# Patient Record
Sex: MALE | Race: WHITE | NOT HISPANIC OR LATINO | Employment: OTHER | URBAN - METROPOLITAN AREA
[De-identification: names, ages, dates, MRNs, and addresses within clinical notes are randomized per-mention and may not be internally consistent; named-entity substitution may affect disease eponyms.]

---

## 2017-08-10 ENCOUNTER — TRANSFERRED RECORDS (OUTPATIENT)
Dept: HEALTH INFORMATION MANAGEMENT | Facility: CLINIC | Age: 62
End: 2017-08-10

## 2018-01-13 ENCOUNTER — APPOINTMENT (OUTPATIENT)
Dept: GENERAL RADIOLOGY | Facility: CLINIC | Age: 63
End: 2018-01-13
Attending: EMERGENCY MEDICINE
Payer: COMMERCIAL

## 2018-01-13 ENCOUNTER — HOSPITAL ENCOUNTER (EMERGENCY)
Facility: CLINIC | Age: 63
Discharge: HOME | End: 2018-01-13
Attending: EMERGENCY MEDICINE | Admitting: EMERGENCY MEDICINE
Payer: COMMERCIAL

## 2018-01-13 VITALS
DIASTOLIC BLOOD PRESSURE: 60 MMHG | SYSTOLIC BLOOD PRESSURE: 102 MMHG | RESPIRATION RATE: 20 BRPM | TEMPERATURE: 99.8 F | HEIGHT: 68 IN | OXYGEN SATURATION: 99 % | HEART RATE: 81 BPM

## 2018-01-13 DIAGNOSIS — J98.8 RESPIRATORY INFECTION: ICD-10-CM

## 2018-01-13 LAB
FLUAV+FLUBV AG SPEC QL: NEGATIVE
FLUAV+FLUBV AG SPEC QL: NEGATIVE
SPECIMEN SOURCE: NORMAL

## 2018-01-13 PROCEDURE — 25000132 ZZH RX MED GY IP 250 OP 250 PS 637: Performed by: EMERGENCY MEDICINE

## 2018-01-13 PROCEDURE — 99284 EMERGENCY DEPT VISIT MOD MDM: CPT | Mod: 25 | Performed by: EMERGENCY MEDICINE

## 2018-01-13 PROCEDURE — 87804 INFLUENZA ASSAY W/OPTIC: CPT | Performed by: EMERGENCY MEDICINE

## 2018-01-13 PROCEDURE — 99284 EMERGENCY DEPT VISIT MOD MDM: CPT | Mod: Z6 | Performed by: EMERGENCY MEDICINE

## 2018-01-13 PROCEDURE — 71046 X-RAY EXAM CHEST 2 VIEWS: CPT

## 2018-01-13 RX ORDER — IPRATROPIUM BROMIDE AND ALBUTEROL SULFATE 2.5; .5 MG/3ML; MG/3ML
3 SOLUTION RESPIRATORY (INHALATION) ONCE
Status: DISCONTINUED | OUTPATIENT
Start: 2018-01-13 | End: 2018-01-13

## 2018-01-13 RX ORDER — OSELTAMIVIR PHOSPHATE 75 MG/1
75 CAPSULE ORAL 2 TIMES DAILY
Qty: 9 CAPSULE | Refills: 0 | Status: SHIPPED | OUTPATIENT
Start: 2018-01-14 | End: 2018-01-19

## 2018-01-13 RX ORDER — BENZONATATE 100 MG/1
200 CAPSULE ORAL 2 TIMES DAILY PRN
Qty: 30 CAPSULE | Refills: 0 | Status: SHIPPED | OUTPATIENT
Start: 2018-01-13 | End: 2018-08-22

## 2018-01-13 RX ORDER — DOXYCYCLINE 100 MG/1
100 CAPSULE ORAL 2 TIMES DAILY
Qty: 14 CAPSULE | Refills: 0 | Status: SHIPPED | OUTPATIENT
Start: 2018-01-13 | End: 2018-01-20

## 2018-01-13 RX ORDER — SODIUM CHLORIDE, SODIUM LACTATE, POTASSIUM CHLORIDE, CALCIUM CHLORIDE 600; 310; 30; 20 MG/100ML; MG/100ML; MG/100ML; MG/100ML
INJECTION, SOLUTION INTRAVENOUS CONTINUOUS
Status: DISCONTINUED | OUTPATIENT
Start: 2018-01-13 | End: 2018-01-13

## 2018-01-13 RX ORDER — ALBUTEROL SULFATE 90 UG/1
2 AEROSOL, METERED RESPIRATORY (INHALATION) EVERY 4 HOURS PRN
Qty: 1 INHALER | Refills: 0 | Status: SHIPPED | OUTPATIENT
Start: 2018-01-13

## 2018-01-13 RX ORDER — OSELTAMIVIR PHOSPHATE 75 MG/1
75 CAPSULE ORAL ONCE
Status: COMPLETED | OUTPATIENT
Start: 2018-01-13 | End: 2018-01-13

## 2018-01-13 RX ORDER — CETIRIZINE HYDROCHLORIDE 10 MG/1
10 TABLET ORAL DAILY
Qty: 5 TABLET | Refills: 0 | Status: SHIPPED | OUTPATIENT
Start: 2018-01-13 | End: 2018-01-18

## 2018-01-13 RX ORDER — ACETAMINOPHEN 500 MG
1000 TABLET ORAL ONCE
Status: COMPLETED | OUTPATIENT
Start: 2018-01-13 | End: 2018-01-13

## 2018-01-13 RX ADMIN — OSELTAMIVIR PHOSPHATE 75 MG: 75 CAPSULE ORAL at 23:28

## 2018-01-13 RX ADMIN — ACETAMINOPHEN 1000 MG: 500 TABLET, FILM COATED ORAL at 22:15

## 2018-01-13 ASSESSMENT — ENCOUNTER SYMPTOMS
POLYDIPSIA: 0
ADENOPATHY: 0
ABDOMINAL PAIN: 0
FLANK PAIN: 0
FEVER: 1
BRUISES/BLEEDS EASILY: 0
NAUSEA: 0
DYSURIA: 0
RHINORRHEA: 1
AGITATION: 0
FATIGUE: 1
VOMITING: 0
HEADACHES: 0
CHILLS: 1
BACK PAIN: 0
LIGHT-HEADEDNESS: 0
NECK PAIN: 0
COUGH: 1
DIFFICULTY URINATING: 0
SHORTNESS OF BREATH: 0
NECK STIFFNESS: 1
COLOR CHANGE: 0

## 2018-01-13 NOTE — ED AVS SNAPSHOT
[facility], Emergency Department    [address] [address]    Phone:  [phone]                                       Jose Carlos Bolden   MRN: [id number]    Department:  [facility], Emergency Department   Date of Visit:  1/13/2018           Patient Information     Date Of Birth          1955        Your diagnoses for this visit were:     Respiratory infection        You were seen by Osmel Ingram MD.        Discharge Instructions       Please take the anti-influenza medicine prescribed to today.  If your symptoms do not improve and you are feeling worse please start taking the antibiotic (doxycycline) that was prescribed to you today.  Your symptoms are likely caused by a virus, possibly influenza, but there is a small chance that you might have bacterial pneumonia.  Therefore, if your symptoms do not improve in 2 days on Tamiflu please start taking the antibiotic.  If you are feeling worse you should seek medical evaluation and delay your trip to [place].    Discharge Instructions  Influenza    You were diagnosed today with influenza or influenza like illness.  Influenza is a respiratory illness caused by influenza A or B viruses.  Influenza causes fever, headache, muscle aches, and fatigue.  These symptoms start one to four days after you have been around a person with this illness.  People with influenza commonly have a dry cough, sore throat, and a runny nose.   Influenza is spread through sneezing and coughing and can live on surfaces for several days.  It is usually contagious for 5 days but in some cases up to 10 days and often affects several family members. If you have a family member who is less than 2 years old, older than 65 years old, pregnant or has a serious medical condition, they should be seen right away by a physician to decide if they should take preventative medications.      Return to the Emergency Department if:    You have trouble breathing.    You develop pain in  your chest.    You have signs of being dehydrated, such as dizziness or unable to urinate at least three times daily.    You feel confused.    You cannot stop vomiting or you cannot drink enough fluids.    In children, you should seek help if the child has any of the above or if child:    Has blue or purplish skin color.    Is so irritable that he or she does not want to be held.    Does not have tears when crying (in infants) or does not urinate at least three times daily.    Does not wake up easily.    Follow-up with your doctor if you are not improving after 5 days.    What can I do to help myself?    Rest.    Fluids -- Drink hydrating solutions such as Gatorade  or Pedialyte  as often as you can. If you are drinking enough, you should pass urine at least every eight hours.    Tylenol  (acetaminophen) and Advil  (ibuprofen) can relieve fever, headache, and muscle aches. Do not give aspirin to children under 18 years old.     Antiviral treatment -- Antiviral medicines do not make the flu symptoms go away immediately.  They have only been shown to make the symptoms go away 12 to 24 hours sooner than they would without treatment.       Antibiotics -- Antibiotics are NOT useful for treating viral illnesses such as influenza. Antibiotics should only be used if there is a bacterial complication of the flu such as bacterial pneumonia, ear infection, or sinusitis.    Because you were diagnosed with a flu like illness you are very contagious.  This means you cannot work, attend school or  for at least 24 hours or until you no longer have a fever.  If you were given a prescription for medicine here today, be sure to read all of the information (including the package insert) that comes with your prescription.  This will include important information about the medicine, its side effects, and any warnings that you need to know about.  The pharmacist who fills the prescription can provide more information and answer  questions you may have about the medicine.  If you have questions or concerns that the pharmacist cannot address, please call or return to the Emergency Department.         Discharge References/Attachments     ADULT, PNEUMONIA (ENGLISH)      24 Hour Appointment Hotline       To make an appointment at any [facility], call [phone] ([phone]). If you don't have a family doctor or clinic, we will help you find one. [place] clinics are conveniently located to serve the needs of you and your family.             Review of your medicines      START taking        Dose / Directions Last dose taken    albuterol 108 (90 BASE) MCG/ACT Inhaler   Commonly known as:  PROAIR HFA   Dose:  2 puff   Quantity:  1 Inhaler        Inhale 2 puffs into the lungs every 4 hours as needed for shortness of breath / dyspnea   Refills:  0        benzonatate 100 MG capsule   Commonly known as:  TESSALON   Dose:  200 mg   Quantity:  30 capsule        Take 2 capsules (200 mg) by mouth 2 times daily as needed   Refills:  0        cetirizine 10 MG tablet   Commonly known as:  zyrTEC   Dose:  10 mg   Quantity:  5 tablet        Take 1 tablet (10 mg) by mouth daily for 5 days   Refills:  0        doxycycline 100 MG capsule   Commonly known as:  VIBRAMYCIN   Dose:  100 mg   Quantity:  14 capsule        Take 1 capsule (100 mg) by mouth 2 times daily for 7 days   Refills:  0        oseltamivir 75 MG capsule   Commonly known as:  TAMIFLU   Dose:  75 mg   Quantity:  9 capsule   Start taking on:  1/14/2018        Take 1 capsule (75 mg) by mouth 2 times daily for 5 days   Refills:  0          Our records show that you are taking the medicines listed below. If these are incorrect, please call your family doctor or clinic.        Dose / Directions Last dose taken    FISH OIL        Refills:  0        simvastatin 20 MG tablet   Commonly known as:  ZOCOR   Dose:  20 mg        Take 20 mg by mouth At Bedtime.   Refills:  0               "  Prescriptions were sent or printed at these locations (5 Prescriptions)                   Other Prescriptions                Printed at Department/Unit printer (5 of 5)         oseltamivir (TAMIFLU) 75 MG capsule               doxycycline (VIBRAMYCIN) 100 MG capsule               albuterol (PROAIR HFA) 108 (90 BASE) MCG/ACT Inhaler               cetirizine (ZYRTEC) 10 MG tablet               benzonatate (TESSALON) 100 MG capsule                Procedures and tests performed during your visit     Influenza A/B antigen    XR Chest 2 Views      Orders Needing Specimen Collection     None      Pending Results     Date and Time Order Name Status Description    1/13/2018 2138 XR Chest 2 Views Preliminary             Pending Culture Results     No orders found from 1/11/2018 to 1/14/2018.            Pending Results Instructions     If you had any lab results that were not finalized at the time of your Discharge, you can call the ED Lab Result RN at [phone]. You will be contacted by this team for any positive Lab results or changes in treatment. The nurses are available 7 days a week from 10A to 6:30P.  You can leave a message 24 hours per day and they will return your call.        Thank you for choosing [facility]       Thank you for choosing [facility] for your care. Our goal is always to provide you with excellent care. Hearing back from our patients is one way we can continue to improve our services. Please take a few minutes to complete the written survey that you may receive in the mail after you visit with us. Thank you!        [employer] Information     [employer] lets you send messages to your doctor, view your test results, renew your prescriptions, schedule appointments and more. To sign up, go to [email or web address] . Click on \"Log in\" on the left side of the screen, which will take you to the Welcome page. Then click on \"Sign up Now\" on the right side of the page.     You will be asked to enter the access " code listed below, as well as some personal information. Please follow the directions to create your username and password.     Your access code is: [email or web address]  Expires: 2018 11:19 PM     Your access code will  in 90 days. If you need help or a new code, please call your [facility] clinic or [phone].        Care EveryWhere ID     This is your Care EveryWhere ID. This could be used by other organizations to access your [facility] medical records  [id number]        Equal Access to Services     [facility] : Kristen Olivarez, mary delgado, sarah arriagaalbarbi boston, matilde michelle . So [facility] [phone].    ATENCIÓN: Si habla español, tiene a alvarenga disposición servicios gratuitos de asistencia lingüística. Llame al [phone].    We comply with applicable federal civil rights laws and [place] laws. We do not discriminate on the basis of race, color, national origin, age, disability, sex, sexual orientation, or gender identity.            After Visit Summary       This is your record. Keep this with you and show to your community pharmacist(s) and doctor(s) at your next visit.

## 2018-01-13 NOTE — ED AVS SNAPSHOT
[facility], [place], Emergency Department    [address] [place] [address]    Phone:  [phone]                                       Jose Carlos Bolden   MRN: [id number]    Department:  [facility], Emergency Department   Date of Visit:  1/13/2018           After Visit Summary Signature Page     I have received my discharge instructions, and my questions have been answered. I have discussed any challenges I see with this plan with the nurse or doctor.    ..........................................................................................................................................  Patient/Patient Representative Signature      ..........................................................................................................................................  Patient Representative Print Name and Relationship to Patient    ..................................................               ................................................  Date                                            Time    ..........................................................................................................................................  Reviewed by Signature/Title    ...................................................              ..............................................  Date                                                            Time

## 2018-01-14 NOTE — DISCHARGE INSTRUCTIONS
Please take the anti-influenza medicine prescribed to today.  If your symptoms do not improve and you are feeling worse please start taking the antibiotic (doxycycline) that was prescribed to you today.  Your symptoms are likely caused by a virus, possibly influenza, but there is a small chance that you might have bacterial pneumonia.  Therefore, if your symptoms do not improve in 2 days on Tamiflu please start taking the antibiotic.  If you are feeling worse you should seek medical evaluation and delay your trip to [place].    Discharge Instructions  Influenza    You were diagnosed today with influenza or influenza like illness.  Influenza is a respiratory illness caused by influenza A or B viruses.  Influenza causes fever, headache, muscle aches, and fatigue.  These symptoms start one to four days after you have been around a person with this illness.  People with influenza commonly have a dry cough, sore throat, and a runny nose.   Influenza is spread through sneezing and coughing and can live on surfaces for several days.  It is usually contagious for 5 days but in some cases up to 10 days and often affects several family members. If you have a family member who is less than 2 years old, older than 65 years old, pregnant or has a serious medical condition, they should be seen right away by a physician to decide if they should take preventative medications.      Return to the Emergency Department if:    You have trouble breathing.    You develop pain in your chest.    You have signs of being dehydrated, such as dizziness or unable to urinate at least three times daily.    You feel confused.    You cannot stop vomiting or you cannot drink enough fluids.    In children, you should seek help if the child has any of the above or if child:    Has blue or purplish skin color.    Is so irritable that he or she does not want to be held.    Does not have tears when crying (in infants) or does not urinate at least three  times daily.    Does not wake up easily.    Follow-up with your doctor if you are not improving after 5 days.    What can I do to help myself?    Rest.    Fluids -- Drink hydrating solutions such as Gatorade  or Pedialyte  as often as you can. If you are drinking enough, you should pass urine at least every eight hours.    Tylenol  (acetaminophen) and Advil  (ibuprofen) can relieve fever, headache, and muscle aches. Do not give aspirin to children under 18 years old.     Antiviral treatment -- Antiviral medicines do not make the flu symptoms go away immediately.  They have only been shown to make the symptoms go away 12 to 24 hours sooner than they would without treatment.       Antibiotics -- Antibiotics are NOT useful for treating viral illnesses such as influenza. Antibiotics should only be used if there is a bacterial complication of the flu such as bacterial pneumonia, ear infection, or sinusitis.    Because you were diagnosed with a flu like illness you are very contagious.  This means you cannot work, attend school or  for at least 24 hours or until you no longer have a fever.  If you were given a prescription for medicine here today, be sure to read all of the information (including the package insert) that comes with your prescription.  This will include important information about the medicine, its side effects, and any warnings that you need to know about.  The pharmacist who fills the prescription can provide more information and answer questions you may have about the medicine.  If you have questions or concerns that the pharmacist cannot address, please call or return to the Emergency Department.

## 2018-01-14 NOTE — ED PROVIDER NOTES
[place] EMERGENCY DEPARTMENT ([facility])  1/13/18 ED 15    History     Chief Complaint   Patient presents with     Flu Symptoms     The history is provided by the patient and medical records.     Jose Carlos Bolden is a 62 year old male with no past medical history who presents with nonproductive cough, pleuritic chest pain, sore throat, and body aches that started abruptly 5 days ago. He has a fever of 101.1  F. He has some neck stiffness that is intermittent. He did have some wheezing before coming here and used an old inhaler. He has some fatigue and sore throat as well as diarrhea. No rashes. No headache. No vomiting.  No shortness of breath or chest pain.  He has decreased appetite but can tolerate food and fluids OK. He tried ibuprofen at 3pm. He has not had any tylenol. No other sick contacts. He has been concerned for aspiration as he does wake up choking in the middle of the night sometimes and sometimes has trouble swallowing his food. He denies any recent significant episodes of possible aspiration, but he is still worried this could be contributing to his symptoms.     I have reviewed the Medications, Allergies, Past Medical and Surgical History, and Social History in the Epic system.    Review of Systems   Constitutional: Positive for chills, fatigue and fever.   HENT: Positive for congestion and rhinorrhea.    Eyes: Negative for visual disturbance.   Respiratory: Positive for cough. Negative for shortness of breath.    Cardiovascular: Negative for chest pain.   Gastrointestinal: Negative for abdominal pain, nausea and vomiting.   Endocrine: Negative for polydipsia and polyuria.   Genitourinary: Negative for difficulty urinating, dysuria and flank pain.   Musculoskeletal: Positive for neck stiffness. Negative for back pain and neck pain.   Skin: Negative for color change.   Allergic/Immunologic: Negative for immunocompromised state.   Neurological: Negative for light-headedness and headaches.  "  Hematological: Negative for adenopathy. Does not bruise/bleed easily.   Psychiatric/Behavioral: Negative for agitation and behavioral problems.       Physical Exam   BP: 132/74  Pulse: 86  Temp: 101.1  F (38.4  C)  Resp: 20  Height: 172.7 cm (5' 8\")  SpO2: 99 %      Physical Exam   Constitutional: He is oriented to person, place, and time. He appears well-developed and well-nourished. No distress.   HENT:   Head: Normocephalic and atraumatic.   Right Ear: External ear normal.   Left Ear: External ear normal.   Nose: Nose normal.   Mouth/Throat: Oropharynx is clear and moist. No oropharyngeal exudate.   No tonsillar enlargement or exudates.  No elevation of the floor of the mouth.  Airway is patent.   Eyes: Conjunctivae and EOM are normal. Pupils are equal, round, and reactive to light. No scleral icterus.   Neck: Normal range of motion, full passive range of motion without pain and phonation normal. Neck supple. No spinous process tenderness and no muscular tenderness present. No rigidity. No edema, no erythema and normal range of motion present.   There is no meningismus.   Cardiovascular: Normal rate, normal heart sounds and intact distal pulses.    Pulmonary/Chest: Effort normal and breath sounds normal. No respiratory distress. He has no wheezes. He has no rales.   Abdominal: Soft. Bowel sounds are normal. There is no tenderness.   Musculoskeletal: Normal range of motion. He exhibits no edema or tenderness.   Lymphadenopathy:     He has no cervical adenopathy.   Neurological: He is alert and oriented to person, place, and time. He has normal strength. No cranial nerve deficit or sensory deficit. He exhibits normal muscle tone. Coordination and gait normal. GCS eye subscore is 4. GCS verbal subscore is 5. GCS motor subscore is 6.   Skin: Skin is warm. No rash noted. He is not diaphoretic.   Psychiatric: He has a normal mood and affect. His behavior is normal. Judgment and thought content normal.   Nursing note " and vitals reviewed.      ED Course     ED Course     Procedures             Critical Care time:  none             Labs Ordered and Resulted from Time of ED Arrival Up to the Time of Departure from the ED   PULSE OXIMETRY NURSING   CARDIAC CONTINUOUS MONITORING   PATIENT CARE ORDER   INFLUENZA A/B ANTIGEN            Assessments & Plan (with Medical Decision Making)   Jose Carlos Bolden is a 62-year-old man presents with a cough, fevers, and body aches for 2 days.  Differential diagnosis:  influenza, pneumonia, viral illness.    After thorough history and physical exam the patient appears to be no acute distress.   He is febrile in the emergency department and I will treat him with oral Tylenol.  I will obtain laboratory studies and hydrate him with bolus of lactated Ringer's, obtain chest x-ray and influenza testing.  He and his family member agree with the plan.      Patient's influenza testing returned negative.  I reviewed his chest x-ray and I read the radiology report; there are hazy perihilar and bibasilar opacities.  Patient's symptoms are mainly consistent with a viral illness and I have a high suspicion that he still might have influenza since the sensitivity of the rapid influenza testing is rather poor.  I did discuss these findings and results with him and his wife and we made a plan for him to start taking Tamiflu.  I will give him his first dose tonight and then give him a prescription for the rest of the course.  I believe that benefit of taking the medicine outweighs the risks.  Patient and wife did inform me that they are leaving for [place] in 2 days.  Therefore, to err on the side of caution I did give him prescription for doxycycline in case this is a bacterial pneumonia.  If he is not feeling better in 48 hours on Tamiflu he will start taking doxycycline.  I also strongly advised him to come back to the Emergency Department for reevaluation and potentially delay his trip if possible.  He is unsure if he  will delay his trip at this time.  He will assess his condition in the next 36 hours and make that decision at that time.  At this point he is stable for discharge.  His fever has resolved in the Emergency Department.    This part of the document was transcribed by Darlene Pham, Medical Scribe.      I have reviewed the nursing notes.    I have reviewed the findings, diagnosis, plan and need for follow up with the patient.    Discharge Medication List as of 1/13/2018 11:19 PM      START taking these medications    Details   oseltamivir (TAMIFLU) 75 MG capsule Take 1 capsule (75 mg) by mouth 2 times daily for 5 days, Disp-9 capsule, R-0, Local PrintFirst dose given in the emergency department at 11:30 PM on January 13, 2018.      doxycycline (VIBRAMYCIN) 100 MG capsule Take 1 capsule (100 mg) by mouth 2 times daily for 7 days, Disp-14 capsule, R-0, Local Print      albuterol (PROAIR HFA) 108 (90 BASE) MCG/ACT Inhaler Inhale 2 puffs into the lungs every 4 hours as needed for shortness of breath / dyspnea, Disp-1 Inhaler, R-0, Local Print      cetirizine (ZYRTEC) 10 MG tablet Take 1 tablet (10 mg) by mouth daily for 5 days, Disp-5 tablet, R-0, Local Print      benzonatate (TESSALON) 100 MG capsule Take 2 capsules (200 mg) by mouth 2 times daily as needed, Disp-30 capsule, R-0, Local Print             Final diagnoses:   Respiratory infection     I was physically present and have reviewed and verified the accuracy of this note documented by my scribe.    Osmel Ingram MD      1/13/2018   [facility], [place], EMERGENCY DEPARTMENT     Osmel Ingram MD  01/13/18 2151

## 2018-01-14 NOTE — ED NOTES
Pt presents to ED with c/o fever, cough, chest wall pain, and neck stiffness, fatigue, and sore throat. Symptoms started Tuesday night. Pt temp on arrival 101.1. Pt has been taking ibuprofen with some relief, tried inhaler without improvement. No significant medical history.

## 2018-07-12 ENCOUNTER — TRANSFERRED RECORDS (OUTPATIENT)
Dept: HEALTH INFORMATION MANAGEMENT | Facility: CLINIC | Age: 63
End: 2018-07-12

## 2018-08-10 ENCOUNTER — TELEPHONE (OUTPATIENT)
Dept: OPHTHALMOLOGY | Facility: CLINIC | Age: 63
End: 2018-08-10

## 2018-08-10 NOTE — TELEPHONE ENCOUNTER
[facility] Call Center    Phone Message    May a detailed message be left on voicemail: yes    Reason for Call: Other: Per pt, requesting visual field test. Has appt coming up this Monday (8/13/2018). Please follow up     Action Taken: Message routed to:  [facility] ([facility] EYE

## 2018-08-13 ENCOUNTER — OFFICE VISIT (OUTPATIENT)
Dept: OPHTHALMOLOGY | Facility: CLINIC | Age: 63
End: 2018-08-13
Attending: OPHTHALMOLOGY
Payer: COMMERCIAL

## 2018-08-13 DIAGNOSIS — H40.003 GLAUCOMA SUSPECT, BOTH EYES: Primary | ICD-10-CM

## 2018-08-13 DIAGNOSIS — H04.123 DRY EYES, BILATERAL: ICD-10-CM

## 2018-08-13 DIAGNOSIS — H25.13 NUCLEAR SCLEROTIC CATARACT OF BOTH EYES: ICD-10-CM

## 2018-08-13 DIAGNOSIS — H52.203 HYPEROPIC ASTIGMATISM OF BOTH EYES: ICD-10-CM

## 2018-08-13 DIAGNOSIS — H52.4 PRESBYOPIA: ICD-10-CM

## 2018-08-13 DIAGNOSIS — H10.13 ALLERGIC CONJUNCTIVITIS OF BOTH EYES: ICD-10-CM

## 2018-08-13 PROCEDURE — 92015 DETERMINE REFRACTIVE STATE: CPT | Mod: ZF

## 2018-08-13 PROCEDURE — G0463 HOSPITAL OUTPT CLINIC VISIT: HCPCS | Mod: ZF

## 2018-08-13 PROCEDURE — 92083 EXTENDED VISUAL FIELD XM: CPT | Mod: ZF | Performed by: OPHTHALMOLOGY

## 2018-08-13 RX ORDER — ATORVASTATIN CALCIUM 20 MG/1
TABLET, FILM COATED ORAL
Refills: 0 | COMMUNITY
Start: 2018-05-09 | End: 2018-09-05

## 2018-08-13 RX ORDER — AMOXICILLIN 500 MG/1
2000 TABLET, FILM COATED ORAL
COMMUNITY
Start: 2014-05-06 | End: 2018-09-05

## 2018-08-13 RX ORDER — GABAPENTIN 600 MG/1
TABLET ORAL
Refills: 3 | COMMUNITY
Start: 2018-07-16 | End: 2018-08-22

## 2018-08-13 ASSESSMENT — VISUAL ACUITY
METHOD: SNELLEN - LINEAR
CORRECTION_TYPE: GLASSES
OD_CC+: -3
OD_CC: 20/25
OS_CC: 20/20

## 2018-08-13 ASSESSMENT — EXTERNAL EXAM - RIGHT EYE: OD_EXAM: NORMAL

## 2018-08-13 ASSESSMENT — REFRACTION_WEARINGRX
OD_CYLINDER: +1.00
OS_SPHERE: +0.50
OS_ADD: +2.50
SPECS_TYPE: PAL
OD_AXIS: 147
OS_AXIS: 057
OD_ADD: +2.50
OD_SPHERE: +0.50
OS_CYLINDER: +0.75

## 2018-08-13 ASSESSMENT — REFRACTION_MANIFEST
OS_CYLINDER: SPHERE
OD_ADD: +2.50
OS_ADD: +2.50
OD_CYLINDER: +0.75
OD_SPHERE: +0.25
OS_SPHERE: +0.50
OD_AXIS: 174

## 2018-08-13 ASSESSMENT — CONF VISUAL FIELD
OD_NORMAL: 1
METHOD: COUNTING FINGERS
OS_NORMAL: 1

## 2018-08-13 ASSESSMENT — TONOMETRY
OD_IOP_MMHG: 21
IOP_METHOD: ICARE
IOP_METHOD: APPLANATION
OD_IOP_MMHG: 20
OS_IOP_MMHG: 18
OS_IOP_MMHG: 20

## 2018-08-13 ASSESSMENT — SLIT LAMP EXAM - LIDS
COMMENTS: MILD MGD
COMMENTS: MILD MGD

## 2018-08-13 ASSESSMENT — EXTERNAL EXAM - LEFT EYE: OS_EXAM: NORMAL

## 2018-08-13 ASSESSMENT — CUP TO DISC RATIO
OD_RATIO: 0.65
OS_RATIO: 0.55

## 2018-08-13 NOTE — MR AVS SNAPSHOT
After Visit Summary   8/13/2018    Jose Carlos Bolden    MRN: [id number]           Patient Information     Date Of Birth          1955        Visit Information        Provider Department      8/13/2018 8:00 AM Brenda [facility]as MD [facility]        Today's Diagnoses     Glaucoma suspect, both eyes    -  1    Allergic conjunctivitis of both eyes        Dry eyes, bilateral        Nuclear sclerotic cataract of both eyes        Hyperopic astigmatism of both eyes        Presbyopia           Follow-ups after your visit        Follow-up notes from your care team     Return in about 1 year (around 8/13/2019) for applanation IOP, dilation, nerve OCT OU.      Your next 10 appointments already scheduled     Aug 22, 2018 10:10 AM CDT   (Arrive by 9:55 AM)   New Patient Visit with Alyse Young MD   [facility] ([facility])    [address] [place] [id number] [phone]            Sep 13, 2018  1:00 PM CDT   (Arrive by 12:45 PM)   Hearing Aid Evaluation with Piyush Bob   [facility] ([facility])    [address] [place] [id number] [phone]           Please see your medical professional for ear cleaning prior to this appointment if you believe wax buildup may be an issue. All patients are required to have a physician's order stating the medical reason for the hearing test. Your doctor can send an electronic order, use their own form or we have provided a form (called Physician's Order for Audiology Services). It states that there is a medical reason for your exam. Without an order you may need to be rescheduled until the order can be obtained.              Who to contact     Please call your clinic at [phone] to:    Ask questions about your health    Make or cancel appointments    Discuss your medicines    Learn about your test results    Speak to your doctor             Additional Information About Your Visit        Digiscend Information     Digiscend is an electronic gateway that provides easy, online access to your medical records. With Digiscend, you can request a clinic appointment, read your test results, renew a prescription or communicate with your care team.     To sign up for Digiscend visit the website at [email or web address]   You will be asked to enter the access code listed below, as well as some personal information. Please follow the directions to create your username and password.     Your access code is: [email or web address]  Expires: 2018  6:30 AM     Your access code will  in 90 days. If you need help or a new code, please contact your [facility] or call [phone] for assistance.        Care EveryWhere ID     This is your Care EveryWhere ID. This could be used by other organizations to access your [place] medical records  [id number]         Blood Pressure from Last 3 Encounters:   18 102/60   12/15/11 134/88    Weight from Last 3 Encounters:   12/15/11 59.9 kg (132 lb)              We Performed the Following     OVF 24-2 Dynamic OU        Primary Care Provider Office Phone # Fax #    Petre Enmanuel Huerta [phone]       [place] [id number] [facility] [address]        Equal Access to Services     GRIFFIN SCOTT : Hadii aad ku hadasho Soomaali, waaxda luqadaha, qaybta kaalmada adeegyada, matilde bland. So [facility] [phone].    ATENCIÓN: Si habla español, tiene a alvarenga disposición servicios gratuitos de asistencia lingüística. Llame al [phone].    We comply with applicable federal civil rights laws and [place] laws. We do not discriminate on the basis of race, color, national origin, age, disability, sex, sexual orientation, or gender identity.            Thank you!     Thank you for choosing EYE CLINIC  for your care. Our goal is always to provide you  with excellent care. Hearing back from our patients is one way we can continue to improve our services. Please take a few minutes to complete the written survey that you may receive in the mail after your visit with us. Thank you!             Your Updated Medication List - Protect others around you: Learn how to safely use, store and throw away your medicines at www.disposemymeds.org.          This list is accurate as of 8/13/18 10:39 AM.  Always use your most recent med list.                   Brand Name Dispense Instructions for use Diagnosis    albuterol 108 (90 Base) MCG/ACT inhaler    PROAIR HFA    1 Inhaler    Inhale 2 puffs into the lungs every 4 hours as needed for shortness of breath / dyspnea        amoxicillin 500 MG tablet    AMOXIL     Take 2,000 mg by mouth        atorvastatin 20 MG tablet    LIPITOR          benzonatate 100 MG capsule    TESSALON    30 capsule    Take 2 capsules (200 mg) by mouth 2 times daily as needed        FISH OIL           gabapentin 600 MG tablet    NEURONTIN          simvastatin 20 MG tablet    ZOCOR     Take 20 mg by mouth At Bedtime.

## 2018-08-13 NOTE — NURSING NOTE
Chief Complaints and History of Present Illnesses   Patient presents with     Annual Eye Exam     HPI    Affected eye(s):  Both   Symptoms:     Blurred vision (Comment: in AM)   Floaters      Frequency:  Constant       Do you have eye pain now?:  Yes   Location:  OU   Pain Level:  Mild Pain (2)   Pain Frequency:  Constant      Comments:  Blurred vision on waking, maybe due to allergies in last couple weeks. New floaters for 6 weeks, worse in left. H/o eye injury in left eye- hit with a ball in the past, and also twig hit eye last year. Pt states he has ongoing eye pain, both eyes for years.    Pt is glaucoma suspect and requests VF testing    Sintia Cherry COT 8:19 AM August 13, 2018

## 2018-08-13 NOTE — PROGRESS NOTES
FELICE Bolden is a 63 year old male here for yearly eye exam. He has noted some intermittent blurring of both eyes in the morning associated with his seasonal allergies. He is not using any drops for this. He denies pain, redness, discharge. No flashes/floaters.    POH: Glaucoma suspect followed by Dr. Lopez  FH: 2 of 8 siblings with glaucoma    Assessment & Plan      (H40.003) Glaucoma suspect, both eyes  (primary encounter diagnosis)  Comment: Based on increased cup to disc ratio with mild cup to disc asymmetry and + FH    FH: + 2 of 8 siblings  Pachymetry: 569/580  Gonioscopy:  Tmax: 22 OU  Today's IOP: 21/20  Target IOP:  Current medications: None  Meds to avoid: None  Visual field: OVF 24-2 (8/13/18)  right eye: Non-specific generalized depression, no glaucomatous defects on pattern  left eye: Non-specific generalized depression, no glaucomatous defects on pattern  - stable compared to last HVF described by Dr. Lopez  Nerve OCT:    Plan: Had been followed yearly by Dr. Lopez, alternating OVF and nerve OCT. Will request prior visual fields and OCTs. Will continue pattern of alternating testing.     RTC 1 year with applanation, dilation, nerve OCT each eye.    (H10.13) Allergic conjunctivitis of both eyes  (H04.123) Dry eyes, bilateral  Comment: Worse in allergy season  Plan: Try Zaditor each eye BID and AT 2-3x daily OU    (H25.13) Nuclear sclerotic cataract of both eyes  Comment: Not visually significant  Plan: Observe    (H52.203) Hyperopic astigmatism of both eyes  (H52.4) Presbyopia  Comment: Minimal change in refraction  Plan: Given updated glasses Rx     -----------------------------------------------------------------------------------    Patient disposition:   Return in about 1 year (around 8/13/2019) for applanation IOP, dilation, nerve OCT OU. or sooner as needed.    Teaching statement:  Complete documentation of historical and exam elements from today's encounter can be found in the full encounter  summary report (not reduplicated in this progress note). I personally obtained the chief complaint(s) and history of present illness.  I confirmed and edited as necessary the review of systems, past medical/surgical history, family history, social history, and examination findings as documented by others; and I examined the patient myself. I personally reviewed the relevant tests, images, and reports as documented above.     I formulated and edited as necessary the assessment and plan and discussed the findings and management plan with the patient and family.    Brenda Arias MD  Comprehensive Ophthalmology & Ocular Pathology  Department of Ophthalmology and Visual Neurosciences  [email or web address]  Pager [phone]

## 2018-08-21 ENCOUNTER — PATIENT OUTREACH (OUTPATIENT)
Dept: CARE COORDINATION | Facility: CLINIC | Age: 63
End: 2018-08-21

## 2018-08-22 ENCOUNTER — OFFICE VISIT (OUTPATIENT)
Dept: INTERNAL MEDICINE | Facility: CLINIC | Age: 63
End: 2018-08-22
Payer: COMMERCIAL

## 2018-08-22 VITALS
BODY MASS INDEX: 20.17 KG/M2 | HEART RATE: 56 BPM | DIASTOLIC BLOOD PRESSURE: 69 MMHG | WEIGHT: 133.1 LBS | SYSTOLIC BLOOD PRESSURE: 133 MMHG | HEIGHT: 68 IN

## 2018-08-22 DIAGNOSIS — R39.15 URGENCY OF URINATION: ICD-10-CM

## 2018-08-22 DIAGNOSIS — Z76.89 ENCOUNTER TO ESTABLISH CARE WITH NEW DOCTOR: Primary | ICD-10-CM

## 2018-08-22 DIAGNOSIS — R30.0 DYSURIA: ICD-10-CM

## 2018-08-22 DIAGNOSIS — E78.5 HYPERLIPIDEMIA LDL GOAL <130: ICD-10-CM

## 2018-08-22 DIAGNOSIS — G25.81 RESTLESS LEGS SYNDROME: ICD-10-CM

## 2018-08-22 LAB — PSA SERPL-MCNC: 1.27 UG/L (ref 0–4)

## 2018-08-22 RX ORDER — GABAPENTIN 600 MG/1
600 TABLET ORAL 3 TIMES DAILY
Qty: 270 TABLET | Refills: 3 | Status: SHIPPED | OUTPATIENT
Start: 2018-08-22 | End: 2019-07-31

## 2018-08-22 ASSESSMENT — ENCOUNTER SYMPTOMS
SINUS CONGESTION: 1
HOARSE VOICE: 0
JOINT SWELLING: 0
MUSCLE WEAKNESS: 0
EYE REDNESS: 0
SORE THROAT: 0
MUSCLE CRAMPS: 1
MYALGIAS: 1
BACK PAIN: 1
NECK MASS: 0
SINUS PAIN: 1
NAIL CHANGES: 0
EYE IRRITATION: 1
STIFFNESS: 0
SKIN CHANGES: 1
POOR WOUND HEALING: 0
EYE WATERING: 1
TASTE DISTURBANCE: 0
DOUBLE VISION: 0
TROUBLE SWALLOWING: 0
EYE PAIN: 0
ARTHRALGIAS: 1
SMELL DISTURBANCE: 0
NECK PAIN: 0

## 2018-08-22 ASSESSMENT — ACTIVITIES OF DAILY LIVING (ADL)
IN_THE_PAST_7_DAYS,_DID_YOU_NEED_HELP_FROM_OTHERS_TO_PERFORM_EVERYDAY_ACTIVITIES_SUCH_AS_EATING,_GETTING_DRESSED,_GROOMING,_BATHING,_WALKING,_OR_USING_THE_TOILET: N
IN_THE_PAST_7_DAYS,_DID_YOU_NEED_HELP_FROM_OTHERS_TO_TAKE_CARE_OF_THINGS_SUCH_AS_LAUNDRY_AND_HOUSEKEEPING,_BANKING,_SHOPPING,_USING_THE_TELEPHONE,_FOOD_PREPARATION,_TRANSPORTATION,_OR_TAKING_YOUR_OWN_MEDICATIONS?: N

## 2018-08-22 ASSESSMENT — PAIN SCALES - GENERAL: PAINLEVEL: MILD PAIN (2)

## 2018-08-22 NOTE — PATIENT INSTRUCTIONS
[facility] Medication Refill Request Information:  * Please contact your pharmacy regarding ANY request for medication refills.  ** [facility] Prescription Fax = [phone]  * Please allow 3 business days for routine medication refills.  * Please allow 5 business days for controlled substance medication refills.     [facility] Test Result notification information:  *You will be notified with in 7-10 days of your appointment day regarding the results of your test.  If you are on MyChart you will be notified as soon as the provider has reviewed the results and signed off on them.    [facility]: [phone]

## 2018-08-22 NOTE — NURSING NOTE
Chief Complaint   Patient presents with     Physical     pt here for physical       Fernanda Ley CMA at 10:18 AM on 8/22/2018.

## 2018-08-22 NOTE — MR AVS SNAPSHOT
After Visit Summary   8/22/2018    Jose Carlos Bolden    MRN: [id number]           Patient Information     Date Of Birth          1955        Visit Information        Provider Department      8/22/2018 10:10 AM Alyse Young MD [facility]        Today's Diagnoses     Encounter to establish care with new doctor    -  1    Dysuria        Restless legs syndrome        Hyperlipidemia LDL goal <130        Urgency of urination           Care Instructions    Primary [facility] Center Medication Refill Request Information:  * Please contact your pharmacy regarding ANY request for medication refills.  ** [facility] Prescription Fax = [phone]  * Please allow 3 business days for routine medication refills.  * Please allow 5 business days for controlled substance medication refills.     [facility] Center Test Result notification information:  *You will be notified with in 7-10 days of your appointment day regarding the results of your test.  If you are on MyChart you will be notified as soon as the provider has reviewed the results and signed off on them.    [facility]: [phone]             Follow-ups after your visit        Follow-up notes from your care team     Return in about 2 weeks (around 9/5/2018) for Physical Exam (Medicare vs. routine).      Your next 10 appointments already scheduled     Sep 05, 2018  7:30 AM CDT   (Arrive by 7:15 AM)   PHYSICAL with Alyse Young MD   [facility] ([facility])    [address] [place] [id number] [phone]            Sep 13, 2018  1:00 PM CDT   (Arrive by 12:45 PM)   Hearing Aid Evaluation with Piyush Bob   [facility] ([facility])    [address] [place] [address] [id number] [phone]           Please see your medical professional for ear cleaning prior to this appointment if you believe wax buildup may  "be an issue. All patients are required to have a physician's order stating the medical reason for the hearing test. Your doctor can send an electronic order, use their own form or we have provided a form (called Physician's Order for Audiology Services). It states that there is a medical reason for your exam. Without an order you may need to be rescheduled until the order can be obtained.              Who to contact     Please call your clinic at [phone] to:    Ask questions about your health    Make or cancel appointments    Discuss your medicines    Learn about your test results    Speak to your doctor            Additional Information About Your Visit        Greenopedia[employer]Ringadoc Information     Sprout is an electronic gateway that provides easy, online access to your medical records. With Sprout, you can request a clinic appointment, read your test results, renew a prescription or communicate with your care team.     To sign up for Sprout visit the website at [email or web address]   You will be asked to enter the access code listed below, as well as some personal information. Please follow the directions to create your username and password.     Your access code is: [email or web address]  Expires: 2018  6:30 AM     Your access code will  in 90 days. If you need help or a new code, please contact your [facility] or call [phone] for assistance.        Care EveryWhere ID     This is your Care EveryWhere ID. This could be used by other organizations to access your [place] medical records  [id number]        Your Vitals Were     Pulse Height BMI (Body Mass Index)             56 1.715 m (5' 7.52\") 20.53 kg/m2          Blood Pressure from Last 3 Encounters:   18 133/69   18 102/60   12/15/11 134/88    Weight from Last 3 Encounters:   18 60.4 kg (133 lb 1.6 oz)   12/15/11 59.9 kg (132 lb)                 Today's Medication Changes          These changes are " accurate as of 8/22/18 11:59 PM.  If you have any questions, ask your nurse or doctor.               These medicines have changed or have updated prescriptions.        Dose/Directions    gabapentin 600 MG tablet   Commonly known as:  NEURONTIN   This may have changed:    - how much to take  - how to take this  - when to take this  - additional instructions   Used for:  Restless legs syndrome   Changed by:  Alyse Young MD        Dose:  600 mg   Take 1 tablet (600 mg) by mouth 3 times daily As directed   Quantity:  270 tablet   Refills:  3         Stop taking these medicines if you haven't already. Please contact your care team if you have questions.     benzonatate 100 MG capsule   Commonly known as:  TESSALON   Stopped by:  Alyse Young MD           FISH OIL   Stopped by:  Alyse Young MD           simvastatin 20 MG tablet   Commonly known as:  ZOCOR   Stopped by:  Alyse Young MD                Where to get your medicines      These medications were sent to [address] [address]     Phone:  [phone]     gabapentin 600 MG tablet                Primary Care Provider Office Phone # Fax #    Peter Enmanuel Huerta [phone]       [facility] [address] [facility] [address]        Equal Access to Services     GRIFFIN SCOTT AH: Kristen ludwigo Solew, waaxda lutinyadaha, qaybta kaalmada adeegyada, matilde bland. So [facility] [phone].    ATENCIÓN: Si habla español, tiene a alvarenga disposición servicios gratuitos de asistencia lingüística. Llame al [phone].    We comply with applicable federal civil rights laws and [place] laws. We do not discriminate on the basis of race, color, national origin, age, disability, sex, sexual orientation, or gender identity.            Thank you!     Thank you for choosing [facility]  for your care. Our goal  is always to provide you with excellent care. Hearing back from our patients is one way we can continue to improve our services. Please take a few minutes to complete the written survey that you may receive in the mail after your visit with us. Thank you!             Your Updated Medication List - Protect others around you: Learn how to safely use, store and throw away your medicines at www.disposemymeds.org.          This list is accurate as of 8/22/18 11:59 PM.  Always use your most recent med list.                   Brand Name Dispense Instructions for use Diagnosis    albuterol 108 (90 Base) MCG/ACT inhaler    PROAIR HFA    1 Inhaler    Inhale 2 puffs into the lungs every 4 hours as needed for shortness of breath / dyspnea        amoxicillin 500 MG tablet    AMOXIL     Take 2,000 mg by mouth        atorvastatin 20 MG tablet    LIPITOR          gabapentin 600 MG tablet    NEURONTIN    270 tablet    Take 1 tablet (600 mg) by mouth 3 times daily As directed    Restless legs syndrome

## 2018-08-22 NOTE — PROGRESS NOTES
[facility]  Primary Care Center   Alyse Young MD  08/22/2018      Chief Complaint:   Establishing care    History of Present Illness:   Jose Carlos Bolden is a 63 year old male with a history of high cholesterol, restless leg syndrome, and osteoarthritis who presents for evaluation of establishing care. He previously visited [facility] for health care.   I reviewed his complete medical history in detail and entered it into the EHR    Hospital visit: In January he visited the emergency department for the flu that developed into pneumonia or another respiratory illness. He was given antibiotics at the hospital for treatment.     Prostate problem: He states that he his urination is a weak stream, some dribbling, and urgency. He had seen someone 20 years ago for this, but did not find anything. He has not had a PSA test done or seen a urologist done. Further, he states that his brother had Prostate cancer at age 60 years old.     Reactive Airways: He has had intermittent asthmatic symptoms throughout his life, beginning as a teenager. They were triggered more when he was younger, and competing in track. However, now it only comes occasionally if he has a bad seasonal allergies    Blood pressure: He does not typically check his blood pressure, but states he is able to do this at a pharmacy if needed.     Skin: He states that he has a spot on his back that is somewhat bothersome. He would like this looked at or a referral to dermatology.     Other concerns discussed:  - He is being monitored for potential Glaucoma, and recently had an eye exam.   - He takes 600 mg of gabapentin 3x daily for his restless leg syndrome. He is prescribed this by a neurologist.        Review of Systems:   Pertinent items are noted in HPI and below, remainder of complete ROS is negative.    Answers for HPI/ROS submitted by the patient on 8/22/2018   General Symptoms: No  Skin Symptoms: Yes  HENT Symptoms: Yes  EYE SYMPTOMS: Yes  HEART  SYMPTOMS: No  LUNG SYMPTOMS: No  INTESTINAL SYMPTOMS: No  URINARY SYMPTOMS: No  REPRODUCTIVE SYMPTOMS: No  SKELETAL SYMPTOMS: Yes  BLOOD SYMPTOMS: No  NERVOUS SYSTEM SYMPTOMS: No  MENTAL HEALTH SYMPTOMS: No  Changes in hair: No  Changes in moles/birth marks: Yes  Itching: Yes  Rashes: No  Changes in nails: No  Acne: No  Change in facial hair: No  Warts: No  Non-healing sores: No  Scarring: No  Flaking of skin: No  Color changes of hands/feet in cold : No  Sun sensitivity: No  Skin thickening: No  Ear pain: Yes  Ear discharge: No  Hearing loss: Yes  Tinnitus: Yes  Nosebleeds: No  Congestion: Yes  Sinus pain: Yes  Trouble swallowing: No   Voice hoarseness: No  Mouth sores: No  Sore throat: No  Tooth pain: No  Gum tenderness: No  Bleeding gums: No  Change in taste: No  Change in sense of smell: No  Dry mouth: No  Hearing aid used: Yes  Neck lump: No  Eye pain: No  Vision loss: No  Dry eyes: Yes  Watery eyes: Yes  Eye bulging: No  Double vision: No  Flashing of lights: Yes  Spots: No  Floaters: Yes  Redness: No  Crossed eyes: No  Tunnel Vision: No  Yellowing of eyes: No  Eye irritation: Yes  Back pain: Yes  Muscle aches: Yes  Neck pain: No  Swollen joints: No  Joint pain: Yes  Bone pain: No  Muscle cramps: Yes  Muscle weakness: No  Joint stiffness: No  Bone fracture: No  PHQ-2 Score: 1    Active Medications:      albuterol (PROAIR HFA) 108 (90 BASE) MCG/ACT Inhaler, Inhale 2 puffs into the lungs every 4 hours as needed for shortness of breath / dyspnea, Disp: 1 Inhaler, Rfl: 0     amoxicillin (AMOXIL) 500 MG tablet, Take 2,000 mg by mouth, Disp: , Rfl:      atorvastatin (LIPITOR) 20 MG tablet, , Disp: , Rfl: 0     gabapentin (NEURONTIN) 600 MG tablet, , Disp: , Rfl: 3     simvastatin (ZOCOR) 20 MG tablet, Take 20 mg by mouth At Bedtime., Disp: , Rfl:       Allergies:   Penicillins and Sulfa drugs      Past Medical History:  High cholesterol  Hip pain, left  Back pain, intermitten  Osteoarthritis  Restless leg  "syndrome  Uses hearing aids in both ears  Reactive airways     Past Surgical History:  Total hip arthroplasty  Vasectomy - not successful    Family History:   Glaucoma - brother (Servando), sister (Linda), father  Prostate cancer - brother (Francisco)  High cholesterol - all four brother and all four sisters, father  Heart issues - brother (Francisco)  Pancreatic cancer ( from it) - sister (Linda)  Heart bypass - sister (Lina)  Depression - sister (Annalisa)  Multiple chemical sensitivity - sister (Annalisa)  Breast cancer - sister (Luz)  Osteoporosis - sister (Luz)  Scoliosis - sister (Luz)  Allergies - mother, maternal grandfather  Glioblastoma - mother  Post Partum depression - mother  Heart attack - father (at age 36), maternal grandmother ( in 80s), paternal grandfather ( age 56)  Heart bypass surgery - father  COPD - father      Social History:   This patient presented alone.  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  Normal moisture, good skin turgor. Seborrheic keratosis and moles scattered across back, all are benign and non suspicious.   Psych:  Broad range affect.  Not psychomotor slowed.  No signs of anxiety or agitation.     Assessment and Plan:    Establish care    Dysuria  Urgency of urination  He has struggled with urinary symptoms including a weak stream, dribbling, and urgency for 20 years now. Given that his brother was diagnosed with prostate cancer I ordered a PSA test for further evaluation.   - PSA tumor marker    Restless legs syndrome  He is currently taking 600 mg three times daily. He was previously " prescribed this by a neurologist.   - gabapentin (NEURONTIN) 600 MG tablet  Dispense: 270 tablet; Refill: 3    Hyperlipidemia LDL goal <130    Seborrheic Keratosis  Scattered across back, will clip bothersome lesion today in clinic.     He will check with his insurance to see if this visit his next visit will be a Welcome to Medicare visit or routine physical.         Follow-up: Return in about 2 weeks (around 9/5/2018) for Physical Exam (Medicare vs. routine).       Total time spent 30 minutes.  More than 50% of the time spent with Mr. Bolden on counseling / coordinating his care        Scribe Disclosure:   I, Coni Levine, am serving as a scribe to document services personally performed by Alyse Young MD at this visit, based upon the provider's statements to me. All documentation has been reviewed by the aforementioned provider prior to being entered into the official medical record.     Portions of this medical record were completed by a scribe. UPON MY REVIEW AND AUTHENTICATION BY ELECTRONIC SIGNATURE, this confirms (a) I performed the applicable clinical services, and (b) the record is accurate.     Alyse Young M.D.  Internal Medicine  Primary Care Center   pager [phone]

## 2018-09-05 ENCOUNTER — OFFICE VISIT (OUTPATIENT)
Dept: INTERNAL MEDICINE | Facility: CLINIC | Age: 63
End: 2018-09-05
Payer: COMMERCIAL

## 2018-09-05 VITALS
DIASTOLIC BLOOD PRESSURE: 62 MMHG | HEART RATE: 78 BPM | SYSTOLIC BLOOD PRESSURE: 105 MMHG | BODY MASS INDEX: 20.97 KG/M2 | WEIGHT: 136 LBS

## 2018-09-05 DIAGNOSIS — L71.9 ROSACEA: ICD-10-CM

## 2018-09-05 DIAGNOSIS — E61.1 IRON DEFICIENCY: ICD-10-CM

## 2018-09-05 DIAGNOSIS — Z96.60 HISTORY OF JOINT REPLACEMENT, UNSPECIFIED JOINT: ICD-10-CM

## 2018-09-05 DIAGNOSIS — E53.8 VITAMIN B12 DEFICIENCY: ICD-10-CM

## 2018-09-05 DIAGNOSIS — E78.5 HYPERLIPIDEMIA LDL GOAL <130: ICD-10-CM

## 2018-09-05 DIAGNOSIS — Z23 NEED FOR VACCINATION: ICD-10-CM

## 2018-09-05 DIAGNOSIS — M19.90 OSTEOARTHRITIS, UNSPECIFIED OSTEOARTHRITIS TYPE, UNSPECIFIED SITE: ICD-10-CM

## 2018-09-05 DIAGNOSIS — L23.7 CONTACT DERMATITIS DUE TO POISON IVY: ICD-10-CM

## 2018-09-05 DIAGNOSIS — E53.8 VITAMIN B12 DEFICIENCY (NON ANEMIC): ICD-10-CM

## 2018-09-05 DIAGNOSIS — Z00.00 ROUTINE GENERAL MEDICAL EXAMINATION AT A HEALTH CARE FACILITY: Primary | ICD-10-CM

## 2018-09-05 DIAGNOSIS — Z83.2 FAMILY HISTORY OF ANEMIA: ICD-10-CM

## 2018-09-05 RX ORDER — AMOXICILLIN 500 MG/1
2000 TABLET, FILM COATED ORAL ONCE
Qty: 4 TABLET | Refills: 3 | COMMUNITY
Start: 2018-09-05 | End: 2020-11-09

## 2018-09-05 RX ORDER — TRIAMCINOLONE ACETONIDE 1 MG/G
CREAM TOPICAL 2 TIMES DAILY
COMMUNITY
End: 2018-09-05

## 2018-09-05 RX ORDER — ATORVASTATIN CALCIUM 20 MG/1
20 TABLET, FILM COATED ORAL DAILY
Qty: 90 TABLET | Refills: 3 | COMMUNITY
Start: 2018-09-05 | End: 2018-09-10

## 2018-09-05 RX ORDER — FERROUS GLUCONATE 324(38)MG
324 TABLET ORAL
Qty: 90 TABLET | Refills: 3 | COMMUNITY
Start: 2018-09-05 | End: 2024-04-23

## 2018-09-05 RX ORDER — TRIAMCINOLONE ACETONIDE 1 MG/G
CREAM TOPICAL 2 TIMES DAILY
Qty: 30 G | Refills: 0 | COMMUNITY
Start: 2018-09-05 | End: 2024-04-23

## 2018-09-05 ASSESSMENT — ENCOUNTER SYMPTOMS
POOR WOUND HEALING: 0
SKIN CHANGES: 0
NAIL CHANGES: 0

## 2018-09-05 ASSESSMENT — PAIN SCALES - GENERAL: PAINLEVEL: MILD PAIN (3)

## 2018-09-05 NOTE — NURSING NOTE
Chief Complaint   Patient presents with     Physical     patient states he is here for a physical     SARIKA OROZCO at 7:36 AM on 9/5/2018.

## 2018-09-05 NOTE — MR AVS SNAPSHOT
After Visit Summary   9/5/2018    Jose Carlos Bolden    MRN: [id number]           Patient Information     Date Of Birth          1955        Visit Information        Provider Department      9/5/2018 7:30 AM Alyse Young MD [facility]        Today's Diagnoses     Routine general medical examination at a health care facility    -  1    Kyphosis, unspecified kyphosis type, unspecified spinal region        Scoliosis, unspecified scoliosis type, unspecified spinal region        Contact dermatitis due to poison ivy        History of joint replacement, unspecified joint        Rosacea        Hyperlipidemia LDL goal <130        Need for vaccination        Family history of anemia        Iron deficiency        Vitamin B12 deficiency (non anemic)          Care Instructions    [facility] Center Medication Refill Request Information:  * Please contact your pharmacy regarding ANY request for medication refills.  ** [facility] Prescription Fax = [phone]  * Please allow 3 business days for routine medication refills.  * Please allow 5 business days for controlled substance medication refills.     Primary Care Center Test Result notification information:  *You will be notified with in 7-10 days of your appointment day regarding the results of your test.  If you are on MyChart you will be notified as soon as the provider has reviewed the results and signed off on them.    Primary Care Center: [phone]     The Primary Care Clinic now has the Shingrix vaccine available.     However, not all insurance carriers cover the entire cost of the Shingrix vaccine if the vaccine is administered at a primary clinic.     Prior to receiving the vaccine, we recommend that you call your insurance carrier and ask them the following questions:     * Does my insurance cover the Shingrix vaccine and administration of the vaccine?   * What is my co-pay or deductible for the vaccine?   * Is there a cost difference  if I receive the vaccine at my doctors office or at a pharmacy?     Unfortunately, the clinic cannot determine your insurance benefits.  Please call your insurance provider prior to scheduling an appointment to receive the Shingrix vaccine.    If you decide to receive your vaccine at the Primary Care Clinic please call [phone] and request a nurse only appointment for the Shingrix vaccine.         Here is some advice regarding bone health:  1.  Make sure you are getting enough calcium and vitamin D.    Currently, total diet plus supplement recommended intake for calcium 1200 mg for ages 51+.  Recommended Vitamin D intake is 600 international units (IU) daily for ages 18-70 .At this time, the total diet plus supplement recommended intake for you is [id number] mg daily Vitamin D  600 IU daily.    2.    Do not take more than the recommended amount of Vitamin D without consulting your provider. Vitamin D toxicity can cause non-specific symptoms such as anorexia, weight loss, polyuria, and heart arrhythmias. More seriously, it can also raise blood levels of calcium which leads to vascular and tissue calcification, with subsequent damage to the heart, blood vessels, and kidneys.  3.  Do not take more than the recommended amount of Calcium without consulting your provider.  Excessively high levels of calcium in the blood known as hypercalcemia can cause renal insufficiency, vascular and soft tissue calcification, hypercalciuria (high levels of calcium in the urine) and kidney stones.  4.  Calcium Carbonate should be taken with food.  Calcium Citrate can be taken with or without food.  5.  Physical activity is important. Consider activities such as walking, water workouts or   walter chi . Include resistance type exercises.  6.  In order for your skin to manufacture enough Vitamin D, you need some exposure to the sun.  Recommendation is approximately 5-30 minutes of sun exposure between 10 AM and 3 PM at least twice a  week to the face, arms, legs, or back without sunscreen.  Do not use tanning beds.  7.  Decrease your risk of falls.  Wear sensible shoes. Review your medications with your provider. See additional recommendations below.   8.  See tables below regarding the content of calcium and Vitamin D in various foods.      Reduce your risk of falls:  Remove home hazards  Take a look around your home. Your living room, kitchen, bedroom, bathroom, hallways and stairways may be filled with hazards. To make your home safer:   Remove boxes, newspapers, electrical cords and phone cords from walkways.   Move coffee tables, magazine racks and plant stands from high-traffic areas.   Secure loose rugs with double-faced tape, tacks or a slip-resistant backing -- or remove loose rugs from your home.   Repair loose, wooden floorboards and carpeting right away.   Store clothing, dishes, food and other necessities within easy reach.   Immediately clean spilled liquids, grease or food.   Use nonskid floor wax.   Use nonslip mats in your bathtub or shower.   Light up your living space  Keep your home brightly lit to avoid tripping on objects that are hard to see. Also:   Place night lights in your bedroom, bathroom and hallways.   Place a lamp within reach of your bed for middle-of-the-night needs.   Make clear paths to light switches that aren't near room entrances. Consider trading traditional switches for glow-in-the-dark or illuminated switches.   Turn on the lights before going up or down stairs.   Store flashlights in easy-to-find places in case of power outages.   Use assistive devices  Your doctor might recommend using a cane or walker to keep you steady. Other assistive devices can help, too. For example:   Hand rails for both sides of stairways   Nonslip treads for bare-wood steps   A raised toilet seat or one with armrests   Grab bars for the shower or tub   A sturdy plastic seat for the shower or tub -- plus a hand-held shower  "nozzle for bathing while sitting down   Source: http://www.[facility]/health/fall-prevention/      Calcium Content of Foods:     Food Milligrams (mg)  per serving     Yogurt, plain, low fat, 8 ounces 415    Orange juice, calcium-fortified, 6 ounces 375    Yogurt, fruit, low fat, 8 ounces 338-384    Mozzarella, part skim, 1.5 ounces 333    Sardines, canned in oil, with bones, 3 ounces 325    Cheddar cheese, 1.5 ounces 307    Milk, nonfat, 8 ounces 299    Milk, reduced-fat (2% milk fat), 8 ounces 293    Milk, buttermilk, 8 ounces 282-350    Milk, whole (3.25% milk fat), 8 ounces 276    Tofu, firm, made with calcium sulfate,   cup 253    Fife, pink, canned, solids with bone, 3 ounces 181    Cottage cheese, 1% milk fat, 1 cup 138    Tofu, soft, made with calcium sulfate,   cup 138    Instant breakfast drink, various flavors and brands, powder prepared with water, 8 ounces 105-250    Frozen yogurt, vanilla, soft serve,   cup  103    Ready-to-eat cereal, calcium-fortified, 1 cup  100-1,000    Turnip greens, fresh, boiled,   cup  99    Kale, fresh, cooked, 1 cup  94    Kale, raw, chopped, 1 cup  90    Ice cream, vanilla,   cup  84    Soy beverage, calcium-fortified, 8 ounces       Chinese cabbage, AdCrimson watson, raw, shredded, 1 cup  74    Bread, white, 1 slice  73    Pudding, chocolate, ready to eat, refrigerated, 4 ounces  55    Tortilla, corn, ready-to-bake/keller, one 6\" diameter 46    Tortilla, flour, ready-to-bake/keller, one 6\" diameter  32    Sour cream, reduced fat, cultured, 2 tablespoons  31    Bread, whole-wheat, 1 slice  30    Broccoli, raw,   cup  21    Cheese, cream, regular, 1 tablespoon  14    Source:  http://ods.od.nih.gov/factsheets    Selected Food Sources of Vitamin D [ Food IUs per serving]:    Cod liver oil, 1 tablespoon 1,360  Swordfish, cooked, 3 ounces 566  Fife (sockeye), cooked, 3 ounces 447  Tuna fish, canned in water, drained, 3 ounces 154   Orange juice fortified with vitamin D, 1 cup " (check product labels, as amount of added vitamin D varies) 137   Milk, nonfat, reduced fat, and whole, vitamin D-fortified, 1 cup 115-124   Yogurt, fortified with 20% of the DV for vitamin D, 6 ounces (more heavily fortified yogurts provide more of the DV) 80  Margarine, fortified, 1 tablespoon 60   Sardines, canned in oil, drained, 2 sardines 46  Liver, beef, cooked, 3 ounces 42  Egg, 1 large (vitamin D is found in yolk) 41  Ready-to-eat cereal, fortified with 10% of the DV for vitamin D, 0.75-1 cup (more heavily fortified cereals might provide more of the DV) 40   Cheese, Swiss, 1 ounce.  Source:  Http://ods.od.nih.gov/factsheets                Follow-ups after your visit        Future tests that were ordered for you today     Open Future Orders        Priority Expected Expires Ordered    Lipid Profile FASTING Routine 9/5/2018 9/12/2018 9/5/2018    Basic metabolic panel Routine 9/5/2018 9/12/2018 9/5/2018    CBC with platelets Routine 9/5/2018 9/12/2018 9/5/2018    Vitamin B12 Routine 9/5/2018 9/12/2018 9/5/2018    HC ZOSTER VACCINE RECOMBINANT ADJUVANTED IM NJX Routine  9/12/2018 9/5/2018    Hepatitis B surface antigen Routine  9/12/2018 9/5/2018    Hepatitis B Surface Antibody Routine 9/5/2018 9/12/2018 9/5/2018            Who to contact     Please call your clinic at [phone] to:    Ask questions about your health    Make or cancel appointments    Discuss your medicines    Learn about your test results    Speak to your doctor            Additional Information About Your Visit        [employer] Information     [employer] gives you secure access to your electronic health record. If you see a primary care provider, you can also send messages to your care team and make appointments. If you have questions, please call your primary care clinic.  If you do not have a primary care provider, please call [phone] and they will assist you.      [employer] is an electronic gateway that provides easy, online access to your  medical records. With incrediblue, you can request a clinic appointment, read your test results, renew a prescription or communicate with your care team.     To access your existing account, please contact your [facility] or call [phone] for assistance.        Care EveryWhere ID     This is your Care EveryWhere ID. This could be used by other organizations to access your [facility] medical records  [id number]        Your Vitals Were     Pulse BMI (Body Mass Index)                78 20.97 kg/m2           Blood Pressure from Last 3 Encounters:   09/05/18 105/62   08/22/18 133/69   01/13/18 102/60    Weight from Last 3 Encounters:   09/05/18 61.7 kg (136 lb)   08/22/18 60.4 kg (133 lb 1.6 oz)   12/15/11 59.9 kg (132 lb)              We Performed the Following     TD PRESERVATIVE FREE, AGE >=7 YR          Today's Medication Changes          These changes are accurate as of 9/5/18  8:38 AM.  If you have any questions, ask your nurse or doctor.               These medicines have changed or have updated prescriptions.        Dose/Directions    amoxicillin 500 MG tablet   Commonly known as:  AMOXIL   This may have changed:  when to take this   Used for:  History of joint replacement, unspecified joint   Changed by:  Alyse Young MD        Dose:  2000 mg   Take 4 tablets (2,000 mg) by mouth once   Quantity:  4 tablet   Refills:  3       atorvastatin 20 MG tablet   Commonly known as:  LIPITOR   This may have changed:    - how much to take  - how to take this  - when to take this   Used for:  Hyperlipidemia LDL goal <130   Changed by:  Alyse Young MD        Dose:  20 mg   Take 1 tablet (20 mg) by mouth daily   Quantity:  90 tablet   Refills:  3                Primary Care Provider Office Phone # Fax #    Peter Huerta [phone]       [address] [address]        Equal Access to Services     GRIFFIN SCOTT AH: Kristen kelley  calvin Olivarez, wawandada lutinyadaha, qaybta kadot boston, matilde erlinin hayaaelizabeth randbenjamin francesjohanny laJean-Claudemireya baldo. So [facility] [phone].    ATENCIÓN: Si christla annika, tiene a alvarenga disposición servicios gratuitos de asistencia lingüística. Keagan al [phone].    We comply with applicable federal civil rights laws and [place] laws. We do not discriminate on the basis of race, color, national origin, age, disability, sex, sexual orientation, or gender identity.            Thank you!     Thank you for choosing [facility]  for your care. Our goal is always to provide you with excellent care. Hearing back from our patients is one way we can continue to improve our services. Please take a few minutes to complete the written survey that you may receive in the mail after your visit with us. Thank you!             Your Updated Medication List - Protect others around you: Learn how to safely use, store and throw away your medicines at www.disposemymeds.org.          This list is accurate as of 9/5/18  8:38 AM.  Always use your most recent med list.                   Brand Name Dispense Instructions for use Diagnosis    albuterol 108 (90 Base) MCG/ACT inhaler    PROAIR HFA    1 Inhaler    Inhale 2 puffs into the lungs every 4 hours as needed for shortness of breath / dyspnea        amoxicillin 500 MG tablet    AMOXIL    4 tablet    Take 4 tablets (2,000 mg) by mouth once    History of joint replacement, unspecified joint       atorvastatin 20 MG tablet    LIPITOR    90 tablet    Take 1 tablet (20 mg) by mouth daily    Hyperlipidemia LDL goal <130       ferrous gluconate 324 (38 Fe) MG tablet    FERGON    90 tablet    Take 1 tablet (324 mg) by mouth daily (with breakfast)    Iron deficiency       gabapentin 600 MG tablet    NEURONTIN    270 tablet    Take 1 tablet (600 mg) by mouth 3 times daily As directed    Restless legs syndrome       metroNIDAZOLE 1 % cream    NORITATE    60 g    Apply topically daily    Rosacea        triamcinolone 0.1 % cream    KENALOG    30 g    Apply topically 2 times daily    Contact dermatitis due to poison ivy       Vitamin B12 1000 MCG Tbcr     90 tablet    Take 1 tablet by mouth daily    Vitamin B12 deficiency (non anemic)

## 2018-09-05 NOTE — PROGRESS NOTES
[facility]  Primary Care Center   Alyse Young MD  09/05/2018      Chief Complaint:   Physical       History of Present Illness:   Jose Carlos Bolden is a 63 year old male with a history of restless leg syndrome, high cholesterol, and osteoporosis who presents alone for a routine physical examination.    Healthcare/Maintenance: His past medical history was reviewed. He is due for immunizations including a routine type tetanus shot, influenza (in October), and Shingrix. He was advised to check with his insurance prior to recieving Shingrix. He is otherwise up to date on routine health. He had a colonoscopy completed in 2010 with unremarkable results. He will be due for a repeat in 10 years. He has never smoked cigarettes and has had lung X-Rays in the past with unremarkable results.     Medications: He is not using Albuterol on a regular basis. He is taking Amoxicillin for dental prophylaxis (joint). To manage his hyperlipidemia, he is taking 20 mg Atorvastatin daily. He was previously taking Simvastatin but stopped taking it as he thought it could have been the source of his leg pain. He notes that his iron levels have been low in the past for unknown reasons. He has also had low levels of Vitamin B12 in the past. He is subsequently taking one iron 324 mg supplement once a day and 1000 micrograms of Vitamin B12 daily. He is not currently taking any supplements for calcium or vitamin D. He notes that his diet consists of green leafy vegetables. He has not received any injections in the past.    Poison Ivy: He was exposed to poison ivy one week ago from Saturday when he was taking pictures at the [facility] near the [facility]. He notes that it has been appearing in new places since his exposure. He notes places on his arms and his thighs that currently have rashes. 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    Other concerns discussed:  1. PSA results, 1.27 - unremarkable  2. Laboratory orders to  be completed when fasting (12 hrs) check kidney function and glucose  3. Has regular eye doctor - no referral needed     Review of Systems:   Pertinent items are noted in HPI, remainder of complete ROS is negative.    Answers for HPI/ROS submitted by the patient on 9/5/2018   General Symptoms: No  Skin Symptoms: Yes  HENT Symptoms: No  EYE SYMPTOMS: No  HEART SYMPTOMS: No  LUNG SYMPTOMS: No  INTESTINAL SYMPTOMS: No  URINARY SYMPTOMS: No  REPRODUCTIVE SYMPTOMS: No  SKELETAL SYMPTOMS: No  BLOOD SYMPTOMS: No  NERVOUS SYSTEM SYMPTOMS: No  MENTAL HEALTH SYMPTOMS: No  Changes in hair: No  Changes in moles/birth marks: No  Itching: Yes  Rashes: Yes  Changes in nails: No  Acne: No  Change in facial hair: No  Warts: Yes  Non-healing sores: No  Scarring: No  Flaking of skin: No  Color changes of hands/feet in cold : No  Sun sensitivity: No  Skin thickening: No    Active Medications:      albuterol (PROAIR HFA) 108 (90 BASE) MCG/ACT Inhaler, Inhale 2 puffs into the lungs every 4 hours as needed for shortness of breath / dyspnea, Disp: 1 Inhaler, Rfl: 0     amoxicillin (AMOXIL) 500 MG tablet, Take 2,000 mg by mouth, Disp: , Rfl:      atorvastatin (LIPITOR) 20 MG tablet, , Disp: , Rfl: 0     gabapentin (NEURONTIN) 600 MG tablet, Take 1 tablet (600 mg) by mouth 3 times daily As directed, Disp: 270 tablet, Rfl: 3      Allergies:   Penicillins  Sulfa drugs   Food - tomatillos, Ground Cherries, potato chips   Environmental allergies      Past Medical History:  Back pain, intermittent   Glaucoma suspect  Hearing loss  High cholesterol  Osteoarthritis  Reactive airway disease  Restless leg syndrome  Gastroesophageal reflux disease   Scoliosis  Kyphosis   Environmental allergies  Iron deficient      Past Surgical History:  Total hip arthroplasty, left  Vasectomy - not successful    Family History:   Glaucoma - brother (Servando), sister (Linda), father  Hyperlipidemia - all four brothers and all four sisters, father  Pancreatic cancer -  sister (Linda)  Brain cancer- mother  Glioblastoma - mother  Post partum depression - mother  Heart disease - maternal grandmother, sister, paternal grandfather  Depression  - sister (Annalisa)  Multiple chemical sensitivity - sister (Annalisa)  Breast cancer - sister (Luz)  Scoliosis - sister (Luz)  Bell's palsy - father, brother (Stevie Singh), sister (Annalisa)  Osteoporosis - mother, all 4 sisters, brother (Francisco)  Anemia - mother, sister (Linda, Annalisa, Lina), brother (Francisco)  Chronic obstructive pulmonary disease - father  Heart attack - father, maternal grandmother, paternal grandfather  Raynaud's disease - sister (Luz), brother (Francisco)  Heart bypass - sister (Lina), father  Vaginal cancer - paternal grandmother  Allergies - mother, sister (Linda, Luz)  BPH - brother (Francisco Al)     Social History:   The patient is , a nonsmoker, and does not consume alcohol. He is a retired  and has 2 adopted children.       Physical Exam:   /62  Pulse 78  Wt 61.7 kg (136 lb)  BMI 20.97 kg/m2   Physical Examination:  General:  Pleasant, alert, no acute distress  Eyes:  Pupils 2-3 mm, sclera white, EOM's full.    Ears:  TM's normal, EAC's patent, clear of cerumen  Nose:  Nasal passages clear, turbinates not swollen, no polyps visualized.  Throat/Mouth:  No pharyngeal erythema or exudate, oral mucosa and tongue moist, no suspicious oral lesions  Neck:  Full AROM, supple, thyroid smooth, symmetric, not enlarge, no nodules  Lungs:  Clear to auscultation throughout, no wheezes, rhonchi or rales.  C/V:  Regular rate and rhythm, no murmurs, rubs or gallops.  No JVD, no carotid bruits.  No peripheral edema.    Abdomen:  Not distended.  Bowel sounds active.  No tenderness, no hepatosplenomegaly or masses.  No CVA tenderness or masses.  Lymph:  No cervical, axillary or inguinal lymph nodes.  Neuro:   Face symmetric. No tremor.  Gait steady.   M/S:  No joint deformities noted.  No joint swelling.  Skin:  No  suspicious lesions.  No rashes or jaundice.  Normal moisture, good skin turgor. Seborrheic keratoses on back. Red, round patch 5.5 cm x 4.5 cm with lighter center and a little bit of scale on left lower leg. Red, round patch 1.5 cm x 1.0 cm on right lower leg posteriorly, 2 mm raised papules near. Red, small scattered papules, most about 2 mm on right inner thigh and similar but more distally on inner left thigh.  Psych:  Broad range affect.  Not psychomotor slowed.  No signs of anxiety or agitation.        Assessment and Plan:  Jose Carlos was seen today for physical.    Diagnoses and all orders for this visit:    Routine general medical examination at a health care facility  Reviewed past medical history and medications during his visit today. He is up to date on colonoscopy (last one completed in 2010)  He is not currently taking any calcium or vitamin D supplements. Informed him of the recommended daily intake of each (1200 mg Ca and 600 mg Vitamin D) and was given papers outlining where each could be obtained from diet.     Contact dermatitis due to poison ivy  He was exposed to poison ivy a little over a week ago while near the [facility]. Upon examination, was advised to continue to treat affected areas as poison ivy. Advised icing and cooling. Also informed to pat the areas vs itching. Follow up in clinic if symptoms do not improve or worsen.     History of joint replacement, unspecified joint  He is currently taking Amoxicillin for dental prophylaxis. Made note of this in his chart in case of future need to refill medication in the future.     Hyperlipidemia LDL goal <130  He is managing his hyperlipidemia with Atorvastatin. Will recheck lipids. Also placed orders for routine blood work due to family medical history. Will return to clinic when fasting to complete labs. Pending results will follow up as needed.    -     Lipid Profile FASTING; Future  -     Basic metabolic panel; Future    Need for  vaccination   He is due for a routine tetanus shot today which was administered during his visit. He was informed about the Shingrix and adivsed to check with insurance before receiving. Informed importance of Hepatitis B vaccination if traveling and placed order to be completed as needed in the future.   -     TD PRESERVATIVE FREE, AGE >=7 YR  -     HC ZOSTER VACCINE RECOMBINANT ADJUVANTED IM NJX; Future  -     Hepatitis B surface antigen; Future  -     Hepatitis B Surface Antibody; Future    Family history of anemia  He has a family history of anemia. WIll place orders to screen for this and pending results will follow up as needed.   -     CBC with platelets; Future    Iron deficiency, Vitamin B12 deficiency (non anemic)  Has history of low iron levels and is currently taking a 324 mg supplement. Also has been deficient of Vitamin B12 in the past and is taking a 1000 microgram supplement. Will recheck levels for both and follow up as needed.  -     CBC with platelets; Future  -     Vitamin B12; Future    Rosacea    Kyphosis, Scoliosis    Follow-up: Patient should follow up in clinic in one year or on a PRN basis.           Scribe Disclosure:   I, Kristie Carmona, am serving as a scribe to document services personally performed by Alyse Young MD at this visit, based upon the provider's statements to me. All documentation has been reviewed by the aforementioned provider prior to being entered into the official medical record.     Portions of this medical record were completed by a scribe. UPON MY REVIEW AND AUTHENTICATION BY ELECTRONIC SIGNATURE, this confirms (a) I performed the applicable clinical services, and (b) the record is accurate.       Alyse Young M.D.  Internal Medicine  Primary Care Center   pager [phone]

## 2018-09-05 NOTE — PATIENT INSTRUCTIONS
[facility] Medication Refill Request Information:  * Please contact your pharmacy regarding ANY request for medication refills.  ** [facility] Prescription Fax = [phone]  * Please allow 3 business days for routine medication refills.  * Please allow 5 business days for controlled substance medication refills.     [facility] Test Result notification information:  *You will be notified with in 7-10 days of your appointment day regarding the results of your test.  If you are on MyChart you will be notified as soon as the provider has reviewed the results and signed off on them.    [facility]: [phone]     The [facility] now has the Shingrix vaccine available.     However, not all insurance carriers cover the entire cost of the Shingrix vaccine if the vaccine is administered at a primary clinic.     Prior to receiving the vaccine, we recommend that you call your insurance carrier and ask them the following questions:     * Does my insurance cover the Shingrix vaccine and administration of the vaccine?   * What is my co-pay or deductible for the vaccine?   * Is there a cost difference if I receive the vaccine at my doctors office or at a pharmacy?     Unfortunately, the clinic cannot determine your insurance benefits.  Please call your insurance provider prior to scheduling an appointment to receive the Shingrix vaccine.    If you decide to receive your vaccine at the [facility] please call [phone] and request a nurse only appointment for the Shingrix vaccine.         Here is some advice regarding bone health:  1.  Make sure you are getting enough calcium and vitamin D.    Currently, total diet plus supplement recommended intake for calcium 1200 mg for ages 51+.  Recommended Vitamin D intake is 600 international units (IU) daily for ages 18-70 .At this time, the total diet plus supplement recommended intake for you is [id number] mg daily Vitamin D  600 IU daily.    2.     Do not take more than the recommended amount of Vitamin D without consulting your provider. Vitamin D toxicity can cause non-specific symptoms such as anorexia, weight loss, polyuria, and heart arrhythmias. More seriously, it can also raise blood levels of calcium which leads to vascular and tissue calcification, with subsequent damage to the heart, blood vessels, and kidneys.  3.  Do not take more than the recommended amount of Calcium without consulting your provider.  Excessively high levels of calcium in the blood known as hypercalcemia can cause renal insufficiency, vascular and soft tissue calcification, hypercalciuria (high levels of calcium in the urine) and kidney stones.  4.  Calcium Carbonate should be taken with food.  Calcium Citrate can be taken with or without food.  5.  Physical activity is important. Consider activities such as walking, water workouts or   walter chi . Include resistance type exercises.  6.  In order for your skin to manufacture enough Vitamin D, you need some exposure to the sun.  Recommendation is approximately 5-30 minutes of sun exposure between 10 AM and 3 PM at least twice a week to the face, arms, legs, or back without sunscreen.  Do not use tanning beds.  7.  Decrease your risk of falls.  Wear sensible shoes. Review your medications with your provider. See additional recommendations below.   8.  See tables below regarding the content of calcium and Vitamin D in various foods.      Reduce your risk of falls:  Remove home hazards  Take a look around your home. Your living room, kitchen, bedroom, bathroom, hallways and stairways may be filled with hazards. To make your home safer:   Remove boxes, newspapers, electrical cords and phone cords from walkways.   Move coffee tables, magazine racks and plant stands from high-traffic areas.   Secure loose rugs with double-faced tape, tacks or a slip-resistant backing -- or remove loose rugs from your home.   Repair loose, wooden  floorboards and carpeting right away.   Store clothing, dishes, food and other necessities within easy reach.   Immediately clean spilled liquids, grease or food.   Use nonskid floor wax.   Use nonslip mats in your bathtub or shower.   Light up your living space  Keep your home brightly lit to avoid tripping on objects that are hard to see. Also:   Place night lights in your bedroom, bathroom and hallways.   Place a lamp within reach of your bed for middle-of-the-night needs.   Make clear paths to light switches that aren't near room entrances. Consider trading traditional switches for glow-in-the-dark or illuminated switches.   Turn on the lights before going up or down stairs.   Store flashlights in easy-to-find places in case of power outages.   Use assistive devices  Your doctor might recommend using a cane or walker to keep you steady. Other assistive devices can help, too. For example:   Hand rails for both sides of stairways   Nonslip treads for bare-wood steps   A raised toilet seat or one with armrests   Grab bars for the shower or tub   A sturdy plastic seat for the shower or tub -- plus a hand-held shower nozzle for bathing while sitting down   Source: http://www.[employer]/health/fall-prevention/      Calcium Content of Foods:     Food Milligrams (mg)  per serving     Yogurt, plain, low fat, 8 ounces 415    Orange juice, calcium-fortified, 6 ounces 375    Yogurt, fruit, low fat, 8 ounces 338-384    Mozzarella, part skim, 1.5 ounces 333    Sardines, canned in oil, with bones, 3 ounces 325    Cheddar cheese, 1.5 ounces 307    Milk, nonfat, 8 ounces 299    Milk, reduced-fat (2% milk fat), 8 ounces 293    Milk, buttermilk, 8 ounces 282-350    Milk, whole (3.25% milk fat), 8 ounces 276    Tofu, firm, made with calcium sulfate,   cup 253    [place], pink, canned, solids with bone, 3 ounces 181    Cottage cheese, 1% milk fat, 1 cup 138    Tofu, soft, made with calcium sulfate,   cup 138    Instant breakfast  "drink, various flavors and brands, powder prepared with water, 8 ounces 105-250    Frozen yogurt, vanilla, soft serve,   cup  103    Ready-to-eat cereal, calcium-fortified, 1 cup  100-1,000    Turnip greens, fresh, boiled,   cup  99    Kale, fresh, cooked, 1 cup  94    Kale, raw, chopped, 1 cup  90    Ice cream, vanilla,   cup  84    Soy beverage, calcium-fortified, 8 ounces       Chinese cabbage, bok watson, raw, shredded, 1 cup  74    Bread, white, 1 slice  73    Pudding, chocolate, ready to eat, refrigerated, 4 ounces  55    Tortilla, corn, ready-to-bake/keller, one 6\" diameter 46    Tortilla, flour, ready-to-bake/keller, one 6\" diameter  32    Sour cream, reduced fat, cultured, 2 tablespoons  31    Bread, whole-wheat, 1 slice  30    Broccoli, raw,   cup  21    Cheese, cream, regular, 1 tablespoon  14    Source:  http://ods.od.nih.gov/factsheets    Selected Food Sources of Vitamin D [ Food IUs per serving]:    Cod liver oil, 1 tablespoon 1,360  Swordfish, cooked, 3 ounces 566  [place] (sockeye), cooked, 3 ounces 447  Tuna fish, canned in water, drained, 3 ounces 154   Orange juice fortified with vitamin D, 1 cup (check product labels, as amount of added vitamin D varies) 137   Milk, nonfat, reduced fat, and whole, vitamin D-fortified, 1 cup 115-124   Yogurt, fortified with 20% of the DV for vitamin D, 6 ounces (more heavily fortified yogurts provide more of the DV) 80  Margarine, fortified, 1 tablespoon 60   Sardines, canned in oil, drained, 2 sardines 46  Liver, beef, cooked, 3 ounces 42  Egg, 1 large (vitamin D is found in yolk) 41  Ready-to-eat cereal, fortified with 10% of the DV for vitamin D, 0.75-1 cup (more heavily fortified cereals might provide more of the DV) 40   Cheese, Swiss, 1 ounce.  Source:  Http://ods.od.nih.gov/factsheets        "

## 2018-09-10 ENCOUNTER — MYC REFILL (OUTPATIENT)
Dept: INTERNAL MEDICINE | Facility: CLINIC | Age: 63
End: 2018-09-10

## 2018-09-10 DIAGNOSIS — Z83.2 FAMILY HISTORY OF ANEMIA: ICD-10-CM

## 2018-09-10 DIAGNOSIS — Z23 NEED FOR VACCINATION: ICD-10-CM

## 2018-09-10 DIAGNOSIS — E78.5 HYPERLIPIDEMIA LDL GOAL <130: ICD-10-CM

## 2018-09-10 DIAGNOSIS — E61.1 IRON DEFICIENCY: ICD-10-CM

## 2018-09-10 LAB
ANION GAP SERPL CALCULATED.3IONS-SCNC: 4 MMOL/L (ref 3–14)
BUN SERPL-MCNC: 14 MG/DL (ref 7–30)
CALCIUM SERPL-MCNC: 9.1 MG/DL (ref 8.5–10.1)
CHLORIDE SERPL-SCNC: 103 MMOL/L (ref 94–109)
CHOLEST SERPL-MCNC: 124 MG/DL
CO2 SERPL-SCNC: 33 MMOL/L (ref 20–32)
CREAT SERPL-MCNC: 1.02 MG/DL (ref 0.66–1.25)
ERYTHROCYTE [DISTWIDTH] IN BLOOD BY AUTOMATED COUNT: 13.3 % (ref 10–15)
GFR SERPL CREATININE-BSD FRML MDRD: 73 ML/MIN/1.7M2
GLUCOSE SERPL-MCNC: 91 MG/DL (ref 70–99)
HBV SURFACE AB SERPL IA-ACNC: 7.48 M[IU]/ML
HBV SURFACE AG SERPL QL IA: NONREACTIVE
HCT VFR BLD AUTO: 44.1 % (ref 40–53)
HDLC SERPL-MCNC: 33 MG/DL
HGB BLD-MCNC: 14.3 G/DL (ref 13.3–17.7)
LDLC SERPL CALC-MCNC: 74 MG/DL
MCH RBC QN AUTO: 32.4 PG (ref 26.5–33)
MCHC RBC AUTO-ENTMCNC: 32.4 G/DL (ref 31.5–36.5)
MCV RBC AUTO: 100 FL (ref 78–100)
NONHDLC SERPL-MCNC: 91 MG/DL
PLATELET # BLD AUTO: 181 10E9/L (ref 150–450)
POTASSIUM SERPL-SCNC: 3.8 MMOL/L (ref 3.4–5.3)
RBC # BLD AUTO: 4.42 10E12/L (ref 4.4–5.9)
SODIUM SERPL-SCNC: 140 MMOL/L (ref 133–144)
TRIGL SERPL-MCNC: 88 MG/DL
VIT B12 SERPL-MCNC: 1232 PG/ML (ref 193–986)
WBC # BLD AUTO: 5.4 10E9/L (ref 4–11)

## 2018-09-10 RX ORDER — ATORVASTATIN CALCIUM 20 MG/1
20 TABLET, FILM COATED ORAL DAILY
Qty: 90 TABLET | Refills: 3 | Status: SHIPPED | OUTPATIENT
Start: 2018-09-10 | End: 2019-12-02

## 2018-09-10 NOTE — TELEPHONE ENCOUNTER
Message from [employer]ends[employer]:  Original authorizing provider: Alyse Young MD    Jose aCrlos Bolden would like a refill of the following medications:  atorvastatin (LIPITOR) 20 MG tablet [Alyse Young MD]    Preferred pharmacy: [address]    Comment:  Hi, I didn't notice that I was out of refills for atorvastatin. I will be out of that by W. 9/12. I understand that it is one that is not urgent to take everyday, though. I spoke with the nurse for my former doctor Deanna at [facility] and they recommend getting Rxs at  now. -- I did the fasting lab work this morning. Thank you    RX sent to pt pharmacy.  Simi Lacey RN 2:01 PM on 9/10/2018.

## 2019-07-31 ENCOUNTER — ANCILLARY PROCEDURE (OUTPATIENT)
Dept: GENERAL RADIOLOGY | Facility: CLINIC | Age: 64
End: 2019-07-31
Attending: INTERNAL MEDICINE
Payer: COMMERCIAL

## 2019-07-31 ENCOUNTER — OFFICE VISIT (OUTPATIENT)
Dept: INTERNAL MEDICINE | Facility: CLINIC | Age: 64
End: 2019-07-31
Payer: COMMERCIAL

## 2019-07-31 VITALS
DIASTOLIC BLOOD PRESSURE: 64 MMHG | BODY MASS INDEX: 20.67 KG/M2 | OXYGEN SATURATION: 98 % | WEIGHT: 134 LBS | SYSTOLIC BLOOD PRESSURE: 110 MMHG | HEART RATE: 62 BPM

## 2019-07-31 DIAGNOSIS — M25.552 HIP PAIN, BILATERAL: Primary | ICD-10-CM

## 2019-07-31 DIAGNOSIS — M25.552 HIP PAIN, BILATERAL: ICD-10-CM

## 2019-07-31 DIAGNOSIS — R20.0 NUMBNESS AND TINGLING OF BOTH FEET: ICD-10-CM

## 2019-07-31 DIAGNOSIS — M25.551 HIP PAIN, BILATERAL: Primary | ICD-10-CM

## 2019-07-31 DIAGNOSIS — Z23 NEED FOR VACCINATION: ICD-10-CM

## 2019-07-31 DIAGNOSIS — M25.551 HIP PAIN, BILATERAL: ICD-10-CM

## 2019-07-31 DIAGNOSIS — R73.9 HYPERGLYCEMIA: ICD-10-CM

## 2019-07-31 DIAGNOSIS — G25.81 RESTLESS LEGS SYNDROME: ICD-10-CM

## 2019-07-31 DIAGNOSIS — E61.1 IRON DEFICIENCY: ICD-10-CM

## 2019-07-31 DIAGNOSIS — R20.2 NUMBNESS AND TINGLING OF BOTH FEET: ICD-10-CM

## 2019-07-31 LAB
FERRITIN SERPL-MCNC: 139 NG/ML (ref 26–388)
HBA1C MFR BLD: 4.7 % (ref 0–5.6)
TSH SERPL DL<=0.005 MIU/L-ACNC: 1.91 MU/L (ref 0.4–4)
VIT B12 SERPL-MCNC: 695 PG/ML (ref 193–986)

## 2019-07-31 RX ORDER — ROPINIROLE 0.25 MG/1
0.25 TABLET, FILM COATED ORAL 3 TIMES DAILY
Qty: 90 TABLET | Refills: 1 | Status: SHIPPED | OUTPATIENT
Start: 2019-07-31 | End: 2019-09-26

## 2019-07-31 ASSESSMENT — PAIN SCALES - GENERAL: PAINLEVEL: MILD PAIN (3)

## 2019-07-31 NOTE — PATIENT INSTRUCTIONS
[facility] Medication Refill Request Information:  * Please contact your pharmacy regarding ANY request for medication refills.  ** [facility] Prescription Fax = [phone]  * Please allow 3 business days for routine medication refills.  * Please allow 5 business days for controlled substance medication refills.     [facility] Test Result notification information:  *You will be notified with in 7-10 days of your appointment day regarding the results of your test.  If you are on MyChart you will be notified as soon as the provider has reviewed the results and signed off on them.    [facility]: [phone]

## 2019-07-31 NOTE — NURSING NOTE
Patient received Prevnar 13 vaccine.  See immunization list for administration details.  Patient tolerated injection well.     Mai Carrizales at 9:00 AM on 7/31/2019.

## 2019-07-31 NOTE — NURSING NOTE
Chief Complaint   Patient presents with     Referral     Pt would like a ortho referral.       Mai Dennis LPN at 7:44 AM on 7/31/2019.

## 2019-07-31 NOTE — PROGRESS NOTES
Subjective  HPI  64 year old man with history of restless leg syndrome, high cholesterol, osteoarthritis, sciatica, osteoporosis, and rosacia who presents for neurology and orthopedics referral request, BP concerns.    He was last seen 9/2018, established care at that time and discussed difficulties with sciatica and RLS.    Since his last appointment, he has new left leg pain from achilles tendon radiating up calf. Episodes occur weekly and last 4-5 days, with up to 7/10 pain at its worst. He sometimes takes ibuprofen for it with modest improvement of the pain. He states that it is similar to his sciatica but lower down his leg. He experiences similar symptoms in his right leg less frequently and with less severity. It interferes with his sleep sometimes. Notably he has fallen 3 times in the last year which he attributes to being unable to pick his foot up fully at times.    He also reports increase in frequency of symptoms of his RLS. He currently takes gabapentin for this but feels it is less effective lately.    He is also concerned about some high BP readings that he takes at the gym with systolic readings in the 140s. He does not rest before taking his reading, rather he goes directly from strenuous activity.    Medications:  Takes 325mg iron and 1000mg B12 for history of B12 deficiency. Has albuterol inhaler but does not use often. Takes 20mg atorvastatin daily for hyperlipidemia. Uses amoxicillin for dental prophylaxis. Takes gabapentin 600mg TID for restless leg syndrome.    PM  Past Medical History:   Diagnosis Date     Allergy     sulfa, penicillium, food (tomatillos, ground cherries, some potato chips)     Back pain      Glaucoma suspect      Hearing loss      Hip pain, left      Hyperlipidemia LDL goal <130      Iron deficiency      Kyphosis      Osteoarthritis      Reactive airway disease      Restless leg syndrome      Scoliosis      Vitamin B12 deficiency      Past Surgical History:   Procedure  Laterality Date     COLONOSCOPY      Findings: normal     JOINT REPLACEMENT Left     hip, CATALINA     VASECTOMY       Social History  Retired  and psych counselor,  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 Exercises daily, mostly biking and hiking. Denies current alcohol use. Never smoker.    Objective  /64   Pulse 62   Wt 60.8 kg (134 lb)   SpO2 98%   BMI 20.67 kg/m    Physical Examination:  General:  Conversant, generally healthy appearing, no acute distress  Head: atraumatic  Ears:  Hearing aids in place  Neck:  Trachea midline, Full AROM, supple, thyroid smooth, symmetric, not enlarged, no nodules, no neck lymphadenopathy  Lungs:  Clear to auscultation throughout, no wheezes, rhonchi or rales. Normal respiratory effort and no intercostal retractions.  C/V:  Regular rate and rhythm, no murmurs, rubs or gallops.  No JVD, normal carotid upstroke and amplitude, no carotid bruits.  Neuro: Alert and oriented, face symmetric. No tremor.  Gait steady. Normal monofilament foot exam, normal vibratory sensation in lower extremities, normal cold sensation in lower extremities, normal sharp/dull in lower extremities. Strength 5/5 in hip flexion and extension, knee flexion and extension, foot eversion, dorsiflexion and plantar flexion, and great toe flexion and extension.   M/S:   Reduced external rotation of right hip, ROM otherwise normal bilaterally. Mild pain in buttock on left straight leg raise, right normal. No point tenderness over sciatic notch  Skin:   Normal temperature., turgor and texture. No rashes, suspicious lesions,  jaundice or ulcers    Extremities:  No peripheral edema, no digital cyanosis  Psych:  Alert and oriented to person, place and time. Appropriate affect.  Not psychomotor slowed.  No signs of anxiety or agitation.    Labs   Results for CREA, ALEJANDRA CREA S (MRN [id number]) as of 7/31/2019 09:03   Ref. Range 9/10/2018 08:07   Vitamin B12 Latest Ref Range: 193 - 986 pg/mL 1,232 (H)      Assessment/Plan  Jose Carlos Bolden is a 64 year old man with a history of sciatica, restless leg syndrome, and prior L hip arthroplasty who presents with left leg pain and sensory concerns, neurology and orthopedic referral requests, and BP concerns.    #1. Left leg pain, other suspected neuropathy - Episodic left leg pain present for last several months, starting at ankle and radiating up to knee, associated with perceived sensory changes, though entirely normal exam. Unclear etiology as the pain is not exacerbated by sciatic nerve maneuvers and he has no point tenderness over sciatic notch. Pain could be progression of sciatica, though less likely with negative exam. Other potential etiologies include musculoskeletal in nature related to frequent exercise vs possible manifestation of his existing RLS vs other suspected neuropathy. Patient is otherwise able to exercises and go about his normal ADL's without difficulty. Will hold off on imaging at this time until further workup is complete.  - Ferritin  - B12 level  - TSH, reflex T4  - Hemoglobin A1c    #2. Restless leg syndrome - Chronic for the patient, but worse over the year. He does not think the gabapentin is working as well as it was before.  - Discontinue gabapentin, start ropinirole 0.25mg TID    #3. BP concerns - Patient concerned regarding BP readings in the 140s taken while he is at the gym. He reports strenuous activity before readings. /64 in clinic. Instructed on resting 5-10 minutes before reading BP.    #2. Health maintenance  - Prevnar vaccine    Total time spent 45 minutes.  More than 50% of the time spent with Mr. Bolden on counseling / coordinating his care        Ulises Alvarenga MS  07/31/19    Teaching Physician  Disclosure:    I was present with the medical student who participated in the service and in the documentation of this note.  I have verified the history and personally performed the physical exam and medical decision making, and  have verified the content of the note, which accurately reflects my assessment of the patient and the plan of care    Alyse Young M.D.  Internal Medicine   pager [phone]

## 2019-08-07 ENCOUNTER — MYC MEDICAL ADVICE (OUTPATIENT)
Dept: INTERNAL MEDICINE | Facility: CLINIC | Age: 64
End: 2019-08-07

## 2019-08-12 ENCOUNTER — MYC MEDICAL ADVICE (OUTPATIENT)
Dept: INTERNAL MEDICINE | Facility: CLINIC | Age: 64
End: 2019-08-12

## 2019-08-12 ENCOUNTER — OFFICE VISIT (OUTPATIENT)
Dept: OPHTHALMOLOGY | Facility: CLINIC | Age: 64
End: 2019-08-12
Attending: OPHTHALMOLOGY
Payer: COMMERCIAL

## 2019-08-12 DIAGNOSIS — H52.4 PRESBYOPIA: ICD-10-CM

## 2019-08-12 DIAGNOSIS — H25.13 NUCLEAR SCLEROTIC CATARACT OF BOTH EYES: ICD-10-CM

## 2019-08-12 DIAGNOSIS — H04.123 DRY EYES, BILATERAL: ICD-10-CM

## 2019-08-12 DIAGNOSIS — H10.13 ALLERGIC CONJUNCTIVITIS OF BOTH EYES: Primary | ICD-10-CM

## 2019-08-12 DIAGNOSIS — H40.003 GLAUCOMA SUSPECT, BOTH EYES: Primary | ICD-10-CM

## 2019-08-12 DIAGNOSIS — H40.003 GLAUCOMA SUSPECT, BOTH EYES: ICD-10-CM

## 2019-08-12 DIAGNOSIS — H52.203 HYPEROPIC ASTIGMATISM OF BOTH EYES: ICD-10-CM

## 2019-08-12 PROCEDURE — 92133 CPTRZD OPH DX IMG PST SGM ON: CPT | Mod: ZF | Performed by: OPHTHALMOLOGY

## 2019-08-12 PROCEDURE — G0463 HOSPITAL OUTPT CLINIC VISIT: HCPCS | Mod: ZF

## 2019-08-12 ASSESSMENT — VISUAL ACUITY
OD_CC: 20/20
METHOD: SNELLEN - LINEAR
OS_CC: 20/20
CORRECTION_TYPE: GLASSES
OD_CC+: -1

## 2019-08-12 ASSESSMENT — TONOMETRY
IOP_METHOD: APPLANATION
OD_IOP_MMHG: 18
OS_IOP_MMHG: 18

## 2019-08-12 ASSESSMENT — SLIT LAMP EXAM - LIDS
COMMENTS: MILD MGD
COMMENTS: MILD MGD

## 2019-08-12 ASSESSMENT — CONF VISUAL FIELD
OD_NORMAL: 1
OS_NORMAL: 1
METHOD: COUNTING FINGERS

## 2019-08-12 ASSESSMENT — REFRACTION_WEARINGRX
OS_CYLINDER: +0.75
OD_ADD: +2.50
OD_CYLINDER: +1.00
SPECS_TYPE: PAL
OS_ADD: +2.50
OS_SPHERE: +0.50
OD_AXIS: 147
OS_AXIS: 057
OD_SPHERE: +0.50

## 2019-08-12 ASSESSMENT — EXTERNAL EXAM - LEFT EYE: OS_EXAM: NORMAL

## 2019-08-12 ASSESSMENT — CUP TO DISC RATIO
OD_RATIO: 0.65
OS_RATIO: 0.55

## 2019-08-12 ASSESSMENT — EXTERNAL EXAM - RIGHT EYE: OD_EXAM: NORMAL

## 2019-08-12 NOTE — PROGRESS NOTES
FELICE Bolden is a 64 year old male here for yearly eye exam. He feels his vision is good and stable in both eyes with current glasses. He has intermittent eye itching which is relieved with Refresh tears. He denies pain, redness, discharge. No flashes/floaters.    POH: Glaucoma suspect   FH: 2 of 8 siblings with glaucoma    Assessment & Plan      (H40.003) Glaucoma suspect, both eyes  (primary encounter diagnosis)  Comment: Based on increased cup to disc ratio with mild cup to disc asymmetry and + FH    FH: + 2 of 8 siblings  Pachymetry: 569/580  Gonioscopy:  Tmax: 22 OU  Today's IOP: 18/18  Target IOP:  Current medications: None  Meds to avoid: None  Visual field: OVF 24-2 (8/13/18)  right eye: Non-specific generalized depression, no glaucomatous defects on pattern  left eye: Non-specific generalized depression, no glaucomatous defects on pattern  - stable compared to last HVF described by Dr. Lopez  Nerve OCT: SpectralBO.LT optic nerve OCT (8/12/19)  OD: Avg RNFL thickness 94, all green   OS: Avg RNFL thickness 97, all green    Normal IOPs today with stable ONH appearance and reassuring RNFL on nerve OCT both eyes.     Plan: Had been followed yearly by Dr. Lopez, alternating OVF and nerve OCT. Will continue pattern of alternating testing.     (H10.13) Allergic conjunctivitis of both eyes  (H04.123) Dry eyes, bilateral  Comment: Worse in allergy season  Plan: Continue with Refresh, can try Zaditor if Refresh stops helping    (H25.13) Nuclear sclerotic cataract of both eyes  Comment: Not visually significant  Plan: Observe    (H52.203) Hyperopic astigmatism of both eyes  (H52.4) Presbyopia  Comment: Good vision with current glasses  Plan: Observe     -----------------------------------------------------------------------------------    Patient disposition:   Return in about 1 year (around 8/12/2020) for applanation IOP, OVF 24-2, dilation. or sooner as needed.    Teaching statement:  Complete documentation of  historical and exam elements from today's encounter can be found in the full encounter summary report (not reduplicated in this progress note). I personally obtained the chief complaint(s) and history of present illness.  I confirmed and edited as necessary the review of systems, past medical/surgical history, family history, social history, and examination findings as documented by others; and I examined the patient myself. I personally reviewed the relevant tests, images, and reports as documented above.     I formulated and edited as necessary the assessment and plan and discussed the findings and management plan with the patient and family.    Brenda Arias MD  Comprehensive Ophthalmology & Ocular Pathology  Department of Ophthalmology and Visual Neurosciences  [email or web address]  Pager [phone]

## 2019-08-12 NOTE — NURSING NOTE
Chief Complaints and History of Present Illnesses   Patient presents with     Glaucoma Follow-Up     1 year follow-up Glaucoma suspect, both eyes     Chief Complaint(s) and History of Present Illness(es)     Glaucoma Follow-Up     Comments: 1 year follow-up Glaucoma suspect, both eyes              Comments     Pt denies any significant vision changes in either eye since last visit.  Hx of floaters- unchanged.  Denies any flashes, pain, pressure, irritation, discharge, and tearing.  Currently using AT's PRN BE.    Julianna Godwin OT 2:57 PM August 12, 2019

## 2019-08-14 NOTE — TELEPHONE ENCOUNTER
Re when to take Ropinerole you Rxed. Your response was that you suggest scheduling appt. I called to schedule; next available time 8/24, a ways out, so I didn't schedule. I already have a followup appt w you 10/7. I just want to know if I should take at 4, 7 & 9 PM as I had been with gabapentin, or take all 3 ropinerole tabs at bedtime? I will try the latter. Thanks

## 2019-08-14 NOTE — TELEPHONE ENCOUNTER
Pt called and informed that he is to space the Requip to 3x a day.  Clinic number given for additional questions or concerns.  Simi Lacey RN 8:37 AM on 8/14/2019.

## 2019-08-26 DIAGNOSIS — G25.81 RESTLESS LEGS SYNDROME: ICD-10-CM

## 2019-08-29 RX ORDER — ROPINIROLE 0.25 MG/1
0.25 TABLET, FILM COATED ORAL 3 TIMES DAILY
Qty: 90 TABLET | Refills: 1 | OUTPATIENT
Start: 2019-08-29

## 2019-08-29 NOTE — TELEPHONE ENCOUNTER
rOPINIRole (REQUIP) 0.25 MG tablet      Last Written Prescription Date:  7/31/19  Last Fill Quantity: 90,   # refills: 1  Last Office Visit : 7/31/19  Future Office visit:  10/7/19    Routing refill request to provider for review/approval because:  Spoke to pharmacistMavis - disregard refill on file.

## 2019-09-26 DIAGNOSIS — G25.81 RESTLESS LEGS SYNDROME: ICD-10-CM

## 2019-09-27 RX ORDER — ROPINIROLE 0.25 MG/1
0.25 TABLET, FILM COATED ORAL 3 TIMES DAILY
Qty: 90 TABLET | Refills: 0 | Status: SHIPPED | OUTPATIENT
Start: 2019-09-27 | End: 2019-10-07

## 2019-09-27 NOTE — TELEPHONE ENCOUNTER
ROPINIROLE HCL 0.25 MG TABLET    Last Written Prescription Date:  7/31/2019  Last Fill Quantity: 90,   # refills: 1  Last Office Visit : 7/31/2019  Future Office visit:  10/7/2019      Refilled for 90 days per protocol.      Routing refill request to provider for review because:   Labs due CBC,ALT, Cr.    Ashley Salazar RN  Central Triage Red Flags/Med Refills

## 2019-09-29 ENCOUNTER — HEALTH MAINTENANCE LETTER (OUTPATIENT)
Age: 64
End: 2019-09-29

## 2019-10-07 ENCOUNTER — OFFICE VISIT (OUTPATIENT)
Dept: INTERNAL MEDICINE | Facility: CLINIC | Age: 64
End: 2019-10-07
Payer: COMMERCIAL

## 2019-10-07 VITALS
HEART RATE: 70 BPM | BODY MASS INDEX: 20.51 KG/M2 | DIASTOLIC BLOOD PRESSURE: 72 MMHG | OXYGEN SATURATION: 97 % | SYSTOLIC BLOOD PRESSURE: 121 MMHG | WEIGHT: 133 LBS

## 2019-10-07 DIAGNOSIS — Z23 NEED FOR VACCINATION: ICD-10-CM

## 2019-10-07 DIAGNOSIS — M16.11 PRIMARY OSTEOARTHRITIS OF RIGHT HIP: Primary | ICD-10-CM

## 2019-10-07 DIAGNOSIS — G25.81 RESTLESS LEGS SYNDROME: ICD-10-CM

## 2019-10-07 RX ORDER — GABAPENTIN 600 MG/1
600 TABLET ORAL 3 TIMES DAILY
Qty: 270 TABLET | Refills: 3 | Status: SHIPPED | OUTPATIENT
Start: 2019-10-07 | End: 2020-11-02

## 2019-10-07 ASSESSMENT — PAIN SCALES - GENERAL: PAINLEVEL: NO PAIN (0)

## 2019-10-07 NOTE — PROGRESS NOTES
[facility]  Primary Care Center   Brie Mendez MD  10/07/2019      Chief Complaint:   Recheck Medication     History of Present Illness:   Jose Carlos Bolden is a 64 year old male with a history of restless leg syndrome, hyperlipidemia,  who presents for medication recheck and restless leg neuropathy follow up.     Restless leg syndrome: He started Requip 0.25 mg in September and has not found it as effective for the treatment of his restless leg symptoms as Gabapentin 600 mg. He would like to switch back to Gabapentin. He experienced side effects with Requip, and denies experiencing side effects of drowsiness or reduced mental clarity with Gabapentin. He had ferritin, B12, A1C, and thyroid labs checked, which were all unremarkable. He is currently taking iron and B12 supplements.     Hyperlipidemia: He is taking Lipitor 20 mg and is experiencing no side effects. His most recent lipid profile in 2018 showed an overall cholesterol of 124 and an LDL of 74.     Right hip pain: He has previously had a left total hip arthroplasty and is currently experiencing right hip pain. He wants a referral to orthopedics to determine whether any intervention is needed.     Neuropathy/Balance: He is having some trouble with his balance, which he believes is due to slight neuropathy in his feet and inquires about a neurology referral.  His balance has been progressively worsening over the past 20 years. He sees a  at the  and works on balance exercises with them.  He experiences foot numbness, especially on the left. He reports that he has narrow ear canals, which may also be contributing to balance issues.     Other concerns discussed:  1. Would like a flu shot today. Up to date on shingles and pneumonia vaccines, gets second dose of pneumonia vaccine July 2020.      Review of Systems:   Pertinent items are noted in HPI or as in patient entered ROS below, remainder of complete ROS is negative.     Active  Medications:     Current Outpatient Medications:      albuterol (PROAIR HFA) 108 (90 BASE) MCG/ACT Inhaler, Inhale 2 puffs into the lungs every 4 hours as needed for shortness of breath / dyspnea, Disp: 1 Inhaler, Rfl: 0     amoxicillin (AMOXIL) 500 MG tablet, Take 4 tablets (2,000 mg) by mouth once, Disp: 4 tablet, Rfl: 3     atorvastatin (LIPITOR) 20 MG tablet, Take 1 tablet (20 mg) by mouth daily, Disp: 90 tablet, Rfl: 3     Cyanocobalamin (VITAMIN B12) 1000 MCG TBCR, Take 1 tablet by mouth daily, Disp: 90 tablet, Rfl: 3     ferrous gluconate (FERGON) 324 (38 Fe) MG tablet, Take 1 tablet (324 mg) by mouth daily (with breakfast), Disp: 90 tablet, Rfl: 3     gabapentin (NEURONTIN) 600 MG tablet, Take 1 tablet (600 mg) by mouth 3 times daily As directed, Disp: 270 tablet, Rfl: 3     metroNIDAZOLE (NORITATE) 1 % cream, Apply topically daily, Disp: 60 g, Rfl: 0     triamcinolone (KENALOG) 0.1 % cream, Apply topically 2 times daily, Disp: 30 g, Rfl: 0      Allergies:   Food; Seasonal allergies; Sulfa drugs; and Penicillium notatum allergy skin test      Past Medical History:  Allergy  Back pain  Glaucoma suspect  Hearing loss  Hip pain, left  Hyperlipidemia  Iron deficiency  Kyphosis  Osteoarthritis  Reactive airway disease  Restless leg syndrome  Scoliosis  Vitamin B12 deficiency     Past Surgical History:  Colonoscopy  Total hip arthroplasty, left  Vasectomy     Family History:   Glaucoma: brother, father, sister  Hyperlipidemia: brother, sister, father   Benign prostatic hyperplasia: brother   Pancreatic cancer: sister   Osteoporosis: sister, mother, brother   Anemia: sister, mother, brother   Brain cancer: mother  Depression: mother, sister  Heart disease: father (first myocardial infarction at 36), maternal grandmother, sister, paternal grandfather ( of myocardial infarction at 56)  Emphysema: father  Marcellus palsy: father, brother   Coronary artery disease: paternal grandmother   Uterine cancer: paternal  grandmother   Raynaud's disease: brother, sister  Penicillin allergy: mother   Breast cancer: sister    Social History:   Smoking status: never smoker  Alcohol use: 7 drinks/week    Physical Exam:   /72 (BP Location: Right arm, Patient Position: Sitting, Cuff Size: Adult Regular)   Pulse 70   Wt 60.3 kg (133 lb)   SpO2 97%   BMI 20.51 kg/m     Constitutional: Alert. In no distress.  Head: Normocephalic. No masses, lesions, tenderness or abnormalities.  Musculoskeletal: Extremities normal. No gross deformities noted. Normal muscle tone.  Neurologic: Gait normal. Sensation grossly WNL. Negative Romberg test, finger-nose test normal.  Psychiatric: Mentation appears normal. Normal affect.     Assessment and Plan:  Primary osteoarthritis of right hip  He has previously had a left total hip arthroplasty and is currently experiencing right hip pain. Will refer to orthopedics to determine if intervention is necessary.  - ORTHOPEDICS ADULT REFERRAL    Restless legs syndrome  He is currently taking Requip 0.25 mg and does not find it as tolerable or effective as Gabapentin 600 mg, which he has previously taken. He denies experiencing drowsiness or reduced mental clarity with Gabapentin in the past. Discussed recent unremarkable labs for B12, ferritin, diabetes, and thyroid, indicating that restless legs are not secondary to any of these. Will restart on Gabapentin 600 mg three times daily.   - gabapentin (NEURONTIN) 600 MG tablet  Dispense: 270 tablet; Refill: 3    Need for vaccination  - FLU VACCINE HIGH DOSE PRESERVATIVE FREE, AGE =>65 YR    Balance issues  He has been experiencing slight balance issues due to foot neuropathy, progressively worsening over the past 20 years. Discussed that Gabapentin could improve neuropathy symptoms and possibly improve balance as a result. Will revisit as necessary and if symptoms worsen further, consider neurology referral.     Discussed lipid profile and strategies to increase  HDL, including diet and increased aerobic exercise.      Follow-up: No follow-ups on file.      Scribe Disclosure:  I, Sherron Dye, am serving as a scribe to document services personally performed by Brie Mendez MD at this visit, based upon the provider's statements to me. All documentation has been reviewed by the aforementioned provider prior to being entered into the official medical record.    Scribe Preparation Attestation:  ISherron, a scribe, prepared the chart for today's encounter.      Portions of this medical record were completed by a scribe. UPON MY REVIEW AND AUTHENTICATION BY ELECTRONIC SIGNATURE, this confirms (a) I performed the applicable clinical services, and (b) the record is accurate.     -- Alysha Mendez MD

## 2019-10-07 NOTE — PATIENT INSTRUCTIONS
[facility] Medication Refill Request Information:  * Please contact your pharmacy regarding ANY request for medication refills.  ** [facility] Prescription Fax = [phone]  * Please allow 3 business days for routine medication refills.  * Please allow 5 business days for controlled substance medication refills.     [facility] Test Result notification information:  *You will be notified with in 7-10 days of your appointment day regarding the results of your test.  If you are on MyChart you will be notified as soon as the provider has reviewed the results and signed off on them.    [facility]: [phone]

## 2019-10-07 NOTE — NURSING NOTE
Chief Complaint   Patient presents with     Recheck Medication     pt here for med check       Parish Arambula CMA, EMT at 3:24 PM on 10/7/2019.

## 2019-10-11 NOTE — TELEPHONE ENCOUNTER
RECORDS RECEIVED FROM: Primary osteoarthritis of right hip. Records/imaging with [facility]. No prior surgery. Referred by Alysha Christine MD   DATE RECEIVED: Oct 17, 2019    NOTES STATUS DETAILS   OFFICE NOTE from referring provider Internal 10/7/19 Dr. Andrea Baird   OFFICE NOTE from other specialist N/A    DISCHARGE SUMMARY from hospital N/A    DISCHARGE REPORT from the ER N/A    OPERATIVE REPORT N/A    MEDICATION LIST Internal    IMPLANT RECORD/STICKER N/A    LABS     CBC/DIFF N/A    CULTURES N/A    INJECTIONS DONE IN RADIOLOGY N/A    MRI N/A    CT SCAN N/A    XRAYS (IMAGES & REPORTS) Internal 2019 internal    TUMOR     PATHOLOGY  Slides & report N/A      Note: Parchment records are for the opposite laterality hip

## 2019-10-14 ASSESSMENT — ENCOUNTER SYMPTOMS
NECK PAIN: 0
ALTERED TEMPERATURE REGULATION: 0
POLYPHAGIA: 0
EYE WATERING: 0
EYE REDNESS: 0
BACK PAIN: 1
FEVER: 0
EYE PAIN: 0
HALLUCINATIONS: 0
DOUBLE VISION: 0
EYE IRRITATION: 1
WEIGHT GAIN: 0
STIFFNESS: 1
INCREASED ENERGY: 0
ARTHRALGIAS: 1
DECREASED APPETITE: 0
MUSCLE CRAMPS: 0
MYALGIAS: 0
WEIGHT LOSS: 0
JOINT SWELLING: 0
POLYDIPSIA: 0
NIGHT SWEATS: 0
FATIGUE: 0
CHILLS: 0
MUSCLE WEAKNESS: 0

## 2019-10-17 ENCOUNTER — OFFICE VISIT (OUTPATIENT)
Dept: ORTHOPEDICS | Facility: CLINIC | Age: 64
End: 2019-10-17
Attending: INTERNAL MEDICINE
Payer: COMMERCIAL

## 2019-10-17 ENCOUNTER — ANCILLARY PROCEDURE (OUTPATIENT)
Dept: GENERAL RADIOLOGY | Facility: CLINIC | Age: 64
End: 2019-10-17
Attending: NURSE PRACTITIONER
Payer: COMMERCIAL

## 2019-10-17 ENCOUNTER — PRE VISIT (OUTPATIENT)
Dept: ORTHOPEDICS | Facility: CLINIC | Age: 64
End: 2019-10-17

## 2019-10-17 VITALS — BODY MASS INDEX: 20.37 KG/M2 | WEIGHT: 134.4 LBS | HEIGHT: 68 IN

## 2019-10-17 DIAGNOSIS — M25.551 PAIN OF RIGHT HIP JOINT: ICD-10-CM

## 2019-10-17 DIAGNOSIS — M25.551 PAIN OF RIGHT HIP JOINT: Primary | ICD-10-CM

## 2019-10-17 DIAGNOSIS — M16.11 PRIMARY OSTEOARTHRITIS OF RIGHT HIP: Primary | ICD-10-CM

## 2019-10-17 ASSESSMENT — MIFFLIN-ST. JEOR: SCORE: 1364.13

## 2019-10-17 NOTE — LETTER
"10/17/2019       RE: Jose Carlos Bolden  [address] [address]     Dear Colleague,    Thank you for referring your patient, Jose Carlos Bolden, to the [facility] at [facility]. Please see a copy of my visit note below.    CC: \"right hip pain\"    HPI:  Jose Carlos is seen as a new patient for right hip pain. He states his hip has \"been sore for many years but has worsened over \"the last few months\". He denies specific injuries or falls but has \"increased his walking\". Has occasional activity related night pain. Takes occasional ibuprophen which \"completely eliminates his hip achiness\". Rates his pain \"4 or 5\". Localized to the groin and at times \"radiates to the outside\". Has no pain while laying on that side at night. Denies locking or catching or other mechanical symptoms. He also uses stretching and strengthening exercises that \"really help\". He had a left CATALINA done in 2014 by Dr. Mcmillan that \"has functioned really well\". He has done his antibiotics for his dental procedures.    PMH: Reviewed from patient chart today 10/17/2019    ROS: Reviewed from patient tablet today 10/17/2019 with all systems negative except for those listed below    PE:  Pleasant and cooperative male alert and oriented x3. He is 5'8\" and weighs 134 pounds. His BMI is 20. He arises from chair unguarded. Has a negative Trendelenburg and no limp on gait. He has full motion in passive internal and external rotation that is slightly painful at the end range of internal rotation but is fairly symmetrical to contralateral side. No pain with hip flexion with symmetrical motion. Nontender to palpate the greater trochanter to both hips. + CMS with full sensation in all dermatomes to lower extremity. Skin intact without rashes, bruises, or lesions. Negative straight leg raise noted. Able to forward flex and hyperextend with side to side bending without pain.    Full length xray and previous hip " images were reviewed today that demonstrate minimal leg length difference with neutral alignment to bilateral lower extremity. Left total hip is in ideal position and anatomic. There is moderate amount of joint space narrowing to the right hip wth an osteophyte and mild subchondral edema noted.     Dx:  1. Moderate right hip osteoarthritis    Plan:  1. The natural progression and plan of care for managing the symptoms of hip arthritis were discussed including conservative options of cane, injections, nsaids, and therapy. He agrees, that his pain is not yet bad enough to consider hip replacement. He will consider an injection in the future. He was given our hip surgeons names for future reference.    Total time spent was 30 minutes with greater than 50% spent in face to face consultation and collaboration of care.    Again, thank you for allowing me to participate in the care of your patient.      Sincerely,    BAM Nettles CNP

## 2019-10-17 NOTE — PROGRESS NOTES
"CC: \"right hip pain\"    HPI:  Jose Carlos is seen as a new patient for right hip pain. He states his hip has \"been sore for many years but has worsened over \"the last few months\". He denies specific injuries or falls but has \"increased his walking\". Has occasional activity related night pain. Takes occasional ibuprophen which \"completely eliminates his hip achiness\". Rates his pain \"4 or 5\". Localized to the groin and at times \"radiates to the outside\". Has no pain while laying on that side at night. Denies locking or catching or other mechanical symptoms. He also uses stretching and strengthening exercises that \"really help\". He had a left CATALINA done in 2014 by Dr. Mcmillan that \"has functioned really well\". He has done his antibiotics for his dental procedures.    PMH: Reviewed from patient chart today 10/17/2019    ROS: Reviewed from patient tablet today 10/17/2019 with all systems negative except for those listed below    PE:  Pleasant and cooperative male alert and oriented x3. He is 5'8\" and weighs 134 pounds. His BMI is 20. He arises from chair unguarded. Has a negative Trendelenburg and no limp on gait. He has full motion in passive internal and external rotation that is slightly painful at the end range of internal rotation but is fairly symmetrical to contralateral side. No pain with hip flexion with symmetrical motion. Nontender to palpate the greater trochanter to both hips. + CMS with full sensation in all dermatomes to lower extremity. Skin intact without rashes, bruises, or lesions. Negative straight leg raise noted. Able to forward flex and hyperextend with side to side bending without pain.    Full length xray and previous hip images were reviewed today that demonstrate minimal leg length difference with neutral alignment to bilateral lower extremity. Left total hip is in ideal position and anatomic. There is moderate amount of joint space narrowing to the right hip wth an osteophyte and mild subchondral edema " noted.     Dx:  1. Moderate right hip osteoarthritis    Plan:  1. The natural progression and plan of care for managing the symptoms of hip arthritis were discussed including conservative options of cane, injections, nsaids, and therapy. He agrees, that his pain is not yet bad enough to consider hip replacement. He will consider an injection in the future. He was given our hip surgeons names for future reference.    Total time spent was 30 minutes with greater than 50% spent in face to face consultation and collaboration of care.      Answers for HPI/ROS submitted by the patient on 10/14/2019   General Symptoms: Yes  Skin Symptoms: No  HENT Symptoms: No  EYE SYMPTOMS: Yes  HEART SYMPTOMS: No  LUNG SYMPTOMS: No  INTESTINAL SYMPTOMS: No  URINARY SYMPTOMS: No  REPRODUCTIVE SYMPTOMS: No  SKELETAL SYMPTOMS: Yes  BLOOD SYMPTOMS: No  NERVOUS SYSTEM SYMPTOMS: No  MENTAL HEALTH SYMPTOMS: No  Fever: No  Loss of appetite: No  Weight loss: No  Weight gain: No  Fatigue: No  Night sweats: No  Chills: No  Increased stress: No  Excessive hunger: No  Excessive thirst: No  Feeling hot or cold when others believe the temperature is normal: No  Loss of height: No  Post-operative complications: No  Surgical site pain: No  Hallucinations: No  Change in or Loss of Energy: No  Hyperactivity: No  Confusion: No  Eye pain: No  Vision loss: No  Dry eyes: Yes  Watery eyes: No  Eye bulging: No  Double vision: No  Flashing of lights: No  Spots: No  Floaters: Yes  Redness: No  Crossed eyes: No  Tunnel Vision: No  Yellowing of eyes: No  Eye irritation: Yes  Back pain: Yes  Muscle aches: No  Neck pain: No  Swollen joints: No  Joint pain: Yes  Bone pain: No  Muscle cramps: No  Muscle weakness: No  Joint stiffness: Yes  Bone fracture: No

## 2019-10-17 NOTE — NURSING NOTE
"Reason For Visit:   Chief Complaint   Patient presents with     Consult     right hip pain       Ht 1.727 m (5' 8\")   Wt 61 kg (134 lb 6.4 oz)   BMI 20.44 kg/m      Pain Assessment  Patient Currently in Pain: Yes  0-10 Pain Scale: 4  Primary Pain Location: Hip(right)  Pain Descriptors: (grinding)  Alleviating Factors: Exercise, Other (comment), NSAIDS  Aggravating Factors: Walking, Sitting    Kenny Kenya, ATC    "

## 2019-10-24 DIAGNOSIS — G25.81 RESTLESS LEGS SYNDROME: ICD-10-CM

## 2019-10-24 RX ORDER — ROPINIROLE 0.25 MG/1
0.25 TABLET, FILM COATED ORAL 3 TIMES DAILY
Qty: 90 TABLET | Refills: 0 | OUTPATIENT
Start: 2019-10-24

## 2019-10-28 ENCOUNTER — TELEPHONE (OUTPATIENT)
Dept: INTERNAL MEDICINE | Facility: CLINIC | Age: 64
End: 2019-10-28

## 2019-10-28 ENCOUNTER — TELEPHONE (OUTPATIENT)
Dept: ORTHOPEDICS | Facility: CLINIC | Age: 64
End: 2019-10-28

## 2019-10-28 DIAGNOSIS — Z96.649 HISTORY OF TOTAL HIP REPLACEMENT, UNSPECIFIED LATERALITY: Primary | ICD-10-CM

## 2019-10-28 RX ORDER — CLINDAMYCIN HCL 300 MG
CAPSULE ORAL
Qty: 6 CAPSULE | Refills: 0 | Status: SHIPPED | OUTPATIENT
Start: 2019-10-28 | End: 2021-12-09

## 2019-10-28 NOTE — TELEPHONE ENCOUNTER
Called Jose Carlos back and let him know that Dr. Whitaker like all his total joint patients to take antibiotics prior to dental work. Prescription sent to [facility].    Sherron Lua, ATC

## 2019-10-28 NOTE — TELEPHONE ENCOUNTER
[facility] Call Center    Phone Message    May a detailed message be left on voicemail: yes    Reason for Call: Medication Question or concern regarding medication   Prescription Clarification  Name of Medication: antibiotics  Prescribing Provider: MAURILIO Burton   Pharmacy: ?   What on the order needs clarification? Does the pt need antibiotics prior to dental procedures, he has an appt today at 12:40 pm. Please call to discuss. Thanks.          Action Taken: Message routed to:  [facility]

## 2019-10-28 NOTE — TELEPHONE ENCOUNTER
-spoke to the patient over the phone.  -informed him that yes, Dr. Whitaker would highly recommend he take the antibiotics prior to dental procedures for the rest of his life.  -better to be safe than sorry, just really wanting to prevent from any infections.  -patient voiced an understanding and will proceed as directed.  -also encouraged the patient to call back with additional questions.     Nya Lim RN  10/28/2019 10:37 AM       [facility] Call Center    Phone Message    May a detailed message be left on voicemail: yes    Reason for Call: Medication Question or concern regarding medication   Prescription Clarification  Name of Medication: amoxicillin (AMOXIL) 500 MG tablet     Prescribing Provider: Alyse Young MD   Pharmacy: [facility]/PHARMACY #[id number] [address]   What on the order needs clarification?   The patient wanted to know if he'll have to take this medication for the rest of his life? He spoke to the Dental clinic, Then at some point in that encounter he was lead to believe he would have to take this for the rest of his life. Then a read a news article that left him with conflicting information please call patient to address concerns thank you.        Action Taken: Message routed to:  [facility]

## 2019-11-16 ENCOUNTER — MYC MEDICAL ADVICE (OUTPATIENT)
Dept: INTERNAL MEDICINE | Facility: CLINIC | Age: 64
End: 2019-11-16

## 2019-11-18 NOTE — TELEPHONE ENCOUNTER
Updated immunization record per [employer] message. Sulema Acosta Paramedic on 11/18/2019 at 11:28 AM

## 2019-12-02 DIAGNOSIS — E78.2 MIXED HYPERLIPIDEMIA: Primary | ICD-10-CM

## 2020-02-24 ENCOUNTER — TELEPHONE (OUTPATIENT)
Dept: ORTHOPEDICS | Facility: CLINIC | Age: 65
End: 2020-02-24

## 2020-02-24 DIAGNOSIS — M16.11 PRIMARY OSTEOARTHRITIS OF RIGHT HIP: Primary | ICD-10-CM

## 2020-02-24 NOTE — TELEPHONE ENCOUNTER
I called Jose Carlos this afternoon to ask about his appointmetn on Thursday 2/28/20. Patient would just like to have a Right hip injectio. Because he is just requesting an injection and Deneen has already approved getting an injection that order will be placed and imaging will call to help Jose Carlos get scheduled. He asked me to cancel his appointment on Thursday and he will proceed with the injection into his right hip.    Sherron Lua, ATC

## 2020-02-27 DIAGNOSIS — E78.5 HYPERLIPIDEMIA LDL GOAL <130: ICD-10-CM

## 2020-02-28 ENCOUNTER — ANCILLARY PROCEDURE (OUTPATIENT)
Dept: GENERAL RADIOLOGY | Facility: CLINIC | Age: 65
End: 2020-02-28
Attending: NURSE PRACTITIONER
Payer: COMMERCIAL

## 2020-02-28 DIAGNOSIS — M16.11 PRIMARY OSTEOARTHRITIS OF RIGHT HIP: ICD-10-CM

## 2020-02-28 RX ORDER — LIDOCAINE HYDROCHLORIDE 20 MG/ML
JELLY TOPICAL ONCE
Status: DISCONTINUED | OUTPATIENT
Start: 2020-02-28 | End: 2020-02-28 | Stop reason: CLARIF

## 2020-02-28 RX ORDER — IOPAMIDOL 408 MG/ML
20 INJECTION, SOLUTION INTRATHECAL ONCE
Status: COMPLETED | OUTPATIENT
Start: 2020-02-28 | End: 2020-02-28

## 2020-02-28 RX ORDER — LIDOCAINE HYDROCHLORIDE 10 MG/ML
30 INJECTION, SOLUTION EPIDURAL; INFILTRATION; INTRACAUDAL; PERINEURAL ONCE
Status: COMPLETED | OUTPATIENT
Start: 2020-02-28 | End: 2020-02-28

## 2020-02-28 RX ORDER — TRIAMCINOLONE ACETONIDE 40 MG/ML
40 INJECTION, SUSPENSION INTRA-ARTICULAR; INTRAMUSCULAR ONCE
Status: COMPLETED | OUTPATIENT
Start: 2020-02-28 | End: 2020-02-28

## 2020-02-28 RX ORDER — ATORVASTATIN CALCIUM 20 MG/1
20 TABLET, FILM COATED ORAL DAILY
Qty: 90 TABLET | Refills: 0 | Status: SHIPPED | OUTPATIENT
Start: 2020-02-28 | End: 2020-05-27

## 2020-02-28 RX ORDER — BUPIVACAINE HYDROCHLORIDE 2.5 MG/ML
10 INJECTION, SOLUTION EPIDURAL; INFILTRATION; INTRACAUDAL ONCE
Status: COMPLETED | OUTPATIENT
Start: 2020-02-28 | End: 2020-02-28

## 2020-02-28 RX ADMIN — IOPAMIDOL 2 ML: 408 INJECTION, SOLUTION INTRATHECAL at 13:04

## 2020-02-28 RX ADMIN — LIDOCAINE HYDROCHLORIDE 5 ML: 10 INJECTION, SOLUTION EPIDURAL; INFILTRATION; INTRACAUDAL; PERINEURAL at 13:04

## 2020-02-28 RX ADMIN — BUPIVACAINE HYDROCHLORIDE 10 MG: 2.5 INJECTION, SOLUTION EPIDURAL; INFILTRATION; INTRACAUDAL at 13:04

## 2020-02-28 RX ADMIN — TRIAMCINOLONE ACETONIDE 40 MG: 40 INJECTION, SUSPENSION INTRA-ARTICULAR; INTRAMUSCULAR at 13:04

## 2020-02-28 NOTE — TELEPHONE ENCOUNTER
Last Clinic Visit: 10/7/19 no upcoming visits, over due for LDL, order in que, 90 day denita RX provided, routed to clinic to contact to schedule lab appointment

## 2020-03-02 DIAGNOSIS — E78.2 MIXED HYPERLIPIDEMIA: ICD-10-CM

## 2020-03-02 LAB
CHOLEST SERPL-MCNC: 142 MG/DL
HDLC SERPL-MCNC: 35 MG/DL
LDLC SERPL CALC-MCNC: 77 MG/DL
NONHDLC SERPL-MCNC: 108 MG/DL
TRIGL SERPL-MCNC: 154 MG/DL

## 2020-03-11 ENCOUNTER — OFFICE VISIT (OUTPATIENT)
Dept: AUDIOLOGY | Facility: CLINIC | Age: 65
End: 2020-03-11
Payer: COMMERCIAL

## 2020-03-11 DIAGNOSIS — H90.3 SENSORINEURAL HEARING LOSS, BILATERAL: Primary | ICD-10-CM

## 2020-03-11 DIAGNOSIS — H90.3 SENSORINEURAL HEARING LOSS (SNHL) OF BOTH EARS: ICD-10-CM

## 2020-03-11 NOTE — PROGRESS NOTES
AUDIOLOGY REPORT    SUBJECTIVE: Jose Carlos Bolden is a 64 year old male who was seen in the Audiology Clinic at [facility] for audiologic evaluation, referred by Alyse Young M.D. The patient reports bilateral hearing loss. He utilizes bilateral Oticon Opn 1 hearing aids that were fit in 2017 at [facility]. He notes occasional left pain (none today) that seems to be caused by his hearing aid dome. The patient reports bilateral tinnitus and imbalance. He denies bilateral drainage or a history of ear surgeries.    OBJECTIVE:  Abuse Screening:  Do you feel unsafe at home or work/school? No  Do you feel threatened by someone? No  Does anyone try to keep you from having contact with others, or doing things outside of your home? No  Physical signs of abuse present? No     Fall Risk Screening:  Have you fallen two or more times in the past year? No  Have you fallen and had an injury in the past year? No    Otoscopic exam indicated ears are clear of cerumen bilaterally. No redness or sore spots were noted in the left ear.     Pure Tone Thresholds assessed using conventional audiometry with good reliability from 250-8000 Hz bilaterally using insert earphones and circumaural headphones     RIGHT:  Normal hearing steeply sloping to severe sensorineural hearing loss    LEFT:    Normal hearing steeply sloping to severe sensorineural hearing loss    Tympanogram:    RIGHT: Normal eardrum mobility    LEFT:   Normal eardrum mobility    Reflexes: Could not test, as unable to maintain a seal    Speech Reception Threshold:    RIGHT: 15 dB HL    LEFT:   15 dB HL    Word Recognition Score:     RIGHT: 100% at 80 dB HL using NU-6 recorded word list    LEFT:   96% at 80 dB HL using NU-6 recorded word list    ASSESSMENT: Bilateral sensorineural hearing loss. Today s results were discussed with the patient in detail.     PLAN: The patient will follow-up with his hearing aid provider as  needed. Repeat audiologic evaluation is recommended if changes are noted. Please call this clinic with questions regarding these results or recommendations.      Ambrocio Sabillon, [facility]  Licensed Audiologist  [place] #[id number]      CC: Alyse Young M.D.

## 2020-03-15 ENCOUNTER — HEALTH MAINTENANCE LETTER (OUTPATIENT)
Age: 65
End: 2020-03-15

## 2020-05-24 DIAGNOSIS — E78.5 HYPERLIPIDEMIA LDL GOAL <130: ICD-10-CM

## 2020-05-27 RX ORDER — ATORVASTATIN CALCIUM 20 MG/1
20 TABLET, FILM COATED ORAL DAILY
Qty: 90 TABLET | Refills: 3 | Status: SHIPPED | OUTPATIENT
Start: 2020-05-27 | End: 2021-05-25

## 2020-09-08 ENCOUNTER — APPOINTMENT (OUTPATIENT)
Dept: LAB | Facility: CLINIC | Age: 65
End: 2020-09-08
Payer: COMMERCIAL

## 2020-09-17 NOTE — TELEPHONE ENCOUNTER
DIAGNOSIS: Ref by Deneen Burton, discuss Rt CATALINA per pt  *Rt Hip*   APPOINTMENT DATE: 10/28/2020   NOTES STATUS DETAILS   OFFICE NOTE from referring provider Internal 10/17/19 OV from Deneen Burton NP (EPIC   OFFICE NOTE from other specialist Care Everywhere 03/26/2015 OV with Ted Mcmillan MD  ( Specialty )    04/23/2014 OV from Roseline Stahl PTA  ([facility] Physical Therapy  )   DISCHARGE SUMMARY from hospital N/A    DISCHARGE REPORT from the ER N/A    OPERATIVE REPORT Care Everywhere 03/26/2014 TOTAL JOINT REPLACEMENT HIP with Ted Mcmillan MD  ([facility])    MEDICATION LIST Internal    EMG (for Spine) N/A    IMPLANT RECORD/STICKER N/A    LABS     CBC/DIFF Care Everywhere 9/10/2018   CULTURES N/A    INJECTIONS DONE IN RADIOLOGY Internal Internal   2/28/2020   MRI N/A    CT SCAN N/A    XRAYS (IMAGES & REPORTS) N/A      TUMOR     PATHOLOGY  Slides & report N/A

## 2020-10-20 DIAGNOSIS — M16.11 PRIMARY OSTEOARTHRITIS OF RIGHT HIP: Primary | ICD-10-CM

## 2020-10-28 ENCOUNTER — ANCILLARY PROCEDURE (OUTPATIENT)
Dept: GENERAL RADIOLOGY | Facility: CLINIC | Age: 65
End: 2020-10-28
Attending: ORTHOPAEDIC SURGERY
Payer: COMMERCIAL

## 2020-10-28 ENCOUNTER — PRE VISIT (OUTPATIENT)
Dept: ORTHOPEDICS | Facility: CLINIC | Age: 65
End: 2020-10-28

## 2020-10-28 ENCOUNTER — OFFICE VISIT (OUTPATIENT)
Dept: ORTHOPEDICS | Facility: CLINIC | Age: 65
End: 2020-10-28
Payer: COMMERCIAL

## 2020-10-28 DIAGNOSIS — M16.11 PRIMARY OSTEOARTHRITIS OF RIGHT HIP: ICD-10-CM

## 2020-10-28 DIAGNOSIS — M16.11 PRIMARY LOCALIZED OSTEOARTHRITIS OF RIGHT HIP: Primary | ICD-10-CM

## 2020-10-28 PROCEDURE — 99203 OFFICE O/P NEW LOW 30 MIN: CPT | Performed by: ORTHOPAEDIC SURGERY

## 2020-10-28 PROCEDURE — 73502 X-RAY EXAM HIP UNI 2-3 VIEWS: CPT | Mod: RT | Performed by: RADIOLOGY

## 2020-10-28 NOTE — NURSING NOTE
Reason For Visit: No chief complaint on file.      Primary MD: Alyse Young  Referring MD: Deneen Burton          There were no vitals taken for this visit.    Pain Assessment  Patient Currently in Pain: Yes  0-10 Pain Scale: 4  Primary Pain Location: Hip  Pain Descriptors: Discomfort    Current Outpatient Medications   Medication     albuterol (PROAIR HFA) 108 (90 BASE) MCG/ACT Inhaler     amoxicillin (AMOXIL) 500 MG tablet     atorvastatin (LIPITOR) 20 MG tablet     clindamycin (CLEOCIN) 300 MG capsule     Cyanocobalamin (VITAMIN B12) 1000 MCG TBCR     ferrous gluconate (FERGON) 324 (38 Fe) MG tablet     gabapentin (NEURONTIN) 600 MG tablet     metroNIDAZOLE (NORITATE) 1 % cream     triamcinolone (KENALOG) 0.1 % cream     No current facility-administered medications for this visit.           Allergies   Allergen Reactions     Food      Seasonal Allergies      Sulfa Drugs Other (See Comments)     Confusion and dizziness     Penicillium Notatum Allergy Skin Test            Dianne Acosta LPN

## 2020-10-28 NOTE — LETTER
10/28/2020         RE: Jose Carlos Bolden  [address] [facility] [address]        Dear Colleague,    Thank you for referring your patient, Jose Carlos Bolden, to the [facility] [place]. Please see a copy of my visit note below.    [facility], Orthopaedic Surgery, Arthritis, Hip and Knee Replacement    Jose Carlos Bolden MRN# [id number]   Age: 65 year old YOB: 1955     Requesting physician: Deneen Burton              History of Present Illness:   Jose Carlos Bolden is a 65 year old year old male who presents today for evaluation and management of   Right hip osteoarthritis.  He has a longstanding history of intermittent issues with his right hip.  He notes that of about 6 weeks or so ago he was having increased pain in the right hip.  The symptoms localized to the groin and or worse with twisting and weight-bearing type activities.  He notes that these symptoms will occasionally come and go but seem to be more severe during this episode.  He notes that he was doing some increased activity including gardening and working in crouch positions which seemed to worsen the symptoms.  Fortunately, the symptoms gradually improved with time.  Over the last couple weeks he has not really had any significant pain in the right hip.  He does take anti-inflammatory medications when the symptoms do occur.  He has not had any history of injections.  He has a history of left total hip replacement which went smoothly and he has not had any problems with the hip replacement.  He             Past Medical History:     Patient Active Problem List   Diagnosis     Hearing loss     Glaucoma suspect     Reactive airway disease     Restless legs syndrome     Kyphosis     Scoliosis     Vitamin B12 deficiency     Osteoarthritis     Iron deficiency     Hyperlipidemia LDL goal <130     Past Medical History:   Diagnosis Date     Allergy     sulfa, penicillium, food (tomatillos, ground cherries, some potato  chips)     Back pain      Glaucoma suspect      Hearing loss      Hip pain, left      Hyperlipidemia LDL goal <130      Iron deficiency      Kyphosis      Osteoarthritis      Reactive airway disease      Restless leg syndrome      Scoliosis      Vitamin B12 deficiency                 Past Surgical History:     Past Surgical History:   Procedure Laterality Date     COLONOSCOPY      Findings: normal     JOINT REPLACEMENT Left     hip, CATALINA     VASECTOMY              Social History:     Social History     Socioeconomic History     Marital status:      Spouse name: Not on file     Number of children: Not on file     Years of education: Not on file     Highest education level: Not on file   Occupational History     Not on file   Social Needs     Financial resource strain: Not on file     Food insecurity     Worry: Not on file     Inability: Not on file     Transportation needs     Medical: Not on file     Non-medical: Not on file   Tobacco Use     Smoking status: Never Smoker     Smokeless tobacco: Never Used   Substance and Sexual Activity     Alcohol use: Yes     Comment: 7 drinks per week on average     Drug use: No     Sexual activity: Yes     Partners: Female   Lifestyle     Physical activity     Days per week: Not on file     Minutes per session: Not on file     Stress: Not on file   Relationships     Social connections     Talks on phone: Not on file     Gets together: Not on file     Attends Hoahaoism service: Not on file     Active member of club or organization: Not on file     Attends meetings of clubs or organizations: Not on file     Relationship status: Not on file     Intimate partner violence     Fear of current or ex partner: Not on file     Emotionally abused: Not on file     Physically abused: Not on file     Forced sexual activity: Not on file   Other Topics Concern     Not on file   Social History Narrative    Retired , 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               Family History:       Family History   Problem Relation Age of Onset     Glaucoma Brother      Hyperlipidemia Brother      Prostate Problems Brother         BPH     Glaucoma Sister      Pancreatic Cancer Sister          age 71 approximately     Osteoporosis Sister      Anemia Sister      Brain Cancer Mother         age 65  of glioblastoma     Depression Mother         post partum     Osteoporosis Mother      Anemia Mother      Allergy (Severe) Mother         penicillin     Heart Disease Father         age 36 first MI, had coronary bypass      Emphysema Father         smoker     Glaucoma Father      Hyperlipidemia Father      Neurologic Disorder Father         Jansen's Palsy     Heart Disease Maternal Grandmother      Other - See Comments Maternal Grandfather         seasonal allergies     Coronary Artery Disease Paternal Grandmother         age 87 MI     Cancer Paternal Grandmother         Uterine/vaginal     Hyperlipidemia Brother      Neurologic Disorder Brother         Bell's palsy     Osteoporosis Brother      Anemia Brother      Vascular Disease Brother         Raynaud's     Prostate Problems Brother         BPH     Hyperlipidemia Sister      Heart Disease Sister         age 50, bypass surgery     Osteoporosis Sister      Anemia Sister      Depression Sister      Hyperlipidemia Sister      Other - See Comments Sister         Multiple chemical sensitivity     Neurologic Disorder Sister         Bell's palsy     Osteoporosis Sister      Anemia Sister      Breast Cancer Sister      Osteoporosis Sister         kyphosis, scoliosis     Hyperlipidemia Sister      Vascular Disease Sister         Raynaud's     Hyperlipidemia Brother      Hyperlipidemia Brother      Neurologic Disorder Brother         Jansen's palsy     Heart Disease Paternal Grandfather          MI age 56     Macular Degeneration No family hx of               Medications:     Current Outpatient Medications   Medication Sig     albuterol  (PROAIR HFA) 108 (90 BASE) MCG/ACT Inhaler Inhale 2 puffs into the lungs every 4 hours as needed for shortness of breath / dyspnea     amoxicillin (AMOXIL) 500 MG tablet Take 4 tablets (2,000 mg) by mouth once     atorvastatin (LIPITOR) 20 MG tablet Take 1 tablet (20 mg) by mouth daily     clindamycin (CLEOCIN) 300 MG capsule Take 2 pills 1 hour prior to dental work.     Cyanocobalamin (VITAMIN B12) 1000 MCG TBCR Take 1 tablet by mouth daily     ferrous gluconate (FERGON) 324 (38 Fe) MG tablet Take 1 tablet (324 mg) by mouth daily (with breakfast)     gabapentin (NEURONTIN) 600 MG tablet Take 1 tablet (600 mg) by mouth 3 times daily As directed     metroNIDAZOLE (NORITATE) 1 % cream Apply topically daily     triamcinolone (KENALOG) 0.1 % cream Apply topically 2 times daily     No current facility-administered medications for this visit.             Review of Systems:   A comprehensive 10 point review of systems (constitutional, ENT, cardiac, peripheral vascular, lymphatic, respiratory, GI, , Musculoskeletal, skin, Neurological) was performed and found to be negative except as described in this note.     Also see intake form completed by patient.             Physical Exam:     EXAMINATION pertinent findings:   VITAL SIGNS: There were no vitals taken for this visit.  There is no height or weight on file to calculate BMI.  GEN: AOx3, cooperative, no distress  RESP: non labored breathing   ABD: benign   SKIN: grossly normal   LYMPHATIC: grossly normal   NEURO: grossly normal   VASCULAR: satisfactory perfusion of all extremities  MUSCULOSKELETAL:    he walks with well-balanced gait.  Examination of the right hip demonstrates no pain with active straight leg raise.  His right hip range of motion is from 0-90 with 20  of internal rotation 40  of external rotation.  He has minimal pain with flexion and internal rotation.  He has no lateral hip tenderness palpation.  He has 5/5 abductor strength.  Ankle plantar flexion  dorsiflexion are intact.  Intact sensation throughout his foot.             Data:   Imaging:    X-rays demonstrate moderate degenerative right hip osteoarthritis and a well-fixed well-positioned left total hip replacement           Assessment and Plan:   Assessment:    Jose Carlos is a very pleasant 65-year-old man with a well-functioning left total hip replacement and right hip osteoarthritis.  He deals quite well with his symptoms and for the most part is not limited  And does not have significant pain in the right hip.  We discussed the natural history of the condition as well as ongoing management options.  He will continue with his bicycling and exercise program.  Should his symptoms worsen to the point where they are impacting his quality of life we could consider other treatment options such as hip replacement.  However I would recommend holding off on this until his symptoms were severe which he is in agreement with.  He will keep us updated as to how things go.  If his symptoms are worsening he will return to clinic on a p.r.n. basis.  He will let us know if he has any other questions or concerns following today's visit.    Arron Cooley M.D.     Arthritis and Joint Replacement  Department of Orthopaedic Surgery, [facility]  [email or web address]  [phone] (pager)           Review of Systems:

## 2020-10-29 NOTE — PROGRESS NOTES
[facility], Orthopaedic Surgery, Arthritis, Hip and Knee Replacement    Jose Carlos Bolden MRN# [id number]   Age: 65 year old YOB: 1955     Requesting physician: Deneen Burton              History of Present Illness:   Jose Carlos Bolden is a 65 year old year old male who presents today for evaluation and management of   Right hip osteoarthritis.  He has a longstanding history of intermittent issues with his right hip.  He notes that of about 6 weeks or so ago he was having increased pain in the right hip.  The symptoms localized to the groin and or worse with twisting and weight-bearing type activities.  He notes that these symptoms will occasionally come and go but seem to be more severe during this episode.  He notes that he was doing some increased activity including gardening and working in crouch positions which seemed to worsen the symptoms.  Fortunately, the symptoms gradually improved with time.  Over the last couple weeks he has not really had any significant pain in the right hip.  He does take anti-inflammatory medications when the symptoms do occur.  He has not had any history of injections.  He has a history of left total hip replacement which went smoothly and he has not had any problems with the hip replacement.  He             Past Medical History:     Patient Active Problem List   Diagnosis     Hearing loss     Glaucoma suspect     Reactive airway disease     Restless legs syndrome     Kyphosis     Scoliosis     Vitamin B12 deficiency     Osteoarthritis     Iron deficiency     Hyperlipidemia LDL goal <130     Past Medical History:   Diagnosis Date     Allergy     sulfa, penicillium, food (tomatillos, ground cherries, some potato chips)     Back pain      Glaucoma suspect      Hearing loss      Hip pain, left      Hyperlipidemia LDL goal <130      Iron deficiency      Kyphosis      Osteoarthritis      Reactive airway disease      Restless leg syndrome      Scoliosis      Vitamin B12  deficiency                 Past Surgical History:     Past Surgical History:   Procedure Laterality Date     COLONOSCOPY      Findings: normal     JOINT REPLACEMENT Left     hip, CATALINA     VASECTOMY              Social History:     Social History     Socioeconomic History     Marital status:      Spouse name: Not on file     Number of children: Not on file     Years of education: Not on file     Highest education level: Not on file   Occupational History     Not on file   Social Needs     Financial resource strain: Not on file     Food insecurity     Worry: Not on file     Inability: Not on file     Transportation needs     Medical: Not on file     Non-medical: Not on file   Tobacco Use     Smoking status: Never Smoker     Smokeless tobacco: Never Used   Substance and Sexual Activity     Alcohol use: Yes     Comment: 7 drinks per week on average     Drug use: No     Sexual activity: Yes     Partners: Female   Lifestyle     Physical activity     Days per week: Not on file     Minutes per session: Not on file     Stress: Not on file   Relationships     Social connections     Talks on phone: Not on file     Gets together: Not on file     Attends Mandaeism service: Not on file     Active member of club or organization: Not on file     Attends meetings of clubs or organizations: Not on file     Relationship status: Not on file     Intimate partner violence     Fear of current or ex partner: Not on file     Emotionally abused: Not on file     Physically abused: Not on file     Forced sexual activity: Not on file   Other Topics Concern     Not on file   Social History Narrative    Retired , 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              Family History:       Family History   Problem Relation Age of Onset     Glaucoma Brother      Hyperlipidemia Brother      Prostate Problems Brother         BPH     Glaucoma Sister      Pancreatic Cancer Sister          age 71 approximately      Osteoporosis Sister      Anemia Sister      Brain Cancer Mother         age 65  of glioblastoma     Depression Mother         post partum     Osteoporosis Mother      Anemia Mother      Allergy (Severe) Mother         penicillin     Heart Disease Father         age 36 first MI, had coronary bypass      Emphysema Father         smoker     Glaucoma Father      Hyperlipidemia Father      Neurologic Disorder Father         Jansen's Palsy     Heart Disease Maternal Grandmother      Other - See Comments Maternal Grandfather         seasonal allergies     Coronary Artery Disease Paternal Grandmother         age 87 MI     Cancer Paternal Grandmother         Uterine/vaginal     Hyperlipidemia Brother      Neurologic Disorder Brother         Bell's palsy     Osteoporosis Brother      Anemia Brother      Vascular Disease Brother         Raynaud's     Prostate Problems Brother         BPH     Hyperlipidemia Sister      Heart Disease Sister         age 50, bypass surgery     Osteoporosis Sister      Anemia Sister      Depression Sister      Hyperlipidemia Sister      Other - See Comments Sister         Multiple chemical sensitivity     Neurologic Disorder Sister         Bell's palsy     Osteoporosis Sister      Anemia Sister      Breast Cancer Sister      Osteoporosis Sister         kyphosis, scoliosis     Hyperlipidemia Sister      Vascular Disease Sister         Raynaud's     Hyperlipidemia Brother      Hyperlipidemia Brother      Neurologic Disorder Brother         Jansen's palsy     Heart Disease Paternal Grandfather          MI age 56     Macular Degeneration No family hx of               Medications:     Current Outpatient Medications   Medication Sig     albuterol (PROAIR HFA) 108 (90 BASE) MCG/ACT Inhaler Inhale 2 puffs into the lungs every 4 hours as needed for shortness of breath / dyspnea     amoxicillin (AMOXIL) 500 MG tablet Take 4 tablets (2,000 mg) by mouth once     atorvastatin (LIPITOR) 20 MG tablet Take  1 tablet (20 mg) by mouth daily     clindamycin (CLEOCIN) 300 MG capsule Take 2 pills 1 hour prior to dental work.     Cyanocobalamin (VITAMIN B12) 1000 MCG TBCR Take 1 tablet by mouth daily     ferrous gluconate (FERGON) 324 (38 Fe) MG tablet Take 1 tablet (324 mg) by mouth daily (with breakfast)     gabapentin (NEURONTIN) 600 MG tablet Take 1 tablet (600 mg) by mouth 3 times daily As directed     metroNIDAZOLE (NORITATE) 1 % cream Apply topically daily     triamcinolone (KENALOG) 0.1 % cream Apply topically 2 times daily     No current facility-administered medications for this visit.             Review of Systems:   A comprehensive 10 point review of systems (constitutional, ENT, cardiac, peripheral vascular, lymphatic, respiratory, GI, , Musculoskeletal, skin, Neurological) was performed and found to be negative except as described in this note.     Also see intake form completed by patient.             Physical Exam:     EXAMINATION pertinent findings:   VITAL SIGNS: There were no vitals taken for this visit.  There is no height or weight on file to calculate BMI.  GEN: AOx3, cooperative, no distress  RESP: non labored breathing   ABD: benign   SKIN: grossly normal   LYMPHATIC: grossly normal   NEURO: grossly normal   VASCULAR: satisfactory perfusion of all extremities  MUSCULOSKELETAL:    he walks with well-balanced gait.  Examination of the right hip demonstrates no pain with active straight leg raise.  His right hip range of motion is from 0-90 with 20  of internal rotation 40  of external rotation.  He has minimal pain with flexion and internal rotation.  He has no lateral hip tenderness palpation.  He has 5/5 abductor strength.  Ankle plantar flexion dorsiflexion are intact.  Intact sensation throughout his foot.             Data:   Imaging:    X-rays demonstrate moderate degenerative right hip osteoarthritis and a well-fixed well-positioned left total hip replacement           Assessment and Plan:    Assessment:    Jose Carlos is a very pleasant 65-year-old man with a well-functioning left total hip replacement and right hip osteoarthritis.  He deals quite well with his symptoms and for the most part is not limited  And does not have significant pain in the right hip.  We discussed the natural history of the condition as well as ongoing management options.  He will continue with his bicycling and exercise program.  Should his symptoms worsen to the point where they are impacting his quality of life we could consider other treatment options such as hip replacement.  However I would recommend holding off on this until his symptoms were severe which he is in agreement with.  He will keep us updated as to how things go.  If his symptoms are worsening he will return to clinic on a p.r.n. basis.  He will let us know if he has any other questions or concerns following today's visit.    Arron Cooley M.D.     Arthritis and Joint Replacement  Department of Orthopaedic Surgery, [facility]  [email or web address]  [phone] (pager)           Review of Systems:

## 2020-10-31 DIAGNOSIS — G25.81 RESTLESS LEGS SYNDROME: ICD-10-CM

## 2020-11-01 NOTE — TELEPHONE ENCOUNTER
gabapentin (NEURONTIN) 600 MG tablet  Last Written Prescription Date:  10/7/2019  Last Fill Quantity: 270,   # refills: 3  Last Office Visit : 10/7/2019  Future Office visit:  None     Routing refill request to provider for review/approval because:  Controlled substance

## 2020-11-02 ENCOUNTER — DOCUMENTATION ONLY (OUTPATIENT)
Dept: CARE COORDINATION | Facility: CLINIC | Age: 65
End: 2020-11-02

## 2020-11-02 RX ORDER — GABAPENTIN 600 MG/1
600 TABLET ORAL 3 TIMES DAILY
Qty: 270 TABLET | Refills: 0 | Status: SHIPPED | OUTPATIENT
Start: 2020-11-02 | End: 2020-11-09

## 2020-11-02 NOTE — TELEPHONE ENCOUNTER
Called patient he needs to make an appt either virtual or in person before additional refills. Appointment schedule virtually with Dr Young on Monday Nov 9 at 1:30 PM confirmed by patient.

## 2020-11-09 ENCOUNTER — VIRTUAL VISIT (OUTPATIENT)
Dept: INTERNAL MEDICINE | Facility: CLINIC | Age: 65
End: 2020-11-09
Payer: COMMERCIAL

## 2020-11-09 DIAGNOSIS — Z12.5 ENCOUNTER FOR SCREENING FOR MALIGNANT NEOPLASM OF PROSTATE: ICD-10-CM

## 2020-11-09 DIAGNOSIS — Z12.11 SPECIAL SCREENING FOR MALIGNANT NEOPLASM OF COLON: ICD-10-CM

## 2020-11-09 DIAGNOSIS — L71.9 ROSACEA: ICD-10-CM

## 2020-11-09 DIAGNOSIS — J45.20 MILD INTERMITTENT REACTIVE AIRWAY DISEASE WITHOUT COMPLICATION: ICD-10-CM

## 2020-11-09 DIAGNOSIS — G25.81 RESTLESS LEGS SYNDROME: Primary | ICD-10-CM

## 2020-11-09 DIAGNOSIS — E61.1 IRON DEFICIENCY: ICD-10-CM

## 2020-11-09 DIAGNOSIS — E53.8 VITAMIN B12 DEFICIENCY (NON ANEMIC): ICD-10-CM

## 2020-11-09 DIAGNOSIS — N39.43 URINARY DRIBBLING: ICD-10-CM

## 2020-11-09 DIAGNOSIS — Z23 NEED FOR VACCINATION: ICD-10-CM

## 2020-11-09 PROCEDURE — 99213 OFFICE O/P EST LOW 20 MIN: CPT | Mod: 95 | Performed by: INTERNAL MEDICINE

## 2020-11-09 RX ORDER — ALBUTEROL SULFATE 90 UG/1
2 AEROSOL, METERED RESPIRATORY (INHALATION) EVERY 4 HOURS PRN
Qty: 1 INHALER | Refills: 2 | Status: CANCELLED | OUTPATIENT
Start: 2020-11-09

## 2020-11-09 RX ORDER — FERROUS GLUCONATE 324(38)MG
324 TABLET ORAL
Qty: 90 TABLET | Refills: 3 | Status: CANCELLED | OUTPATIENT
Start: 2020-11-09

## 2020-11-09 RX ORDER — GABAPENTIN 600 MG/1
TABLET ORAL
Qty: 315 TABLET | Refills: 3 | Status: SHIPPED | OUTPATIENT
Start: 2020-11-09 | End: 2021-06-29

## 2020-11-09 NOTE — PROGRESS NOTES
Virtual video visit    CC: follow up chronic health conditions, med refills    Other health team members:  Ortho:  Dr. Yasir Corrales  Ophth:  Dr. Arias    HPI:  65 year old male  Last OV with me 7/31/19  PMHx   restless leg syndrome, hypercholesterolemia, osteoarthritis (back, right hip, s/p left CATALINA), osteoporosis, and rosacea, elevated blood pressures w/o hx hypertension, B12 deficiency, reactive airways, dental SBE prophylaxis antibiotics, bilateral S/N hearing loss    Today;  Nothing in particular to address today execept for gabapentin refill  RLS can disturb sleep at night, interested in increasing gabapentin  Legs twitchy while watching TV as well.  Briefly on ropinirole in the past, less effective and more side effects  Discussed routine prostate cancer screening along with other health care maintenance  On questioning, he reports hx of dribbling, stream weak, incomplete emptying, nocturia intermittently  Colonoscopy due this year. Due for Pneumovax.    Patient Active Problem List   Diagnosis     Hearing loss     Glaucoma suspect     Reactive airway disease     Restless legs syndrome     Kyphosis     Scoliosis     Vitamin B12 deficiency     Osteoarthritis     Iron deficiency     Hyperlipidemia LDL goal <130     Current Outpatient Medications   Medication Sig Dispense Refill     albuterol (PROAIR HFA) 108 (90 BASE) MCG/ACT Inhaler Inhale 2 puffs into the lungs every 4 hours as needed for shortness of breath / dyspnea 1 Inhaler 0     atorvastatin (LIPITOR) 20 MG tablet Take 1 tablet (20 mg) by mouth daily 90 tablet 3     clindamycin (CLEOCIN) 300 MG capsule Take 2 pills 1 hour prior to dental work. 6 capsule 0     Cyanocobalamin (VITAMIN B12) 1000 MCG TBCR Take 1 tablet by mouth daily 90 tablet 3     ferrous gluconate (FERGON) 324 (38 Fe) MG tablet Take 1 tablet (324 mg) by mouth daily (with breakfast) 90 tablet 3     gabapentin (NEURONTIN) 600 MG tablet Take 1 tablet (600 mg) by mouth 3 times daily  "As directed  Pt needs to make an appt either virtual or in person before additional refills. 270 tablet 0     metroNIDAZOLE (NORITATE) 1 % cream Apply topically daily 60 g 0     triamcinolone (KENALOG) 0.1 % cream Apply topically 2 times daily 30 g 0     Allergies   Allergen Reactions     Food      Seasonal Allergies      Sulfa Drugs Other (See Comments)     Confusion and dizziness     Penicillium Notatum Allergy Skin Test      BP Readings from Last 6 Encounters:   10/07/19 121/72   07/31/19 110/64   09/05/18 105/62   08/22/18 133/69   01/13/18 102/60   12/15/11 134/88     Wt Readings from Last 5 Encounters:   10/17/19 61 kg (134 lb 6.4 oz)   10/07/19 60.3 kg (133 lb)   07/31/19 60.8 kg (134 lb)   09/05/18 61.7 kg (136 lb)   08/22/18 60.4 kg (133 lb 1.6 oz)     Estimated body mass index is 20.44 kg/m  as calculated from the following:    Height as of 10/17/19: 1.727 m (5' 8\").    Weight as of 10/17/19: 61 kg (134 lb 6.4 oz).  Gen:  Appears well, engaged in conversation    The most recent lab was a lipid panel 3/2/20:  Component      Latest Ref Rng & Units 3/2/2020   Cholesterol      <200 mg/dL 142   Triglycerides      <150 mg/dL 154 (H)   HDL Cholesterol      >39 mg/dL 35 (L)   LDL Cholesterol Calculated      <100 mg/dL 77     Otherwise, had labs 7/31/19:  Component      Latest Ref Rng & Units 7/31/2019   Ferritin      26 - 388 ng/mL 139   Vitamin B12      193 - 986 pg/mL 695   Hemoglobin A1C      0 - 5.6 % 4.7   TSH      0.40 - 4.00 mU/L 1.91     Jose Carlos was seen today for medication follow-up.    Diagnoses and all orders for this visit:    Restless legs syndrome  -     gabapentin (NEURONTIN) 600 MG tablet; Take one tablet (600 mg) by mouth in the morning and afternoon, and 1.5 tablet (900 mg) in the evening.  Consider referral to sleep clinic if medication not helping sufficiently.    Vitamin B12 deficiency (non anemic)  -     Vitamin B12; Future    Iron deficiency  -     CBC with platelets; Future  -     Ferritin; " "Future    Rosacea    Mild intermittent reactive airway disease without complication    Need for vaccination  -     ADMIN MEDICARE: Pneumococcal Vaccine ()    Special screening for malignant neoplasm of colon  -     GASTROENTEROLOGY ADULT REF PROCEDURE ONLY; Future    Other orders  -     Pneumococcal vaccine 23 valent PPSV23  (Pneumovax) [79672]; Future        Routine f/u 6 months.    This patient is being evaluated via a billable video visit as an alternative to an in-person visit.       The patient has been notified of following:     \"This video visit will be conducted via a call between you and your physician/provider. We have found that certain health care needs can be provided without the need for an in-person physical exam.  This service lets us provide the care you need with a video conversation.  If a prescription is necessary we can send it directly to your pharmacy.  If lab work is needed we can place an order for that and you can then stop by our lab to have the test done at a later time. If during the course of the call the physician/provider feels a video visit is not appropriate, you will not be charged for this service.\"     Patient has given verbal consent for virtual video visit? Yes  Did patient initiate this virtual visit? Yes    Person spoken to:  Patient    This was a synchronous virtual visit  Location of patient: home  Location of physician:  home office  Department name:  Medicine  Mode of communication:  Video Conference via [employer]    Time video initiated:  1:32  Time video ended:  1:54 pm  Total length of video visit: 22 min    Alyse Young M.D.  Internal Medicine  [facility]       Patients: if you have questions or concerns about this progress note, please discuss them with the provider at a future office visit.      "

## 2020-11-09 NOTE — LETTER
My Asthma Action Plan    Name: Jose Carlos Bolden   YOB: 1955  Date: 11/9/2020   My doctor: Alyse Young MD   My clinic: [facility] [place]        My Rescue Medicine:   Albuterol inhaler (Proair/Ventolin/Proventil HFA)  2-4 puffs EVERY 4 HOURS as needed. Use a spacer if recommended by your provider.   My Asthma Severity:   mild, intermittent reactive airways  Know your asthma triggers: Potential triggers for asthma may include allergies (trees, plants, mold, pets, dust),  upper respiratory tract infections, exposure cold air, exposure to irritants (smoke, perfumes, exhaust fumes) and exercise.               GREEN ZONE   Good Control    I feel good    No cough or wheeze    Can work, sleep and play without asthma symptoms       Take your asthma control medicine every day.     1. If exercise triggers your asthma, take your rescue medication    15 minutes before exercise or sports, and    During exercise if you have asthma symptoms  2. Spacer to use with inhaler: If you have a spacer, make sure to use it with your inhaler             YELLOW ZONE Getting Worse  I have ANY of these:    I do not feel good    Cough or wheeze    Chest feels tight    Wake up at night   1. Keep taking your Green Zone medications  2. Start taking your rescue medicine:    every 20 minutes for up to 1 hour. Then every 4 hours for 24-48 hours.  3. If you stay in the Yellow Zone for more than 12-24 hours, contact your doctor.  4. If you do not return to the Green Zone in 12-24 hours or you get worse, start taking your oral steroid medicine if prescribed by your provider.           RED ZONE Medical Alert - Get Help  I have ANY of these:    I feel awful    Medicine is not helping    Breathing getting harder    Trouble walking or talking    Nose opens wide to breathe       1. Take your rescue medicine NOW  2. If your provider has prescribed an oral steroid medicine, start taking it NOW  3. Call your  doctor NOW  4. If you are still in the Red Zone after 20 minutes and you have not reached your doctor:    Take your rescue medicine again and    Call 911 or go to the emergency room right away    See your regular doctor within 2 weeks of an Emergency Room or Urgent Care visit for follow-up treatment.          Annual Reminders:  Meet with Asthma Educator,  Flu Shot in the Fall, consider Pneumonia Vaccination for patients with asthma (aged 19 and older).    Pharmacy: [id number] - [place], [place] - [address]    Electronically signed by Alyse Young MD   Date: 11/09/20                    Asthma Triggers  How To Control Things That Make Your Asthma Worse    Triggers are things that make your asthma worse.  Look at the list below to help you find your triggers and   what you can do about them. You can help prevent asthma flare-ups by staying away from your triggers.      Trigger                                                          What you can do   Cigarette Smoke  Tobacco smoke can make asthma worse. Do not allow smoking in your home, car or around you.  Be sure no one smokes at a child s day care or school.  If you smoke, ask your health care provider for ways to help you quit.  Ask family members to quit too.  Ask your health care provider for a referral to Quit Plan to help you quit smoking, or call 0-138-826-PLAN.     Colds, Flu, Bronchitis  These are common triggers of asthma. Wash your hands often.  Don t touch your eyes, nose or mouth.  Get a flu shot every year.     Dust Mites  These are tiny bugs that live in cloth or carpet. They are too small to see. Wash sheets and blankets in hot water every week.   Encase pillows and mattress in dust mite proof covers.  Avoid having carpet if you can. If you have carpet, vacuum weekly.   Use a dust mask and HEPA vacuum.   Pollen and Outdoor Mold  Some people are allergic to trees, grass, or weed pollen, or molds. Try to keep your windows  closed.  Limit time out doors when pollen count is high.   Ask you health care provider about taking medicine during allergy season.     Animal Dander  Some people are allergic to skin flakes, urine or saliva from pets with fur or feathers. Keep pets with fur or feathers out of your home.    If you can t keep the pet outdoors, then keep the pet out of your bedroom.  Keep the bedroom door closed.  Keep pets off cloth furniture and away from stuffed toys.     Mice, Rats, and Cockroaches  Some people are allergic to the waste from these pests.   Cover food and garbage.  Clean up spills and food crumbs.  Store grease in the refrigerator.   Keep food out of the bedroom.   Indoor Mold  This can be a trigger if your home has high moisture. Fix leaking faucets, pipes, or other sources of water.   Clean moldy surfaces.  Dehumidify basement if it is damp and smelly.   Smoke, Strong Odors, and Sprays  These can reduce air quality. Stay away from strong odors and sprays, such as perfume, powder, hair spray, paints, smoke incense, paint, cleaning products, candles and new carpet.   Exercise or Sports  Some people with asthma have this trigger. Be active!  Ask your doctor about taking medicine before sports or exercise to prevent symptoms.    Warm up for 5-10 minutes before and after sports or exercise.     Other Triggers of Asthma  Cold air:  Cover your nose and mouth with a scarf.  Sometimes laughing or crying can be a trigger.  Some medicines and food can trigger asthma.

## 2020-11-09 NOTE — NURSING NOTE
Chief Complaint   Patient presents with     Medication Follow-up     Pt is here to follow up on medications.      Video Visit Technology for this patient: Sameera Video Visit- Patient was left in waiting room     Mai Dennis LPN at 1:15 PM on 11/9/2020.

## 2020-11-10 ASSESSMENT — ASTHMA QUESTIONNAIRES: ACT_TOTALSCORE: 25

## 2020-11-12 DIAGNOSIS — E53.8 VITAMIN B12 DEFICIENCY (NON ANEMIC): ICD-10-CM

## 2020-11-12 DIAGNOSIS — Z12.5 ENCOUNTER FOR SCREENING FOR MALIGNANT NEOPLASM OF PROSTATE: ICD-10-CM

## 2020-11-12 DIAGNOSIS — E61.1 IRON DEFICIENCY: ICD-10-CM

## 2020-11-12 DIAGNOSIS — Z12.11 SPECIAL SCREENING FOR MALIGNANT NEOPLASM OF COLON: ICD-10-CM

## 2020-11-12 DIAGNOSIS — N39.43 URINARY DRIBBLING: ICD-10-CM

## 2020-11-12 LAB
ERYTHROCYTE [DISTWIDTH] IN BLOOD BY AUTOMATED COUNT: 13 % (ref 10–15)
FERRITIN SERPL-MCNC: 80 NG/ML (ref 26–388)
HCT VFR BLD AUTO: 42.7 % (ref 40–53)
HGB BLD-MCNC: 14 G/DL (ref 13.3–17.7)
MCH RBC QN AUTO: 32.6 PG (ref 26.5–33)
MCHC RBC AUTO-ENTMCNC: 32.8 G/DL (ref 31.5–36.5)
MCV RBC AUTO: 99 FL (ref 78–100)
PLATELET # BLD AUTO: 198 10E9/L (ref 150–450)
PSA SERPL-ACNC: 0.9 UG/L (ref 0–4)
RBC # BLD AUTO: 4.3 10E12/L (ref 4.4–5.9)
VIT B12 SERPL-MCNC: 1100 PG/ML (ref 193–986)
WBC # BLD AUTO: 5.7 10E9/L (ref 4–11)

## 2020-11-12 PROCEDURE — 85027 COMPLETE CBC AUTOMATED: CPT | Performed by: PATHOLOGY

## 2020-11-12 PROCEDURE — G0103 PSA SCREENING: HCPCS | Performed by: PATHOLOGY

## 2020-11-12 PROCEDURE — 36415 COLL VENOUS BLD VENIPUNCTURE: CPT | Performed by: PATHOLOGY

## 2020-11-12 PROCEDURE — 82728 ASSAY OF FERRITIN: CPT | Performed by: PATHOLOGY

## 2020-11-12 PROCEDURE — 82607 VITAMIN B-12: CPT | Performed by: PATHOLOGY

## 2020-11-18 DIAGNOSIS — Z11.59 ENCOUNTER FOR SCREENING FOR OTHER VIRAL DISEASES: Primary | ICD-10-CM

## 2020-12-14 DIAGNOSIS — Z11.59 ENCOUNTER FOR SCREENING FOR OTHER VIRAL DISEASES: ICD-10-CM

## 2020-12-14 PROCEDURE — U0003 INFECTIOUS AGENT DETECTION BY NUCLEIC ACID (DNA OR RNA); SEVERE ACUTE RESPIRATORY SYNDROME CORONAVIRUS 2 (SARS-COV-2) (CORONAVIRUS DISEASE [COVID-19]), AMPLIFIED PROBE TECHNIQUE, MAKING USE OF HIGH THROUGHPUT TECHNOLOGIES AS DESCRIBED BY CMS-2020-01-R: HCPCS | Mod: 90 | Performed by: PATHOLOGY

## 2020-12-14 PROCEDURE — 99000 SPECIMEN HANDLING OFFICE-LAB: CPT | Performed by: PATHOLOGY

## 2020-12-15 LAB
SARS-COV-2 RNA SPEC QL NAA+PROBE: NOT DETECTED
SPECIMEN SOURCE: NORMAL

## 2020-12-17 ENCOUNTER — HOSPITAL ENCOUNTER (OUTPATIENT)
Facility: AMBULATORY SURGERY CENTER | Age: 65
Discharge: HOME | End: 2020-12-17
Attending: INTERNAL MEDICINE | Admitting: INTERNAL MEDICINE
Payer: COMMERCIAL

## 2020-12-17 VITALS
RESPIRATION RATE: 12 BRPM | TEMPERATURE: 97.9 F | OXYGEN SATURATION: 100 % | BODY MASS INDEX: 19.7 KG/M2 | DIASTOLIC BLOOD PRESSURE: 60 MMHG | WEIGHT: 130 LBS | HEIGHT: 68 IN | HEART RATE: 54 BPM | SYSTOLIC BLOOD PRESSURE: 112 MMHG

## 2020-12-17 LAB — COLONOSCOPY: NORMAL

## 2020-12-17 PROCEDURE — 88305 TISSUE EXAM BY PATHOLOGIST: CPT | Mod: GC | Performed by: PATHOLOGY

## 2020-12-17 PROCEDURE — 45385 COLONOSCOPY W/LESION REMOVAL: CPT | Mod: PT

## 2020-12-17 RX ORDER — ONDANSETRON 2 MG/ML
4 INJECTION INTRAMUSCULAR; INTRAVENOUS
Status: DISCONTINUED | OUTPATIENT
Start: 2020-12-17 | End: 2020-12-18 | Stop reason: HOSPADM

## 2020-12-17 RX ORDER — SIMETHICONE
LIQUID (ML) MISCELLANEOUS PRN
Status: DISCONTINUED | OUTPATIENT
Start: 2020-12-17 | End: 2020-12-17 | Stop reason: HOSPADM

## 2020-12-17 RX ORDER — LIDOCAINE 40 MG/G
CREAM TOPICAL
Status: DISCONTINUED | OUTPATIENT
Start: 2020-12-17 | End: 2020-12-18 | Stop reason: HOSPADM

## 2020-12-17 RX ORDER — FENTANYL CITRATE 50 UG/ML
INJECTION, SOLUTION INTRAMUSCULAR; INTRAVENOUS PRN
Status: DISCONTINUED | OUTPATIENT
Start: 2020-12-17 | End: 2020-12-17 | Stop reason: HOSPADM

## 2020-12-17 ASSESSMENT — MIFFLIN-ST. JEOR: SCORE: 1339.18

## 2020-12-18 LAB — COPATH REPORT: NORMAL

## 2021-03-05 ENCOUNTER — IMMUNIZATION (OUTPATIENT)
Dept: NURSING | Facility: CLINIC | Age: 66
End: 2021-03-05
Payer: COMMERCIAL

## 2021-03-05 PROCEDURE — 91303 PR COVID VAC JANSSEN AD26 0.5ML: CPT

## 2021-03-05 PROCEDURE — 0031A PR COVID VAC JANSSEN AD26 0.5ML: CPT

## 2021-04-16 ENCOUNTER — TELEPHONE (OUTPATIENT)
Dept: OPHTHALMOLOGY | Facility: CLINIC | Age: 66
End: 2021-04-16

## 2021-04-16 ENCOUNTER — MYC MEDICAL ADVICE (OUTPATIENT)
Dept: OPHTHALMOLOGY | Facility: CLINIC | Age: 66
End: 2021-04-16

## 2021-04-16 NOTE — TELEPHONE ENCOUNTER
Will document in mychart encounter    Ej Carmona RN 2:33 PM 04/16/21          [facility] Call Center    Phone Message    May a detailed message be left on voicemail: yes     Reason for Call: Symptoms or Concerns     If patient has red-flag symptoms, warm transfer to triage line    Current symptom or concern: Eye redness, blurred vision after poking eye with grass     Symptoms have been present for:  3 day(s)    Has patient previously been seen for this? No    Are there any new or worsening symptoms? Yes: Pt scheduled appt for Monday 4/19. Requests a call back at [phone] to discuss how to manage Sx in the meantime. Pt was not distressed      Action Taken: Message routed to:  [facility]    Travel Screening: Not Applicable

## 2021-04-16 NOTE — TELEPHONE ENCOUNTER
Spoke to pt at 1430 Friday 4-    Pt poked right eye with grass 2 days ago and noted blood spot on white part of eye    Mild discomfort and pt also has seasonal allergies and working with fiberglass/dust recenty    No vision changes    Blood spot is on white part eye (does not cross limbus) per pt    Reviewed blood on white part of eye not hurtful to eye, may take 1-2 weeks to absorb, may look worse before better, should not have pain or vision changes-- if so would need to be seen more urgently    Recommended preservative free artificial tears and may use every couple hours    Reviewed [phone] option 4 for information to page on call eye provider for any new symtpoms/vision changes over weekend    Offered exam on Monday    Pt prefers to hold on appt and monitor and seemed comfortable with plan    Note to on call for review    Ej Carmona RN 2:43 PM 04/16/21

## 2021-05-09 ENCOUNTER — HEALTH MAINTENANCE LETTER (OUTPATIENT)
Age: 66
End: 2021-05-09

## 2021-05-23 DIAGNOSIS — E78.5 HYPERLIPIDEMIA LDL GOAL <130: ICD-10-CM

## 2021-05-25 RX ORDER — ATORVASTATIN CALCIUM 20 MG/1
20 TABLET, FILM COATED ORAL DAILY
Qty: 90 TABLET | Refills: 0 | Status: SHIPPED | OUTPATIENT
Start: 2021-05-25 | End: 2021-06-29

## 2021-06-10 ENCOUNTER — TELEPHONE (OUTPATIENT)
Dept: INTERNAL MEDICINE | Facility: CLINIC | Age: 66
End: 2021-06-10

## 2021-06-10 NOTE — TELEPHONE ENCOUNTER
[facility] Call Center    Phone Message    May a detailed message be left on voicemail: yes     Reason for Call: Order(s): Other:   Reason for requested: Requesting repeat lab orders from 11/12/2020  Date needed: 06/11/2021 - Lab appointment at 2pm.  Provider name: Dr. Young      Action Taken: Message routed to:  [facility]    Travel Screening: Not Applicable

## 2021-06-28 ENCOUNTER — OFFICE VISIT (OUTPATIENT)
Dept: OPHTHALMOLOGY | Facility: CLINIC | Age: 66
End: 2021-06-28
Attending: OPHTHALMOLOGY
Payer: COMMERCIAL

## 2021-06-28 DIAGNOSIS — H40.003 GLAUCOMA SUSPECT, BOTH EYES: Primary | ICD-10-CM

## 2021-06-28 DIAGNOSIS — H04.123 DRY EYES, BILATERAL: ICD-10-CM

## 2021-06-28 DIAGNOSIS — H52.4 PRESBYOPIA: ICD-10-CM

## 2021-06-28 DIAGNOSIS — H52.203 HYPEROPIC ASTIGMATISM OF BOTH EYES: ICD-10-CM

## 2021-06-28 DIAGNOSIS — H25.13 NUCLEAR SCLEROTIC CATARACT OF BOTH EYES: ICD-10-CM

## 2021-06-28 PROCEDURE — G0463 HOSPITAL OUTPT CLINIC VISIT: HCPCS

## 2021-06-28 PROCEDURE — 92014 COMPRE OPH EXAM EST PT 1/>: CPT | Mod: GC | Performed by: OPHTHALMOLOGY

## 2021-06-28 PROCEDURE — 92083 EXTENDED VISUAL FIELD XM: CPT | Performed by: OPHTHALMOLOGY

## 2021-06-28 ASSESSMENT — EXTERNAL EXAM - LEFT EYE: OS_EXAM: NORMAL

## 2021-06-28 ASSESSMENT — VISUAL ACUITY
METHOD_MR_RETINOSCOPY: 1
METHOD: SNELLEN - LINEAR
OS_CC+: -1
OS_CC: 20/25
OD_CC+: -1
OD_CC: 20/25

## 2021-06-28 ASSESSMENT — TONOMETRY
OD_IOP_MMHG: 19
OS_IOP_MMHG: 19
IOP_METHOD: APPLANATION

## 2021-06-28 ASSESSMENT — CONF VISUAL FIELD
OS_NORMAL: 1
METHOD: COUNTING FINGERS
OD_NORMAL: 1

## 2021-06-28 ASSESSMENT — REFRACTION_WEARINGRX
OS_ADD: +2.50
SPECS_TYPE: PAL
OD_AXIS: 147
OD_SPHERE: +0.50
OD_ADD: +2.50
OS_AXIS: 057
OD_CYLINDER: +1.00
OS_SPHERE: +0.50
OS_CYLINDER: +0.75

## 2021-06-28 ASSESSMENT — REFRACTION_MANIFEST
OS_SPHERE: +0.50
OS_ADD: +2.50
OD_CYLINDER: +0.75
OD_AXIS: 170
OD_SPHERE: +0.25
OS_CYLINDER: SPHERE
OD_ADD: +2.50

## 2021-06-28 ASSESSMENT — CUP TO DISC RATIO
OD_RATIO: 0.7
OS_RATIO: 0.55

## 2021-06-28 ASSESSMENT — SLIT LAMP EXAM - LIDS
COMMENTS: DERMATOCHALASIS
COMMENTS: DERMATOCHALASIS

## 2021-06-28 ASSESSMENT — EXTERNAL EXAM - RIGHT EYE: OD_EXAM: NORMAL

## 2021-06-28 NOTE — PROGRESS NOTES
FELICE Bolden is a 66 year old male here for CEE and follow-up of glaucoma suspect. He feels there has been a mild decrease in vision over the last year, and his current glasses don't help as much as they used to. He has intermittent eye itching which is relieved with Refresh tears that he uses about 4 times per week. He denies pain, redness, discharge. No flashes/no change in floaters.    POH: Glaucoma suspect   FH: 2 of 8 siblings with glaucoma    Assessment & Plan      (H40.003) Glaucoma suspect, both eyes  (primary encounter diagnosis)  Comment: Based on increased cup to disc ratio with mild cup to disc asymmetry and + FH    FH: + 2 of 8 siblings  Pachymetry: 569/580  Gonioscopy:  Tmax: 22 OU  Today's IOP: 19/19  Target IOP:  Current medications: None  Meds to avoid: None  Visual field: OVF 24-2 (6/28/21)  right eye: Non-specific generalized depression, no glaucomatous defects on pattern, stable compared 2018  left eye: Non-specific generalized depression, no glaucomatous defects on pattern, stable compared to 2018  Nerve OCT: [employer] optic nerve OCT (8/12/19)  OD: Avg RNFL thickness 94, all green   OS: Avg RNFL thickness 97, all green    Normal IOPs today with stable ONH appearance and stable visual field testing. Prior reassuring RNFL on nerve OCT both eyes.     Plan: Had been followed yearly by Dr. Lopez, alternating OVF and nerve OCT. Will continue pattern of alternating testing.     (H10.13) Allergic conjunctivitis of both eyes  (H04.123) Dry eyes, bilateral  Comment: asymptomatic at this time, well controlled  Plan: ATs prn    (H25.13) Nuclear sclerotic cataract of both eyes  Comment: Not visually significant  Plan: Observe    (H52.203) Hyperopic astigmatism of both eyes  (H52.4) Presbyopia  Comment: Good vision with refraction, mild change  Plan: Given updated glasses Rx.      -----------------------------------------------------------------------------------    Patient disposition:   Return in  about 1 year (around 6/28/2022) for applanation IOP, dilation, nerve OCT OU, or sooner as needed.     Estela Monroy MD  PGY-2 Resident Physician  Department of Ophthalmology    Teaching statement:  Complete documentation of historical and exam elements from today's encounter can be found in the full encounter summary report (not reduplicated in this progress note). I personally obtained the chief complaint(s) and history of present illness.  I confirmed and edited as necessary the review of systems, past medical/surgical history, family history, social history, and examination findings as documented by others; and I examined the patient myself. I personally reviewed the relevant tests, images, and reports as documented above.     I formulated and edited as necessary the assessment and plan and discussed the findings and management plan with the patient and family.    Brenda Arias MD  Comprehensive Ophthalmology & Ocular Pathology  Department of Ophthalmology and Visual Neurosciences  [email or web address]  Pager [phone]

## 2021-06-28 NOTE — NURSING NOTE
"Chief Complaints and History of Present Illnesses   Patient presents with     Annual Eye Exam     Glaucoma suspect, both eyes     Chief Complaint(s) and History of Present Illness(es)     Annual Eye Exam     Laterality: both eyes    Associated symptoms: floaters.  Negative for eye pain, flashes and dryness    Treatments tried: artificial tears    Pain scale: 0/10    Comments: Glaucoma suspect, both eyes              Comments     Pt states floaters same as last visit [facility]  States things seem a little more blurry since last vist  Refresh drops \"4X per week\"    Lucia Bryan COT 9:15 AM June 28, 2021                   "

## 2021-06-29 ENCOUNTER — OFFICE VISIT (OUTPATIENT)
Dept: INTERNAL MEDICINE | Facility: CLINIC | Age: 66
End: 2021-06-29
Payer: COMMERCIAL

## 2021-06-29 VITALS
HEIGHT: 68 IN | BODY MASS INDEX: 19.7 KG/M2 | DIASTOLIC BLOOD PRESSURE: 72 MMHG | WEIGHT: 130 LBS | OXYGEN SATURATION: 95 % | SYSTOLIC BLOOD PRESSURE: 127 MMHG | HEART RATE: 65 BPM

## 2021-06-29 DIAGNOSIS — N40.1 BENIGN PROSTATIC HYPERPLASIA WITH LOWER URINARY TRACT SYMPTOMS, SYMPTOM DETAILS UNSPECIFIED: ICD-10-CM

## 2021-06-29 DIAGNOSIS — G25.81 RESTLESS LEGS SYNDROME: ICD-10-CM

## 2021-06-29 DIAGNOSIS — Z23 PNEUMOCOCCAL VACCINATION ADMINISTERED AT CURRENT VISIT: ICD-10-CM

## 2021-06-29 DIAGNOSIS — Z00.00 MEDICARE ANNUAL WELLNESS VISIT, SUBSEQUENT: Primary | ICD-10-CM

## 2021-06-29 DIAGNOSIS — E78.5 HYPERLIPIDEMIA LDL GOAL <130: ICD-10-CM

## 2021-06-29 DIAGNOSIS — M19.91 PRIMARY OSTEOARTHRITIS, UNSPECIFIED SITE: ICD-10-CM

## 2021-06-29 PROCEDURE — G0439 PPPS, SUBSEQ VISIT: HCPCS | Mod: GE | Performed by: STUDENT IN AN ORGANIZED HEALTH CARE EDUCATION/TRAINING PROGRAM

## 2021-06-29 PROCEDURE — 99213 OFFICE O/P EST LOW 20 MIN: CPT | Mod: GE | Performed by: STUDENT IN AN ORGANIZED HEALTH CARE EDUCATION/TRAINING PROGRAM

## 2021-06-29 PROCEDURE — 90732 PPSV23 VACC 2 YRS+ SUBQ/IM: CPT | Performed by: STUDENT IN AN ORGANIZED HEALTH CARE EDUCATION/TRAINING PROGRAM

## 2021-06-29 PROCEDURE — G0009 ADMIN PNEUMOCOCCAL VACCINE: HCPCS | Performed by: STUDENT IN AN ORGANIZED HEALTH CARE EDUCATION/TRAINING PROGRAM

## 2021-06-29 RX ORDER — ATORVASTATIN CALCIUM 20 MG/1
20 TABLET, FILM COATED ORAL DAILY
Qty: 90 TABLET | Refills: 3 | Status: SHIPPED | OUTPATIENT
Start: 2021-06-29 | End: 2022-07-16

## 2021-06-29 RX ORDER — GABAPENTIN 600 MG/1
TABLET ORAL
Qty: 315 TABLET | Refills: 3 | Status: SHIPPED | OUTPATIENT
Start: 2021-06-29 | End: 2022-07-16

## 2021-06-29 RX ORDER — FINASTERIDE 5 MG/1
5 TABLET, FILM COATED ORAL DAILY
Qty: 90 TABLET | Refills: 3 | Status: SHIPPED | OUTPATIENT
Start: 2021-06-29 | End: 2022-06-29

## 2021-06-29 ASSESSMENT — PAIN SCALES - GENERAL: PAINLEVEL: MILD PAIN (2)

## 2021-06-29 ASSESSMENT — MIFFLIN-ST. JEOR: SCORE: 1334.18

## 2021-06-29 NOTE — PROGRESS NOTES
Medicare Annual Wellness Visit Previsit note    This 66 year old year old male presents for a  Medicare Wellness Exam.           Medical Care     Have you been to an ER or a hospital in the last year? No  What other specialists or organizations are involved in your medical care?  [facility]  Current providers sharing in care for this patient include:  Patient Care Team       Relationship Specialty Notifications Start End    Alyse Young MD PCP - General Internal Medicine  9/5/18     Phone: [phone] Fax: [phone]         [address] [place] [address]    Deneen Burton APRN CNP Nurse Practitioner Nurse Practitioner - Gerontology  9/15/20     Phone: [phone] Fax: [phone]         [address] [facility] [address]    Arron Cooley MD MD Orthopedics  9/15/20     Phone: [phone] Fax: [phone]         [address] [facility] [address]    Arron Cooley MD Assigned Musculoskeletal Provider   11/15/20     Phone: [phone] Fax: [phone]         [facility] [address] [facility] [address]    Alyse Young MD Assigned PCP   11/22/20     Phone: [phone] Fax: [phone]         [address] [place] [address]    Brenda Arias MD MD Ophthalmology  4/16/21     Phone: [phone] Fax: [phone]         [address] [place] [id number]                 Social History     Marital Status:  Who lives in your household? Wife and himself  Does your home have any of the following safety concerns? Loose rugs in the hallway, no grab bars in the bathroom, no handrails on the stairs or have poorly lit areas?  Yes no grab bars  Do you feel threatened or controlled by a partner, ex-partner or anyone in your life? No  Has anyone hurt you physically, for example by pushing, hitting, slapping or kicking you   or forcing you to have sex? No  Do you need help with the phone, transportation, shopping, preparing meals,  housework, laundry, medications or managing money? No   Have you noticed any hearing difficulties? Yes has hearing aids      Risk Behaviors and Healthy Habits     How many servings of fruits and vegetables do you eat a day? 4-5  How often do you exercise and what do you do? 250 minutes a week  Do you frequently ride without a seatbelt? Not answered  Do you use tobacco?  No  Do you use any other drugs? No       Do you use alcohol?Yes  Number of drinks per day 1  Number of drinking days a week 6      Sexual Health     Are you sexually active? Yes    If yes, with men, women, or both? Female  If yes, do you more than one current partner?No  If yes, do you use condoms? N/A  Have you had any sexually transmitted infections? No  Any sexual concerns? No

## 2021-06-29 NOTE — NURSING NOTE
Chief Complaint   Patient presents with     Medicare Visit     pt here for medicare wellness       Parish Arambula CMA, EMT at 2:42 PM on 6/29/2021.

## 2021-06-29 NOTE — PROGRESS NOTES
SUBJECTIVE:   Jose Carlos Bolden is a 66 year old male who presents for Preventive Visit.      Patient has been advised of split billing requirements and indicates understanding: Yes  Are you in the first 12 months of your Medicare Part B coverage?  No    Please refer to questionnaire as documented in Medicare annual wellness previsit note.    Reviewed and updated as needed this visit by clinical staff                 Reviewed and updated as needed this visit by Provider                Social History     Tobacco Use     Smoking status: Never Smoker     Smokeless tobacco: Never Used   Substance Use Topics     Alcohol use: Yes     Comment: 7 drinks per week on average                           Current providers sharing in care for this patient include:     Patient Care Team:  Alyse Young MD as PCP - General (Internal Medicine)  Deneen Burton APRN CNP as Nurse Practitioner (Nurse Practitioner - Gerontology)  Arron Cooley MD as MD (Orthopedics)  Arron Cooley MD as Assigned Musculoskeletal Provider  Alyse Young MD as Assigned PCP  Brenda Arias MD as MD (Ophthalmology)    The following health maintenance items are reviewed in Epic and correct as of today:  Health Maintenance   Topic Date Due     ADVANCE CARE PLANNING  Never done     HEPATITIS C SCREENING  Never done     AORTIC ANEURYSM SCREENING (SYSTEM ASSIGNED)  Never done     MEDICARE ANNUAL WELLNESS VISIT  09/05/2019     Pneumococcal Vaccine: 65+ Years (1 of 2 - PPSV23) 09/25/2019     ASTHMA CONTROL TEST  05/09/2021     ASTHMA ACTION PLAN  11/09/2021     FALL RISK ASSESSMENT  11/09/2021     LIPID  03/02/2025     DTAP/TDAP/TD IMMUNIZATION (5 - Td) 09/05/2028     COLORECTAL CANCER SCREENING  12/17/2030     PHQ-2  Completed     INFLUENZA VACCINE  Completed     ZOSTER IMMUNIZATION  Completed     COVID-19 Vaccine  Completed     IPV IMMUNIZATION  Aged Out     MENINGITIS IMMUNIZATION  Aged Out     HEPATITIS B IMMUNIZATION  Aged Out  "      Pneumonia Vaccine:Adults age 65+ who have not received previous Pneumovax (PPSV23) or PCV13 as an adult: Should first be given PCV13 AND then should be given PPSV23 6-12 months after PCV13    ROS:  Constitutional, HEENT, cardiovascular, pulmonary, GI, , musculoskeletal, neuro, skin, endocrine and psych systems are negative, except as otherwise noted.    OBJECTIVE:   There were no vitals taken for this visit. Estimated body mass index is 19.77 kg/m  as calculated from the following:    Height as of 12/17/20: 1.727 m (5' 8\").    Weight as of 12/17/20: 59 kg (130 lb).  EXAM:   GENERAL: healthy, alert and no distress  EYES: Eyes grossly normal to inspection, PERRL and conjunctivae and sclerae normal  HENT: ear canals and TM's normal, nose and mouth without ulcers or lesions  NECK: no adenopathy, no asymmetry, masses, or scars and thyroid normal to palpation  RESP: lungs clear to auscultation - no rales, rhonchi or wheezes  CV: regular rate and rhythm, normal S1 S2, no S3 or S4, no murmur, click or rub, no peripheral edema and peripheral pulses strong  ABDOMEN: soft, nontender, no hepatosplenomegaly, no masses and bowel sounds normal  MS: no gross musculoskeletal defects noted, no edema  SKIN: no suspicious lesions or rashes  NEURO: Normal strength and tone, mentation intact and speech normal  PSYCH: mentation appears normal, affect normal/bright    none     ASSESSMENT / PLAN:       ICD-10-CM    1. Medicare annual wellness visit, subsequent  Z00.00    2. Hyperlipidemia LDL goal <130  E78.5 atorvastatin (LIPITOR) 20 MG tablet   3. Restless legs syndrome  G25.81 gabapentin (NEURONTIN) 600 MG tablet   4. Benign prostatic hyperplasia with lower urinary tract symptoms, symptom details unspecified  N40.1 finasteride (PROSCAR) 5 MG tablet   5. Pneumococcal vaccination administered at current visit  Z23 ADMIN MEDICARE: Pneumococcal Vaccine ()   6. Primary osteoarthritis, unspecified site  M19.91        Patient has " "been advised of split billing requirements and indicates understanding: Yes    COUNSELING:  Reviewed preventive health counseling, as reflected in patient instructions       Regular exercise       Healthy diet/nutrition       Bladder control       Prostate cancer screening    Estimated body mass index is 19.77 kg/m  as calculated from the following:    Height as of 12/17/20: 1.727 m (5' 8\").    Weight as of 12/17/20: 59 kg (130 lb).        He reports that he has never smoked. He has never used smokeless tobacco.    Appropriate preventive services were discussed with this patient, including applicable screening as appropriate for cardiovascular disease, diabetes, osteopenia/osteoporosis, and glaucoma.  As appropriate for age/gender, discussed screening for colorectal cancer, prostate cancer, breast cancer, and cervical cancer. Checklist reviewing preventive services available has been given to the patient.    Reviewed patients plan of care and provided an AVS. The Basic Care Plan (routine screening as documented in Health Maintenance) for Jose Carlos meets the Care Plan requirement. This Care Plan has been established and reviewed with the Patient.    Counseling Resources:  ATP IV Guidelines  Pooled Cohorts Equation Calculator  Breast Cancer Risk Calculator  BRCA-Related Cancer Risk Assessment: FHS-7 Tool  FRAX Risk Assessment  ICSI Preventive Guidelines  Dietary Guidelines for Americans, 2010  Chef Dovunque's MyPlate  ASA Prophylaxis  Lung CA Screening    Yeimi Jorge MD  [facility] INTERNAL MEDICINE [place]    While the patient was in clinic, I reviewed the pertinent medical history and results.  I discussed the current findings on physical examination, as well as the patient s diagnosis and treatment plan with the resident and agree with the information as documented with the following exceptions: none.  Annalisa Sauceda MD  Internal Medicine    "

## 2021-06-29 NOTE — PATIENT INSTRUCTIONS
Patient Education     Quadriceps, Short Arcs (Strength)    1. Sit on the floor with your right leg straight in front of you. Bend your left knee up and put your left foot flat on the floor.  2. Place a rolled-up towel under your right thigh, just above your knee. Relax your leg.  3. Straighten your right leg, lifting your foot off the floor. Hold for 5 seconds. Then relax.  4. Repeat 10 times, or as instructed.  5. Switch legs and then repeat.  Challenge yourself   Use ankle weights for a tougher workout. Your healthcare provider will tell you what size ankle weights to use.  [employer] last reviewed this educational content on 6/1/2019 2000-2021 [employer]. All rights reserved. This information is not intended as a substitute for professional medical care. Always follow your healthcare professional's instructions.

## 2021-06-29 NOTE — PROGRESS NOTES
"                     PRIMARY CARE CENTER      SUBJECTIVE:     Jose Carlos Bolden is a 66 year old male with a PMHx of restless legs who comes in for evaluation of lower urinary tract symptoms.    Endorses increased straining to urinate, urgency, feelings of incomplete urination chronically that are progressively worsening.  He averages nightly urination about once per night although ranges from 0-3 times nightly.  He notes dribbling as he is unable to maintain a stream and now must sit to urinate.  He has never been on any BPH agents including tamsulosin or finasteride.  He has undergone screening with PSA with values of 0.9 and 2020 and 1.27 in 2018.    Has osteoarthritis of the right knee greater than left.  Intermittently uses ibuprofen (approximately a dose every 3 days).  He occasionally uses Tylenol.  He is concerned about risks to liver and kidneys.  Counseled on these therapies.  He does not have any history of gastritis/GI bleeding.  He has normal hepatic and renal function.  He uses topical therapies including IcyHot, capsaicin, etc.  Has never used Voltaren.     MEDICATIONS/ALLERGIES:     Medications and allergies reviewed by me today.      ROS:     Constitutional, neuro, ENT, endocrine, pulmonary, cardiac, gastrointestinal, genitourinary, musculoskeletal, integument and psychiatric systems are negative, except as otherwise noted.     OBJECTIVE:     /72 (BP Location: Right arm, Patient Position: Sitting, Cuff Size: Adult Regular)   Pulse 65   Ht 1.727 m (5' 8\")   Wt 59 kg (130 lb)   SpO2 95%   BMI 19.77 kg/m     Wt Readings from Last 1 Encounters:   06/29/21 59 kg (130 lb)     GENERAL: healthy, alert and no distress  EYES: Eyes grossly normal to inspection, PERRL and conjunctivae and sclerae normal  HENT: ear canals and TM's normal, nose and mouth without ulcers or lesions  NECK: no adenopathy, no asymmetry, masses, or scars and thyroid normal to palpation  RESP: lungs clear to auscultation - no " rales, rhonchi or wheezes  CV: regular rate and rhythm, normal S1 S2, no S3 or S4, no murmur, click or rub, no peripheral edema and peripheral pulses strong  ABDOMEN: soft, nontender, no hepatosplenomegaly, no masses and bowel sounds normal  MS: no gross musculoskeletal defects noted, no edema  SKIN: no suspicious lesions or rashes  NEURO: Normal strength and tone, mentation intact and speech normal  PSYCH: mentation appears normal, affect normal/bright      ASSESSMENT/PLAN:     Jose Carlos was seen today for medicare visit.    Diagnoses and all orders for this visit:    Medicare annual wellness visit, subsequent    Hyperlipidemia LDL goal <130  -     atorvastatin (LIPITOR) 20 MG tablet; Take 1 tablet (20 mg) by mouth daily    Restless legs syndrome  -     gabapentin (NEURONTIN) 600 MG tablet; Take one tablet (600 mg) by mouth in the morning and afternoon, and 1.5 tablet (900 mg) in the evening.    Benign prostatic hyperplasia with lower urinary tract symptoms, symptom details unspecified  Discussed risks and benefits of therapy.  Patient ultimately opted to initiate finasteride as he was concerned the tamsulosin may cause lightheadedness and he feels as though he does not have a balanced reserve and would be at increased fall risk.  -     finasteride (PROSCAR) 5 MG tablet; Take 1 tablet (5 mg) by mouth daily    Pneumococcal vaccination administered at current visit  -     ADMIN MEDICARE: Pneumococcal Vaccine ()    Primary osteoarthritis, unspecified site  He will continue current conservative therapies.  Will plan to prescribe topical diclofenac 1% up to 4 times daily per patient request.  He has previously discussed steroid injections with different provider and is considering this.    Other orders  -     Pneumococcal vaccine 23 valent PPSV23  (Pneumovax) [[id number]]    Follow-up in 1 year or sooner as needed.    Options for treatment and follow-up care were reviewed with the patient. Jose Carlos Bolden engaged in the decision  making process and verbalized understanding of the options discussed and agreed with the final plan.    Yeimi Jorge MD  PGY-3, Internal Medicine  Jun 29, 2021    Pt was seen and plan of care discussed with Dr. Sauceda.

## 2021-08-10 ENCOUNTER — DOCUMENTATION ONLY (OUTPATIENT)
Dept: OTHER | Facility: CLINIC | Age: 66
End: 2021-08-10

## 2021-10-24 ENCOUNTER — HEALTH MAINTENANCE LETTER (OUTPATIENT)
Age: 66
End: 2021-10-24

## 2021-12-07 ASSESSMENT — ENCOUNTER SYMPTOMS
JOINT SWELLING: 0
BACK PAIN: 0
SKIN CHANGES: 1
MUSCLE CRAMPS: 1
NECK PAIN: 0
DYSURIA: 0
MUSCLE WEAKNESS: 0
HEMATURIA: 0
SINUS CONGESTION: 1
DIFFICULTY URINATING: 0
EYE WATERING: 1
HOARSE VOICE: 0
SINUS PAIN: 0
NECK MASS: 0
STIFFNESS: 1
SMELL DISTURBANCE: 0
FLANK PAIN: 0
EYE PAIN: 1
EYE IRRITATION: 1
ARTHRALGIAS: 1
TASTE DISTURBANCE: 0
SORE THROAT: 0
MYALGIAS: 0
EYE REDNESS: 1
DOUBLE VISION: 0
TROUBLE SWALLOWING: 0
POOR WOUND HEALING: 0

## 2021-12-07 ASSESSMENT — ACTIVITIES OF DAILY LIVING (ADL)
IN_THE_PAST_7_DAYS,_DID_YOU_NEED_HELP_FROM_OTHERS_TO_TAKE_CARE_OF_THINGS_SUCH_AS_LAUNDRY_AND_HOUSEKEEPING,_BANKING,_SHOPPING,_USING_THE_TELEPHONE,_FOOD_PREPARATION,_TRANSPORTATION,_OR_TAKING_YOUR_OWN_MEDICATIONS?: N
IN_THE_PAST_7_DAYS,_DID_YOU_NEED_HELP_FROM_OTHERS_TO_PERFORM_EVERYDAY_ACTIVITIES_SUCH_AS_EATING,_GETTING_DRESSED,_GROOMING,_BATHING,_WALKING,_OR_USING_THE_TOILET: N

## 2021-12-09 ENCOUNTER — OFFICE VISIT (OUTPATIENT)
Dept: INTERNAL MEDICINE | Facility: CLINIC | Age: 66
End: 2021-12-09
Payer: COMMERCIAL

## 2021-12-09 ENCOUNTER — LAB (OUTPATIENT)
Dept: LAB | Facility: CLINIC | Age: 66
End: 2021-12-09
Payer: COMMERCIAL

## 2021-12-09 VITALS
BODY MASS INDEX: 20.61 KG/M2 | DIASTOLIC BLOOD PRESSURE: 73 MMHG | HEIGHT: 68 IN | WEIGHT: 136 LBS | SYSTOLIC BLOOD PRESSURE: 116 MMHG | HEART RATE: 62 BPM | RESPIRATION RATE: 16 BRPM | OXYGEN SATURATION: 99 %

## 2021-12-09 DIAGNOSIS — M79.662 PAIN OF LEFT LOWER LEG: Primary | ICD-10-CM

## 2021-12-09 DIAGNOSIS — D50.0 IRON DEFICIENCY ANEMIA DUE TO CHRONIC BLOOD LOSS: ICD-10-CM

## 2021-12-09 DIAGNOSIS — E78.00 HIGH CHOLESTEROL: ICD-10-CM

## 2021-12-09 LAB
ALBUMIN SERPL-MCNC: 4.1 G/DL (ref 3.4–5)
ALP SERPL-CCNC: 58 U/L (ref 40–150)
ALT SERPL W P-5'-P-CCNC: 30 U/L (ref 0–70)
ANION GAP SERPL CALCULATED.3IONS-SCNC: 5 MMOL/L (ref 3–14)
AST SERPL W P-5'-P-CCNC: 20 U/L (ref 0–45)
BILIRUB SERPL-MCNC: 0.9 MG/DL (ref 0.2–1.3)
BUN SERPL-MCNC: 12 MG/DL (ref 7–30)
CALCIUM SERPL-MCNC: 9.2 MG/DL (ref 8.5–10.1)
CHLORIDE BLD-SCNC: 107 MMOL/L (ref 94–109)
CHOLEST SERPL-MCNC: 118 MG/DL
CO2 SERPL-SCNC: 32 MMOL/L (ref 20–32)
CREAT SERPL-MCNC: 0.97 MG/DL (ref 0.66–1.25)
ERYTHROCYTE [DISTWIDTH] IN BLOOD BY AUTOMATED COUNT: 13.9 % (ref 10–15)
FASTING STATUS PATIENT QL REPORTED: NO
FERRITIN SERPL-MCNC: 49 NG/ML (ref 26–388)
GFR SERPL CREATININE-BSD FRML MDRD: 80 ML/MIN/1.73M2
GLUCOSE BLD-MCNC: 70 MG/DL (ref 70–99)
HCT VFR BLD AUTO: 42.9 % (ref 40–53)
HDLC SERPL-MCNC: 31 MG/DL
HGB BLD-MCNC: 14.1 G/DL (ref 13.3–17.7)
IRON SATN MFR SERPL: 41 % (ref 15–46)
IRON SERPL-MCNC: 144 UG/DL (ref 35–180)
LDLC SERPL CALC-MCNC: 54 MG/DL
MCH RBC QN AUTO: 32.6 PG (ref 26.5–33)
MCHC RBC AUTO-ENTMCNC: 32.9 G/DL (ref 31.5–36.5)
MCV RBC AUTO: 99 FL (ref 78–100)
NONHDLC SERPL-MCNC: 87 MG/DL
PLATELET # BLD AUTO: 212 10E3/UL (ref 150–450)
POTASSIUM BLD-SCNC: 4.2 MMOL/L (ref 3.4–5.3)
PROT SERPL-MCNC: 7.3 G/DL (ref 6.8–8.8)
RBC # BLD AUTO: 4.32 10E6/UL (ref 4.4–5.9)
SODIUM SERPL-SCNC: 144 MMOL/L (ref 133–144)
TIBC SERPL-MCNC: 349 UG/DL (ref 240–430)
TRANSFERRIN SERPL-MCNC: 216 MG/DL (ref 210–360)
TRIGL SERPL-MCNC: 164 MG/DL
WBC # BLD AUTO: 5.3 10E3/UL (ref 4–11)

## 2021-12-09 PROCEDURE — 99213 OFFICE O/P EST LOW 20 MIN: CPT | Mod: 25 | Performed by: STUDENT IN AN ORGANIZED HEALTH CARE EDUCATION/TRAINING PROGRAM

## 2021-12-09 PROCEDURE — 36415 COLL VENOUS BLD VENIPUNCTURE: CPT | Performed by: PATHOLOGY

## 2021-12-09 PROCEDURE — G0008 ADMIN INFLUENZA VIRUS VAC: HCPCS | Performed by: STUDENT IN AN ORGANIZED HEALTH CARE EDUCATION/TRAINING PROGRAM

## 2021-12-09 PROCEDURE — 99000 SPECIMEN HANDLING OFFICE-LAB: CPT | Performed by: PATHOLOGY

## 2021-12-09 PROCEDURE — 80061 LIPID PANEL: CPT | Performed by: PATHOLOGY

## 2021-12-09 PROCEDURE — 82728 ASSAY OF FERRITIN: CPT | Performed by: PATHOLOGY

## 2021-12-09 PROCEDURE — 84466 ASSAY OF TRANSFERRIN: CPT | Mod: 90 | Performed by: PATHOLOGY

## 2021-12-09 PROCEDURE — 80053 COMPREHEN METABOLIC PANEL: CPT | Performed by: PATHOLOGY

## 2021-12-09 PROCEDURE — 85027 COMPLETE CBC AUTOMATED: CPT | Performed by: PATHOLOGY

## 2021-12-09 PROCEDURE — 90662 IIV NO PRSV INCREASED AG IM: CPT | Performed by: STUDENT IN AN ORGANIZED HEALTH CARE EDUCATION/TRAINING PROGRAM

## 2021-12-09 ASSESSMENT — MIFFLIN-ST. JEOR: SCORE: 1361.39

## 2021-12-09 ASSESSMENT — PAIN SCALES - GENERAL: PAINLEVEL: NO PAIN (0)

## 2021-12-09 NOTE — PROGRESS NOTES
"I, Ben Singer MD discussed with the resident during the visit, and agree with the resident's findings and plan of care as documented in the resident's note. I was immediately available to the patient should the need have arisen.  /73 (BP Location: Right arm, Patient Position: Sitting, Cuff Size: Adult Regular)   Pulse 62   Resp 16   Ht 1.727 m (5' 8\")   Wt 61.7 kg (136 lb)   SpO2 99%   BMI 20.68 kg/m    I personally reviewed vital signs and past record.  Key findings: follow-up for care elsewhere, several good preventive strategies.    "

## 2021-12-09 NOTE — NURSING NOTE
Jose Carlos Bolden is a 66 year old male patient that presents today in clinic for the following:    Chief Complaint   Patient presents with     Establish Care     Patient comes to re establish care.      The patient's allergies and medications were reviewed as noted. A set of vitals were recorded as noted without incident. The patient does not have any other questions for the provider.    Андрей Curtis, EMT at 8:26 AM on 12/9/2021

## 2021-12-09 NOTE — PROGRESS NOTES
PRIMARY CARE CENTER         HPI:      HPI:  Jose Carlos Bolden is a 66 year old male who presents to establish care. Patient states he is in fairly good health. He did stop exercising during COVID19 but began going to the gym again recently and feels good. Has been experiencing some pain in his left lateral leg intermittently for the past several months. No injury or trauma. Pain seems to worsens when he steps a certain way, sometimes when walking down stairs. He does have a history of TORI and is on iron supplements. He had a colonoscopy for this and it was unremarkable aside from two small polyps that were removed with a cold snare. Iron studies have not been rechecked since starting iron therapy. He also has a history of glaucoma. No recent vision changes or eye pain. Follows with ophthalmology every 6 months. Will recheck a lipid panel. His health maintenance is otherwise up to date. He expresses no other concerns at this time.                                                   Medications:  Current Outpatient Medications   Medication     albuterol (PROAIR HFA) 108 (90 BASE) MCG/ACT Inhaler     atorvastatin (LIPITOR) 20 MG tablet     Cyanocobalamin (VITAMIN B12) 1000 MCG TBCR     ferrous gluconate (FERGON) 324 (38 Fe) MG tablet     finasteride (PROSCAR) 5 MG tablet     gabapentin (NEURONTIN) 600 MG tablet     metroNIDAZOLE (NORITATE) 1 % cream     triamcinolone (KENALOG) 0.1 % cream     clindamycin (CLEOCIN) 300 MG capsule     No current facility-administered medications for this visit.      Allergies:  Allergies   Allergen Reactions     Cats      Feathers      Food      Potato chips, tomatillos, cherry de la fuente     Seasonal Allergies      Smoke.      Sulfa Drugs Other (See Comments)     Confusion and dizziness     Penicillium Notatum Allergy Skin Test      Medical History:  Past Medical History:   Diagnosis Date     Allergy     sulfa, penicillium, food (tomatillos, ground cherries, some potato chips)     Back pain       "Glaucoma suspect      Hearing loss      Hip pain, left      Hyperlipidemia LDL goal <130      Iron deficiency      Kyphosis      Osteoarthritis      Reactive airway disease      Restless leg syndrome      Scoliosis      Vitamin B12 deficiency      Family History:  Reviewed. Noncontributory.     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 He is . He smokes tobacco and drink about 5-6 drinks per week.           Review of Systems:     ROS  I have personally reviewed and updated the complete ROS on the day of the visit.           Physical Exam:   /73 (BP Location: Right arm, Patient Position: Sitting, Cuff Size: Adult Regular)   Pulse 62   Resp 16   Ht 1.727 m (5' 8\")   Wt 61.7 kg (136 lb)   SpO2 99%   BMI 20.68 kg/m    Body mass index is 20.68 kg/m .  Vitals were reviewed    Physical Exam:   General: Sitting upright, talking in complete sentences, non-distressed  HEENT: Normocephalic, atraumatic, PERRL, EOMI, no conjunctival pallor, anicteric, nares patent, oropharynx clear and moist  CVS: S1/S2 WNL, RRR, no murmur, no LE edema, cap refill <2 seconds  Pulm: Non-labored breathing, vesicular breath sounds, no crackles or wheeze  Abdo: Non-distended, non-tender to palpation, no rebound or guarding, BS present   MSK: No obvious bony deformities, no clubbing  Skin: Warm and dry, no obvious rashes  Neuro: Alert and oriented x3, non-focal   Psych: Normal mood and affect     Assessment and Plan     Jose Carlos was seen today for establish care.    Diagnoses and all orders for this visit:    Pain of left lower leg  -     Physical Therapy Referral; Future    High cholesterol  -     Lipid Profile FASTING; Future  -     Comprehensive metabolic panel; Future    Iron deficiency anemia due to chronic blood loss  -     CBC with platelets; Future  -     Ferritin; Future  -     Transferrin; Future  -     Iron and iron binding capacity; Future    Other orders  -     FLU VACCINE HIGH DOSE " PRESERVATIVE FREE, AGE =>65 YR      Follow up in 6 months, sooner if new symptoms or concerns.     Options for treatment and follow-up care were reviewed with the patient. Jose Carlos Bolden engaged in the decision making process and verbalized understanding of the options discussed and agreed with the final plan.    Servando Triana MD  Dec 9, 2021    Pt was seen and plan of care discussed with Dr. Mendez.     Servando Triana MD  Internal Medicine

## 2021-12-13 ENCOUNTER — THERAPY VISIT (OUTPATIENT)
Dept: PHYSICAL THERAPY | Facility: CLINIC | Age: 66
End: 2021-12-13
Attending: PEDIATRICS
Payer: COMMERCIAL

## 2021-12-13 DIAGNOSIS — M79.662 PAIN OF LEFT LOWER LEG: ICD-10-CM

## 2021-12-13 PROCEDURE — 97161 PT EVAL LOW COMPLEX 20 MIN: CPT | Mod: GP | Performed by: PHYSICAL THERAPIST

## 2021-12-13 PROCEDURE — 97110 THERAPEUTIC EXERCISES: CPT | Mod: GP | Performed by: PHYSICAL THERAPIST

## 2021-12-13 NOTE — PROGRESS NOTES
Physical Therapy Initial Examination/Evaluation  December 13, 2021    Jose Carlos Bolden is a 66 year old male referred to physical therapy by Ben Singer MD for treatment of pain of left lower leg with Precautions/Restrictions/MD instructions E&T    Therapist Impression:   Jose Carlos presents to therapy with left lateral leg and ankle pain. Demonstrates significant weakness of the left ankle with tenderness over the peroneal tendon, poor balance, pronation of left foot in weightbearing, and pain affecting function. Skilled therapy is required to address ongoing limitations, make progress towards therapy goals, improve quality of life, reduce falls risk, and return to previous level of function.     Subjective:  DOI/onset: 12/9/2021 DOS: NA  Jose Carlos states he has been having pain on the lateral aspect of his left ankle that has been going on for over 1 year. Has the most pain with stairs, both ascending and descending. Intermittent pain, but does not go completely away. Has had some 'stumbles' due to the pain. Pain with rotation.   Acute Injury or Gradual Onset?: Gradual injury over time  Mechanism of Injury: unknown  Related PMH: history of ankle sprains Previous Treatment: Nothing Effect of prior treatment: NA  Imaging: None  Chief Complaint/Functional Limitations:   Walking, stairs and see below in therapy evaluation codes   Pain: rest 2 /10, activity 10/10 Location: lateral left ankle Frequency: Constant Described as: aching and sharp Alleviated by: Pain medications Progression of Symptoms: Gradually getting worse. Time of day when pain is worse: Activity related  Sleeping: No issues/uninterrupted   Occupation: retired    Current HEP/exercise regimen: walking (5 miles/day), biking, stairs   Patient's goals are see chief complaints walking, stairs     Answers for HPI/ROS submitted by the patient on 12/13/2021  Reason for Visit:: lower leg pain  When problem began:: 1/1/2011  How problem occurred:: don't know  Number scale:  3/10  General health as reported by patient: good  Medical allergies: none  Surgeries: orthopedic surgery  Medications you are currently taking: sleep medication, steroids  Occupation:: retired  What are your primary job tasks: other  Other Tasks Detail: all of the above + gardening      Return to MD:  None scheduled     ANKLE EVALUATION    Static Posture  Left foot pronation     Dynamic Movement Screen  Single leg stance observations: Difficulty with balance eyes open  or Lateral trunk lean   Double limb squat observations: Narrow HAILEY and pronation of left foot L>R  Single limb squat observations: Not assessed  Gait: No significant findings    Ankle Range of Motion  Equivalent range of motion in all directions noted without pain     Ankle Strength  Ankle MMT Dorsiflexion Plantarflexion Inversion Eversion   Left 3+/5 3/5 3+/5 3+/5   Right 4+/5 4+/5 4+/5 4+/5     Heel Raises:  Right: 5/20  Left: 1/20, pain     Provocation tests  Resisted (tedon) Left Right  PF/INV (post tib) Pain-free Pain-free  PF/EV (peroneals) Pain Pain-free  DF/INV (ant tib) Pain-free Pain-free  DF/EV (ext. dig) Pain-free Pain free  Overpressure/Stretch (tendon sheath)  Post tib (DV/EV) Pain-free Pain-free  Ant tib (PF/EV) Pain-free Pain-free  Peroneals (DF/INV) Pain-free Pain-free  Ext. Dig (PF/INV) Pain-free Pain free    Special Tests:  Ligament testing:    Palpation  Left: Mild tenderness to palpation at peroneal tendon       Assessment/Plan:  Patient is a 66 year old male with left side ankle complaints.    Patient has the following significant findings with corresponding treatment plan.                Diagnosis 1:  Left Ankle  Pain -  manual therapy, splint/taping/bracing/orthotics and home program  Decreased strength - therapeutic exercise and therapeutic activities  Impaired balance - neuro re-education and therapeutic activities  Decreased function - therapeutic activities    Therapy Evaluation Codes:     Cumulative Therapy Evaluation is:  Low complexity.    Previous and current functional limitations:  (See Goal Flow Sheet for this information)    Short term and Long term goals: (See Goal Flow Sheet for this information)     Communication ability:  Patient appears to be able to clearly communicate and understand verbal and written communication and follow directions correctly.  Treatment Explanation - The following has been discussed with the patient:   RX ordered/plan of care  Anticipated outcomes  Possible risks and side effects  This patient would benefit from PT intervention to resume normal activities.   Rehab potential is good.    Frequency:  2 X a month, once daily  Duration:  for 3 months  Discharge Plan:  Achieve all LTG.  Independent in home treatment program.  Reach maximal therapeutic benefit.    Please refer to the daily flowsheet for treatment today, total treatment time and time spent performing 1:1 timed codes.

## 2021-12-20 NOTE — PROGRESS NOTES
[facility]    OUTPATIENT Physical Therapy ORTHOPEDIC EVALUATION  PLAN OF TREATMENT FOR OUTPATIENT REHABILITATION  (COMPLETE FOR INITIAL CLAIMS ONLY)  Patient's Last Name, First Name, M.I.  YOB: 1955  YuanJose Carlos THORPE    Provider s Name:  [facility]   Medical Record No.  [id number]   Start of Care Date:      Onset Date:   12/09/21   Type:     _X__PT   ___OT Medical Diagnosis:    Encounter Diagnosis   Name Primary?     Pain of left lower leg         Treatment Diagnosis:  Left Leg Pain        Goals:     12/13/21 0500   Body Part   Goals listed below are for Lower Leg   Goal #1   Goal #1 ambulation   Previous Functional Level No restrictions   Performance Level No limitations   Current Functional Level Miles patient can walk   Performance Level 1 mile, 3/10 pain   STG Target Performance Miles patient will be able to  walk   Performance Level 1 mile, <1/10 pain   Rationale for safe community ambulation;to maintain proper body mechanics/posture while ambulating to avoid additional compensatory injury due to improper gait mechanics;to promote a healthy and active lifestyle   Due Date 01/27/22    LTG Target Performance Miles patient will be able to  walk   Performance Level >2 miles without pain   Rationale for safe outdoor household ambulation;for safe community ambulation;to maintain proper body mechanics/posture while ambulating to avoid additional compensatory injury due to improper gait mechanics;to promote a healthy and active lifestyle   Due Date 03/11/22       Therapy Frequency:     Predicted Duration of Therapy Intervention:       Suze Hathaway, PT                 I CERTIFY THE NEED FOR THESE SERVICES FURNISHED UNDER        THIS PLAN OF TREATMENT AND WHILE UNDER MY CARE     (Physician attestation of this document indicates review and certification of the  therapy plan).                       Certification Date From:    12/13/2021  Certification Date To:   3/11/2022    Referring Provider:  Servando Triana    Initial Assessment        See Epic Evaluation

## 2021-12-22 ENCOUNTER — THERAPY VISIT (OUTPATIENT)
Dept: PHYSICAL THERAPY | Facility: CLINIC | Age: 66
End: 2021-12-22
Payer: COMMERCIAL

## 2021-12-22 DIAGNOSIS — M79.662 PAIN OF LEFT LOWER LEG: ICD-10-CM

## 2021-12-22 PROCEDURE — 97110 THERAPEUTIC EXERCISES: CPT | Mod: GP | Performed by: PHYSICAL THERAPIST

## 2021-12-22 PROCEDURE — 97530 THERAPEUTIC ACTIVITIES: CPT | Mod: GP | Performed by: PHYSICAL THERAPIST

## 2021-12-28 ENCOUNTER — THERAPY VISIT (OUTPATIENT)
Dept: PHYSICAL THERAPY | Facility: CLINIC | Age: 66
End: 2021-12-28
Payer: COMMERCIAL

## 2021-12-28 DIAGNOSIS — M79.662 PAIN OF LEFT LOWER LEG: ICD-10-CM

## 2021-12-28 PROCEDURE — 97110 THERAPEUTIC EXERCISES: CPT | Mod: GP | Performed by: PHYSICAL THERAPIST

## 2021-12-28 PROCEDURE — 97530 THERAPEUTIC ACTIVITIES: CPT | Mod: GP | Performed by: PHYSICAL THERAPIST

## 2022-01-05 ENCOUNTER — OFFICE VISIT (OUTPATIENT)
Dept: DERMATOLOGY | Facility: CLINIC | Age: 67
End: 2022-01-05
Payer: COMMERCIAL

## 2022-01-05 DIAGNOSIS — L91.8 INFLAMED SKIN TAG: ICD-10-CM

## 2022-01-05 DIAGNOSIS — L21.9 SEBORRHEIC DERMATITIS: Primary | ICD-10-CM

## 2022-01-05 DIAGNOSIS — L82.1 SEBORRHEIC KERATOSIS: ICD-10-CM

## 2022-01-05 PROCEDURE — 11200 RMVL SKIN TAGS UP TO&INC 15: CPT | Mod: GC | Performed by: DERMATOLOGY

## 2022-01-05 PROCEDURE — 99203 OFFICE O/P NEW LOW 30 MIN: CPT | Mod: 25 | Performed by: DERMATOLOGY

## 2022-01-05 RX ORDER — KETOCONAZOLE 20 MG/G
CREAM TOPICAL 2 TIMES DAILY
Qty: 60 G | Refills: 11 | Status: SHIPPED | OUTPATIENT
Start: 2022-01-05 | End: 2022-10-07

## 2022-01-05 ASSESSMENT — PAIN SCALES - GENERAL: PAINLEVEL: NO PAIN (0)

## 2022-01-05 NOTE — PROGRESS NOTES
[facility] Dermatology Note  Encounter Date: Jan 5, 2022  Office Visit     Dermatology Problem List:  1. Seborrheic dermatitis   - Ketoconazole cream  ____________________________________________    Assessment & Plan:    # Seborrheic dermatitis.  - Start ketoconazole cream BID    # SK.  - Reassurance    # Skin tag. Given symptomatic nature, treat with cryo x4.  - see cryo note    Procedures Performed:   - Cryotherapy procedure note, location(s): skin tags x2 left eye, x 2 right eye. After verbal consent and discussion of risks and benefits including, but not limited to, dyspigmentation/scar, blister, and pain, lesion(s) was(were) treated with 1-2 mm freeze border for 1-2 cycles with liquid nitrogen. Post cryotherapy instructions were provided.    Follow-up: 3 months (sooner if concerns arise)     Staff and Scribe:     Bita Mojica DO   [facility] Resident   Pager#[phone]    Precepted with Dr. Herndon     Scribe Disclosure:  I, Jesu Peraza, am serving as a scribe to document services personally performed by Sonla Herndon MD based on data collection and the provider's statements to me.     Staff Physician Comments:   I saw and evaluated the patient with the resident and I agree with the assessment and plan.  I was present for the entire minor procedure and examination.    Sonal Herndon MD    Department of Dermatology  [facility]: Phone: [phone], Fax: [phone]  1/5/2022     ____________________________________________    CC: Skin Check (states that he has no spots of concern )    HPI:  Mr. Jose Carlos Bolden is a(n) 66 year old male who presents today as a new patient for a skin check.    Skin tags right and left eye    - Irritating and affect vision   - Would be open to cryotherapy     Keratoses   Scattered on back.     Facial rash   Been there for years.   Metronidazole    Older script for kenalog?     No family or personal hx of skin     Uses sunscreen and wide brimmed hats     Patient is otherwise feeling well, without additional skin concerns.    Labs Reviewed:  N/A    Physical Exam:   Vitals: There were no vitals taken for this visit.  SKIN: Total skin excluding the undergarment areas was performed. The exam included the head/face, neck, both arms, chest, back, abdomen, both legs, digits and/or nails.   - Scalp normal in appearance, no flaking or erythema  - erythema and scale on cheeks extending into nasolabial folds  - multiple fleshy pedunculated papules around eyes  - There are waxy stuck on tan to brown papules over the back and lower abdomen region.   - No other lesions of concern on areas examined.     Medications:  Current Outpatient Medications   Medication     albuterol (PROAIR HFA) 108 (90 BASE) MCG/ACT Inhaler     atorvastatin (LIPITOR) 20 MG tablet     Cyanocobalamin (VITAMIN B12) 1000 MCG TBCR     ferrous gluconate (FERGON) 324 (38 Fe) MG tablet     finasteride (PROSCAR) 5 MG tablet     gabapentin (NEURONTIN) 600 MG tablet     metroNIDAZOLE (NORITATE) 1 % cream     triamcinolone (KENALOG) 0.1 % cream     No current facility-administered medications for this visit.      Past Medical History:   Patient Active Problem List   Diagnosis     Hearing loss     Glaucoma suspect     Reactive airway disease     Restless legs syndrome     Kyphosis     Scoliosis     Vitamin B12 deficiency     Osteoarthritis     Iron deficiency     Hyperlipidemia LDL goal <130     Pain of left lower leg     Past Medical History:   Diagnosis Date     Allergy     sulfa, penicillium, food (tomatillos, ground cherries, some potato chips)     Back pain      Glaucoma suspect      Hearing loss      Hip pain, left      Hyperlipidemia LDL goal <130      Iron deficiency      Kyphosis      Osteoarthritis      Reactive airway disease      Restless leg syndrome      Scoliosis      Vitamin B12 deficiency          CC Alyse Young MD  [address] [facility] [address] [address]  [place] [address] on close of this encounter.

## 2022-01-05 NOTE — LETTER
1/5/2022       RE: Jose Carlos Bolden  [address] [address]     Dear Colleague,    Thank you for referring your patient, Jose Carlos Bolden, to the [facility] [place] at [facility]. Please see a copy of my visit note below.    [facility] Dermatology Note  Encounter Date: Jan 5, 2022  Office Visit     Dermatology Problem List:  1. Seborrheic dermatitis   - Ketoconazole cream  ____________________________________________    Assessment & Plan:    # Seborrheic dermatitis.  - Start ketoconazole cream BID    # SK.  - Reassurance    # Skin tag. Given symptomatic nature, treat with cryo x4.  - see cryo note    Procedures Performed:   - Cryotherapy procedure note, location(s): skin tags x2 left eye, x 2 right eye. After verbal consent and discussion of risks and benefits including, but not limited to, dyspigmentation/scar, blister, and pain, lesion(s) was(were) treated with 1-2 mm freeze border for 1-2 cycles with liquid nitrogen. Post cryotherapy instructions were provided.    Follow-up: 3 months (sooner if concerns arise)     Staff and Scribe:     Bita Mojica DO   [facility] Resident   Pager#[phone]    Precepted with Dr. Herndon     Scribe Disclosure:  I, Jesu Peraza, am serving as a scribe to document services personally performed by Sonal Herndon MD based on data collection and the provider's statements to me.     Staff Physician Comments:   I saw and evaluated the patient with the resident and I agree with the assessment and plan.  I was present for the entire minor procedure and examination.    Sonal Herndon MD    Department of Dermatology  [facility]: Phone: [phone], Fax: [phone]  1/5/2022     ____________________________________________    CC: Skin Check (states that he has no spots  of concern )    HPI:  Mr. Jose Carlos Bolden is a(n) 66 year old male who presents today as a new patient for a skin check.    Skin tags right and left eye    - Irritating and affect vision   - Would be open to cryotherapy     Keratoses   Scattered on back.     Facial rash   Been there for years.   Metronidazole   Older script for kenalog?     No family or personal hx of skin     Uses sunscreen and wide brimmed hats     Patient is otherwise feeling well, without additional skin concerns.    Labs Reviewed:  N/A    Physical Exam:   Vitals: There were no vitals taken for this visit.  SKIN: Total skin excluding the undergarment areas was performed. The exam included the head/face, neck, both arms, chest, back, abdomen, both legs, digits and/or nails.   - Scalp normal in appearance, no flaking or erythema  - erythema and scale on cheeks extending into nasolabial folds  - multiple fleshy pedunculated papules around eyes  - There are waxy stuck on tan to brown papules over the back and lower abdomen region.   - No other lesions of concern on areas examined.     Medications:  Current Outpatient Medications   Medication     albuterol (PROAIR HFA) 108 (90 BASE) MCG/ACT Inhaler     atorvastatin (LIPITOR) 20 MG tablet     Cyanocobalamin (VITAMIN B12) 1000 MCG TBCR     ferrous gluconate (FERGON) 324 (38 Fe) MG tablet     finasteride (PROSCAR) 5 MG tablet     gabapentin (NEURONTIN) 600 MG tablet     metroNIDAZOLE (NORITATE) 1 % cream     triamcinolone (KENALOG) 0.1 % cream     No current facility-administered medications for this visit.      Past Medical History:   Patient Active Problem List   Diagnosis     Hearing loss     Glaucoma suspect     Reactive airway disease     Restless legs syndrome     Kyphosis     Scoliosis     Vitamin B12 deficiency     Osteoarthritis     Iron deficiency     Hyperlipidemia LDL goal <130     Pain of left lower leg     Past Medical History:   Diagnosis Date     Allergy     sulfa, penicillium, food  (tomatillos, ground cherries, some potato chips)     Back pain      Glaucoma suspect      Hearing loss      Hip pain, left      Hyperlipidemia LDL goal <130      Iron deficiency      Kyphosis      Osteoarthritis      Reactive airway disease      Restless leg syndrome      Scoliosis      Vitamin B12 deficiency         CC Alyse Young MD  [address] [facility] [address]  [place] [address] on close of this encounter.

## 2022-01-05 NOTE — NURSING NOTE
Dermatology Rooming Note    Jose Carlos Bolden's goals for this visit include:   Chief Complaint   Patient presents with     Skin Check     states that he has no spots of concern      Jadyn Landaverde CMA on 1/5/2022 at 8:42 AM

## 2022-01-06 NOTE — PATIENT INSTRUCTIONS
Cryotherapy    What is it?    Use of a very cold liquid, such as liquid nitrogen, to freeze and destroy abnormal skin cells that need to be removed    What should I expect?    Tenderness and redness    A small blister that might grow and fill with dark purple blood. There may be crusting.    More than one treatment may be needed if the lesions do not go away.    How do I care for the treated area?    Gently wash the area with your hands when bathing.    Use a thin layer of Vaseline to help with healing. You may use a Band-Aid.     The area should heal within 7-10 days and may leave behind a pink or lighter color.     Do not use an antibiotic or Neosporin ointment.     You may take acetaminophen (Tylenol) for pain.     Call your doctor if you have:    Severe pain    Signs of infection (warmth, redness, cloudy yellow drainage, and or a bad smell)    Questions or concerns    Who should I call with questions?       [facility]: [phone]       [facility]: [phone]       For urgent needs outside of business hours call the [facility] at [phone] and ask for the dermatology resident on call

## 2022-01-07 ENCOUNTER — TELEPHONE (OUTPATIENT)
Dept: INTERNAL MEDICINE | Facility: CLINIC | Age: 67
End: 2022-01-07
Payer: COMMERCIAL

## 2022-01-07 NOTE — TELEPHONE ENCOUNTER
[facility] Call Center    Phone Message    May a detailed message be left on voicemail: yes     Reason for Call: Order(s): Other:   Reason for requested: [facility] [id number] option 2 calling regarding an electronic document that was sent in December that needs to be completed by Dr. Triana. Please view or call to discuss. Thank you.   Date needed: 01/07/22  Provider name: Dr. Triana      Action Taken: Message routed to:  [facility]    Travel Screening: Not Applicable

## 2022-01-10 NOTE — CONFIDENTIAL NOTE
Spoke to Deni and she will fax it to us so Dr. Singer can sign    Suni Hollis on 1/10/2022 at 11:38 AM

## 2022-01-28 ENCOUNTER — THERAPY VISIT (OUTPATIENT)
Dept: PHYSICAL THERAPY | Facility: CLINIC | Age: 67
End: 2022-01-28
Payer: COMMERCIAL

## 2022-01-28 DIAGNOSIS — M79.662 PAIN OF LEFT LOWER LEG: ICD-10-CM

## 2022-01-28 PROCEDURE — 97530 THERAPEUTIC ACTIVITIES: CPT | Mod: GP | Performed by: PHYSICAL THERAPIST

## 2022-01-28 PROCEDURE — 97110 THERAPEUTIC EXERCISES: CPT | Mod: GP | Performed by: PHYSICAL THERAPIST

## 2022-03-03 ENCOUNTER — THERAPY VISIT (OUTPATIENT)
Dept: PHYSICAL THERAPY | Facility: CLINIC | Age: 67
End: 2022-03-03
Payer: COMMERCIAL

## 2022-03-03 DIAGNOSIS — M79.662 PAIN OF LEFT LOWER LEG: ICD-10-CM

## 2022-03-03 PROCEDURE — 97110 THERAPEUTIC EXERCISES: CPT | Mod: GP | Performed by: PHYSICAL THERAPIST

## 2022-03-03 PROCEDURE — 97530 THERAPEUTIC ACTIVITIES: CPT | Mod: GP | Performed by: PHYSICAL THERAPIST

## 2022-03-03 PROCEDURE — 97112 NEUROMUSCULAR REEDUCATION: CPT | Mod: GP | Performed by: PHYSICAL THERAPIST

## 2022-03-24 ENCOUNTER — THERAPY VISIT (OUTPATIENT)
Dept: PHYSICAL THERAPY | Facility: CLINIC | Age: 67
End: 2022-03-24
Payer: COMMERCIAL

## 2022-03-24 DIAGNOSIS — M79.662 PAIN OF LEFT LOWER LEG: ICD-10-CM

## 2022-03-24 PROCEDURE — 97112 NEUROMUSCULAR REEDUCATION: CPT | Mod: GP | Performed by: PHYSICAL THERAPIST

## 2022-03-24 PROCEDURE — 97530 THERAPEUTIC ACTIVITIES: CPT | Mod: GP | Performed by: PHYSICAL THERAPIST

## 2022-04-07 ENCOUNTER — MYC MEDICAL ADVICE (OUTPATIENT)
Dept: INTERNAL MEDICINE | Facility: CLINIC | Age: 67
End: 2022-04-07

## 2022-04-07 DIAGNOSIS — M16.11 PRIMARY OSTEOARTHRITIS OF RIGHT HIP: Primary | ICD-10-CM

## 2022-04-08 NOTE — TELEPHONE ENCOUNTER
Patient last seen in orthopedic on 10/28/2020.  Has been over 1 year.  Patient will need a new referral.    Referral order pended for provider to review and approve.        Augustine Durbin CMA ([facility]) at 7:22 AM on 4/8/2022

## 2022-04-13 NOTE — TELEPHONE ENCOUNTER
DIAGNOSIS: Right hip and knee pain per pt/no img/self ref   APPOINTMENT DATE: 4.21.22   NOTES STATUS DETAILS   OFFICE NOTE from referring provider Internal 4.7.22 [facility] Medical Advice   OFFICE NOTE from other specialist Internal/Care Everywhere 10.28.20 Dr Arron Cooley, [facility]  10.17.19 Deneen Burton, [facility]  3.26.15 Dr Ted Mcmillan,   6.12.14  5.6.14  4.8.14  1.28.14   OPERATIVE REPORT Internal 3.26.14 [facility]   MEDICATION LIST Internal    XRAYS (IMAGES & REPORTS) Internal 10.28.20 R hip and pelvis  10.17.19 6 foot standing  7.31.19 B hip and pelvis

## 2022-04-18 NOTE — PROGRESS NOTES
"[facility] Dermatology Note  Encounter Date: Apr 19, 2022  Office Visit     Dermatology Problem List:  1. Seborrheic dermatitis   - Ketoconazole cream    ____________________________________________    Assessment & Plan:    # Seborrheic dermatitis, chronic, active.  Discussed regular use of ketoconazole +/- adding topical steroid. May have background ETT rosacea as well (which could be flaring with sunlight, chili peppers as he has noticed) and advised PDL most effective for this if interested.  - Start ketoconazole cream more regularly, such as daily; otherwise can continue prn for flares.  - If not responding to ketoconazole, patient to notify me and we can consider topical steroid    # Skin tags, around the eyes x3  - Cryo today, see procedure below    Procedures Performed:   - Cryotherapy procedure note, location(s): see above. After verbal consent and discussion of risks and benefits including, but not limited to, dyspigmentation/scar, blister, and pain, 3 lesion(s) was(were) treated with 1-2 mm freeze border for 1-2 cycles with liquid nitrogen. Post cryotherapy instructions were provided.    Follow-up: 6 month(s) in-person, or earlier for new or changing lesions    Staff and Scribe:     Scribe Disclosure:  I, Panda Greco, am serving as a scribe to document services personally performed by Sonal Herndon MD based on data collection and the provider's statements to me.     Provider Disclosure:   The documentation recorded by the scribe accurately reflects the services I personally performed and the decisions made by me.    Sonal Herndon MD    Department of Dermatology  [facility]: Phone: [phone], Fax: [phone]  4/19/2022     ____________________________________________    CC: Derm Problem (Seborrheic dermatitis - Jose Carlos notes its been \"okay\" since last visit, no change. " )    HPI:  Mr. Jose Carlos Bolden is a(n) 66 year old male who presents today as a return patient for seborrheic dermatitis follow-up. Last seen by me on 1/5/2022, at which time patient was started on ketoconazole cream BID for treatment of seborrheic dermatitis. Additionally, patient underwent cryo for treatment of skin tags.    Today, patient reports that his skin has been fairly well controlled, and he has been using ketoconazole cream approximately once a week. He has tried both metronidazole and ketoconazole cream in the area, and he notes that both are effective. He has noticed flares with sunlight and cutting chili peppers. Additionally, he reports that some of the bothersome skin tags treated at last visit are still present but they are smaller.    Patient is otherwise feeling well, without additional skin concerns.    Labs Reviewed:  N/A    Physical Exam:  Vitals: There were no vitals taken for this visit.  SKIN: Focused examination of face was performed.  - Erythema on medial cheeks.  - There is(are) skin colored pedunculated papules around the eyes.  - No other lesions of concern on areas examined.     Medications:  Current Outpatient Medications   Medication     albuterol (PROAIR HFA) 108 (90 BASE) MCG/ACT Inhaler     atorvastatin (LIPITOR) 20 MG tablet     Cyanocobalamin (VITAMIN B12) 1000 MCG TBCR     finasteride (PROSCAR) 5 MG tablet     gabapentin (NEURONTIN) 600 MG tablet     ketoconazole (NIZORAL) 2 % external cream     metroNIDAZOLE (NORITATE) 1 % cream     triamcinolone (KENALOG) 0.1 % cream     ferrous gluconate (FERGON) 324 (38 Fe) MG tablet     No current facility-administered medications for this visit.      Past Medical History:   Patient Active Problem List   Diagnosis     Hearing loss     Glaucoma suspect     Reactive airway disease     Restless legs syndrome     Kyphosis     Scoliosis     Vitamin B12 deficiency     Osteoarthritis     Iron deficiency     Hyperlipidemia LDL goal <130     Pain of  left lower leg     Past Medical History:   Diagnosis Date     Allergy     sulfa, penicillium, food (tomatillos, ground cherries, some potato chips)     Back pain      Glaucoma suspect      Hearing loss      Hip pain, left      Hyperlipidemia LDL goal <130      Iron deficiency      Kyphosis      Osteoarthritis      Reactive airway disease      Restless leg syndrome      Scoliosis      Vitamin B12 deficiency         CC Servando Triana MD  [address]  [place] [address] on close of this encounter.

## 2022-04-19 ENCOUNTER — OFFICE VISIT (OUTPATIENT)
Dept: DERMATOLOGY | Facility: CLINIC | Age: 67
End: 2022-04-19
Payer: COMMERCIAL

## 2022-04-19 DIAGNOSIS — L21.9 SEBORRHEIC DERMATITIS: Primary | ICD-10-CM

## 2022-04-19 DIAGNOSIS — L91.8 INFLAMED SKIN TAG: ICD-10-CM

## 2022-04-19 PROCEDURE — 11200 RMVL SKIN TAGS UP TO&INC 15: CPT | Performed by: DERMATOLOGY

## 2022-04-19 PROCEDURE — 99213 OFFICE O/P EST LOW 20 MIN: CPT | Mod: 25 | Performed by: DERMATOLOGY

## 2022-04-19 ASSESSMENT — PAIN SCALES - GENERAL: PAINLEVEL: NO PAIN (0)

## 2022-04-19 NOTE — NURSING NOTE
"Dermatology Rooming Note    Joes Carlos Bolden's goals for this visit include:   Chief Complaint   Patient presents with     Derm Problem     Seborrheic dermatitis - Jose Carlos notes its been \"okay\" since last visit, no change.      Ira Saleem, Visit Facilitator    "

## 2022-04-19 NOTE — PATIENT INSTRUCTIONS
Cryotherapy    What is it?  Use of a very cold liquid, such as liquid nitrogen, to freeze and destroy abnormal skin cells that need to be removed    What should I expect?  Tenderness and redness  A small blister that might grow and fill with dark purple blood. There may be crusting.  More than one treatment may be needed if the lesions do not go away.    How do I care for the treated area?  Gently wash the area with your hands when bathing.  Use a thin layer of Vaseline to help with healing. You may use a Band-Aid.   The area should heal within 7-10 days and may leave behind a pink or lighter color.   Do not use an antibiotic or Neosporin ointment.   You may take acetaminophen (Tylenol) for pain.     Call your doctor if you have:  Severe pain  Signs of infection (warmth, redness, cloudy yellow drainage, and or a bad smell)  Questions or concerns    Who should I call with questions?      [facility]: [phone]      [facility]: [phone]      For urgent needs outside of business hours call the [facility] at [phone] and ask for the dermatology resident on call

## 2022-04-19 NOTE — LETTER
4/19/2022       RE: Jose Carlos Bolden  [address] [address]     Dear Colleague,    Thank you for referring your patient, Jose Carlos Bolden, to the [facility] [place] [facility]. Please see a copy of my visit note below.    [facility] Dermatology Note  Encounter Date: Apr 19, 2022  Office Visit     Dermatology Problem List:  1. Seborrheic dermatitis   - Ketoconazole cream    ____________________________________________    Assessment & Plan:    # Seborrheic dermatitis, chronic, active.  Discussed regular use of ketoconazole +/- adding topical steroid. May have background ETT rosacea as well (which could be flaring with sunlight, chili peppers as he has noticed) and advised PDL most effective for this if interested.  - Start ketoconazole cream more regularly, such as daily; otherwise can continue prn for flares.  - If not responding to ketoconazole, patient to notify me and we can consider topical steroid    # Skin tags, around the eyes x3  - Cryo today, see procedure below    Procedures Performed:   - Cryotherapy procedure note, location(s): see above. After verbal consent and discussion of risks and benefits including, but not limited to, dyspigmentation/scar, blister, and pain, 3 lesion(s) was(were) treated with 1-2 mm freeze border for 1-2 cycles with liquid nitrogen. Post cryotherapy instructions were provided.    Follow-up: 6 month(s) in-person, or earlier for new or changing lesions    Staff and Scribe:     Scribe Disclosure:  I, Panda Greco, am serving as a scribe to document services personally performed by Sonal Herndon MD based on data collection and the provider's statements to me.     Provider Disclosure:   The documentation recorded by the scribe accurately reflects the services I personally performed and the decisions made by me.    Sonal Herndon MD  Assistant  "Professor  Department of Dermatology  [facility]: Phone: [phone], Fax: [phone]  4/19/2022     ____________________________________________    CC: Derm Problem (Seborrheic dermatitis - Jose Carlos notes its been \"okay\" since last visit, no change. )    HPI:  Mr. Jose Carlos Bolden is a(n) 66 year old male who presents today as a return patient for seborrheic dermatitis follow-up. Last seen by me on 1/5/2022, at which time patient was started on ketoconazole cream BID for treatment of seborrheic dermatitis. Additionally, patient underwent cryo for treatment of skin tags.    Today, patient reports that his skin has been fairly well controlled, and he has been using ketoconazole cream approximately once a week. He has tried both metronidazole and ketoconazole cream in the area, and he notes that both are effective. He has noticed flares with sunlight and cutting chili peppers. Additionally, he reports that some of the bothersome skin tags treated at last visit are still present but they are smaller.    Patient is otherwise feeling well, without additional skin concerns.    Labs Reviewed:  N/A    Physical Exam:  Vitals: There were no vitals taken for this visit.  SKIN: Focused examination of face was performed.  - Erythema on medial cheeks.  - There is(are) skin colored pedunculated papules around the eyes.  - No other lesions of concern on areas examined.     Medications:  Current Outpatient Medications   Medication     albuterol (PROAIR HFA) 108 (90 BASE) MCG/ACT Inhaler     atorvastatin (LIPITOR) 20 MG tablet     Cyanocobalamin (VITAMIN B12) 1000 MCG TBCR     finasteride (PROSCAR) 5 MG tablet     gabapentin (NEURONTIN) 600 MG tablet     ketoconazole (NIZORAL) 2 % external cream     metroNIDAZOLE (NORITATE) 1 % cream     triamcinolone (KENALOG) 0.1 % cream     ferrous gluconate (FERGON) 324 (38 Fe) MG tablet     No current facility-administered " medications for this visit.      Past Medical History:   Patient Active Problem List   Diagnosis     Hearing loss     Glaucoma suspect     Reactive airway disease     Restless legs syndrome     Kyphosis     Scoliosis     Vitamin B12 deficiency     Osteoarthritis     Iron deficiency     Hyperlipidemia LDL goal <130     Pain of left lower leg     Past Medical History:   Diagnosis Date     Allergy     sulfa, penicillium, food (tomatillos, ground cherries, some potato chips)     Back pain      Glaucoma suspect      Hearing loss      Hip pain, left      Hyperlipidemia LDL goal <130      Iron deficiency      Kyphosis      Osteoarthritis      Reactive airway disease      Restless leg syndrome      Scoliosis      Vitamin B12 deficiency         CC Servando Triana MD  [address]  [place] [address] on close of this encounter.

## 2022-04-21 ENCOUNTER — PRE VISIT (OUTPATIENT)
Dept: ORTHOPEDICS | Facility: CLINIC | Age: 67
End: 2022-04-21
Payer: COMMERCIAL

## 2022-04-23 NOTE — TELEPHONE ENCOUNTER
DIAGNOSIS: Right hip and knee pain   APPOINTMENT DATE: 05.05.2022   NOTES STATUS DETAILS   OFFICE NOTE from referring provider Internal 04.07.2022 Servadno Triana MD   OFFICE NOTE from other specialist Internal  06.29.2021 Yeimi Jorge MD     10.28.2020 Arron Cooley MD     10.17.2019 Deneen Burton, APRN CNP     10.07.2019 Alysha Christine MD     DISCHARGE SUMMARY from hospital     DISCHARGE REPORT from the ER     OPERATIVE REPORT     MEDICATION LIST Internal    EMG (for Spine)     IMPLANT RECORD/STICKER     LABS     CBC/DIFF     CULTURES     INJECTIONS DONE IN RADIOLOGY Internal 02.28.2020 Xray Joint Injection   MRI     CT SCAN     XRAYS (IMAGES & REPORTS) Internal 10.28.2020 XR Pelvis and hip    07.31.2019 XR PELVIS AND HIP BILATERAL 2 VIEWS     TUMOR     PATHOLOGY  Slides & report

## 2022-05-02 DIAGNOSIS — M25.569 KNEE PAIN: ICD-10-CM

## 2022-05-02 DIAGNOSIS — M16.11 PRIMARY OSTEOARTHRITIS OF RIGHT HIP: Primary | ICD-10-CM

## 2022-05-05 ENCOUNTER — ANCILLARY PROCEDURE (OUTPATIENT)
Dept: GENERAL RADIOLOGY | Facility: CLINIC | Age: 67
End: 2022-05-05
Attending: ORTHOPAEDIC SURGERY
Payer: COMMERCIAL

## 2022-05-05 ENCOUNTER — OFFICE VISIT (OUTPATIENT)
Dept: ORTHOPEDICS | Facility: CLINIC | Age: 67
End: 2022-05-05
Payer: COMMERCIAL

## 2022-05-05 ENCOUNTER — PRE VISIT (OUTPATIENT)
Dept: ORTHOPEDICS | Facility: CLINIC | Age: 67
End: 2022-05-05
Payer: COMMERCIAL

## 2022-05-05 ENCOUNTER — OFFICE VISIT (OUTPATIENT)
Dept: ORTHOPEDICS | Facility: CLINIC | Age: 67
End: 2022-05-05

## 2022-05-05 VITALS — HEIGHT: 68 IN | WEIGHT: 130 LBS | BODY MASS INDEX: 19.7 KG/M2

## 2022-05-05 VITALS — BODY MASS INDEX: 19.7 KG/M2 | WEIGHT: 130 LBS | HEIGHT: 68 IN

## 2022-05-05 DIAGNOSIS — M25.569 KNEE PAIN: ICD-10-CM

## 2022-05-05 DIAGNOSIS — M16.11 PRIMARY OSTEOARTHRITIS OF RIGHT HIP: Primary | ICD-10-CM

## 2022-05-05 DIAGNOSIS — M16.11 PRIMARY OSTEOARTHRITIS OF RIGHT HIP: ICD-10-CM

## 2022-05-05 PROCEDURE — 73562 X-RAY EXAM OF KNEE 3: CPT | Mod: RT | Performed by: RADIOLOGY

## 2022-05-05 PROCEDURE — 99214 OFFICE O/P EST MOD 30 MIN: CPT | Mod: GC | Performed by: ORTHOPAEDIC SURGERY

## 2022-05-05 PROCEDURE — 99207 PR DROP WITH A PROCEDURE: CPT | Performed by: PREVENTIVE MEDICINE

## 2022-05-05 PROCEDURE — 73502 X-RAY EXAM HIP UNI 2-3 VIEWS: CPT | Mod: RT | Performed by: RADIOLOGY

## 2022-05-05 PROCEDURE — 20611 DRAIN/INJ JOINT/BURSA W/US: CPT | Mod: RT | Performed by: PREVENTIVE MEDICINE

## 2022-05-05 RX ADMIN — LIDOCAINE HYDROCHLORIDE 4 ML: 10 INJECTION, SOLUTION EPIDURAL; INFILTRATION; INTRACAUDAL; PERINEURAL at 17:15

## 2022-05-05 RX ADMIN — TRIAMCINOLONE ACETONIDE 40 MG: 40 INJECTION, SUSPENSION INTRA-ARTICULAR; INTRAMUSCULAR at 17:15

## 2022-05-05 ASSESSMENT — ENCOUNTER SYMPTOMS
WHEEZING: 0
LOSS OF CONSCIOUSNESS: 0
SNORES LOUDLY: 1
ARTHRALGIAS: 1
TREMORS: 0
EYE REDNESS: 0
HEADACHES: 0
TINGLING: 0
DOUBLE VISION: 0
PARALYSIS: 0
STIFFNESS: 1
COUGH: 0
SORE THROAT: 0
FLANK PAIN: 0
HEMOPTYSIS: 0
POSTURAL DYSPNEA: 0
DISTURBANCES IN COORDINATION: 0
MEMORY LOSS: 0
NECK PAIN: 0
MUSCLE CRAMPS: 1
HEMATURIA: 0
DIFFICULTY URINATING: 0
SINUS CONGESTION: 0
BACK PAIN: 0
SHORTNESS OF BREATH: 0
JOINT SWELLING: 0
DIZZINESS: 0
NUMBNESS: 0
SMELL DISTURBANCE: 0
SINUS PAIN: 0
TASTE DISTURBANCE: 0
EYE WATERING: 1
SPUTUM PRODUCTION: 0
EYE PAIN: 0
WEAKNESS: 0
DYSPNEA ON EXERTION: 0
MYALGIAS: 1
SPEECH CHANGE: 0
MUSCLE WEAKNESS: 1
SEIZURES: 0
DYSURIA: 0
NECK MASS: 0
EYE IRRITATION: 1
TROUBLE SWALLOWING: 0
COUGH DISTURBING SLEEP: 0
HOARSE VOICE: 0

## 2022-05-05 ASSESSMENT — HOOS JR
HOOS JR TOTAL INTERVAL SCORE: 55.99
BENDING TO THE FLOOR TO PICK UP OBJECT: MODERATE
GOING UP OR DOWN STAIRS: MILD
RISING FROM SITTING: MILD
LYING IN BED (TURNING OVER, MAINTAINING HIP POSITION): MODERATE
WALKING ON UNEVEN SURFACE: MODERATE
SITTING: SEVERE

## 2022-05-05 NOTE — NURSING NOTE
"Reason For Visit:   Chief Complaint   Patient presents with     Consult     Right hip pain. Injection 1-2 years ago, helped for a couple months. Just the one. Has not done PT. Groin pain is worst but can be in butt and lateral also.  Right knee pain. Gradual worsening for years. Has been diagnosed with pre patellar bursitis. Has not done PT. No injections. No brace.        Ht 1.727 m (5' 8\")   Wt 59 kg (130 lb)   BMI 19.77 kg/m           Haley Fletcher, RAY    "

## 2022-05-05 NOTE — PROGRESS NOTES
Large Joint Injection: R hip joint    Date/Time: 5/5/2022 5:15 PM  Performed by: Jesu Gore MD  Authorized by: Jesu Gore MD     Indications:  Pain, osteoarthritis and diagnostic evaluation  Needle Size:  22 G  Guidance: ultrasound    Approach:  Anterior  Location:  Hip      Site:  R hip joint  Medications:  40 mg triamcinolone 40 MG/ML; 4 mL lidocaine (PF) 1 %  Outcome:  Tolerated well, no immediate complications  Procedure discussed: discussed risks, benefits, and alternatives    Consent Given by:  Patient  Timeout: timeout called immediately prior to procedure    Prep: patient was prepped and draped in usual sterile fashion

## 2022-05-05 NOTE — PROGRESS NOTES
Right Intraarticular Hip Injection - Ultrasound Guided  The patient was informed of the risks and the benefits of the procedure and a written consent was signed.  The patient s right hip was prepped with chlorhexidine in sterile fashion.   40 mg of triamcinolone suspension was drawn up into a 5 mL syringe with 4 mL of 1% lidocaine.  Injection was performed using sterile technique.  Under ultrasound guidance a 3.5-inch 22-gauge needle was used to enter the femoracetabular joint.  Anterior approach was used, needle placement was visualized and documented with ultrasound.  Ultrasound visualization was necessary due to decreased joint space in the setting of osteoarthritis.  Injection performed long axis to the probe.  Injection solution visualized within the joint space.  Images were permanently stored for the patient's record.  There were no complications. The patient tolerated the procedure well. There was negligible bleeding.

## 2022-05-05 NOTE — PROGRESS NOTES
Assessment: This is a 66 year old with right hip osteoarthritis for which he would like to continue conservative management at this time    Plan:    Referred for right hip intra-articular corticosteroid injection    Follow-up 3 to 4 months for repeat evaluation      Chief Complaint: Right hip pain    Physician:  No ref. provider found    HPI: Jose Carlos Bolden is a 66 year old male who presents today for evaluation of right hip pain.  This has been present for a few years without inciting event.  He has had multiple previous corticosteroid injections, he believes 2 or 3.  These provide relief for a short period of time.  He also notes he has knee pain when he has left hip pain.  His pain is both posterior and anterior at the hip.  Worse with activity and prolonged flexion.  No weakness, numbness, tingling that is new recently but notes he does have a left foot tingling at times.    Symptom Profile  Location of symptoms: Right anterior and posterior hip  Onset: Atraumatically few years ago  Trend: getting worse  Duration of symptoms: Years  Quality of symptoms: aching  Severity: Moderate to severe  Alleviate: Rest  Exacerbating: Prolonged activity and flexion  Previous Treatments: Previous treatments include activity modification, oral pain medication, corticosteroid injection    Current Status:  Results of the patient s Hip Disability and Osteoarthritis Outcome Score (HOOS)  are as follows (0-100 scales with 100 being the theoretical best):  Hoos Jr: 55.99    MEDICAL HISTORY:   Past Medical History:   Diagnosis Date     Allergy     sulfa, penicillium, food (tomatillos, ground cherries, some potato chips)     Back pain      Glaucoma suspect      Hearing loss      Hip pain, left      Hyperlipidemia LDL goal <130      Iron deficiency      Kyphosis      Osteoarthritis      Reactive airway disease      Restless leg syndrome      Scoliosis      Vitamin B12 deficiency        Medications:     Current Outpatient Medications:       albuterol (PROAIR HFA) 108 (90 BASE) MCG/ACT Inhaler, Inhale 2 puffs into the lungs every 4 hours as needed for shortness of breath / dyspnea, Disp: 1 Inhaler, Rfl: 0     atorvastatin (LIPITOR) 20 MG tablet, Take 1 tablet (20 mg) by mouth daily, Disp: 90 tablet, Rfl: 3     Cyanocobalamin (VITAMIN B12) 1000 MCG TBCR, Take 1 tablet by mouth daily, Disp: 90 tablet, Rfl: 3     ferrous gluconate (FERGON) 324 (38 Fe) MG tablet, Take 1 tablet (324 mg) by mouth daily (with breakfast) (Patient not taking: Reported on 2022), Disp: 90 tablet, Rfl: 3     finasteride (PROSCAR) 5 MG tablet, Take 1 tablet (5 mg) by mouth daily, Disp: 90 tablet, Rfl: 3     gabapentin (NEURONTIN) 600 MG tablet, Take one tablet (600 mg) by mouth in the morning and afternoon, and 1.5 tablet (900 mg) in the evening., Disp: 315 tablet, Rfl: 3     ketoconazole (NIZORAL) 2 % external cream, Apply topically 2 times daily To face as needed for rash, Disp: 60 g, Rfl: 11     metroNIDAZOLE (NORITATE) 1 % cream, Apply topically daily, Disp: 60 g, Rfl: 0     triamcinolone (KENALOG) 0.1 % cream, Apply topically 2 times daily, Disp: 30 g, Rfl: 0    Allergies: Cats, Feathers, Food, Seasonal allergies, Smoke., Sulfa drugs, and Penicillium notatum allergy skin test    SURGICAL HISTORY:   Past Surgical History:   Procedure Laterality Date     COLONOSCOPY      Findings: normal     COLONOSCOPY N/A 2020    Procedure: COLONOSCOPY, WITH POLYPECTOMY AND BIOPSY;  Surgeon: Marvel Cook MD;  Location: [facility] OR     JOINT REPLACEMENT Left     hip, CATALINA     VASECTOMY         FAMILY HISTORY:   Family History   Problem Relation Age of Onset     Glaucoma Brother      Hyperlipidemia Brother      Prostate Problems Brother         BPH     Glaucoma Sister      Pancreatic Cancer Sister          age 71 approximately     Osteoporosis Sister      Anemia Sister      Brain Cancer Mother         age 65  of glioblastoma     Depression Mother         post partum      Osteoporosis Mother      Anemia Mother      Allergy (Severe) Mother         penicillin     Heart Disease Father         age 36 first MI, had coronary bypass      Emphysema Father         smoker     Glaucoma Father      Hyperlipidemia Father      Neurologic Disorder Father         Jansen's Palsy     Heart Disease Maternal Grandmother      Other - See Comments Maternal Grandfather         seasonal allergies     Coronary Artery Disease Paternal Grandmother         age 87 MI     Cancer Paternal Grandmother         Uterine/vaginal     Hyperlipidemia Brother      Neurologic Disorder Brother         Bell's palsy     Osteoporosis Brother      Anemia Brother      Vascular Disease Brother         Raynaud's     Prostate Problems Brother         BPH     Hyperlipidemia Sister      Heart Disease Sister         age 50, bypass surgery     Osteoporosis Sister      Anemia Sister      Depression Sister      Hyperlipidemia Sister      Other - See Comments Sister         Multiple chemical sensitivity     Neurologic Disorder Sister         Bell's palsy     Osteoporosis Sister      Anemia Sister      Breast Cancer Sister      Osteoporosis Sister         kyphosis, scoliosis     Hyperlipidemia Sister      Vascular Disease Sister         Raynaud's     Hyperlipidemia Brother      Hyperlipidemia Brother      Neurologic Disorder Brother         Jansen's palsy     Heart Disease Paternal Grandfather          MI age 56     Macular Degeneration No family hx of        SOCIAL HISTORY:   Social History     Tobacco Use     Smoking status: Never Smoker     Smokeless tobacco: Never Used   Substance Use Topics     Alcohol use: Yes     Comment: 7 drinks per week on average       REVIEW OF SYSTEMS:  The comprehensive review of systems from the intake form was reviewed with the patient.  No fever, weight change or fatigue. No dry eyes. No oral ulcers, sore throat or voice change. No palpitations, syncope, angina or edema.  No chest pain, excessive  sleepiness, shortness of breath or hemoptysis.   No abdominal pain, nausea, vomiting, diarrhea or heartburn.  No skin rash. No focal weakness or numbness. No bleeding or lymphadenopathy. No rhinitis or hives.     Exam:  On physical examination the patient appears the stated age, is in no acute distress, affectThe is appropriate, and breathing is non-labored.  Vitals are documented in the EMR and have been reviewed:    There were no vitals taken for this visit.  Data Unavailable  There is no height or weight on file to calculate BMI.    Rises from chair: easily  Gait: nonantalgic  Trendelenburg test: negative  Gains the exam table: with ease    RIGHT hip subjective: mildly painful  Abd: 25  Add: 10  Flexion: 85  IRF: 5  ERF: 30  Impingement test: positive    LEFT hip subjective: no pain  Abd: 25  Add: 10  Flexion: 95  IRF: 15  ERF: 30    Distally, the circulatory, motor, and sensation exam is intact with 5/5 EHL, gastroc-soleus, and tibialis anterior.  Sensation to light touch is intact.  Dorsalis pedis and posterior tibialis pulses are palpable.  There are no sores on the feet, no bruising, and no lymphedema.    X-rays: Previous left CATALINA. Right hip with joint space narrowing, osteophytes, and subchondral sclerosis    Patient was seen and discussed with Dr Jordan who is in agreement with this assessment and plan    Will MD Timothy  Orthopaedic Surgery, PGY4        Answers for HPI/ROS submitted by the patient on 5/5/2022  General Symptoms: No  Skin Symptoms: No  HENT Symptoms: Yes  EYE SYMPTOMS: Yes  HEART SYMPTOMS: No  LUNG SYMPTOMS: Yes  INTESTINAL SYMPTOMS: No  URINARY SYMPTOMS: Yes  REPRODUCTIVE SYMPTOMS: No  SKELETAL SYMPTOMS: Yes  BLOOD SYMPTOMS: No  NERVOUS SYSTEM SYMPTOMS: Yes  MENTAL HEALTH SYMPTOMS: No  Ear pain: No  Ear discharge: No  Hearing loss: No  Tinnitus: Yes  Nosebleeds: No  Congestion: No  Sinus pain: No  Trouble swallowing: No   Voice hoarseness: No  Mouth sores: No  Sore throat: No  Tooth pain:  Yes  Gum tenderness: No  Bleeding gums: No  Change in taste: No  Change in sense of smell: No  Dry mouth: No  Hearing aid used: Yes  Neck lump: No  Eye pain: No  Vision loss: No  Dry eyes: Yes  Watery eyes: Yes  Eye bulging: No  Double vision: No  Flashing of lights: No  Spots: No  Floaters: Yes  Redness: No  Crossed eyes: No  Tunnel Vision: No  Yellowing of eyes: No  Eye irritation: Yes  Cough: No  Sputum or phlegm: No  Coughing up blood: No  Difficulty breating or shortness of breath: No  Snoring: Yes  Wheezing: No  Difficulty breathing on exertion: No  Nighttime Cough: No  Difficulty breathing when lying flat: No  Trouble holding urine or incontinence: No  Pain or burning: No  Trouble starting or stopping: Yes  Increased frequency of urination: No  Blood in urine: No  Decreased frequency of urination: No  Frequent nighttime urination: No  Flank pain: No  Difficulty emptying bladder: No  Back pain: No  Muscle aches: Yes  Neck pain: No  Swollen joints: No  Joint pain: Yes  Bone pain: No  Muscle cramps: Yes  Muscle weakness: Yes  Joint stiffness: Yes  Bone fracture: No  Trouble with coordination: No  Dizziness or trouble with balance: No  Fainting or black-out spells: No  Memory loss: No  Headache: No  Seizures: No  Speech problems: No  Tingling: No  Tremor: No  Weakness: No  Difficulty walking: No  Paralysis: No  Numbness: No

## 2022-05-05 NOTE — NURSING NOTE
[address] [address]  Dept: [phone]  ______________________________________________________________________________    Patient: Jose Carlos Bolden   : 1955   MRN: [id number]   May 5, 2022    INVASIVE PROCEDURE SAFETY CHECKLIST    Date: May 5, 2022   Procedure: Right Intra-Articular Hip Joint injection with kenalog and USG  Patient Name: Jose Carlos Bolden  MRN: [id number]  YOB: 1955    Action: Complete sections as appropriate. Any discrepancy results in a HARD COPY until resolved.     PRE PROCEDURE:  Patient ID verified with 2 identifiers (name and  or MRN): Yes  Procedure and site verified with patient/designee (when able): Yes  Accurate consent documentation in medical record: Yes  H&P (or appropriate assessment) documented in medical record: Yes  H&P must be up to 20 days prior to procedure and updates within 24 hours of procedure as applicable: NA  Relevant diagnostic and radiology test results appropriately labeled and displayed as applicable: NA  Procedure site(s) marked with provider initials: NA    TIMEOUT:  Time-Out performed immediately prior to starting procedure, including verbal and active participation of all team members addressing the following:Yes  * Correct patient identify  * Confirmed that the correct side and site are marked  * An accurate procedure consent form  * Agreement on the procedure to be done  * Correct patient position  * Relevant images and results are properly labeled and appropriately displayed  * The need to administer antibiotics or fluids for irrigation purposes during the procedure as applicable   * Safety precautions based on patient history or medication use    DURING PROCEDURE: Verification of correct person, site, and procedures any time the responsibility for care of the patient is transferred to another member of the care team.       Prior to injection, verified patient identity using  patient's name and date of birth.  Due to injection administration, patient instructed to remain in clinic for 15 minutes  afterwards, and to report any adverse reaction to me immediately.    Joint injection was performed.      Lido  Drug Amount Wasted:  Yes: 1 mg/ml   Vial/Syringe: Single dose vial  Expiration Date:  11/01/2024    Kenalog  Drug Amount Wasted: No  Vial/Syringe: Single dose vial  Expiration Date: 12/01/2023    Tosha Hernadez, ATC  May 5, 2022

## 2022-05-05 NOTE — LETTER
5/5/2022         RE: Jose Carlos Bolden  [address] [address]        Dear Colleague,    Thank you for referring your patient, Jose Carlos Bolden, to the [facility] [place]. Please see a copy of my visit note below.    Assessment: This is a 66 year old with right hip osteoarthritis for which he would like to continue conservative management at this time    Plan:    Referred for right hip intra-articular corticosteroid injection    Follow-up 3 to 4 months for repeat evaluation      Chief Complaint: Right hip pain    Physician:  No ref. provider found    HPI: Jose Carlos Bolden is a 66 year old male who presents today for evaluation of right hip pain.  This has been present for a few years without inciting event.  He has had multiple previous corticosteroid injections, he believes 2 or 3.  These provide relief for a short period of time.  He also notes he has knee pain when he has left hip pain.  His pain is both posterior and anterior at the hip.  Worse with activity and prolonged flexion.  No weakness, numbness, tingling that is new recently but notes he does have a left foot tingling at times.    Symptom Profile  Location of symptoms: Right anterior and posterior hip  Onset: Atraumatically few years ago  Trend: getting worse  Duration of symptoms: Years  Quality of symptoms: aching  Severity: Moderate to severe  Alleviate: Rest  Exacerbating: Prolonged activity and flexion  Previous Treatments: Previous treatments include activity modification, oral pain medication, corticosteroid injection    Current Status:  Results of the patient s Hip Disability and Osteoarthritis Outcome Score (HOOS)  are as follows (0-100 scales with 100 being the theoretical best):  Hoos Jr: 55.99    MEDICAL HISTORY:   Past Medical History:   Diagnosis Date     Allergy     sulfa, penicillium, food (tomatillos, ground cherries, some potato chips)     Back pain      Glaucoma suspect      Hearing loss      Hip  pain, left      Hyperlipidemia LDL goal <130      Iron deficiency      Kyphosis      Osteoarthritis      Reactive airway disease      Restless leg syndrome      Scoliosis      Vitamin B12 deficiency        Medications:     Current Outpatient Medications:      albuterol (PROAIR HFA) 108 (90 BASE) MCG/ACT Inhaler, Inhale 2 puffs into the lungs every 4 hours as needed for shortness of breath / dyspnea, Disp: 1 Inhaler, Rfl: 0     atorvastatin (LIPITOR) 20 MG tablet, Take 1 tablet (20 mg) by mouth daily, Disp: 90 tablet, Rfl: 3     Cyanocobalamin (VITAMIN B12) 1000 MCG TBCR, Take 1 tablet by mouth daily, Disp: 90 tablet, Rfl: 3     ferrous gluconate (FERGON) 324 (38 Fe) MG tablet, Take 1 tablet (324 mg) by mouth daily (with breakfast) (Patient not taking: Reported on 4/19/2022), Disp: 90 tablet, Rfl: 3     finasteride (PROSCAR) 5 MG tablet, Take 1 tablet (5 mg) by mouth daily, Disp: 90 tablet, Rfl: 3     gabapentin (NEURONTIN) 600 MG tablet, Take one tablet (600 mg) by mouth in the morning and afternoon, and 1.5 tablet (900 mg) in the evening., Disp: 315 tablet, Rfl: 3     ketoconazole (NIZORAL) 2 % external cream, Apply topically 2 times daily To face as needed for rash, Disp: 60 g, Rfl: 11     metroNIDAZOLE (NORITATE) 1 % cream, Apply topically daily, Disp: 60 g, Rfl: 0     triamcinolone (KENALOG) 0.1 % cream, Apply topically 2 times daily, Disp: 30 g, Rfl: 0    Allergies: Cats, Feathers, Food, Seasonal allergies, Smoke., Sulfa drugs, and Penicillium notatum allergy skin test    SURGICAL HISTORY:   Past Surgical History:   Procedure Laterality Date     COLONOSCOPY      Findings: normal     COLONOSCOPY N/A 12/17/2020    Procedure: COLONOSCOPY, WITH POLYPECTOMY AND BIOPSY;  Surgeon: Marvel Cook MD;  Location: [facility] OR     JOINT REPLACEMENT Left     hip, CATALINA     VASECTOMY         FAMILY HISTORY:   Family History   Problem Relation Age of Onset     Glaucoma Brother      Hyperlipidemia Brother      Prostate Problems  Brother         BPH     Glaucoma Sister      Pancreatic Cancer Sister          age 71 approximately     Osteoporosis Sister      Anemia Sister      Brain Cancer Mother         age 65  of glioblastoma     Depression Mother         post partum     Osteoporosis Mother      Anemia Mother      Allergy (Severe) Mother         penicillin     Heart Disease Father         age 36 first MI, had coronary bypass      Emphysema Father         smoker     Glaucoma Father      Hyperlipidemia Father      Neurologic Disorder Father         Jansen's Palsy     Heart Disease Maternal Grandmother      Other - See Comments Maternal Grandfather         seasonal allergies     Coronary Artery Disease Paternal Grandmother         age 87 MI     Cancer Paternal Grandmother         Uterine/vaginal     Hyperlipidemia Brother      Neurologic Disorder Brother         Bell's palsy     Osteoporosis Brother      Anemia Brother      Vascular Disease Brother         Raynaud's     Prostate Problems Brother         BPH     Hyperlipidemia Sister      Heart Disease Sister         age 50, bypass surgery     Osteoporosis Sister      Anemia Sister      Depression Sister      Hyperlipidemia Sister      Other - See Comments Sister         Multiple chemical sensitivity     Neurologic Disorder Sister         Bell's palsy     Osteoporosis Sister      Anemia Sister      Breast Cancer Sister      Osteoporosis Sister         kyphosis, scoliosis     Hyperlipidemia Sister      Vascular Disease Sister         Raynaud's     Hyperlipidemia Brother      Hyperlipidemia Brother      Neurologic Disorder Brother         Jansen's palsy     Heart Disease Paternal Grandfather          MI age 56     Macular Degeneration No family hx of        SOCIAL HISTORY:   Social History     Tobacco Use     Smoking status: Never Smoker     Smokeless tobacco: Never Used   Substance Use Topics     Alcohol use: Yes     Comment: 7 drinks per week on average       REVIEW OF SYSTEMS:  The  comprehensive review of systems from the intake form was reviewed with the patient.  No fever, weight change or fatigue. No dry eyes. No oral ulcers, sore throat or voice change. No palpitations, syncope, angina or edema.  No chest pain, excessive sleepiness, shortness of breath or hemoptysis.   No abdominal pain, nausea, vomiting, diarrhea or heartburn.  No skin rash. No focal weakness or numbness. No bleeding or lymphadenopathy. No rhinitis or hives.     Exam:  On physical examination the patient appears the stated age, is in no acute distress, affectThe is appropriate, and breathing is non-labored.  Vitals are documented in the EMR and have been reviewed:    There were no vitals taken for this visit.  Data Unavailable  There is no height or weight on file to calculate BMI.    Rises from chair: easily  Gait: nonantalgic  Trendelenburg test: negative  Gains the exam table: with ease    RIGHT hip subjective: mildly painful  Abd: 25  Add: 10  Flexion: 85  IRF: 5  ERF: 30  Impingement test: positive    LEFT hip subjective: no pain  Abd: 25  Add: 10  Flexion: 95  IRF: 15  ERF: 30    Distally, the circulatory, motor, and sensation exam is intact with 5/5 EHL, gastroc-soleus, and tibialis anterior.  Sensation to light touch is intact.  Dorsalis pedis and posterior tibialis pulses are palpable.  There are no sores on the feet, no bruising, and no lymphedema.    X-rays: Previous left CATALINA. Right hip with joint space narrowing, osteophytes, and subchondral sclerosis    Patient was seen and discussed with Dr Jordan who is in agreement with this assessment and plan    Will MD Timothy  Orthopaedic Surgery, PGY4        Answers for HPI/ROS submitted by the patient on 5/5/2022  General Symptoms: No  Skin Symptoms: No  HENT Symptoms: Yes  EYE SYMPTOMS: Yes  HEART SYMPTOMS: No  LUNG SYMPTOMS: Yes  INTESTINAL SYMPTOMS: No  URINARY SYMPTOMS: Yes  REPRODUCTIVE SYMPTOMS: No  SKELETAL SYMPTOMS: Yes  BLOOD SYMPTOMS: No  NERVOUS SYSTEM  SYMPTOMS: Yes  MENTAL HEALTH SYMPTOMS: No  Ear pain: No  Ear discharge: No  Hearing loss: No  Tinnitus: Yes  Nosebleeds: No  Congestion: No  Sinus pain: No  Trouble swallowing: No   Voice hoarseness: No  Mouth sores: No  Sore throat: No  Tooth pain: Yes  Gum tenderness: No  Bleeding gums: No  Change in taste: No  Change in sense of smell: No  Dry mouth: No  Hearing aid used: Yes  Neck lump: No  Eye pain: No  Vision loss: No  Dry eyes: Yes  Watery eyes: Yes  Eye bulging: No  Double vision: No  Flashing of lights: No  Spots: No  Floaters: Yes  Redness: No  Crossed eyes: No  Tunnel Vision: No  Yellowing of eyes: No  Eye irritation: Yes  Cough: No  Sputum or phlegm: No  Coughing up blood: No  Difficulty breating or shortness of breath: No  Snoring: Yes  Wheezing: No  Difficulty breathing on exertion: No  Nighttime Cough: No  Difficulty breathing when lying flat: No  Trouble holding urine or incontinence: No  Pain or burning: No  Trouble starting or stopping: Yes  Increased frequency of urination: No  Blood in urine: No  Decreased frequency of urination: No  Frequent nighttime urination: No  Flank pain: No  Difficulty emptying bladder: No  Back pain: No  Muscle aches: Yes  Neck pain: No  Swollen joints: No  Joint pain: Yes  Bone pain: No  Muscle cramps: Yes  Muscle weakness: Yes  Joint stiffness: Yes  Bone fracture: No  Trouble with coordination: No  Dizziness or trouble with balance: No  Fainting or black-out spells: No  Memory loss: No  Headache: No  Seizures: No  Speech problems: No  Tingling: No  Tremor: No  Weakness: No  Difficulty walking: No  Paralysis: No  Numbness: No        I have personally examined this patient and have reviewed the clinical presentation and progress note with the resident. I agree with the treatment plan as outlined. The plan was formulated with the resident on the day of the resident's dictation.      Answers for HPI/ROS submitted by the patient on 5/5/2022  General Symptoms: No  Skin  Symptoms: No  HENT Symptoms: Yes  EYE SYMPTOMS: Yes  HEART SYMPTOMS: No  LUNG SYMPTOMS: Yes  INTESTINAL SYMPTOMS: No  URINARY SYMPTOMS: Yes  REPRODUCTIVE SYMPTOMS: No  SKELETAL SYMPTOMS: Yes  BLOOD SYMPTOMS: No  NERVOUS SYSTEM SYMPTOMS: Yes  MENTAL HEALTH SYMPTOMS: No  Ear pain: No  Ear discharge: No  Hearing loss: No  Tinnitus: Yes  Nosebleeds: No  Congestion: No  Sinus pain: No  Trouble swallowing: No   Voice hoarseness: No  Mouth sores: No  Sore throat: No  Tooth pain: Yes  Gum tenderness: No  Bleeding gums: No  Change in taste: No  Change in sense of smell: No  Dry mouth: No  Hearing aid used: Yes  Neck lump: No  Eye pain: No  Vision loss: No  Dry eyes: Yes  Watery eyes: Yes  Eye bulging: No  Double vision: No  Flashing of lights: No  Spots: No  Floaters: Yes  Redness: No  Crossed eyes: No  Tunnel Vision: No  Yellowing of eyes: No  Eye irritation: Yes  Cough: No  Sputum or phlegm: No  Coughing up blood: No  Difficulty breating or shortness of breath: No  Snoring: Yes  Wheezing: No  Difficulty breathing on exertion: No  Nighttime Cough: No  Difficulty breathing when lying flat: No  Trouble holding urine or incontinence: No  Pain or burning: No  Trouble starting or stopping: Yes  Increased frequency of urination: No  Blood in urine: No  Decreased frequency of urination: No  Frequent nighttime urination: No  Flank pain: No  Difficulty emptying bladder: No  Back pain: No  Muscle aches: Yes  Neck pain: No  Swollen joints: No  Joint pain: Yes  Bone pain: No  Muscle cramps: Yes  Muscle weakness: Yes  Joint stiffness: Yes  Bone fracture: No  Trouble with coordination: No  Dizziness or trouble with balance: No  Fainting or black-out spells: No  Memory loss: No  Headache: No  Seizures: No  Speech problems: No  Tingling: No  Tremor: No  Weakness: No  Difficulty walking: No  Paralysis: No  Numbness: No

## 2022-05-05 NOTE — PROGRESS NOTES
I have personally examined this patient and have reviewed the clinical presentation and progress note with the resident. I agree with the treatment plan as outlined. The plan was formulated with the resident on the day of the resident's dictation.      Answers for HPI/ROS submitted by the patient on 5/5/2022  General Symptoms: No  Skin Symptoms: No  HENT Symptoms: Yes  EYE SYMPTOMS: Yes  HEART SYMPTOMS: No  LUNG SYMPTOMS: Yes  INTESTINAL SYMPTOMS: No  URINARY SYMPTOMS: Yes  REPRODUCTIVE SYMPTOMS: No  SKELETAL SYMPTOMS: Yes  BLOOD SYMPTOMS: No  NERVOUS SYSTEM SYMPTOMS: Yes  MENTAL HEALTH SYMPTOMS: No  Ear pain: No  Ear discharge: No  Hearing loss: No  Tinnitus: Yes  Nosebleeds: No  Congestion: No  Sinus pain: No  Trouble swallowing: No   Voice hoarseness: No  Mouth sores: No  Sore throat: No  Tooth pain: Yes  Gum tenderness: No  Bleeding gums: No  Change in taste: No  Change in sense of smell: No  Dry mouth: No  Hearing aid used: Yes  Neck lump: No  Eye pain: No  Vision loss: No  Dry eyes: Yes  Watery eyes: Yes  Eye bulging: No  Double vision: No  Flashing of lights: No  Spots: No  Floaters: Yes  Redness: No  Crossed eyes: No  Tunnel Vision: No  Yellowing of eyes: No  Eye irritation: Yes  Cough: No  Sputum or phlegm: No  Coughing up blood: No  Difficulty breating or shortness of breath: No  Snoring: Yes  Wheezing: No  Difficulty breathing on exertion: No  Nighttime Cough: No  Difficulty breathing when lying flat: No  Trouble holding urine or incontinence: No  Pain or burning: No  Trouble starting or stopping: Yes  Increased frequency of urination: No  Blood in urine: No  Decreased frequency of urination: No  Frequent nighttime urination: No  Flank pain: No  Difficulty emptying bladder: No  Back pain: No  Muscle aches: Yes  Neck pain: No  Swollen joints: No  Joint pain: Yes  Bone pain: No  Muscle cramps: Yes  Muscle weakness: Yes  Joint stiffness: Yes  Bone fracture: No  Trouble with coordination: No  Dizziness or  trouble with balance: No  Fainting or black-out spells: No  Memory loss: No  Headache: No  Seizures: No  Speech problems: No  Tingling: No  Tremor: No  Weakness: No  Difficulty walking: No  Paralysis: No  Numbness: No

## 2022-05-05 NOTE — LETTER
5/5/2022      RE: Jose Carlos Bolden  [address] [address]     Dear Colleague,    Thank you for referring your patient, Jose Carlos Bolden, to the [facility] [place]. Please see a copy of my visit note below.    Right Intraarticular Hip Injection - Ultrasound Guided  The patient was informed of the risks and the benefits of the procedure and a written consent was signed.  The patient s right hip was prepped with chlorhexidine in sterile fashion.   40 mg of triamcinolone suspension was drawn up into a 5 mL syringe with 4 mL of 1% lidocaine.  Injection was performed using sterile technique.  Under ultrasound guidance a 3.5-inch 22-gauge needle was used to enter the femoracetabular joint.  Anterior approach was used, needle placement was visualized and documented with ultrasound.  Ultrasound visualization was necessary due to decreased joint space in the setting of osteoarthritis.  Injection performed long axis to the probe.  Injection solution visualized within the joint space.  Images were permanently stored for the patient's record.  There were no complications. The patient tolerated the procedure well. There was negligible bleeding.               Large Joint Injection: R hip joint    Date/Time: 5/5/2022 5:15 PM  Performed by: Jesu Gore MD  Authorized by: Jesu Gore MD     Indications:  Pain, osteoarthritis and diagnostic evaluation  Needle Size:  22 G  Guidance: ultrasound    Approach:  Anterior  Location:  Hip      Site:  R hip joint  Medications:  40 mg triamcinolone 40 MG/ML; 4 mL lidocaine (PF) 1 %  Outcome:  Tolerated well, no immediate complications  Procedure discussed: discussed risks, benefits, and alternatives    Consent Given by:  Patient  Timeout: timeout called immediately prior to procedure    Prep: patient was prepped and draped in usual sterile fashion        Again, thank you for allowing me to participate in the care of your  patient.      Sincerely,    Jesu Gore MD

## 2022-05-16 RX ORDER — LIDOCAINE HYDROCHLORIDE 10 MG/ML
4 INJECTION, SOLUTION EPIDURAL; INFILTRATION; INTRACAUDAL; PERINEURAL
Status: SHIPPED | OUTPATIENT
Start: 2022-05-05

## 2022-05-16 RX ORDER — TRIAMCINOLONE ACETONIDE 40 MG/ML
40 INJECTION, SUSPENSION INTRA-ARTICULAR; INTRAMUSCULAR
Status: SHIPPED | OUTPATIENT
Start: 2022-05-05

## 2022-06-10 ENCOUNTER — MYC MEDICAL ADVICE (OUTPATIENT)
Dept: INTERNAL MEDICINE | Facility: CLINIC | Age: 67
End: 2022-06-10

## 2022-06-10 DIAGNOSIS — H90.5 SNHL (SENSORINEURAL HEARING LOSS): ICD-10-CM

## 2022-06-10 DIAGNOSIS — D50.0 IRON DEFICIENCY ANEMIA DUE TO CHRONIC BLOOD LOSS: Primary | ICD-10-CM

## 2022-06-20 NOTE — TELEPHONE ENCOUNTER
[facility] Call Center    Phone Message    May a detailed message be left on voicemail: yes     Reason for Call: Other: pt is calling to see if he can get the orders for the Audiology signed as he has an appt on 6/22/2022. Please call or send a message in my chart wehn the orders are signed.  he is also waiting to hear about the labs as well.    Action Taken: Message routed to:  [facility]    Travel Screening: Not Applicable

## 2022-06-21 NOTE — TELEPHONE ENCOUNTER
Requested orders resent to provider today.   Cc'd Nurse.    See 12/15/21 mychart message from Dr. Kong Durbin CMA ([facility]) at 3:14 PM on 6/21/2022

## 2022-07-05 ENCOUNTER — MYC MEDICAL ADVICE (OUTPATIENT)
Dept: INTERNAL MEDICINE | Facility: CLINIC | Age: 67
End: 2022-07-05

## 2022-07-05 ENCOUNTER — LAB (OUTPATIENT)
Dept: LAB | Facility: CLINIC | Age: 67
End: 2022-07-05
Payer: COMMERCIAL

## 2022-07-05 DIAGNOSIS — D50.0 IRON DEFICIENCY ANEMIA DUE TO CHRONIC BLOOD LOSS: ICD-10-CM

## 2022-07-05 LAB
BASOPHILS # BLD AUTO: 0 10E3/UL (ref 0–0.2)
BASOPHILS NFR BLD AUTO: 1 %
EOSINOPHIL # BLD AUTO: 0.1 10E3/UL (ref 0–0.7)
EOSINOPHIL NFR BLD AUTO: 2 %
ERYTHROCYTE [DISTWIDTH] IN BLOOD BY AUTOMATED COUNT: 13.5 % (ref 10–15)
FERRITIN SERPL-MCNC: 52 NG/ML (ref 26–388)
HCT VFR BLD AUTO: 41.6 % (ref 40–53)
HGB BLD-MCNC: 13.8 G/DL (ref 13.3–17.7)
IMM GRANULOCYTES # BLD: 0 10E3/UL
IMM GRANULOCYTES NFR BLD: 0 %
IRON SATN MFR SERPL: 28 % (ref 15–46)
IRON SERPL-MCNC: 80 UG/DL (ref 35–180)
LYMPHOCYTES # BLD AUTO: 2.3 10E3/UL (ref 0.8–5.3)
LYMPHOCYTES NFR BLD AUTO: 29 %
MCH RBC QN AUTO: 32.8 PG (ref 26.5–33)
MCHC RBC AUTO-ENTMCNC: 33.2 G/DL (ref 31.5–36.5)
MCV RBC AUTO: 99 FL (ref 78–100)
MONOCYTES # BLD AUTO: 0.6 10E3/UL (ref 0–1.3)
MONOCYTES NFR BLD AUTO: 8 %
NEUTROPHILS # BLD AUTO: 4.7 10E3/UL (ref 1.6–8.3)
NEUTROPHILS NFR BLD AUTO: 60 %
NRBC # BLD AUTO: 0 10E3/UL
NRBC BLD AUTO-RTO: 0 /100
PLATELET # BLD AUTO: 190 10E3/UL (ref 150–450)
RBC # BLD AUTO: 4.21 10E6/UL (ref 4.4–5.9)
TIBC SERPL-MCNC: 290 UG/DL (ref 240–430)
WBC # BLD AUTO: 7.9 10E3/UL (ref 4–11)

## 2022-07-05 PROCEDURE — 83550 IRON BINDING TEST: CPT | Performed by: PATHOLOGY

## 2022-07-05 PROCEDURE — 99000 SPECIMEN HANDLING OFFICE-LAB: CPT | Performed by: PATHOLOGY

## 2022-07-05 PROCEDURE — 36415 COLL VENOUS BLD VENIPUNCTURE: CPT | Performed by: PATHOLOGY

## 2022-07-05 PROCEDURE — 85025 COMPLETE CBC W/AUTO DIFF WBC: CPT | Performed by: PATHOLOGY

## 2022-07-05 PROCEDURE — 82728 ASSAY OF FERRITIN: CPT | Performed by: PATHOLOGY

## 2022-07-05 PROCEDURE — 84466 ASSAY OF TRANSFERRIN: CPT | Mod: 90 | Performed by: PATHOLOGY

## 2022-07-06 LAB — TRANSFERRIN SERPL-MCNC: 246 MG/DL (ref 200–360)

## 2022-07-13 DIAGNOSIS — E78.5 HYPERLIPIDEMIA LDL GOAL <130: ICD-10-CM

## 2022-07-13 DIAGNOSIS — G25.81 RESTLESS LEGS SYNDROME: ICD-10-CM

## 2022-07-16 NOTE — TELEPHONE ENCOUNTER
GABAPENTIN 600 MG TABLET  Last Written Prescription Date:  6/29/2021  Last Fill Quantity: 315,   # refills: 3  Last Office Visit :  12/9/2021  Future Office visit:  None  Routing refill request to provider for review/approval because:  Drug not on the [facility], [facility] or [facility] refill protocol or controlled substance    ATORVASTATIN 20 MG TABLET  Last Written Prescription Date:  6/29/2021  Last Fill Quantity: 90   # refills: 3  Last Office Visit :  12/9/2021  Future Office visit:  None  Routing refill request to provider for review/approval because:  Refer to clinic for review    Ashley Salazar RN  Central Triage Red Flags/Med Refills

## 2022-07-17 ENCOUNTER — MYC MEDICAL ADVICE (OUTPATIENT)
Dept: INTERNAL MEDICINE | Facility: CLINIC | Age: 67
End: 2022-07-17

## 2022-07-17 RX ORDER — ATORVASTATIN CALCIUM 20 MG/1
20 TABLET, FILM COATED ORAL DAILY
Qty: 90 TABLET | Refills: 1 | Status: SHIPPED | OUTPATIENT
Start: 2022-07-17 | End: 2023-01-16

## 2022-07-17 RX ORDER — GABAPENTIN 600 MG/1
TABLET ORAL
Qty: 315 TABLET | Refills: 0 | Status: SHIPPED | OUTPATIENT
Start: 2022-07-17 | End: 2022-10-17

## 2022-07-17 NOTE — CONFIDENTIAL NOTE
Coordinators please contact patient and get him a re-establish appt with any of Page Peace, Estuardo, Anjana, Rolly, or Timur.   Timeline: August or September are fine.

## 2022-07-18 NOTE — TELEPHONE ENCOUNTER
Contact patient to help re-establish care with 1st yr IM Resident Nata, Estuardo, Anjana, Rolly, or Timur. Patient schedule with Dr Peace on 8/9 at 830 am

## 2022-07-31 ENCOUNTER — HEALTH MAINTENANCE LETTER (OUTPATIENT)
Age: 67
End: 2022-07-31

## 2022-08-08 ASSESSMENT — ENCOUNTER SYMPTOMS
LOSS OF CONSCIOUSNESS: 0
STIFFNESS: 1
WEAKNESS: 1
HYPOTENSION: 0
DYSURIA: 0
SYNCOPE: 0
SPEECH CHANGE: 0
JOINT SWELLING: 0
NECK MASS: 0
SLEEP DISTURBANCES DUE TO BREATHING: 0
HOARSE VOICE: 0
ARTHRALGIAS: 0
FLANK PAIN: 0
EYE IRRITATION: 1
TASTE DISTURBANCE: 0
DOUBLE VISION: 1
HEMATURIA: 0
SEIZURES: 0
MUSCLE CRAMPS: 1
HYPERTENSION: 0
EYE PAIN: 0
MYALGIAS: 1
HEADACHES: 1
TROUBLE SWALLOWING: 0
LIGHT-HEADEDNESS: 1
MUSCLE WEAKNESS: 1
EXERCISE INTOLERANCE: 0
NECK PAIN: 1
SINUS CONGESTION: 0
ORTHOPNEA: 0
SINUS PAIN: 0
NUMBNESS: 0
DECREASED CONCENTRATION: 0
SORE THROAT: 0
PALPITATIONS: 0
TREMORS: 0
TINGLING: 1
EYE WATERING: 1
MEMORY LOSS: 1
DIZZINESS: 1
NERVOUS/ANXIOUS: 1
SMELL DISTURBANCE: 0
DIFFICULTY URINATING: 1
DEPRESSION: 1
BACK PAIN: 0
PANIC: 0
LEG PAIN: 0
DISTURBANCES IN COORDINATION: 1
EYE REDNESS: 1
INSOMNIA: 1
PARALYSIS: 0

## 2022-08-09 ENCOUNTER — OFFICE VISIT (OUTPATIENT)
Dept: INTERNAL MEDICINE | Facility: CLINIC | Age: 67
End: 2022-08-09

## 2022-08-09 ENCOUNTER — LAB (OUTPATIENT)
Dept: LAB | Facility: CLINIC | Age: 67
End: 2022-08-09
Payer: COMMERCIAL

## 2022-08-09 VITALS
BODY MASS INDEX: 19.77 KG/M2 | HEART RATE: 58 BPM | OXYGEN SATURATION: 98 % | WEIGHT: 130 LBS | DIASTOLIC BLOOD PRESSURE: 64 MMHG | SYSTOLIC BLOOD PRESSURE: 120 MMHG

## 2022-08-09 DIAGNOSIS — G47.62 NOCTURNAL LEG CRAMPS: ICD-10-CM

## 2022-08-09 DIAGNOSIS — Z00.00 HEALTHCARE MAINTENANCE: Primary | ICD-10-CM

## 2022-08-09 DIAGNOSIS — Z00.00 HEALTHCARE MAINTENANCE: ICD-10-CM

## 2022-08-09 DIAGNOSIS — L29.9 ITCHING: ICD-10-CM

## 2022-08-09 LAB
ALBUMIN SERPL-MCNC: 4.1 G/DL (ref 3.4–5)
ALP SERPL-CCNC: 52 U/L (ref 40–150)
ALT SERPL W P-5'-P-CCNC: 26 U/L (ref 0–70)
ANION GAP SERPL CALCULATED.3IONS-SCNC: 4 MMOL/L (ref 3–14)
AST SERPL W P-5'-P-CCNC: 19 U/L (ref 0–45)
BILIRUB SERPL-MCNC: 0.7 MG/DL (ref 0.2–1.3)
BUN SERPL-MCNC: 19 MG/DL (ref 7–30)
CALCIUM SERPL-MCNC: 9.3 MG/DL (ref 8.5–10.1)
CHLORIDE BLD-SCNC: 106 MMOL/L (ref 94–109)
CK SERPL-CCNC: 146 U/L (ref 30–300)
CO2 SERPL-SCNC: 32 MMOL/L (ref 20–32)
CREAT SERPL-MCNC: 0.95 MG/DL (ref 0.66–1.25)
GFR SERPL CREATININE-BSD FRML MDRD: 87 ML/MIN/1.73M2
GLUCOSE BLD-MCNC: 92 MG/DL (ref 70–99)
HCV AB SERPL QL IA: NONREACTIVE
HIV 1+2 AB+HIV1 P24 AG SERPL QL IA: NONREACTIVE
POTASSIUM BLD-SCNC: 4.5 MMOL/L (ref 3.4–5.3)
PROT SERPL-MCNC: 7.3 G/DL (ref 6.8–8.8)
SODIUM SERPL-SCNC: 142 MMOL/L (ref 133–144)

## 2022-08-09 PROCEDURE — 87389 HIV-1 AG W/HIV-1&-2 AB AG IA: CPT | Mod: 90 | Performed by: PATHOLOGY

## 2022-08-09 PROCEDURE — 36415 COLL VENOUS BLD VENIPUNCTURE: CPT | Performed by: PATHOLOGY

## 2022-08-09 PROCEDURE — 82550 ASSAY OF CK (CPK): CPT | Performed by: PATHOLOGY

## 2022-08-09 PROCEDURE — 99214 OFFICE O/P EST MOD 30 MIN: CPT | Mod: GC

## 2022-08-09 PROCEDURE — 99000 SPECIMEN HANDLING OFFICE-LAB: CPT | Performed by: PATHOLOGY

## 2022-08-09 PROCEDURE — 80053 COMPREHEN METABOLIC PANEL: CPT | Performed by: PATHOLOGY

## 2022-08-09 PROCEDURE — 86803 HEPATITIS C AB TEST: CPT | Mod: 90 | Performed by: PATHOLOGY

## 2022-08-09 RX ORDER — DIPHENHYDRAMINE HCL 25 MG
25 CAPSULE ORAL
Qty: 30 CAPSULE | Refills: 1 | Status: SHIPPED | OUTPATIENT
Start: 2022-08-09 | End: 2024-04-23

## 2022-08-09 NOTE — PROGRESS NOTES
PRIMARY CARE CENTER       SUBJECTIVE:  Jose Carlos Bolden is a 67 year old male with a PMHx of HLD, TORI, OA who comes in to transition care from recent resident graduate, also having some itching and leg cramps.    He says the itching has been going on for a few years, and is mostly on the soles of his feet. He occasionally walks outside barefoot and says this makes the itching worse, but he also has itching along his thighs, torso, arms. Has not noticed any skin changes in these areas other than a white macule on his R forearm, which he says derm believed was seborrheic dermatitis. Does have rosacea. Has had no changes in laundry soap or body wash. No history of liver or kidney problems, no abdominal pains.    Has also been noticing some leg pain that has been going on for a few years, worsening now. He says this is not the same as his arthritis pain, and feels more in the muscle. He mostly describes cramping in his calf. Does have a history of varicose veins. The cramps happen 3x/wk, last 20 min, feel like nissa horse. The cramping tends to be worse at night. Says he recently went on a midnight to mar[place] bike ride, but says the cramping is not tied to exercising. Says he has mostly focused on leg workouts since COVID has started. Does have restless leg syndrome he says is well controlled on gabapentin, vit B12, and iron. Does not take other supplements. Has a good diet, eats bananas every day. No pain/numbness in the legs when walking or at an incline. Does not feel like it takes wounds a long time to heal      ROS:   Answers for HPI/ROS submitted by the patient on 8/8/2022  General Symptoms: No  Skin Symptoms: No  HENT Symptoms: Yes  EYE SYMPTOMS: Yes  HEART SYMPTOMS: Yes  LUNG SYMPTOMS: No  INTESTINAL SYMPTOMS: No  URINARY SYMPTOMS: Yes  REPRODUCTIVE SYMPTOMS: No  SKELETAL SYMPTOMS: Yes  BLOOD SYMPTOMS: No  NERVOUS SYSTEM SYMPTOMS: Yes  MENTAL HEALTH SYMPTOMS: Yes  Ear pain: No  Ear discharge:  No  Hearing loss: Yes  Tinnitus: Yes  Nosebleeds: No  Congestion: No  Sinus pain: No  Trouble swallowing: No   Voice hoarseness: No  Mouth sores: Yes  Sore throat: No  Tooth pain: No  Gum tenderness: Yes  Bleeding gums: Yes  Change in taste: No  Change in sense of smell: No  Dry mouth: No  Hearing aid used: Yes  Neck lump: No  Eye pain: No  Vision loss: No  Dry eyes: Yes  Watery eyes: Yes  Eye bulging: No  Double vision: Yes  Flashing of lights: No  Spots: No  Floaters: Yes  Redness: Yes  Crossed eyes: No  Tunnel Vision: No  Yellowing of eyes: No  Eye irritation: Yes  Chest pain or pressure: Yes  Fast or irregular heartbeat: No  Pain in legs with walking: No  Trouble breathing while lying down: No  Fingers or toes appear blue: No  High blood pressure: No  Low blood pressure: No  Fainting: No  Murmurs: No  Pacemaker: No  Varicose veins: Yes  Edema or swelling: No  Wake up at night with shortness of breath: No  Light-headedness: Yes  Exercise intolerance: No  Trouble holding urine or incontinence: Yes  Pain or burning: No  Trouble starting or stopping: Yes  Increased frequency of urination: No  Blood in urine: No  Decreased frequency of urination: No  Frequent nighttime urination: No  Flank pain: No  Difficulty emptying bladder: Yes  Back pain: No  Muscle aches: Yes  Neck pain: Yes  Swollen joints: No  Joint pain: No  Bone pain: Yes  Muscle cramps: Yes  Muscle weakness: Yes  Joint stiffness: Yes  Bone fracture: No  Trouble with coordination: Yes  Dizziness or trouble with balance: Yes  Fainting or black-out spells: No  Memory loss: Yes  Headache: Yes  Seizures: No  Speech problems: No  Tingling: Yes  Tremor: No  Weakness: Yes  Difficulty walking: No  Paralysis: No  Numbness: No  Nervous or Anxious: Yes  Depression: Yes  Trouble sleeping: Yes  Trouble thinking or concentrating: No  Mood changes: No  Panic attacks: No    OBJECTIVE:    /64 (BP Location: Right arm, Patient Position: Sitting, Cuff Size: Adult  Regular)   Pulse 58   Wt 59 kg (130 lb)   SpO2 98%   BMI 19.77 kg/m     Wt Readings from Last 1 Encounters:   08/09/22 59 kg (130 lb)       Physical Exam  Constitutional:       General: He is not in acute distress.     Appearance: He is not ill-appearing.   HENT:      Head: Normocephalic and atraumatic.      Mouth/Throat:      Mouth: Mucous membranes are moist.   Eyes:      General: No scleral icterus.     Extraocular Movements: Extraocular movements intact.   Cardiovascular:      Rate and Rhythm: Normal rate and regular rhythm.      Pulses: Normal pulses.      Heart sounds: Normal heart sounds. No murmur heard.    No gallop.   Pulmonary:      Effort: Pulmonary effort is normal. No respiratory distress.      Breath sounds: Normal breath sounds. No wheezing or rales.   Abdominal:      General: Abdomen is flat. Bowel sounds are normal. There is no distension.      Palpations: Abdomen is soft. There is no mass.      Tenderness: There is no abdominal tenderness. There is no guarding.   Musculoskeletal:         General: Normal range of motion.      Right lower leg: No edema.      Left lower leg: No edema.      Comments: 5/5 strength UE and LE   Skin:     Coloration: Skin is not jaundiced.      Comments: Small, ~1cm hypopigmented macule on R forearm. More also over R deltoid. Erythematous patch along nasolabial folds and cheeks. No chronic wounds, no shiny skin or hair changes on lower extremities, no nail dystrophy. Vericose veins in both thighs   Neurological:      Mental Status: He is alert.          ASSESSMENT/PLAN:  Jose Carlos Bolden is a 67 year old male with a PMHx of HLD, TORI, RAFAEL who comes in to transition care from recent resident graduate, also having some itching and leg cramps. Leg cramps may be due to electrolyte abnormality, statin induced myopathy, or muscle damage from exercise. Unclear the cause of itching given lack of skin changes, allergies, systemic changes.     #Leg cramps  - CMP to evaluate for  electrolyte abnormalities  - CK to assess for statin induced myopathy  - Diphenhydramine to help with nocturnal leg cramps, patient educated on how to take this medication and possible side effects  - Discussed with patient about taking OTC magnesium supplements  - Educated patient on heat massages, hydration, stretching before workouts, and the avoidance of very strenuous exercises    #Pruritis  - Diphenhydramine as above  - Talked with patient about using emollients/moisturizers such as PTC Sarna cream, and also to avoid walking outside barefoot  - CMP to assess for any underlying liver abnormalities    #HCM  - Will order HIV and Hep C screening as neither have been done  - Up to date on vaccines, colonoscopy, lipid screening      Adam Peace MD  Aug 9, 2022    Pt was seen and plan of care discussed with Dr. Annalisa Sauceda.     Adam Peace, PGY1      Attending Addendum:  Patient seen and examined with resident in clinic today.  Pertinent portions of history and exam were independently verified by myself.  I agree with the exam and plan as outlined above with the following modifications: none.  Annalisa Sauceda MD  Internal Medicine

## 2022-08-09 NOTE — PATIENT INSTRUCTIONS
Sarna cream may be helpful for itching. You can buy this over the counter. You are also being prescribed Benadryl, which you can take 30-60 min before bedtime as needed for cramping or itching.     We also recommend thorough stretching of the calves before working out to help reduce cramping. Heat massages may also help, and try to avoid any extreme strenuous exercises. Make sure to stay hydrated as well. Magnesium supplements can also be helpful for cramping

## 2022-08-09 NOTE — NURSING NOTE
Chief Complaint   Patient presents with     Establish Care     Pt here to reestablish care       Parish Arambula CMA, EMT at 8:35 AM on 8/9/2022.

## 2022-10-07 ENCOUNTER — OFFICE VISIT (OUTPATIENT)
Dept: DERMATOLOGY | Facility: CLINIC | Age: 67
End: 2022-10-07
Payer: COMMERCIAL

## 2022-10-07 DIAGNOSIS — D22.9 MULTIPLE BENIGN NEVI: Primary | ICD-10-CM

## 2022-10-07 DIAGNOSIS — L21.9 SEBORRHEIC DERMATITIS: ICD-10-CM

## 2022-10-07 DIAGNOSIS — L82.1 SEBORRHEIC KERATOSIS: ICD-10-CM

## 2022-10-07 DIAGNOSIS — I83.93 VARICOSE VEINS OF BOTH LOWER EXTREMITIES, UNSPECIFIED WHETHER COMPLICATED: ICD-10-CM

## 2022-10-07 DIAGNOSIS — L71.9 ROSACEA: ICD-10-CM

## 2022-10-07 PROCEDURE — 99214 OFFICE O/P EST MOD 30 MIN: CPT | Performed by: DERMATOLOGY

## 2022-10-07 RX ORDER — KETOCONAZOLE 20 MG/G
CREAM TOPICAL 2 TIMES DAILY
Qty: 60 G | Refills: 11 | Status: SHIPPED | OUTPATIENT
Start: 2022-10-07

## 2022-10-07 ASSESSMENT — PAIN SCALES - GENERAL: PAINLEVEL: NO PAIN (0)

## 2022-10-07 NOTE — PROGRESS NOTES
[facility] Dermatology Note  Encounter Date: Oct 7, 2022  Office Visit     Dermatology Problem List:  1. Seborrheic dermatitis   - Ketoconazole cream  ____________________________________________    Assessment & Plan:    # Seborrheic dermatitis, chronic, stable. Improved on more frequent ketoconazole.  - Continue ketoconazole cream BID prn    # Erythrotelangiectatic rosacea, chronic, stable.  Notes flaring with sunlight, spicy food. Advised PDL most effective for this if interested. Not interested today.  - Future: consider PDL    # Seborrheic keratoses  - Reassured of benign nature, no treatment necessary.     # Multiple benign nevi.   - No concerning lesions today  - Monitor for ABCDEs of melanoma   - Continue sun protection - recommend SPF 30 or higher with frequent application   - Return sooner if noticing changing or symptomatic lesions     # Varicose veins - one area with itching. Advised could consider treatment through vascular if interested. Consider compression stockings although would need thigh-highs for area of symptoms.  - Patient will send message if he wants vascular surgery referral    Procedures Performed:   None    Follow-up: 1 year(s) in-person, or earlier for new or changing lesions    Staff and Scribe:     Scribe Disclosure:  I, Panda Greco, am serving as a scribe to document services personally performed by Sonal Herndon MD based on data collection and the provider's statements to me.     Provider Disclosure:   The documentation recorded by the scribe accurately reflects the services I personally performed and the decisions made by me.    Sonal Herndon MD    Department of Dermatology  [facility]: Phone: [phone], Fax: [phone]  10/7/2022     ____________________________________________    CC: Derm Problem (Jose Carlos is here today for a Seb Derm recheck  )    HPI:  Mr. Jose Carlos Bolden is a(n) 67 year old male who presents today as a return patient for FBSE and seb derm follow-up. Last seen by me on 4/19/2022, at which time ketoconazole cream treatment frequency for seborrheic dermatitis was increased to daily. Additionally, patient underwent cryotherapy for 3 skin tags around the eyes.     Today, patient reports that seborrheic dermatitis has improved with continued use of sunscreen and ketoconazole cream. Patient also reports itchy varicose veins in the legs. Denies new lesions of concern on skin.    Patient is otherwise feeling well, without additional skin concerns.    Labs Reviewed:  N/A    Physical Exam:  Vitals: There were no vitals taken for this visit.  SKIN: Total skin excluding the undergarment areas was performed. The exam included the head/face, neck, both arms, chest, back, abdomen, both legs, digits and/or nails.   - Mild erythema of malar cheeks with background telangiectasias, no scaling.  - Varicosities on lower extremities.  - Multiple regular brown pigmented macules and papules are identified on the trunk and extremities.   - There are waxy stuck on tan to brown papules on the trunk and extremities.  - No other lesions of concern on areas examined.     Medications:  Current Outpatient Medications   Medication     albuterol (PROAIR HFA) 108 (90 BASE) MCG/ACT Inhaler     atorvastatin (LIPITOR) 20 MG tablet     Cyanocobalamin (VITAMIN B12) 1000 MCG TBCR     diphenhydrAMINE (BENADRYL) 25 MG capsule     ferrous gluconate (FERGON) 324 (38 Fe) MG tablet     gabapentin (NEURONTIN) 600 MG tablet     ketoconazole (NIZORAL) 2 % external cream     metroNIDAZOLE (NORITATE) 1 % cream     triamcinolone (KENALOG) 0.1 % cream     Current Facility-Administered Medications   Medication     lidocaine (PF) (XYLOCAINE) 1 % injection 4 mL     triamcinolone (KENALOG-40) injection 40 mg      Past Medical History:   Patient Active Problem List   Diagnosis     Hearing loss      Glaucoma suspect     Reactive airway disease     Restless legs syndrome     Kyphosis     Scoliosis     Vitamin B12 deficiency     Osteoarthritis     Iron deficiency     Hyperlipidemia LDL goal <130     Pain of left lower leg     Past Medical History:   Diagnosis Date     Allergy     sulfa, penicillium, food (tomatillos, ground cherries, some potato chips)     Back pain      Glaucoma suspect      Hearing loss      Hip pain, left      Hyperlipidemia LDL goal <130      Iron deficiency      Kyphosis      Osteoarthritis      Reactive airway disease      Restless leg syndrome      Scoliosis      Vitamin B12 deficiency         CC Servando Triana MD  [address]  [place] [address] on close of this encounter.

## 2022-10-07 NOTE — LETTER
10/7/2022       RE: Jose Carlos Bolden  [address] [address]     Dear Colleague,    Thank you for referring your patient, Jose Carlos Bolden, to the [facility] [place] [facility]. Please see a copy of my visit note below.    [facility] Dermatology Note  Encounter Date: Oct 7, 2022  Office Visit     Dermatology Problem List:  1. Seborrheic dermatitis   - Ketoconazole cream  ____________________________________________    Assessment & Plan:    # Seborrheic dermatitis, chronic, stable. Improved on more frequent ketoconazole.  - Continue ketoconazole cream BID prn    # Erythrotelangiectatic rosacea, chronic, stable.  Notes flaring with sunlight, spicy food. Advised PDL most effective for this if interested. Not interested today.  - Future: consider PDL    # Seborrheic keratoses  - Reassured of benign nature, no treatment necessary.     # Multiple benign nevi.   - No concerning lesions today  - Monitor for ABCDEs of melanoma   - Continue sun protection - recommend SPF 30 or higher with frequent application   - Return sooner if noticing changing or symptomatic lesions     # Varicose veins - one area with itching. Advised could consider treatment through vascular if interested. Consider compression stockings although would need thigh-highs for area of symptoms.  - Patient will send message if he wants vascular surgery referral    Procedures Performed:   None    Follow-up: 1 year(s) in-person, or earlier for new or changing lesions    Staff and Scribe:     Scribe Disclosure:  I, Panda Greco, am serving as a scribe to document services personally performed by Sonal Herndon MD based on data collection and the provider's statements to me.     Provider Disclosure:   The documentation recorded by the scribe accurately reflects the services I personally performed and the decisions made by me.    Sonal  MD Yanick    Department of Dermatology  [facility]: Phone: [phone], Fax: [phone]  10/7/2022     ____________________________________________    CC: Derm Problem (Jose Carlos is here today for a Seb Derm recheck )    HPI:  Mr. Jose Carlos Bolden is a(n) 67 year old male who presents today as a return patient for FBSE and seb derm follow-up. Last seen by me on 4/19/2022, at which time ketoconazole cream treatment frequency for seborrheic dermatitis was increased to daily. Additionally, patient underwent cryotherapy for 3 skin tags around the eyes.     Today, patient reports that seborrheic dermatitis has improved with continued use of sunscreen and ketoconazole cream. Patient also reports itchy varicose veins in the legs. Denies new lesions of concern on skin.    Patient is otherwise feeling well, without additional skin concerns.    Labs Reviewed:  N/A    Physical Exam:  Vitals: There were no vitals taken for this visit.  SKIN: Total skin excluding the undergarment areas was performed. The exam included the head/face, neck, both arms, chest, back, abdomen, both legs, digits and/or nails.   - Mild erythema of malar cheeks with background telangiectasias, no scaling.  - Varicosities on lower extremities.  - Multiple regular brown pigmented macules and papules are identified on the trunk and extremities.   - There are waxy stuck on tan to brown papules on the trunk and extremities.  - No other lesions of concern on areas examined.     Medications:  Current Outpatient Medications   Medication     albuterol (PROAIR HFA) 108 (90 BASE) MCG/ACT Inhaler     atorvastatin (LIPITOR) 20 MG tablet     Cyanocobalamin (VITAMIN B12) 1000 MCG TBCR     diphenhydrAMINE (BENADRYL) 25 MG capsule     ferrous gluconate (FERGON) 324 (38 Fe) MG tablet     gabapentin (NEURONTIN) 600 MG tablet     ketoconazole (NIZORAL) 2 % external cream      metroNIDAZOLE (NORITATE) 1 % cream     triamcinolone (KENALOG) 0.1 % cream     Current Facility-Administered Medications   Medication     lidocaine (PF) (XYLOCAINE) 1 % injection 4 mL     triamcinolone (KENALOG-40) injection 40 mg      Past Medical History:   Patient Active Problem List   Diagnosis     Hearing loss     Glaucoma suspect     Reactive airway disease     Restless legs syndrome     Kyphosis     Scoliosis     Vitamin B12 deficiency     Osteoarthritis     Iron deficiency     Hyperlipidemia LDL goal <130     Pain of left lower leg     Past Medical History:   Diagnosis Date     Allergy     sulfa, penicillium, food (tomatillos, ground cherries, some potato chips)     Back pain      Glaucoma suspect      Hearing loss      Hip pain, left      Hyperlipidemia LDL goal <130      Iron deficiency      Kyphosis      Osteoarthritis      Reactive airway disease      Restless leg syndrome      Scoliosis      Vitamin B12 deficiency         CC Servando Triana MD  [address]  [place] [address] on close of this encounter.

## 2022-10-07 NOTE — NURSING NOTE
Dermatology Rooming Note    Jose Carlos Bolden's goals for this visit include:   Chief Complaint   Patient presents with     Derm Problem     Jose Carlos is here today for a Seb Derm recheck      CORNEL Guo

## 2022-10-13 DIAGNOSIS — G25.81 RESTLESS LEGS SYNDROME: ICD-10-CM

## 2022-10-15 ENCOUNTER — HEALTH MAINTENANCE LETTER (OUTPATIENT)
Age: 67
End: 2022-10-15

## 2022-10-17 RX ORDER — GABAPENTIN 600 MG/1
TABLET ORAL
Qty: 315 TABLET | Refills: 1 | Status: SHIPPED | OUTPATIENT
Start: 2022-10-17 | End: 2023-04-20

## 2022-10-17 NOTE — TELEPHONE ENCOUNTER
gabapentin (NEURONTIN) 600 MG tablet      Last Written Prescription Date:  7/17/2022  Last Fill Quantity: 315 tab,   # refills: 0  Last Office Visit : 8/9/2022 *Established care (staffed by Dr Sauceda)  Future Office visit:  none    Routing refill request to provider for review/approval because:  Medication not on the [facility] refill protocol.

## 2022-11-17 ENCOUNTER — HOSPITAL ENCOUNTER (INPATIENT)
Facility: CLINIC | Age: 67
LOS: 1 days | Discharge: HOME | DRG: 534 | End: 2022-11-17
Attending: EMERGENCY MEDICINE | Admitting: EMERGENCY MEDICINE
Payer: COMMERCIAL

## 2022-11-17 ENCOUNTER — APPOINTMENT (OUTPATIENT)
Dept: GENERAL RADIOLOGY | Facility: CLINIC | Age: 67
DRG: 534 | End: 2022-11-17
Attending: EMERGENCY MEDICINE
Payer: COMMERCIAL

## 2022-11-17 VITALS
OXYGEN SATURATION: 97 % | BODY MASS INDEX: 19.7 KG/M2 | WEIGHT: 130 LBS | HEART RATE: 68 BPM | DIASTOLIC BLOOD PRESSURE: 76 MMHG | SYSTOLIC BLOOD PRESSURE: 150 MMHG | TEMPERATURE: 98.3 F | HEIGHT: 68 IN

## 2022-11-17 DIAGNOSIS — S72.302A CLOSED FRACTURE OF SHAFT OF LEFT FEMUR, UNSPECIFIED FRACTURE MORPHOLOGY, INITIAL ENCOUNTER (H): ICD-10-CM
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
## 2022-11-17 DIAGNOSIS — Z11.52 ENCOUNTER FOR SCREENING LABORATORY TESTING FOR SEVERE ACUTE RESPIRATORY SYNDROME CORONAVIRUS 2 (SARS-COV-2): ICD-10-CM

## 2022-11-17 LAB
ANION GAP SERPL CALCULATED.3IONS-SCNC: 9 MMOL/L (ref 7–15)
BASOPHILS # BLD AUTO: 0 10E3/UL (ref 0–0.2)
BASOPHILS NFR BLD AUTO: 1 %
BUN SERPL-MCNC: 13.2 MG/DL (ref 8–23)
CALCIUM SERPL-MCNC: 9 MG/DL (ref 8.8–10.2)
CHLORIDE SERPL-SCNC: 104 MMOL/L (ref 98–107)
CREAT SERPL-MCNC: 0.82 MG/DL (ref 0.67–1.17)
DEPRECATED HCO3 PLAS-SCNC: 26 MMOL/L (ref 22–29)
EOSINOPHIL # BLD AUTO: 0.1 10E3/UL (ref 0–0.7)
EOSINOPHIL NFR BLD AUTO: 1 %
ERYTHROCYTE [DISTWIDTH] IN BLOOD BY AUTOMATED COUNT: 13.6 % (ref 10–15)
GFR SERPL CREATININE-BSD FRML MDRD: >90 ML/MIN/1.73M2
GLUCOSE SERPL-MCNC: 97 MG/DL (ref 70–99)
HCT VFR BLD AUTO: 37.6 % (ref 40–53)
HGB BLD-MCNC: 12.6 G/DL (ref 13.3–17.7)
IMM GRANULOCYTES # BLD: 0 10E3/UL
IMM GRANULOCYTES NFR BLD: 0 %
LYMPHOCYTES # BLD AUTO: 1.4 10E3/UL (ref 0.8–5.3)
LYMPHOCYTES NFR BLD AUTO: 22 %
MCH RBC QN AUTO: 32.3 PG (ref 26.5–33)
MCHC RBC AUTO-ENTMCNC: 33.5 G/DL (ref 31.5–36.5)
MCV RBC AUTO: 96 FL (ref 78–100)
MONOCYTES # BLD AUTO: 0.4 10E3/UL (ref 0–1.3)
MONOCYTES NFR BLD AUTO: 7 %
NEUTROPHILS # BLD AUTO: 4.5 10E3/UL (ref 1.6–8.3)
NEUTROPHILS NFR BLD AUTO: 69 %
NRBC # BLD AUTO: 0 10E3/UL
NRBC BLD AUTO-RTO: 0 /100
PLATELET # BLD AUTO: 178 10E3/UL (ref 150–450)
POTASSIUM SERPL-SCNC: 4 MMOL/L (ref 3.4–5.3)
RBC # BLD AUTO: 3.9 10E6/UL (ref 4.4–5.9)
SARS-COV-2 RNA RESP QL NAA+PROBE: NEGATIVE
SODIUM SERPL-SCNC: 139 MMOL/L (ref 136–145)
WBC # BLD AUTO: 6.4 10E3/UL (ref 4–11)

## 2022-11-17 PROCEDURE — C9803 HOPD COVID-19 SPEC COLLECT: HCPCS | Performed by: EMERGENCY MEDICINE

## 2022-11-17 PROCEDURE — 72170 X-RAY EXAM OF PELVIS: CPT | Mod: 26 | Performed by: RADIOLOGY

## 2022-11-17 PROCEDURE — 72170 X-RAY EXAM OF PELVIS: CPT

## 2022-11-17 PROCEDURE — 85025 COMPLETE CBC W/AUTO DIFF WBC: CPT | Performed by: EMERGENCY MEDICINE

## 2022-11-17 PROCEDURE — 99222 1ST HOSP IP/OBS MODERATE 55: CPT

## 2022-11-17 PROCEDURE — 36415 COLL VENOUS BLD VENIPUNCTURE: CPT | Performed by: EMERGENCY MEDICINE

## 2022-11-17 PROCEDURE — 250N000011 HC RX IP 250 OP 636: Performed by: EMERGENCY MEDICINE

## 2022-11-17 PROCEDURE — U0005 INFEC AGEN DETEC AMPLI PROBE: HCPCS | Performed by: EMERGENCY MEDICINE

## 2022-11-17 PROCEDURE — 96376 TX/PRO/DX INJ SAME DRUG ADON: CPT | Performed by: EMERGENCY MEDICINE

## 2022-11-17 PROCEDURE — 99285 EMERGENCY DEPT VISIT HI MDM: CPT | Performed by: EMERGENCY MEDICINE

## 2022-11-17 PROCEDURE — 120N000002 HC R&B MED SURG/OB UMMC

## 2022-11-17 PROCEDURE — 80048 BASIC METABOLIC PNL TOTAL CA: CPT | Performed by: EMERGENCY MEDICINE

## 2022-11-17 PROCEDURE — 73552 X-RAY EXAM OF FEMUR 2/>: CPT | Mod: LT

## 2022-11-17 PROCEDURE — 96374 THER/PROPH/DIAG INJ IV PUSH: CPT | Performed by: EMERGENCY MEDICINE

## 2022-11-17 PROCEDURE — 99285 EMERGENCY DEPT VISIT HI MDM: CPT | Mod: 25 | Performed by: EMERGENCY MEDICINE

## 2022-11-17 PROCEDURE — 73552 X-RAY EXAM OF FEMUR 2/>: CPT | Mod: 26 | Performed by: RADIOLOGY

## 2022-11-17 RX ORDER — HYDROMORPHONE HYDROCHLORIDE 1 MG/ML
0.5 INJECTION, SOLUTION INTRAMUSCULAR; INTRAVENOUS; SUBCUTANEOUS EVERY 30 MIN PRN
Status: DISCONTINUED | OUTPATIENT
Start: 2022-11-17 | End: 2022-11-17 | Stop reason: HOSPADM

## 2022-11-17 RX ORDER — OXYCODONE HYDROCHLORIDE 5 MG/1
5 TABLET ORAL EVERY 6 HOURS PRN
Qty: 12 TABLET | Refills: 0 | Status: SHIPPED | OUTPATIENT
Start: 2022-11-17 | End: 2022-11-20

## 2022-11-17 RX ADMIN — HYDROMORPHONE HYDROCHLORIDE 0.5 MG: 1 INJECTION, SOLUTION INTRAMUSCULAR; INTRAVENOUS; SUBCUTANEOUS at 10:26

## 2022-11-17 RX ADMIN — HYDROMORPHONE HYDROCHLORIDE 0.5 MG: 1 INJECTION, SOLUTION INTRAMUSCULAR; INTRAVENOUS; SUBCUTANEOUS at 14:54

## 2022-11-17 RX ADMIN — HYDROMORPHONE HYDROCHLORIDE 0.5 MG: 1 INJECTION, SOLUTION INTRAMUSCULAR; INTRAVENOUS; SUBCUTANEOUS at 11:15

## 2022-11-17 ASSESSMENT — ACTIVITIES OF DAILY LIVING (ADL)
ADLS_ACUITY_SCORE: 35

## 2022-11-17 NOTE — CONSULTS
[facility] Orthopedic Consultation    Jose Carlos Bolden MRN# [id number]   Age: 67 year old YOB: 1955   Date of Admission:  11/17/2022    Reason for consult: Left proximal femur periprosthetic fracture    Requesting physician: Aubree Flower MD              Impression and Recommendation:     Impression:  Jose Carlos Bolden is a 67 year old otherwise healthy male status-post L CATALINA with Dr. Mcmillan 3/2014 here with left nondisplaced proximal femur periprosthetic fracture. Fracture reviewed with Orthopedic Surgery staff, Dr. Donald. Fracture stable at this time, patient appropriate for non-operative management.      Recomendations:  - No operative management at this time   - WBAT, discharged with walker and crutches to assist with mobility   - No single leg stance or active abduction  - Follow up next week for updated radiographs, messaged scheduling team       Assessment and Plan discussed with Dr. Rothman PGY 4 and Dr. Donald, Orthopedic Surgery attending.          Chief Complaint:   Left hip pain          History of Present Illness:   Jose Carlos Bolden is a 67 year old healthy male s/p L CATALINA with Dr. Mcmillan 3/2014, here for evaluation of left hip pain after a fall this morning. Patient states he was going down the stairs too fast while talking with house guests, when he slipped and landed on his left hip. Denies hitting his head or sustaining any other injury. Had immediate L hip/thigh pain, was unable to ambulate. Did not have any pain in the hip prior to the fall, ambulated without assistive device. Denies any numbness, tingling, or new weakness to the LLE.     Denies fevers, chills, nausea, vomiting, diarrhea, constipation, chest pain, shortness of breath.     History obtained from patient interview and chart review.        Past Medical History:     Past Medical History:   Diagnosis Date     Allergy     sulfa, penicillium, food (tomatillos, ground cherries, some potato chips)     Back pain      Glaucoma  "suspect      Hearing loss      Hip pain, left      Hyperlipidemia LDL goal <130      Iron deficiency      Kyphosis      Osteoarthritis      Reactive airway disease      Restless leg syndrome      Scoliosis      Vitamin B12 deficiency              Past Surgical History:     Past Surgical History:   Procedure Laterality Date     COLONOSCOPY      Findings: normal     COLONOSCOPY N/A 12/17/2020    Procedure: COLONOSCOPY, WITH POLYPECTOMY AND BIOPSY;  Surgeon: Marvel Cook MD;  Location: [facility] OR     JOINT REPLACEMENT Left     hip, CATALINA     VASECTOMY               Social History:   Tobacco use: Does not use  Alcohol use: Occasional  Elicit drug use: Patient denies  Occupation: Retired, 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           Allergies:     Allergies   Allergen Reactions     Cats      Feathers      Food      Potato chips, tomatillos, cherry de la fuente     Seasonal Allergies      Smoke.      Sulfa Drugs Other (See Comments)     Confusion and dizziness     Penicillium Notatum Allergy Skin Test              Medications:   Medication reviewed with patient and in chart.  Anticoagulation: None  Antibiotics: None          Review of Systems:   See HPI. 10 point review of systems is otherwise negative.           Physical Exam:     Vitals:    11/17/22 0956   BP: (!) 150/76   Pulse: 70   Temp: 98.3  F (36.8  C)   TempSrc: Oral   SpO2: 100%   Weight: 59 kg (130 lb)   Height: 1.727 m (5' 8\")     General: Awake, alert, appropriate, following commands, NAD.  Neuro: EOM grossly intact  Skin: No rashes,  skin color normal.  HEENT: Normal.   Lungs: Breathing comfortably and nonlabored, no wheezes or stridor noted.  Heart/Cardiovascular: Regular pulse, no peripheral cyanosis.  Abdomen: Soft, non-tender, non-distended.     Back: Nontender to palpation throughout, no step-offs or " deformities appreciated. Bilateral SI joints non-tender.     Right Lower Extremity:   - No gross deformity, skin intact.  - No significant tenderness to palpation over thigh, knee, leg, ankle, foot, toes.  - No pain with ROM hip, knee, ankle.   - Motor intact distally TA, GSC, EHL, FHL with 5/5 strength.  - SILT sp, dp, sa, alvarenga, ti n. Distributions.   - DP/PT pulses palpable, toes warm and well perfused.    Left Lower Extremity:   - No gross deformity, skin intact, incision well healed  - Tenderness to palpation over greater trochanter  - No significant tenderness to palpation over  knee, leg, ankle, foot, toes.  - ROM of the hip not performed   - Motor intact distally TA, GSC, EHL, FHL with 5/5 strength.  - SILT sp, dp, sa, alvarenga, ti n. Distributions.   - DP/PT pulses palpable, toes warm and well perfused.          Imaging:   All imaging independently reviewed.  XR femur left  Impression:  Nondisplaced fracture through the proximal left lateral femoral shaft  extending to the greater trochanter.            Laboratory date:   CBC:  Lab Results   Component Value Date    WBC 6.4 11/17/2022    HGB 12.6 (L) 11/17/2022     11/17/2022       BMP:  Lab Results   Component Value Date     11/17/2022    POTASSIUM 4.0 11/17/2022    CHLORIDE 104 11/17/2022    CO2 26 11/17/2022    BUN 13.2 11/17/2022    CR 0.82 11/17/2022    ANIONGAP 9 11/17/2022    ALDAIR 9.0 11/17/2022    GLC 97 11/17/2022       Inflammatory Markers:  Lab Results   Component Value Date    WBC 6.4 11/17/2022       Cultures:  No results for input(s): CULT in the last 168 hours.    Sherron Zarate PA-C  11/17/2022 1:15 PM  Orthopaedic Surgery

## 2022-11-17 NOTE — DISCHARGE INSTRUCTIONS
Thank you for choosing [facility] for your care. It was a pleasure taking care of you today in our Emergency Department.     Per orthopedics recommendation, you can have weight bearing as tolerated, but no single leg stance or active abduction.     Please follow up with your orthopedics in the next week. However, if you have continued worsening symptoms, please return to the emergency department.

## 2022-11-17 NOTE — ED PROVIDER NOTES
[place] EMERGENCY DEPARTMENT ([facility])  11/17/22    History     Chief Complaint   Patient presents with     Fall     Hip pain     HPI  Jose Carlos Bolden is a 67 year old male PMH significant for osteoarthritis and HLD who presents to the ED for evaluation of a fall and subsequent left hip pain.  Patient reports that he was walking down the stairs when he missed a step and fell down the stairs.  Indicates this was a mechanical fall denies any head trauma.  Currently, he denies any head, neck, back, chest, or abdominal pain.  He states he was unable to get up after the fall and is currently experiencing pain in his left hip.  He denies any altered sensation and denies any lower leg injury.  He denies any other pain following the fall.  He denies any injury to the right leg or hip.  Patient denies fever, chills, sore throat, nausea, or vomiting.  He reports his only daily medications are atorvastatin and gabapentin for restless leg syndrome.  He indicates his only allergy is to sulfa drugs.    Past Medical History  Past Medical History:   Diagnosis Date     Allergy     sulfa, penicillium, food (tomatillos, ground cherries, some potato chips)     Back pain      Glaucoma suspect      Hearing loss      Hip pain, left      Hyperlipidemia LDL goal <130      Iron deficiency      Kyphosis      Osteoarthritis      Reactive airway disease      Restless leg syndrome      Scoliosis      Vitamin B12 deficiency      Past Surgical History:   Procedure Laterality Date     COLONOSCOPY      Findings: normal     COLONOSCOPY N/A 12/17/2020    Procedure: COLONOSCOPY, WITH POLYPECTOMY AND BIOPSY;  Surgeon: Marvel Cook MD;  Location: [facility] OR     JOINT REPLACEMENT Left     hip, CATALINA     VASECTOMY       albuterol (PROAIR HFA) 108 (90 BASE) MCG/ACT Inhaler  atorvastatin (LIPITOR) 20 MG tablet  Cyanocobalamin (VITAMIN B12) 1000 MCG TBCR  diphenhydrAMINE (BENADRYL) 25 MG capsule  ferrous gluconate (FERGON) 324 (38 Fe) MG  tablet  gabapentin (NEURONTIN) 600 MG tablet  ketoconazole (NIZORAL) 2 % external cream  metroNIDAZOLE (NORITATE) 1 % cream  triamcinolone (KENALOG) 0.1 % cream      Allergies   Allergen Reactions     Cats      Feathers      Food      Potato chips, tomatillos, cherry de la fuente     Seasonal Allergies      Smoke.      Sulfa Drugs Other (See Comments)     Confusion and dizziness     Penicillium Notatum Allergy Skin Test      Family History  Family History   Problem Relation Age of Onset     Glaucoma Brother      Hyperlipidemia Brother      Prostate Problems Brother         BPH     Glaucoma Sister      Pancreatic Cancer Sister          age 71 approximately     Osteoporosis Sister      Anemia Sister      Brain Cancer Mother         age 65  of glioblastoma     Depression Mother         post partum     Osteoporosis Mother      Anemia Mother      Allergy (Severe) Mother         penicillin     Heart Disease Father         age 36 first MI, had coronary bypass      Emphysema Father         smoker     Glaucoma Father      Hyperlipidemia Father      Neurologic Disorder Father         Jansen's Palsy     Heart Disease Maternal Grandmother      Other - See Comments Maternal Grandfather         seasonal allergies     Coronary Artery Disease Paternal Grandmother         age 87 MI     Cancer Paternal Grandmother         Uterine/vaginal     Hyperlipidemia Brother      Neurologic Disorder Brother         Bell's palsy     Osteoporosis Brother      Anemia Brother      Vascular Disease Brother         Raynaud's     Prostate Problems Brother         BPH     Hyperlipidemia Sister      Heart Disease Sister         age 50, bypass surgery     Osteoporosis Sister      Anemia Sister      Depression Sister      Hyperlipidemia Sister      Other - See Comments Sister         Multiple chemical sensitivity     Neurologic Disorder Sister         Bell's palsy     Osteoporosis Sister      Anemia Sister      Breast Cancer Sister      Osteoporosis  "Sister         kyphosis, scoliosis     Hyperlipidemia Sister      Vascular Disease Sister         Raynaud's     Hyperlipidemia Brother      Hyperlipidemia Brother      Neurologic Disorder Brother         Jansen's palsy     Heart Disease Paternal Grandfather          MI age 56     Macular Degeneration No family hx of      Social History   Social History     Tobacco Use     Smoking status: Never     Smokeless tobacco: Never   Substance Use Topics     Alcohol use: Yes     Comment: 7 drinks per week on average     Drug use: No      Past medical history, past surgical history, medications, allergies, family history, and social history were reviewed with the patient. No additional pertinent items.       Review of Systems  A complete review of systems was performed with pertinent positives and negatives noted in the HPI, and all other systems negative.    Physical Exam   BP: (!) 150/76  Pulse: 70  Temp: 98.3  F (36.8  C)  Height: 172.7 cm (5' 8\")  Weight: 59 kg (130 lb)  SpO2: 100 %  Physical Exam  General: Alert layingon the bed    Head:  atraumatic   Neck:  no c-spine/neck tenderness   CV: RRR, strong pulse in the affected limb with < 2 seconds cap refill to digits distally   Resp:  breathing comfortably   Abd:  soft/nt/nd   Back:  no T&L spine/back tenderness   Skin:  , no crepitance The integrity of the skin at this site has not been compromised.   Extremities:  tenderness over left proximal femur and hip; limited ROM of the joint secondary to pain otherwise  AROM of all major joints without pain   Neuro: Awake alert; face  symmetrical; moving extremities x 4,   Lower Extremity: Strength 5/5 dorsiflexion,plantarflexion/EHL, 4/5 knee due to pain flexion/extension, 4/5 hip flexion/extension due to pain. Sensation intact to light touch in for saphenous/sural/deep peroneal/superficial peroneal/and tibial nerves.     ED Course      Procedures   10:08 AM  The patient was seen and examined by Aubree Flower MD in Room " ED11.       After arrival patient was evaluated, no evidence of neurologic injury on exam, or vascular injury on exam.  No other injuries except for the left lower extremity.  Upon exam, concern for fracture of the left femur or pelvis, discussed risks and benefits of imaging.  X-rays were obtained and showed patient was status post left hip arthroplasty, hardware appears intact.  X-ray showed:   Nondisplaced fracture through the proximal left lateral femoral shaft  extending to the greater trochanter. Approximately 1 cm of femoral  component subsidence.  Patient was given IV Dilaudid the emergency department for pain, and was feeling better.  Orthopedics was consulted and recommended patient go to [facility] for further evaluation and care at this time.  Patient is in agreement with plan.  However on subsequent evaluation, they stated patient would be okay to be discharged to home with close follow-up in clinic next week.  They recommended  Weight bearing as tolerated, no single leg stance or active abduction.  They also requested that patient have a walker for around the house as well as crutches to help with the stairs.  Orders were placed for patient.    Patient discharged in good condition with questions answered. He was given [facility] discharge instructions and follow-up recommendations. Patient will return to the ED if symptoms worsen or he develops any of the concerning signs/symptoms as outlined in the EPIC d/c instructions. Otherwise he will follow up in clinic as recommended in the d/c instructions.       Results for orders placed or performed during the hospital encounter of 11/17/22   XR Pelvis 1/2 Views     Status: None    Narrative    1 views pelvis radiograph(s) 11/17/2022 11:20 AM  4 views left femur radiographs 11/17/2022 11:20 AM    History: Fall down the stairs, left hip and pelvis pain. Patient  reports that he was walking down stairs pneumocystis fell down stairs.  Assess mechanical fall with head  trauma. He is unable to get up after  the fall currently having left hip pain.     Comparison: Radiographs 5/5/2022 and 7/31/2019    Findings:    AP view(s) of the pelvis were obtained.   AP and lateral  views of the left femur were obtained.     Left hip and femur:     Status post total left hip arthroplasty. Hardware appears intact.  Mildly displaced fracture through the proximal lateral femoral shaft  extending through the greater femoral trochanter. Approximately 1 cm  of femoral component subsidence. The acetabular portion of the hip  arthroplasty appears intact.     The knee joint is congruent on these nondedicated views. No  substantial degenerative changes of the knee. No effusion.    Soft tissue calcifications.      Right hip:    Moderate degenerative changes of the right. No acute osseous  abnormality.    Other FINDINGS:    Sacrum and innominate bones are partially obscured by overlying bowel  gas/fecal content.    Pelvic phlebolith.      Impression    Impression:  Nondisplaced fracture through the proximal left lateral femoral shaft  extending to the greater trochanter. Approximately 1 cm of femoral  component subsidence.     I have personally reviewed the examination and initial interpretation  and I agree with the findings.    KIMBERLY WILLAMS MD (Joe)         SYSTEM ID:  [id number]   XR Femur Left 2 Views     Status: None    Narrative    1 views pelvis radiograph(s) 11/17/2022 11:20 AM  4 views left femur radiographs 11/17/2022 11:20 AM    History: Fall down the stairs, left hip and pelvis pain. Patient  reports that he was walking down stairs pneumocystis fell down stairs.  Assess mechanical fall with head trauma. He is unable to get up after  the fall currently having left hip pain.     Comparison: Radiographs 5/5/2022 and 7/31/2019    Findings:    AP view(s) of the pelvis were obtained.   AP and lateral  views of the left femur were obtained.     Left hip and femur:     Status post total left hip  arthroplasty. Hardware appears intact.  Mildly displaced fracture through the proximal lateral femoral shaft  extending through the greater femoral trochanter. Approximately 1 cm  of femoral component subsidence. The acetabular portion of the hip  arthroplasty appears intact.     The knee joint is congruent on these nondedicated views. No  substantial degenerative changes of the knee. No effusion.    Soft tissue calcifications.      Right hip:    Moderate degenerative changes of the right. No acute osseous  abnormality.    Other FINDINGS:    Sacrum and innominate bones are partially obscured by overlying bowel  gas/fecal content.    Pelvic phlebolith.      Impression    Impression:  Nondisplaced fracture through the proximal left lateral femoral shaft  extending to the greater trochanter. Approximately 1 cm of femoral  component subsidence.     I have personally reviewed the examination and initial interpretation  and I agree with the findings.    KIMBERLY WILLAMS MD (Joe)         SYSTEM ID:  [id number]   Basic metabolic panel     Status: Normal   Result Value Ref Range    Sodium 139 136 - 145 mmol/L    Potassium 4.0 3.4 - 5.3 mmol/L    Chloride 104 98 - 107 mmol/L    Carbon Dioxide (CO2) 26 22 - 29 mmol/L    Anion Gap 9 7 - 15 mmol/L    Urea Nitrogen 13.2 8.0 - 23.0 mg/dL    Creatinine 0.82 0.67 - 1.17 mg/dL    Calcium 9.0 8.8 - 10.2 mg/dL    Glucose 97 70 - 99 mg/dL    GFR Estimate >90 >60 mL/min/1.73m2   CBC with platelets and differential     Status: Abnormal   Result Value Ref Range    WBC Count 6.4 4.0 - 11.0 10e3/uL    RBC Count 3.90 (L) 4.40 - 5.90 10e6/uL    Hemoglobin 12.6 (L) 13.3 - 17.7 g/dL    Hematocrit 37.6 (L) 40.0 - 53.0 %    MCV 96 78 - 100 fL    MCH 32.3 26.5 - 33.0 pg    MCHC 33.5 31.5 - 36.5 g/dL    RDW 13.6 10.0 - 15.0 %    Platelet Count 178 150 - 450 10e3/uL    % Neutrophils 69 %    % Lymphocytes 22 %    % Monocytes 7 %    % Eosinophils 1 %    % Basophils 1 %    % Immature Granulocytes 0 %     NRBCs per 100 WBC 0 <1 /100    Absolute Neutrophils 4.5 1.6 - 8.3 10e3/uL    Absolute Lymphocytes 1.4 0.8 - 5.3 10e3/uL    Absolute Monocytes 0.4 0.0 - 1.3 10e3/uL    Absolute Eosinophils 0.1 0.0 - 0.7 10e3/uL    Absolute Basophils 0.0 0.0 - 0.2 10e3/uL    Absolute Immature Granulocytes 0.0 <=0.4 10e3/uL    Absolute NRBCs 0.0 10e3/uL   CBC with platelets differential     Status: Abnormal    Narrative    The following orders were created for panel order CBC with platelets differential.  Procedure                               Abnormality         Status                     ---------                               -----------         ------                     CBC with platelets and d...[[id number]]  Abnormal            Final result                 Please view results for these tests on the individual orders.     Medications   HYDROmorphone (PF) (DILAUDID) injection 0.5 mg (0.5 mg Intravenous Given 11/17/22 1115)        Assessments & Plan    (W19.XXXA) Fall, initial encounter    (S72.302A) Closed fracture of shaft of left femur, unspecified fracture morphology, initial encounter (H)    Plan: Walker Order for DME - ONLY FOR DME, Crutches         Order for DME - ONLY FOR DME    Plan:   Discharge to home with follow up in Ortho clinic next week  Return to the ER with any worsening symptoms        I have reviewed the nursing notes. I have reviewed the findings, diagnosis, plan and need for follow up with the patient.    New Prescriptions    No medications on file       Final diagnoses:   Fall, initial encounter     INorberto, am serving as a trained medical scribe to document services personally performed by Aubree Flower MD, based on the provider's statements to me.     IAubree MD, was physically present and have reviewed and verified the accuracy of this note documented by Norberto Tracey.    --  Aubree Flower MD  [facility] EMERGENCY DEPARTMENT  11/17/2022     Aubree Flower,  MD  11/17/22 [id number]       Aubree Flower MD  11/17/22 [id number]

## 2022-11-18 ENCOUNTER — TELEPHONE (OUTPATIENT)
Dept: ORTHOPEDICS | Facility: CLINIC | Age: 67
End: 2022-11-18

## 2022-11-18 ENCOUNTER — PATIENT OUTREACH (OUTPATIENT)
Dept: CARE COORDINATION | Facility: CLINIC | Age: 67
End: 2022-11-18

## 2022-11-18 NOTE — PROGRESS NOTES
[facility]    Background: Transitional Care Management program identified per system criteria and reviewed by [facility] team for possible outreach.    Assessment: Upon chart review, [facility] Team member will not proceed with patient outreach related to this episode of Transitional Care Management program due to reason below:    Patient has active communication with a nurse, provider or care team for reason of post-hospital follow up plan.  Outreach call by [facility] team not indicated to minimize duplicative efforts.     Plan: Transitional Care Management episode addressed appropriately per reason noted above.      DIMITRY Villalta    [facility]    *Connected Care Resource Team does NOT follow patient ongoing. Referrals are identified based on internal discharge reports and the outreach is to ensure patient has an understanding of their discharge instructions.

## 2022-11-18 NOTE — TELEPHONE ENCOUNTER
Called and talked with Madeleine and Jose Carlos.   Patient is home and they were wondering about pain control. We discussed he can take ibuprofen 2-3 tablets every 6 hours and alternating with Acetaminophen 1000mg every 6 hours. He can take a dose every 3 hours and then take an Oxycodone or half an Oxycodone when he needs it.   He also asked about elevating and we discussed he would be ok doing this 3 times a day, but does not need to do this all the time.   They had no other questions.  Mari Arriaga RN

## 2022-11-18 NOTE — TELEPHONE ENCOUNTER
[facility] Call Center    Phone Message    May a detailed message be left on voicemail: yes     Reason for Call: Other: Pt wife calling on behalf of pt. Just a general question regarding tylenol and ibuprofen can they alternate these with oxycodone      Action Taken: Other: ortho csc    Travel Screening: Not Applicable

## 2022-11-18 NOTE — TELEPHONE ENCOUNTER
Action November 18, 2022 3:01 PM MT   Action Taken Sent a request for images from  [phone].     Action November 22, 2022 9:23 AM MT   Action Taken Images received and resolved to PACS by another user.      DIAGNOSIS: Left Hip Fx - ED Follow-up   APPOINTMENT DATE: 11/23/2022   NOTES STATUS DETAILS   DISCHARGE REPORT from the ER Internal 11/17/2022 - [facility] ED   OPERATIVE REPORT Care Everywhere 03/26/2014 - Left Total Hip Arthroplasty   MEDICATION LIST Care Everywhere  Internal    IMPLANT RECORD/STICKER Care Everywhere    LABS     CBC/DIFF Internal 11/17/2022   MRI PACS HP:  03/26/2009 - Left Hip Arthrogram     XRAYS (IMAGES & REPORTS) PACS Internal      HP:  11/29/2016, 03/26/2015, 05/06/2014, 01/07/2014 - Left Hip/Pelvis  03/26/2014 - Left Hip PORT  09/07/2010- Pelivs

## 2022-11-18 NOTE — TELEPHONE ENCOUNTER
Writer called and scheduled pt to see Dr. Donald Wednesday 12:30 pm for ED follow up Left hip FX. Pt had a left total hip 7 years ago.     Claudine Cooney LPN

## 2022-11-23 ENCOUNTER — ANCILLARY PROCEDURE (OUTPATIENT)
Dept: GENERAL RADIOLOGY | Facility: CLINIC | Age: 67
End: 2022-11-23
Attending: STUDENT IN AN ORGANIZED HEALTH CARE EDUCATION/TRAINING PROGRAM
Payer: COMMERCIAL

## 2022-11-23 ENCOUNTER — PRE VISIT (OUTPATIENT)
Dept: ORTHOPEDICS | Facility: CLINIC | Age: 67
End: 2022-11-23

## 2022-11-23 ENCOUNTER — OFFICE VISIT (OUTPATIENT)
Dept: ORTHOPEDICS | Facility: CLINIC | Age: 67
End: 2022-11-23
Payer: COMMERCIAL

## 2022-11-23 VITALS — HEIGHT: 68 IN | WEIGHT: 137 LBS | BODY MASS INDEX: 20.76 KG/M2

## 2022-11-23 DIAGNOSIS — M16.11 PRIMARY OSTEOARTHRITIS OF RIGHT HIP: Primary | ICD-10-CM

## 2022-11-23 DIAGNOSIS — S72.102A CLOSED FRACTURE OF TROCHANTER OF LEFT FEMUR, INITIAL ENCOUNTER (H): Primary | ICD-10-CM

## 2022-11-23 DIAGNOSIS — M16.11 PRIMARY OSTEOARTHRITIS OF RIGHT HIP: ICD-10-CM

## 2022-11-23 PROCEDURE — 99214 OFFICE O/P EST MOD 30 MIN: CPT | Performed by: STUDENT IN AN ORGANIZED HEALTH CARE EDUCATION/TRAINING PROGRAM

## 2022-11-23 PROCEDURE — 73502 X-RAY EXAM HIP UNI 2-3 VIEWS: CPT | Mod: LT | Performed by: RADIOLOGY

## 2022-11-23 RX ORDER — OXYCODONE HYDROCHLORIDE 5 MG/1
5 TABLET ORAL EVERY 6 HOURS PRN
Qty: 10 TABLET | Refills: 0 | Status: SHIPPED | OUTPATIENT
Start: 2022-11-23 | End: 2022-11-26

## 2022-11-23 ASSESSMENT — ENCOUNTER SYMPTOMS
BLOATING: 0
RECTAL PAIN: 0
BLOOD IN STOOL: 0
BACK PAIN: 1
CONSTIPATION: 0
JAUNDICE: 0
MUSCLE CRAMPS: 1
NECK PAIN: 0
VOMITING: 0
BOWEL INCONTINENCE: 0
HEARTBURN: 1
STIFFNESS: 0
NAUSEA: 0
ABDOMINAL PAIN: 0
DIARRHEA: 0
MUSCLE WEAKNESS: 1
ARTHRALGIAS: 1
MYALGIAS: 1
JOINT SWELLING: 0

## 2022-11-23 ASSESSMENT — HOOS JR
WALKING ON UNEVEN SURFACE: EXTREME
SITTING: MODERATE
HOOS JR TOTAL INTERVAL SCORE: 29.01
LYING IN BED (TURNING OVER, MAINTAINING HIP POSITION): SEVERE
BENDING TO THE FLOOR TO PICK UP OBJECT: EXTREME
RISING FROM SITTING: SEVERE
GOING UP OR DOWN STAIRS: SEVERE

## 2022-11-23 NOTE — PROGRESS NOTES
"    [facility]  Orthopaedic Surgery Consultation by Taurus Donald M.D.    Jose Carlos Bolden MRN# [id number]   Age: 67 year old YOB: 1955     Requesting physician: No ref. provider found  Adam ePace     Background history:  DX:  Kyphosis  Scoliosis  Restless legs syndrome  Vitamin B12 deficiency  Iron deficiency  Hyperlipidemia  Hearing loss  Reactive airway disease    TREATMENTS:  1. March 2014, left CATALINA, Dr. Jean [facility]           History of Present Illness:   67 year old male who is following up in our clinic today after ED visit in setting of left nondisplaced proximal femur periprosthetic fracture [place] A(g) for which nonsurgical treatment has been initiated.    Today patient is approximately 1 week after the ED visit.  He states he is doing progressively better.  His pain is subsided and is well controlled on a regimen of Tylenol and ibuprofen with occasional oxycodone.  He is ambulating with the assistance of a walker and crutches.  No physical therapy.  He is weightbearing as tolerated.  He is here for follow-up and radiographic evaluation of fracture.    Social:   Occupation: Retired -   Living situation: Lives with wife  Hobbies / Sports: cycling, walking, gardening    Smoking: No  Alcohol: Yes socially  Illicit drug use: No         Physical Exam:     EXAMINATION pertinent findings:   PSYCH: Pleasant, healthy-appearing, alert, oriented x3, cooperative. Normal mood and affect.  VITAL SIGNS: Height 1.727 m (5' 8\"), weight 62.1 kg (137 lb).  Reviewed nursing intake notes.   Body mass index is 20.83 kg/m .  RESP: non labored breathing   ABD: benign, soft, non-tender, no acute peritoneal findings  SKIN: grossly normal   LYMPHATIC: grossly normal, no adenopathy, no extremity edema  NEURO: grossly normal , no motor deficits  VASCULAR: satisfactory perfusion of all extremities   MUSCULOSKELETAL:   Gait: Not tested because of necessity to utilize crutches or " walker.    Left hip range of motion not tested due to presence of underlying fracture.    Left LE:   Thigh and leg compartments soft and compressible   +Quad/TA/GSC/FHL/EHL   SILT DP/SP/Angie/Saph/Tib nerve distributions   Palpable dorsalis pedis pulse          Data:   All laboratory data reviewed  All imaging studies reviewed by me personally.    XR pelvis/hip left 11/23/2022:  My interpretation: Presence of [place] A greater trochanteric region.  No signs of secondary dislocations compared to previous radiographic imaging studies.  No signs of loosening of femoral component.            Assessment and Plan:   Assessment:  67-year-old male presenting for follow-up after sustaining [place] A fracture around total hip arthroplasty left.  No signs of secondary dislocation.     Plan:  I extensively discussed the findings in the ED and today with the patient and spouse.  He will continue to weight-bear as tolerated with walker and crutches.  No single-leg stance or active abduction to prevent secondary dislocation of greater trochanteric region.  Pain medication as needed.  Patient request a small refill of oxycodone which was provided.  Currently no need for physical therapy.  We discussed the small possibility of a loose femoral component.  We discussed that this will be declared over time if indeed the case.  We discussed that given the geometry of current implant and fracture pattern it is unlikely that the stem is loose.  Patient and spouse understand and agree to the treatment plan as set forth.  We will follow-up with him in 5 weeks with renewed radiographic imaging studies.      Taurus Donald MD, PhD     Adult Reconstruction  [facility] Department of Orthopaedic Surgery        DATA for DOCUMENTATION:         Past Medical History:     Patient Active Problem List   Diagnosis     Hearing loss     Glaucoma suspect     Reactive airway disease     Restless legs syndrome      Kyphosis     Scoliosis     Vitamin B12 deficiency     Osteoarthritis     Iron deficiency     Hyperlipidemia LDL goal <130     Pain of left lower leg     Fall, initial encounter     Past Medical History:   Diagnosis Date     Allergy     sulfa, penicillium, food (tomatillos, ground cherries, some potato chips)     Back pain      Glaucoma suspect      Hearing loss      Hip pain, left      Hyperlipidemia LDL goal <130      Iron deficiency      Kyphosis      Osteoarthritis      Reactive airway disease      Restless leg syndrome      Scoliosis      Vitamin B12 deficiency        Also see scanned health assessment forms.       Past Surgical History:     Past Surgical History:   Procedure Laterality Date     COLONOSCOPY      Findings: normal     COLONOSCOPY N/A 12/17/2020    Procedure: COLONOSCOPY, WITH POLYPECTOMY AND BIOPSY;  Surgeon: Marvel Cook MD;  Location: [facility] OR     JOINT REPLACEMENT Left     hip, CATALINA     VASECTOMY              Social History:     Social History     Socioeconomic History     Marital status:      Spouse name: Not on file     Number of children: Not on file     Years of education: Not on file     Highest education level: Not on file   Occupational History     Not on file   Tobacco Use     Smoking status: Never     Smokeless tobacco: Never   Substance and Sexual Activity     Alcohol use: Yes     Comment: 7 drinks per week on average     Drug use: No     Sexual activity: Yes     Partners: Female   Other Topics Concern     Not on file   Social History Narrative    Retired , 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     Social Determinants of Health     Financial Resource Strain: Not on file   Food Insecurity: Not on file   Transportation Needs: Not on file   Physical Activity: Not on file   Stress: Not on file   Social Connections: Not on file   Intimate Partner Violence: Not on file   Housing Stability: Not on file            Family History:       Family History    Problem Relation Age of Onset     Glaucoma Brother      Hyperlipidemia Brother      Prostate Problems Brother         BPH     Glaucoma Sister      Pancreatic Cancer Sister          age 71 approximately     Osteoporosis Sister      Anemia Sister      Brain Cancer Mother         age 65  of glioblastoma     Depression Mother         post partum     Osteoporosis Mother      Anemia Mother      Allergy (Severe) Mother         penicillin     Heart Disease Father         age 36 first MI, had coronary bypass      Emphysema Father         smoker     Glaucoma Father      Hyperlipidemia Father      Neurologic Disorder Father         Jansen's Palsy     Heart Disease Maternal Grandmother      Other - See Comments Maternal Grandfather         seasonal allergies     Coronary Artery Disease Paternal Grandmother         age 87 MI     Cancer Paternal Grandmother         Uterine/vaginal     Hyperlipidemia Brother      Neurologic Disorder Brother         Bell's palsy     Osteoporosis Brother      Anemia Brother      Vascular Disease Brother         Raynaud's     Prostate Problems Brother         BPH     Hyperlipidemia Sister      Heart Disease Sister         age 50, bypass surgery     Osteoporosis Sister      Anemia Sister      Depression Sister      Hyperlipidemia Sister      Other - See Comments Sister         Multiple chemical sensitivity     Neurologic Disorder Sister         Bell's palsy     Osteoporosis Sister      Anemia Sister      Breast Cancer Sister      Osteoporosis Sister         kyphosis, scoliosis     Hyperlipidemia Sister      Vascular Disease Sister         Raynaud's     Hyperlipidemia Brother      Hyperlipidemia Brother      Neurologic Disorder Brother         Jansen's palsy     Heart Disease Paternal Grandfather          MI age 56     Macular Degeneration No family hx of             Medications:     Current Outpatient Medications   Medication Sig     albuterol (PROAIR HFA) 108 (90 BASE) MCG/ACT Inhaler  Inhale 2 puffs into the lungs every 4 hours as needed for shortness of breath / dyspnea     atorvastatin (LIPITOR) 20 MG tablet Take 1 tablet (20 mg) by mouth daily     Cyanocobalamin (VITAMIN B12) 1000 MCG TBCR Take 1 tablet by mouth daily     diphenhydrAMINE (BENADRYL) 25 MG capsule Take 1 capsule (25 mg) by mouth nightly as needed for itching or other (Cramping)     ferrous gluconate (FERGON) 324 (38 Fe) MG tablet Take 324 mg by mouth daily (with breakfast)     gabapentin (NEURONTIN) 600 MG tablet TAKE ONE TABLET EVERY MORNING AND AFTERNOON, AND 1.5 TABLETS IN THE EVENING     ketoconazole (NIZORAL) 2 % external cream Apply topically 2 times daily To face as needed for rash     metroNIDAZOLE (NORITATE) 1 % cream Apply topically daily     triamcinolone (KENALOG) 0.1 % cream Apply topically 2 times daily     Current Facility-Administered Medications   Medication     lidocaine (PF) (XYLOCAINE) 1 % injection 4 mL     triamcinolone (KENALOG-40) injection 40 mg              Review of Systems:   A comprehensive 10 point review of systems (constitutional, ENT, cardiac, peripheral vascular, lymphatic, respiratory, GI, , Musculoskeletal, skin, Neurological) was performed and found to be negative except as described in this note.     See intake form completed by patient

## 2022-12-19 NOTE — PROGRESS NOTES
[facility]  Orthopaedic Surgery Consultation by Taurus Donald M.D.    Jose Carlos Bolden MRN# [id number]   Age: 67 year old YOB: 1955     Requesting physician: No ref. provider found  Adam Peace     Background history:  DX:  1. Kyphosis  2. Scoliosis  3. Restless legs syndrome  4. Vitamin B12 deficiency  5. Iron deficiency  6. Hyperlipidemia  7. Hearing loss  8. Reactive airway disease    TREATMENTS:  1. March 2014, left CATALINA, Dr. Jean [facility]           History of Present Illness:   67 year old male who is following up in our clinic today after ED visit in setting of left nondisplaced proximal femur periprosthetic fracture [place] A(g) for which nonsurgical treatment has been initiated.    Today patient is approximately 6 weeks after the ED visit.  He states he is doing progressively better.  His pain is subsided and is well controlled on a regimen of occasional Tylenol and ibuprofen.  He is ambulating with the assistance of a walker and crutches.  No physical therapy.  He is weightbearing as tolerated.  He is here for follow-up and radiographic evaluation of fracture.    Social:   Occupation: Retired -   Living situation: Lives with wife  Hobbies / Sports: cycling, walking, gardening    Smoking: No  Alcohol: Yes socially  Illicit drug use: No         Physical Exam:     EXAMINATION pertinent findings:   PSYCH: Pleasant, healthy-appearing, alert, oriented x3, cooperative. Normal mood and affect.  VITAL SIGNS: There were no vitals taken for this visit.  Reviewed nursing intake notes.   There is no height or weight on file to calculate BMI.  RESP: non labored breathing   ABD: benign, soft, non-tender, no acute peritoneal findings  SKIN: grossly normal   LYMPHATIC: grossly normal, no adenopathy, no extremity edema  NEURO: grossly normal , no motor deficits  VASCULAR: satisfactory perfusion of all extremities   MUSCULOSKELETAL:   Gait: Not tested because of necessity  to utilize crutches or walker.    Left hip range of motion not tested due to presence of underlying fracture.    Left LE:   Thigh and leg compartments soft and compressible   +Quad/TA/GSC/FHL/EHL   SILT DP/SP/Angie/Saph/Tib nerve distributions   Palpable dorsalis pedis pulse          Data:   All laboratory data reviewed  All imaging studies reviewed by me personally.    XR pelvis/hip left 1/4/2023:  My interpretation: Compared to previous imaging studies there is an previously unappreciated [place] B1 fracture present instead of previously diagnosed [place] A greater trochanteric region.  Minimally displaced.    No signs of loosening of femoral component.            Assessment and Plan:   Assessment:  67-year-old male presenting for follow-up after sustaining [place] B1 fracture around total hip arthroplasty left.  No signs of secondary displacement or loosening of femoral component.  Clinically improving.     Plan:  I extensively discussed the findings today with the patient and spouse.  We discussed that the fracture pattern is more extensive than previously appreciated.  Given his clinical presentation of continued improvement it would be my recommendation to refrain from surgical intervention.  We did discuss the potential of nonunion or loosening of components.  We discussed that given the implant design the femoral component should be fixed proximally and therefore be stable once fracture is completely healed.  He will continue to weight-bear as tolerated with walker and crutches.  No single-leg stance or active abduction. No physical therapy yet.  Pain medication as needed.   Patient and spouse understand and agree to the treatment plan as set forth.  We will follow-up with him in 6 weeks with renewed radiographic imaging studies.  If at that point in time patient is continuing to clinically improve and radiographic imaging studies do not show any signs of loosening or secondary displacement of fracture  elements he will be able to increase his weightbearing back to full.      Taurus Donald MD, PhD     Adult Reconstruction  [facility] Department of Orthopaedic Surgery    Total combined visit time and work time before and after clinic visit on encounter date = 20 min

## 2022-12-20 DIAGNOSIS — S72.102A CLOSED FRACTURE OF TROCHANTER OF LEFT FEMUR, INITIAL ENCOUNTER (H): Primary | ICD-10-CM

## 2022-12-21 PROBLEM — M79.662 PAIN OF LEFT LOWER LEG: Status: RESOLVED | Noted: 2021-12-13 | Resolved: 2022-12-21

## 2023-01-03 ASSESSMENT — HOOS JR
WALKING ON UNEVEN SURFACE: MODERATE
SITTING: MILD
HOOS JR TOTAL INTERVAL SCORE: 64.66
LYING IN BED (TURNING OVER, MAINTAINING HIP POSITION): MODERATE
GOING UP OR DOWN STAIRS: MILD
BENDING TO THE FLOOR TO PICK UP OBJECT: MODERATE

## 2023-01-04 ENCOUNTER — ANCILLARY PROCEDURE (OUTPATIENT)
Dept: GENERAL RADIOLOGY | Facility: CLINIC | Age: 68
End: 2023-01-04
Attending: STUDENT IN AN ORGANIZED HEALTH CARE EDUCATION/TRAINING PROGRAM
Payer: COMMERCIAL

## 2023-01-04 ENCOUNTER — OFFICE VISIT (OUTPATIENT)
Dept: ORTHOPEDICS | Facility: CLINIC | Age: 68
End: 2023-01-04
Payer: COMMERCIAL

## 2023-01-04 DIAGNOSIS — S72.102A CLOSED FRACTURE OF TROCHANTER OF LEFT FEMUR, INITIAL ENCOUNTER (H): ICD-10-CM

## 2023-01-04 DIAGNOSIS — M97.8XXA PERIPROSTHETIC FRACTURE OF PROXIMAL END OF FEMUR: ICD-10-CM

## 2023-01-04 DIAGNOSIS — S72.102A CLOSED FRACTURE OF TROCHANTER OF LEFT FEMUR, INITIAL ENCOUNTER (H): Primary | ICD-10-CM

## 2023-01-04 DIAGNOSIS — Z96.649 PERIPROSTHETIC FRACTURE OF PROXIMAL END OF FEMUR: ICD-10-CM

## 2023-01-04 PROCEDURE — 73502 X-RAY EXAM HIP UNI 2-3 VIEWS: CPT | Mod: LT | Performed by: RADIOLOGY

## 2023-01-04 PROCEDURE — 99213 OFFICE O/P EST LOW 20 MIN: CPT | Performed by: STUDENT IN AN ORGANIZED HEALTH CARE EDUCATION/TRAINING PROGRAM

## 2023-01-04 NOTE — LETTER
1/4/2023         RE: Jose Carlos Bolden  [address] [address]        Dear Colleague,    Thank you for referring your patient, Jose Carlos Bolden, to the [facility] [place]. Please see a copy of my visit note below.        [facility]  Orthopaedic Surgery Consultation by Taurus Donald M.D.    Jose Carlos Bolden MRN# [id number]   Age: 67 year old YOB: 1955     Requesting physician: No ref. provider found  Adam Peace     Background history:  DX:  1. Kyphosis  2. Scoliosis  3. Restless legs syndrome  4. Vitamin B12 deficiency  5. Iron deficiency  6. Hyperlipidemia  7. Hearing loss  8. Reactive airway disease    TREATMENTS:  1. March 2014, left CATALINA, Dr. Jean [facility]           History of Present Illness:   67 year old male who is following up in our clinic today after ED visit in setting of left nondisplaced proximal femur periprosthetic fracture [place] A(g) for which nonsurgical treatment has been initiated.    Today patient is approximately 6 weeks after the ED visit.  He states he is doing progressively better.  His pain is subsided and is well controlled on a regimen of occasional Tylenol and ibuprofen.  He is ambulating with the assistance of a walker and crutches.  No physical therapy.  He is weightbearing as tolerated.  He is here for follow-up and radiographic evaluation of fracture.    Social:   Occupation: Retired -   Living situation: Lives with wife  Hobbies / Sports: cycling, walking, gardening    Smoking: No  Alcohol: Yes socially  Illicit drug use: No         Physical Exam:     EXAMINATION pertinent findings:   PSYCH: Pleasant, healthy-appearing, alert, oriented x3, cooperative. Normal mood and affect.  VITAL SIGNS: There were no vitals taken for this visit.  Reviewed nursing intake notes.   There is no height or weight on file to calculate BMI.  RESP: non labored breathing   ABD: benign, soft, non-tender, no acute  peritoneal findings  SKIN: grossly normal   LYMPHATIC: grossly normal, no adenopathy, no extremity edema  NEURO: grossly normal , no motor deficits  VASCULAR: satisfactory perfusion of all extremities   MUSCULOSKELETAL:   Gait: Not tested because of necessity to utilize crutches or walker.    Left hip range of motion not tested due to presence of underlying fracture.    Left LE:   Thigh and leg compartments soft and compressible   +Quad/TA/GSC/FHL/EHL   SILT DP/SP/Angie/Saph/Tib nerve distributions   Palpable dorsalis pedis pulse          Data:   All laboratory data reviewed  All imaging studies reviewed by me personally.    XR pelvis/hip left 1/4/2023:  My interpretation: Compared to previous imaging studies there is an previously unappreciated [place] B1 fracture present instead of previously diagnosed [place] A greater trochanteric region.  Minimally displaced.    No signs of loosening of femoral component.            Assessment and Plan:   Assessment:  67-year-old male presenting for follow-up after sustaining [place] B1 fracture around total hip arthroplasty left.  No signs of secondary displacement or loosening of femoral component.  Clinically improving.     Plan:  I extensively discussed the findings today with the patient and spouse.  We discussed that the fracture pattern is more extensive than previously appreciated.  Given his clinical presentation of continued improvement it would be my recommendation to refrain from surgical intervention.  We did discuss the potential of nonunion or loosening of components.  We discussed that given the implant design the femoral component should be fixed proximally and therefore be stable once fracture is completely healed.  He will continue to weight-bear as tolerated with walker and crutches.  No single-leg stance or active abduction. No physical therapy yet.  Pain medication as needed.   Patient and spouse understand and agree to the treatment plan as set forth.   We will follow-up with him in 6 weeks with renewed radiographic imaging studies.  If at that point in time patient is continuing to clinically improve and radiographic imaging studies do not show any signs of loosening or secondary displacement of fracture elements he will be able to increase his weightbearing back to full.      Taurus Donald MD, PhD     Adult Reconstruction  [facility] Department of Orthopaedic Surgery    Total combined visit time and work time before and after clinic visit on encounter date = 20 min

## 2023-01-13 DIAGNOSIS — E78.5 HYPERLIPIDEMIA LDL GOAL <130: ICD-10-CM

## 2023-01-16 NOTE — TELEPHONE ENCOUNTER
atorvastatin (LIPITOR) 20 MG tablet        Last Written Prescription Date:  7-  Last Fill Quantity: 90,   # refills: 1  Last Office Visit : 08-  Future Office visit:  Not on file    Routing refill request to provider for review/approval because:    Statins Protocol Failed 01/13/2023 12:27 AM   Protocol Details  LDL on file in past 12 months     Recent Labs   Lab Test 12/09/21  0918   LDL 54

## 2023-01-17 RX ORDER — ATORVASTATIN CALCIUM 20 MG/1
20 TABLET, FILM COATED ORAL DAILY
Qty: 90 TABLET | Refills: 0 | Status: SHIPPED | OUTPATIENT
Start: 2023-01-17 | End: 2023-01-23

## 2023-01-20 ENCOUNTER — LAB (OUTPATIENT)
Dept: LAB | Facility: CLINIC | Age: 68
End: 2023-01-20
Payer: COMMERCIAL

## 2023-01-20 DIAGNOSIS — E78.5 HYPERLIPIDEMIA LDL GOAL <130: ICD-10-CM

## 2023-01-20 LAB
CHOLEST SERPL-MCNC: 136 MG/DL
HDLC SERPL-MCNC: 33 MG/DL
LDLC SERPL CALC-MCNC: 81 MG/DL
NONHDLC SERPL-MCNC: 103 MG/DL
TRIGL SERPL-MCNC: 110 MG/DL

## 2023-01-20 PROCEDURE — 80061 LIPID PANEL: CPT | Performed by: PATHOLOGY

## 2023-01-20 PROCEDURE — 36415 COLL VENOUS BLD VENIPUNCTURE: CPT | Performed by: PATHOLOGY

## 2023-01-23 DIAGNOSIS — E78.5 HYPERLIPIDEMIA LDL GOAL <130: ICD-10-CM

## 2023-01-23 RX ORDER — ATORVASTATIN CALCIUM 20 MG/1
20 TABLET, FILM COATED ORAL DAILY
Qty: 90 TABLET | Refills: 3 | Status: SHIPPED | OUTPATIENT
Start: 2023-01-23 | End: 2023-08-07

## 2023-02-21 DIAGNOSIS — M16.11 PRIMARY OSTEOARTHRITIS OF RIGHT HIP: ICD-10-CM

## 2023-02-21 DIAGNOSIS — S72.102A CLOSED FRACTURE OF TROCHANTER OF LEFT FEMUR, INITIAL ENCOUNTER (H): Primary | ICD-10-CM

## 2023-02-22 ENCOUNTER — ANCILLARY PROCEDURE (OUTPATIENT)
Dept: GENERAL RADIOLOGY | Facility: CLINIC | Age: 68
End: 2023-02-22
Attending: STUDENT IN AN ORGANIZED HEALTH CARE EDUCATION/TRAINING PROGRAM
Payer: COMMERCIAL

## 2023-02-22 ENCOUNTER — TELEPHONE (OUTPATIENT)
Dept: ORTHOPEDICS | Facility: CLINIC | Age: 68
End: 2023-02-22

## 2023-02-22 ENCOUNTER — OFFICE VISIT (OUTPATIENT)
Dept: ORTHOPEDICS | Facility: CLINIC | Age: 68
End: 2023-02-22
Attending: STUDENT IN AN ORGANIZED HEALTH CARE EDUCATION/TRAINING PROGRAM
Payer: COMMERCIAL

## 2023-02-22 DIAGNOSIS — S72.102A CLOSED FRACTURE OF TROCHANTER OF LEFT FEMUR, INITIAL ENCOUNTER (H): ICD-10-CM

## 2023-02-22 DIAGNOSIS — S72.102D CLOSED FRACTURE OF TROCHANTER OF LEFT FEMUR WITH ROUTINE HEALING, SUBSEQUENT ENCOUNTER: Primary | ICD-10-CM

## 2023-02-22 DIAGNOSIS — M16.11 PRIMARY OSTEOARTHRITIS OF RIGHT HIP: ICD-10-CM

## 2023-02-22 PROCEDURE — 99213 OFFICE O/P EST LOW 20 MIN: CPT | Performed by: STUDENT IN AN ORGANIZED HEALTH CARE EDUCATION/TRAINING PROGRAM

## 2023-02-22 PROCEDURE — 73502 X-RAY EXAM HIP UNI 2-3 VIEWS: CPT | Mod: GC | Performed by: RADIOLOGY

## 2023-02-22 ASSESSMENT — HOOS JR
HOOS JR TOTAL INTERVAL SCORE: 61.82
SITTING: MILD
GOING UP OR DOWN STAIRS: MILD
RISING FROM SITTING: MILD
WALKING ON UNEVEN SURFACE: MODERATE
BENDING TO THE FLOOR TO PICK UP OBJECT: MODERATE
LYING IN BED (TURNING OVER, MAINTAINING HIP POSITION): MODERATE

## 2023-02-22 NOTE — TELEPHONE ENCOUNTER
[facility] Call Center    Phone Message    May a detailed message be left on voicemail: no     Reason for Call: Other: Patient is able to come in earlier today if possible. He can be at clinic in 15-30 minutes    Action Taken: Message routed to:  [facility]    Travel Screening: Not Applicable

## 2023-02-22 NOTE — PROGRESS NOTES
[facility]  Orthopaedic Surgery Consultation by Taurus Donald M.D.    Jose Carlos Bolden MRN# [id number]   Age: 67 year old YOB: 1955     Requesting physician: No ref. provider found  Adam Peace     Background history:  DX:  1. Kyphosis  2. Scoliosis  3. Restless legs syndrome  4. Vitamin B12 deficiency  5. Iron deficiency  6. Hyperlipidemia  7. Hearing loss  8. Reactive airway disease    TREATMENTS:  1. March 2014, left CATALINA, Dr. Jean [facility]           History of Present Illness:   67 year old male who is following up in our clinic today after ED visit in setting of left nondisplaced proximal femur periprosthetic fracture [place] A(g) for which nonsurgical treatment has been initiated.    Today patient is approximately 3 months after the ED visit.  He states he is doing progressively better.  His pain is subsided and is well controlled.  He no longer uses over-the-counter analgesics.  He is ambulating with the assistance of  crutches.  No physical therapy.  He has been weightbearing 50% over the last 6 weeks..  He is here for follow-up and radiographic evaluation of [place] B1 periprosthetic fracture.    Social:   Occupation: Retired -   Living situation: Lives with wife  Hobbies / Sports: cycling, walking, gardening    Smoking: No  Alcohol: Yes socially  Illicit drug use: No         Physical Exam:     EXAMINATION pertinent findings:   PSYCH: Pleasant, healthy-appearing, alert, oriented x3, cooperative. Normal mood and affect.  VITAL SIGNS: There were no vitals taken for this visit.  Reviewed nursing intake notes.   There is no height or weight on file to calculate BMI.  RESP: non labored breathing   ABD: benign, soft, non-tender, no acute peritoneal findings  SKIN: grossly normal   LYMPHATIC: grossly normal, no adenopathy, no extremity edema  NEURO: grossly normal , no motor deficits  VASCULAR: satisfactory perfusion of all extremities   MUSCULOSKELETAL:    Gait: Not tested because of necessity to utilize crutches or walker.    Left hip range of motion not tested due to presence of underlying fracture.    Left LE:   Thigh and leg compartments soft and compressible   +Quad/TA/GSC/FHL/EHL   SILT DP/SP/Angie/Saph/Tib nerve distributions   Palpable dorsalis pedis pulse          Data:   All laboratory data reviewed  All imaging studies reviewed by me personally.    XR pelvis/hip left 2/22 2023:  My interpretation: Compared to previous imaging studies the [place] B1 fracture visualized does not show any signs of secondary displacement.  Progressive fracture healing visualized.    No signs of loosening of femoral component.            Assessment and Plan:   Assessment:  67-year-old male presenting for follow-up after sustaining [place] B1 fracture around total hip arthroplasty left.  No signs of secondary displacement or loosening of femoral component.  Clinically improving.     Plan:  I extensively discussed the findings today with the patient and spouse.  We again did discuss the potential of loosening of components.  We discussed that given the implant design the femoral component should be fixed proximally and therefore be stable once fracture is completely healed.  Based on the time interval since trauma and current clinical presentation patient can continue to increase his weightbearing back to full and discard his crutches when his gait is strong and stable enough.  Once he is able to do so he can start working on single-leg stance and active abduction as well as general strength training lower extremities.  A referral to physical therapy was provided.  Patient and spouse understand and agree to the treatment plan as set forth.  We will follow-up with him on an as-needed basis.      Taurus Donald MD, PhD     Adult Reconstruction  [facility] Department of Orthopaedic Surgery

## 2023-02-22 NOTE — TELEPHONE ENCOUNTER
I called the patient and offered them a 1:30pm appointment with Dr. Donald and they accepted. They will arrive at 1:00pm for their Xrays.    Bird Oconnell, EMT

## 2023-02-22 NOTE — NURSING NOTE
Reason For Visit:   Chief Complaint   Patient presents with     Surgical Followup     Follow up left hip       There were no vitals taken for this visit.    Pain Assessment  Patient Currently in Pain: Yes  0-10 Pain Scale: 2  Primary Pain Location: Hip (Left)  Pain Descriptors: Other (comment) (grinding)  Alleviating Factors: Rest  Aggravating Factors: Other (comment) (crosing his legs, laying on his left side)    Angela Perez ATC

## 2023-02-22 NOTE — LETTER
2/22/2023         RE: Jose Carlos Bolden  [address] [facility] [address]        Dear Colleague,    Thank you for referring your patient, Jose Carlos Bolden, to the [facility] [place]. Please see a copy of my visit note below.        [facility]  Orthopaedic Surgery Consultation by Taurus Donald M.D.    Jose Carlos Bolden MRN# [id number]   Age: 67 year old YOB: 1955     Requesting physician: No ref. provider found  Adam Peace     Background history:  DX:  1. Kyphosis  2. Scoliosis  3. Restless legs syndrome  4. Vitamin B12 deficiency  5. Iron deficiency  6. Hyperlipidemia  7. Hearing loss  8. Reactive airway disease    TREATMENTS:  1. March 2014, left CATALINA, [facility]           History of Present Illness:   67 year old male who is following up in our clinic today after ED visit in setting of left nondisplaced proximal femur periprosthetic fracture [employer] A(g) for which nonsurgical treatment has been initiated.    Today patient is approximately 3 months after the ED visit.  He states he is doing progressively better.  His pain is subsided and is well controlled.  He no longer uses over-the-counter analgesics.  He is ambulating with the assistance of  crutches.  No physical therapy.  He has been weightbearing 50% over the last 6 weeks..  He is here for follow-up and radiographic evaluation of [employer] B1 periprosthetic fracture.    Social:   Occupation: Retired -   Living situation: Lives with wife  Hobbies / Sports: cycling, walking, gardening    Smoking: No  Alcohol: Yes socially  Illicit drug use: No         Physical Exam:     EXAMINATION pertinent findings:   PSYCH: Pleasant, healthy-appearing, alert, oriented x3, cooperative. Normal mood and affect.  VITAL SIGNS: There were no vitals taken for this visit.  Reviewed nursing intake notes.   There is no height or weight on file to calculate BMI.  RESP: non labored breathing    ABD: benign, soft, non-tender, no acute peritoneal findings  SKIN: grossly normal   LYMPHATIC: grossly normal, no adenopathy, no extremity edema  NEURO: grossly normal , no motor deficits  VASCULAR: satisfactory perfusion of all extremities   MUSCULOSKELETAL:   Gait: Not tested because of necessity to utilize crutches or walker.    Left hip range of motion not tested due to presence of underlying fracture.    Left LE:   Thigh and leg compartments soft and compressible   +Quad/TA/GSC/FHL/EHL   SILT DP/SP/Angie/Saph/Tib nerve distributions   Palpable dorsalis pedis pulse          Data:   All laboratory data reviewed  All imaging studies reviewed by me personally.    XR pelvis/hip left 2/22 2023:  My interpretation: Compared to previous imaging studies the [place] B1 fracture visualized does not show any signs of secondary displacement.  Progressive fracture healing visualized.    No signs of loosening of femoral component.            Assessment and Plan:   Assessment:  67-year-old male presenting for follow-up after sustaining [place] B1 fracture around total hip arthroplasty left.  No signs of secondary displacement or loosening of femoral component.  Clinically improving.     Plan:  I extensively discussed the findings today with the patient and spouse.  We again did discuss the potential of loosening of components.  We discussed that given the implant design the femoral component should be fixed proximally and therefore be stable once fracture is completely healed.  Based on the time interval since trauma and current clinical presentation patient can continue to increase his weightbearing back to full and discard his crutches when his gait is strong and stable enough.  Once he is able to do so he can start working on single-leg stance and active abduction as well as general strength training lower extremities.  A referral to physical therapy was provided.  Patient and spouse understand and agree to the  treatment plan as set forth.  We will follow-up with him on an as-needed basis.      Taurus Donald MD, PhD     Adult Reconstruction  [facility] Department of Orthopaedic Surgery

## 2023-02-24 ENCOUNTER — THERAPY VISIT (OUTPATIENT)
Dept: PHYSICAL THERAPY | Facility: CLINIC | Age: 68
End: 2023-02-24
Attending: STUDENT IN AN ORGANIZED HEALTH CARE EDUCATION/TRAINING PROGRAM
Payer: COMMERCIAL

## 2023-02-24 DIAGNOSIS — S72.102D CLOSED FRACTURE OF TROCHANTER OF LEFT FEMUR WITH ROUTINE HEALING, SUBSEQUENT ENCOUNTER: ICD-10-CM

## 2023-02-24 DIAGNOSIS — S72.002D CLOSED FRACTURE OF LEFT HIP WITH ROUTINE HEALING: ICD-10-CM

## 2023-02-24 PROCEDURE — 97161 PT EVAL LOW COMPLEX 20 MIN: CPT | Mod: GP | Performed by: PHYSICAL THERAPIST

## 2023-02-24 PROCEDURE — 97110 THERAPEUTIC EXERCISES: CPT | Mod: GP | Performed by: PHYSICAL THERAPIST

## 2023-02-24 PROCEDURE — 97530 THERAPEUTIC ACTIVITIES: CPT | Mod: GP | Performed by: PHYSICAL THERAPIST

## 2023-02-24 ASSESSMENT — ACTIVITIES OF DAILY LIVING (ADL)
SITTING_FOR_15_MINUTES: NO DIFFICULTY AT ALL
WALKING_UP_STEEP_HILLS: MODERATE DIFFICULTY
STEPPING_UP_AND_DOWN_CURBS: NO DIFFICULTY AT ALL
GOING_DOWN_1_FLIGHT_OF_STAIRS: SLIGHT DIFFICULTY
WALKING_DOWN_STEEP_HILLS: MODERATE DIFFICULTY
HOS_ADL_ITEM_SCORE_TOTAL: 43
WALKING_INITIALLY: SLIGHT DIFFICULTY
GETTING_INTO_AND_OUT_OF_AN_AVERAGE_CAR: MODERATE DIFFICULTY
GOING_UP_1_FLIGHT_OF_STAIRS: SLIGHT DIFFICULTY
STANDING_FOR_15_MINUTES: SLIGHT DIFFICULTY
LIGHT_TO_MODERATE_WORK: SLIGHT DIFFICULTY
HOS_ADL_COUNT: 17
HOS_ADL_HIGHEST_POTENTIAL_SCORE: 68
HEAVY_WORK: EXTREME DIFFICULTY
TWISTING/PIVOTING_ON_INVOLVED_LEG: MODERATE DIFFICULTY
ROLLING_OVER_IN_BED: SLIGHT DIFFICULTY
WALKING_15_MINUTES_OR_GREATER: SLIGHT DIFFICULTY
PUTTING_ON_SOCKS_AND_SHOES: MODERATE DIFFICULTY
GETTING_INTO_AND_OUT_OF_A_BATHTUB: SLIGHT DIFFICULTY
HOS_ADL_SCORE(%): 63.24
WALKING_APPROXIMATELY_10_MINUTES: SLIGHT DIFFICULTY
RECREATIONAL_ACTIVITIES: UNABLE TO DO
DEEP_SQUATTING: SLIGHT DIFFICULTY

## 2023-02-24 NOTE — PROGRESS NOTES
Physical Therapy Initial Evaluation  Subjective:  The history is provided by the patient.   Therapist Generated HPI Evaluation  Problem details: MD order 2/23/23 11/17/22 - pt presents 3 months after proximal hip fracture after fall downstairs. It was a nondisplaced proximal femur periprosthetic fracture. History of L CATALINA.   Has been using walker and crutches since this point but has been allowed to be WBAT. Reports has been performing about 50% WBing.  Per MD note yesterday, allowed to progress to FWB an discontinue assistive device as able. Yesterday began walking around house without crutches which has gone fine. Stairs with crutches has been fine, has railing. Little sore lying on sides.     Would like to return to: cycling, walking, gardening. Normally likes to walk up to 5 miles a day..         Type of problem:  Left hip.    This is a new condition.  Condition occurred with:  A fall/slip.  Where condition occurred: at home.  Patient reports pain:  Lateral.  Pain quality: pinpoint. and is intermittent.  Radiates to: occasionaly in groin. Pain is the same all the time.  Since onset symptoms are gradually improving.  Symptoms are exacerbated by weight bearing and walking  Relieved by: crutches.  Special tests included:  X-ray.  Past treatment: none since fracture. Improved with treatment: NA.  Work activity restrictions: retired.  Barriers include:  None as reported by patient.    Patient Health History  Jose Carlos Bolden being seen for Assessment post hip fracture and 3 months on crutches. Start PT..     Problem began: 11/17/2022.   Problem occurred: Fell down stairs (Holding things in both hands). Landed on hip with prosthesis.   Pain is reported as 2/10 on pain scale.  General health as reported by patient is good.  Pertinent medical history includes: depression, history of fractures, implanted device and osteoarthritis. Other medical history details: Flat feet, poor balance and flexibility..     Medical allergies:  latex. Other medical allergies details: May have some latex sensitivity..   Surgeries include:  Orthopedic surgery.    Current medications:  Other. Other medications details: Gabapentin for RLS. Also take Iron and B12 for it..    Current occupation is retired.   Primary job tasks include:  Other.   Other job/home tasks details: Basic household stuff. Also usually I do shoveling and most yardwork. I garden..                                  Objective:    Gait:  Pt brings in crutches that he ahs been using. Able to demonstrate FWB without assistive device, slight compensated trendelenburg, mild antalgic    Weight Bearing Status:  WBAT                                                      Hip Evaluation  Hip PROM:    Flexion: Left: 100   Right: 110        Internal Rotation: Left: 5    Right: 10  External Rotation: Left: 40    Right: 40              Hip Strength:    Flexion:   Left: 4+/5   +  Pain:  Right: 5/5   Pain:                    Extension:  Left: 4/5  Pain:Right: 5/5    Pain:    Abduction:  Left: 4/5     Pain:Right: 5-/5    Pain:  Adduction:  Left: 5-/5    Pain:Right: 5-/5   Pain:        Knee Extension:  Left: 5/5   +  Pain:Right: 5/5    Pain:                       General     ROS    Assessment/Plan:    Patient is a 67 year old male with left side hip complaints.    Patient has the following significant findings with corresponding treatment plan.                Diagnosis 1:  Left proximal hip fracture    Pain -  self management, education and home program  Decreased ROM/flexibility - manual therapy, therapeutic exercise and home program  Decreased strength - therapeutic exercise, therapeutic activities and home program  Impaired gait - assistive devices and home program  Decreased function - therapeutic activities and home program    Therapy Evaluation Codes:   1) History comprised of:   Personal factors that impact the plan of care:      None.    Comorbidity factors that impact the plan of care are:       Osteoarthritis.     Medications impacting care: None.  2) Examination of Body Systems comprised of:   Body structures and functions that impact the plan of care:      Hip.   Activity limitations that impact the plan of care are:      Stairs, Walking, Sleeping and Laying down.  3) Clinical presentation characteristics are:   Stable/Uncomplicated.  4) Decision-Making    Low complexity using standardized patient assessment instrument and/or measureable assessment of functional outcome.  Cumulative Therapy Evaluation is: Low complexity.    Previous and current functional limitations:  (See Goal Flow Sheet for this information)    Short term and Long term goals: (See Goal Flow Sheet for this information)     Communication ability:  Patient appears to be able to clearly communicate and understand verbal and written communication and follow directions correctly.  Treatment Explanation - The following has been discussed with the patient:   RX ordered/plan of care  Anticipated outcomes  Possible risks and side effects  This patient would benefit from PT intervention to resume normal activities.   Rehab potential is good.    Frequency:  1 X week, once daily  Duration:  for 4 weeks tapering to 2 X a month over 8 weeks  Discharge Plan:  Achieve all LTG.  Independent in home treatment program.  Reach maximal therapeutic benefit.    Please refer to the daily flowsheet for treatment today, total treatment time and time spent performing 1:1 timed codes.

## 2023-02-24 NOTE — PROGRESS NOTES
[facility]    OUTPATIENT Physical Therapy ORTHOPEDIC EVALUATION  PLAN OF TREATMENT FOR OUTPATIENT REHABILITATION  (COMPLETE FOR INITIAL CLAIMS ONLY)  Patient's Last Name, First Name, M.I.  YOB: 1955  Jose Carlos Bolden    Provider s Name:  [facility]   Medical Record No.  [id number]   Start of Care Date:  02/24/23   Onset Date:   11/17/22   Treatment Diagnosis:  left hip fracture Medical Diagnosis:     Closed fracture of trochanter of left femur with routine healing, subsequent encounter  Closed fracture of left hip with routine healing       Goals:     02/24/23 0500   Body Part   Goals listed below are for L hip   Goal #1   Goal #1 ambulation   Previous Functional Level No restrictions   Current Functional Level Minutes patient can walk   Performance Level 1-2 minutes without crutches   STG Target Performance Minutes patient will be able to walk   Performance Level >10 min without AD   Rationale for safe household ambulation;for safe outdoor household ambulation   Due Date 03/17/23    LTG Target Performance Minutes patient will be able to  walk   Performance Level >30 min without AD, normal gait   Rationale for safe outdoor household ambulation;for safe community ambulation;to promote a healthy and active lifestyle   Due Date 05/24/23   Goal #2   Goal #2 stairs   Previous Functional Level No restrictions   Current Functional Level Ascend/descend stairs   Performance Level with crutch   STG Target Performance Ascend stairs   Performance Level full flight reciprocally, no AD   Rationale to enter/leave the house safely;to reach upper level of home safely   Due Date 03/17/23   LTG Target Performance Ascend/descend stairs   Performance Level full flight reciprocally, no AD   Rationale to reach lower level of home safely   Due Date 05/24/23         Therapy Frequency:  1x/week  for 4 weeks tapering to 2 X a month over 8 weeks  Predicted Duration of Therapy Intervention:  12 weeks    Vandana Garcia, PT                 I CERTIFY THE NEED FOR THESE SERVICES FURNISHED UNDER        THIS PLAN OF TREATMENT AND WHILE UNDER MY CARE     (Physician attestation of this document indicates review and certification of the therapy plan).                     Certification Date From:  02/24/23   Certification Date To:  05/24/23    Referring Provider:  Taurus Donald    Initial Assessment        See Epic Evaluation SOC Date: 02/24/23

## 2023-03-01 ENCOUNTER — THERAPY VISIT (OUTPATIENT)
Dept: PHYSICAL THERAPY | Facility: CLINIC | Age: 68
End: 2023-03-01
Attending: STUDENT IN AN ORGANIZED HEALTH CARE EDUCATION/TRAINING PROGRAM
Payer: COMMERCIAL

## 2023-03-01 DIAGNOSIS — S72.002D CLOSED FRACTURE OF LEFT HIP WITH ROUTINE HEALING: Primary | ICD-10-CM

## 2023-03-01 PROCEDURE — 97530 THERAPEUTIC ACTIVITIES: CPT | Mod: GP | Performed by: PHYSICAL THERAPIST

## 2023-03-01 PROCEDURE — 97110 THERAPEUTIC EXERCISES: CPT | Mod: GP | Performed by: PHYSICAL THERAPIST

## 2023-03-08 ENCOUNTER — THERAPY VISIT (OUTPATIENT)
Dept: PHYSICAL THERAPY | Facility: CLINIC | Age: 68
End: 2023-03-08
Attending: STUDENT IN AN ORGANIZED HEALTH CARE EDUCATION/TRAINING PROGRAM
Payer: COMMERCIAL

## 2023-03-08 DIAGNOSIS — S72.002D CLOSED FRACTURE OF LEFT HIP WITH ROUTINE HEALING: Primary | ICD-10-CM

## 2023-03-08 PROCEDURE — 97112 NEUROMUSCULAR REEDUCATION: CPT | Mod: GP | Performed by: PHYSICAL THERAPIST

## 2023-03-08 PROCEDURE — 97110 THERAPEUTIC EXERCISES: CPT | Mod: GP | Performed by: PHYSICAL THERAPIST

## 2023-03-08 PROCEDURE — 97530 THERAPEUTIC ACTIVITIES: CPT | Mod: GP | Performed by: PHYSICAL THERAPIST

## 2023-04-18 DIAGNOSIS — G25.81 RESTLESS LEGS SYNDROME: ICD-10-CM

## 2023-04-20 NOTE — TELEPHONE ENCOUNTER
GABAPENTIN 600 MG TABLET      Last Written Prescription Date:  10/17/22  Last Fill Quantity: 315,   # refills: 1  Last Office Visit : 8/9/22  Future Office visit:  NONE    Routing refill request to provider for review/approval because:  Refill team is not authorized to refill Gabapentin

## 2023-04-21 RX ORDER — GABAPENTIN 600 MG/1
TABLET ORAL
Qty: 315 TABLET | Refills: 1 | Status: SHIPPED | OUTPATIENT
Start: 2023-04-21 | End: 2023-08-07

## 2023-04-21 NOTE — TELEPHONE ENCOUNTER
Pt was last seen in clinic on 8/9/22. Pt last had gabapentin (NEURONTIN) 600 MG tablet refilled on 10/17/22 for a 180 day supply by PCP (per , last 90 days on 1/21/23). Due for refill 4/21/2023. Medication refill sent to pharmacy.     AGUS RUIZ RN on 4/21/2023 at 1:00 PM

## 2023-05-10 PROBLEM — S72.002D CLOSED FRACTURE OF LEFT HIP WITH ROUTINE HEALING: Status: RESOLVED | Noted: 2023-02-24 | Resolved: 2023-05-10

## 2023-05-10 NOTE — PROGRESS NOTES
Discharge Note    Progress reporting period is from Feb 24, 2023 to Mar 8, 2023.     Jose Carlos failed to return for next follow up visit and current status is unknown.  Please see information below for last relevant information on current status.  Patient seen for Rxs Used: 3 visits.  SUBJECTIVE  Subjective changes noted by patient:  Subjective: pt reports some left hip pain in L stance during R leg standing hip HEP. the single leg balance is very challenging. feels activity overall is going really well. averaging walking >2 miles daily, up to 4.5 miles one day, just very mild tolerable soreness. stairs are normal and going fine. did 7 flights of stairs down and that went well.  .  Current pain level is Current Pain level: 0/10.     Previous pain level was  Initial Pain level: 2/10.   Changes in function:  Yes (See Goal flowsheet attached for changes in current functional level)  Adverse reaction to treatment or activity: None    OBJECTIVE  Changes noted in objective findings: Objective: SL balance on L x 30 sec. ambulating without AD, normal gait, normal speed. incr challenge with dynamic balance in L stance vs right stance     ASSESSMENT/PLAN  Diagnosis: left hip fracture   Updated problem list and treatment plan:   Pain - HEP  STG/LTGs have been met or progress has been made towards goals:  Yes, please see goal flowsheet for most current information  Assessment of Progress: current status is unknown.  Last current status: Assessment of progress: Pt is progressing well   Self Management Plans:  HEP  I have re-evaluated this patient and find that the nature, scope, duration and intensity of the therapy is appropriate for the medical condition of the patient.  Jose Carlos continues to require the following intervention to meet STG and LTG's:  HEP.    Recommendations:  Discharge with current home program.  Patient to follow up with MD as needed.    Please refer to the daily flowsheet for treatment today, total treatment time and  time spent performing 1:1 timed codes.

## 2023-05-19 ENCOUNTER — OFFICE VISIT (OUTPATIENT)
Dept: OPHTHALMOLOGY | Facility: CLINIC | Age: 68
End: 2023-05-19
Attending: OPTOMETRIST
Payer: COMMERCIAL

## 2023-05-19 DIAGNOSIS — H40.003 GLAUCOMA SUSPECT, BOTH EYES: Primary | ICD-10-CM

## 2023-05-19 DIAGNOSIS — H40.003 GLAUCOMA SUSPECT OF BOTH EYES: Primary | ICD-10-CM

## 2023-05-19 DIAGNOSIS — H40.003 GLAUCOMA SUSPECT OF BOTH EYES: ICD-10-CM

## 2023-05-19 DIAGNOSIS — H04.123 DRY EYES, BILATERAL: ICD-10-CM

## 2023-05-19 PROCEDURE — G0463 HOSPITAL OUTPT CLINIC VISIT: HCPCS | Performed by: OPTOMETRIST

## 2023-05-19 PROCEDURE — 92004 COMPRE OPH EXAM NEW PT 1/>: CPT | Performed by: OPTOMETRIST

## 2023-05-19 PROCEDURE — 92133 CPTRZD OPH DX IMG PST SGM ON: CPT | Performed by: OPTOMETRIST

## 2023-05-19 PROCEDURE — 92083 EXTENDED VISUAL FIELD XM: CPT | Performed by: OPTOMETRIST

## 2023-05-19 ASSESSMENT — CONF VISUAL FIELD
OD_SUPERIOR_NASAL_RESTRICTION: 0
OS_NORMAL: 1
OS_INFERIOR_NASAL_RESTRICTION: 0
OD_INFERIOR_TEMPORAL_RESTRICTION: 0
OD_INFERIOR_NASAL_RESTRICTION: 0
OS_SUPERIOR_TEMPORAL_RESTRICTION: 0
OD_SUPERIOR_TEMPORAL_RESTRICTION: 0
OS_INFERIOR_TEMPORAL_RESTRICTION: 0
OS_SUPERIOR_NASAL_RESTRICTION: 0
OD_NORMAL: 1

## 2023-05-19 ASSESSMENT — REFRACTION_WEARINGRX
OD_SPHERE: +0.50
OS_SPHERE: +0.75
OS_AXIS: 041
OD_AXIS: 153
OD_CYLINDER: +1.50
OS_CYLINDER: +0.75

## 2023-05-19 ASSESSMENT — TONOMETRY
OS_IOP_MMHG: 16
IOP_METHOD: TONOPEN
OD_IOP_MMHG: 22

## 2023-05-19 ASSESSMENT — VISUAL ACUITY
METHOD: SNELLEN - LINEAR
OD_CC: 20/20
OS_CC: 20/20
OD_CC+: -1

## 2023-05-19 NOTE — PROGRESS NOTES
Assessment & Plan       Jose Carlos Bolden is a 67 year old male with the following diagnoses:   1. Glaucoma suspect, both eyes    2. Dry eyes, bilateral - Both Eyes    3. Glaucoma suspect of both eyes          VF right eye and left eye increase in superior defect right eye and depression left eye   NFL decrease in right eye   Dry eyes controlled  Consult Glaucoma dept     Patient disposition:   Return for Follow Up.          Complete documentation of historical and exam elements from today's encounter can be found in the full encounter summary report (not reduplicated in this progress note). I personally obtained the chief complaint(s) and history of present illness.  I confirmed and edited as necessary the review of systems, past medical/surgical history, family history, social history, and examination findings as documented by others; and I examined the patient myself. I personally reviewed the relevant tests, images, and reports as documented above. I formulated and edited as necessary the assessment and plan and discussed the findings and management plan with the patient and family.  Dr. Aneesh Patel

## 2023-05-19 NOTE — NURSING NOTE
Chief Complaints and History of Present Illnesses   Patient presents with     Follow Up     1 year follow up Glaucoma Suspect  Eye water at times, wearing distance more lately   Systane prn   Elizabeth Gravitant COA 8:13 AM May 19, 2023          Chief Complaint(s) and History of Present Illness(es)     Follow Up            Laterality: both eyes    Associated symptoms: tearing, floaters and itching.  Negative for pain with eye movement, photophobia and flashes    Treatments tried: artificial tears    Pain scale: 0/10    Comments: 1 year follow up Glaucoma Suspect  Eye water at times, wearing distance more lately   Systane prn   Elizabeth Gravitant [facility] 8:13 AM May 19, 2023

## 2023-05-19 NOTE — PATIENT INSTRUCTIONS
VF right eye and left eye increase in superior defect right eye and depression left eye   NFL decrease in right eye   Dry eyes controlled  Consult Glaucoma dept

## 2023-05-22 ENCOUNTER — ALLIED HEALTH/NURSE VISIT (OUTPATIENT)
Dept: OPHTHALMOLOGY | Facility: CLINIC | Age: 68
End: 2023-05-22
Attending: OPTOMETRIST
Payer: COMMERCIAL

## 2023-05-22 DIAGNOSIS — H40.003 GLAUCOMA SUSPECT OF BOTH EYES: Primary | ICD-10-CM

## 2023-05-22 PROCEDURE — 92015 DETERMINE REFRACTIVE STATE: CPT

## 2023-05-22 PROCEDURE — 999N000103 HC STATISTIC NO CHARGE FACILITY FEE

## 2023-05-22 PROCEDURE — 99207 PR NO CHARGE COORDINATED CARE PS: CPT | Performed by: OPTOMETRIST

## 2023-05-22 ASSESSMENT — REFRACTION_WEARINGRX
OD_AXIS: 170
OD_ADD: +2.50
SPECS_TYPE: PAL
OS_SPHERE: +0.50
OS_CYLINDER: SPHERE
OD_CYLINDER: +0.75
OD_SPHERE: +0.50

## 2023-05-22 ASSESSMENT — REFRACTION_MANIFEST
OS_AXIS: 155
OD_CYLINDER: +0.50
OS_ADD: +2.75
OS_SPHERE: +0.50
OS_CYLINDER: +0.50
OD_AXIS: 175
OD_ADD: +2.75
OD_SPHERE: +0.25

## 2023-05-22 ASSESSMENT — VISUAL ACUITY
CORRECTION_TYPE: GLASSES
OD_CC+: -1
OS_CC: 20/20
METHOD: SNELLEN - LINEAR
OS_CC+: -2
OD_CC: 20/20

## 2023-05-26 ASSESSMENT — SLIT LAMP EXAM - LIDS
COMMENTS: DERMATOCHALASIS
COMMENTS: DERMATOCHALASIS

## 2023-05-26 ASSESSMENT — EXTERNAL EXAM - RIGHT EYE: OD_EXAM: NORMAL

## 2023-05-26 ASSESSMENT — EXTERNAL EXAM - LEFT EYE: OS_EXAM: NORMAL

## 2023-05-26 ASSESSMENT — CUP TO DISC RATIO
OD_RATIO: 0.7
OS_RATIO: 0.55

## 2023-06-27 ENCOUNTER — NURSE TRIAGE (OUTPATIENT)
Dept: NURSING | Facility: CLINIC | Age: 68
End: 2023-06-27
Payer: COMMERCIAL

## 2023-06-27 NOTE — TELEPHONE ENCOUNTER
Pt calling. He states that he cut the back of his hand with a hatchet. He states that it wasn't very deep. It stopped bleeding after 10-15 minutes. Pt states after he started applying pressure to the area it stopped fairly quickly but may still be slightly bleeding. Pt also states that he may have gotten some cotton ball in the wound while cleaning it.    Advised HC and to present to the ED. Pt didn't think this was necessary. Discussed UC and the potential they may send him to the ED anyway and discussed UR as option.    Gaby Garcia, RN, BSN  [facility] Nurse Advisor 1:22 PM 6/27/2023      Reason for Disposition    Skin is split open or gaping (or length > 1/2 inch or 12 mm on the skin, 1/4 inch or 6 mm on the face)    Additional Information    Negative: Major bleeding (e.g., actively dripping or spurting) and can't be stopped    Negative: Amputation    Negative: Shock suspected (e.g., cold/pale/clammy skin, too weak to stand, low BP, rapid pulse)    Negative: Knife wound (or other possibly deep cut) and to chest, abdomen, back, neck, or head    Negative: Self-injury (e.g., cutting, self-harm) and suicidal or out-of-control    Negative: Sounds like a life-threatening emergency to the triager    Negative: Animal bite and broken skin    Negative: Human bite and broken skin    Negative: Puncture wound    Negative: Bleeding and won't stop after 10 minutes of direct pressure (using correct technique)    Protocols used: CUTS AND IRGEBKVUMIC-S-QT

## 2023-07-10 NOTE — PROGRESS NOTES
Chief Complaint/Presenting Concern: New glaucoma consult    History of Present Illness:   Jose Carlos Bolden is a 68 year old patient who presents for evaluation of glaucoma. Patient previously followed with Dr. Arias and Dr. Patel as glaucoma suspect, but noted changed on OCT and VF at last visit 05/2023.    07/11/23: Patient doing well today. He states several people in his family have glaucoma. He has noticed some mild decline in his overall vision recently, particularly at distance.    Relevant Past Medical/Family/Social History: HTN    Relevant Review of Systems: Negative     Diagnosis: Primary open angle glaucoma   Year diagnosis: 2023. Previously glaucoma suspect  Previous glaucoma surgery/laser: None  Maximum intraocular pressure 22/22  Currently Meds: None  Family history: positive, 2 brothers, sister, grandmother  CCT: [id number]  Gonio: Open 360, TM heavily pigmented both eyes  Refractive status: Mild hyperopia  Trauma history: positive, remote history of hitting eye with a soccer ball  Steroid exposure: negative  Vasospastic disease: Migraine/Raynaud phenomenon: negative  A past hemodynamic crisis or Low BP:: negative  Meds AEs/intolerance:  PMHx: Reactive airway disease, HLD, osteoarthritis  Anticoagulants: None     Previoius testing:  Visual field 5/19/23:   Right eye - Superior arcuate defect, scattered inferior changes, reliable   Left eye - ST changes, possible early superior arcuate defect, reliable   OCT Optic Nerve RNFL Spectralis 5/19/23  right eye: thinning IN today with increased thinning throughout compared to 2019  left eye: normal thickness 360, though thinning nasally compared to 2019  Today's testing:  IOP 22/24 with applanation  Angle open 360 on gonio with heavily pigmented TM    Additional Ocular History:     2. Early NS each eye     Plan/Recommendations:    Discussed findings with patient.    Patient previously not on any IOP lowering medications. Discussed that elevated IOP and changes on  VF/OCT RNFL are consistent with diagnosis of primary open angle glaucoma. Angles open on gonio but heavily pigmented, will assess for signs of PXF at next dilated exam.    Discussed option of starting IOP lowering gtt vs SLT laser. Patient would prefer to start drops at this time before pursuing laser.     Add new medications:  o Start latanoprost at bedtime both eyes    RTC 6 weeks for VA, IOP, possible SLT if needed     Physician Attestation     Attending Physician Attestation:  Complete documentation of historical and exam elements from today's encounter can be found in the full encounter summary report (not reduplicated in this progress note). I personally obtained the chief complaint(s) and history of present illness. I confirmed and edited as necessary the review of systems, past medical/surgical history, family history, social history, and examination findings as documented by others; and I examined the patient myself. I personally reviewed the relevant tests, images, and reports as documented above. I formulated and edited as necessary the assessment and plan and discussed the findings and management plan with the patient and any family members present at the time of the visit.  Orlando Schilling M.D., Glaucoma, July 11, 2023

## 2023-07-11 ENCOUNTER — OFFICE VISIT (OUTPATIENT)
Dept: OPHTHALMOLOGY | Facility: CLINIC | Age: 68
End: 2023-07-11
Attending: OPHTHALMOLOGY
Payer: COMMERCIAL

## 2023-07-11 DIAGNOSIS — H40.1132 PRIMARY OPEN ANGLE GLAUCOMA (POAG) OF BOTH EYES, MODERATE STAGE: Primary | ICD-10-CM

## 2023-07-11 DIAGNOSIS — H40.003 GLAUCOMA SUSPECT OF BOTH EYES: Primary | ICD-10-CM

## 2023-07-11 DIAGNOSIS — H40.003 GLAUCOMA SUSPECT OF BOTH EYES: ICD-10-CM

## 2023-07-11 PROCEDURE — 99214 OFFICE O/P EST MOD 30 MIN: CPT | Mod: GC | Performed by: OPHTHALMOLOGY

## 2023-07-11 PROCEDURE — G0463 HOSPITAL OUTPT CLINIC VISIT: HCPCS | Performed by: OPHTHALMOLOGY

## 2023-07-11 PROCEDURE — 76514 ECHO EXAM OF EYE THICKNESS: CPT | Performed by: OPHTHALMOLOGY

## 2023-07-11 PROCEDURE — 92020 GONIOSCOPY: CPT | Performed by: OPHTHALMOLOGY

## 2023-07-11 RX ORDER — LATANOPROST 50 UG/ML
1 SOLUTION/ DROPS OPHTHALMIC DAILY
Qty: 7.5 ML | Refills: 4 | Status: SHIPPED | OUTPATIENT
Start: 2023-07-11 | End: 2024-07-26

## 2023-07-11 ASSESSMENT — SLIT LAMP EXAM - LIDS
COMMENTS: DERMATOCHALASIS
COMMENTS: DERMATOCHALASIS

## 2023-07-11 ASSESSMENT — PACHYMETRY
OS_CT(UM): 567
OD_CT(UM): 576

## 2023-07-11 ASSESSMENT — CONF VISUAL FIELD
OS_INFERIOR_NASAL_RESTRICTION: 0
OD_INFERIOR_TEMPORAL_RESTRICTION: 0
OS_SUPERIOR_TEMPORAL_RESTRICTION: 0
OS_INFERIOR_TEMPORAL_RESTRICTION: 0
OD_SUPERIOR_TEMPORAL_RESTRICTION: 0
OS_SUPERIOR_NASAL_RESTRICTION: 0
OS_NORMAL: 1
OD_NORMAL: 1
OD_SUPERIOR_NASAL_RESTRICTION: 0
OD_INFERIOR_NASAL_RESTRICTION: 0
METHOD: COUNTING FINGERS

## 2023-07-11 ASSESSMENT — TONOMETRY
IOP_METHOD: TONOPEN
OS_IOP_MMHG: 23
IOP_METHOD: TONOPEN
OD_IOP_MMHG: 26
OD_IOP_MMHG: 24
IOP_METHOD: APPLANATION MB
OS_IOP_MMHG: 24
OD_IOP_MMHG: 22
OS_IOP_MMHG: 24

## 2023-07-11 ASSESSMENT — REFRACTION_WEARINGRX
OS_ADD: +2.75
SPECS_TYPE: PAL
OS_AXIS: 011
OD_SPHERE: +0.50
OS_CYLINDER: +0.25
OD_CYLINDER: +0.50
OD_ADD: +2.75
OS_SPHERE: +1.00
OD_AXIS: 167

## 2023-07-11 ASSESSMENT — EXTERNAL EXAM - LEFT EYE: OS_EXAM: NORMAL

## 2023-07-11 ASSESSMENT — CUP TO DISC RATIO
OD_RATIO: 0.7
OS_RATIO: 0.55

## 2023-07-11 ASSESSMENT — EXTERNAL EXAM - RIGHT EYE: OD_EXAM: NORMAL

## 2023-07-11 ASSESSMENT — VISUAL ACUITY
OD_CC+: -2
METHOD: SNELLEN - LINEAR
OS_CC+: -1+2
OD_CC: 20/20
CORRECTION_TYPE: GLASSES
OS_CC: 20/25

## 2023-07-11 NOTE — NURSING NOTE
Chief Complaints and History of Present Illnesses   Patient presents with     Glaucoma Suspect Evaluation     Chief Complaint(s) and History of Present Illness(es)     Glaucoma Suspect Evaluation            Laterality: both eyes    Associated symptoms: Negative for double vision, eye pain and photophobia    Pain scale: 0/10          Comments    Vision has been stable since last visit.   No eye pain or discomfort today.   Uses AT PRN for dryness.    Rosas Calderon 9:21 AM July 11, 2023

## 2023-08-07 ENCOUNTER — OFFICE VISIT (OUTPATIENT)
Dept: INTERNAL MEDICINE | Facility: CLINIC | Age: 68
End: 2023-08-07
Payer: COMMERCIAL

## 2023-08-07 VITALS
WEIGHT: 134.8 LBS | HEART RATE: 59 BPM | OXYGEN SATURATION: 96 % | BODY MASS INDEX: 20.5 KG/M2 | DIASTOLIC BLOOD PRESSURE: 70 MMHG | SYSTOLIC BLOOD PRESSURE: 122 MMHG

## 2023-08-07 DIAGNOSIS — Z00.00 ENCOUNTER FOR MEDICARE ANNUAL WELLNESS EXAM: Primary | ICD-10-CM

## 2023-08-07 DIAGNOSIS — G25.81 RESTLESS LEGS SYNDROME: ICD-10-CM

## 2023-08-07 DIAGNOSIS — E78.5 HYPERLIPIDEMIA LDL GOAL <130: ICD-10-CM

## 2023-08-07 DIAGNOSIS — L82.0 INFLAMED SEBORRHEIC KERATOSIS: ICD-10-CM

## 2023-08-07 PROCEDURE — G0439 PPPS, SUBSEQ VISIT: HCPCS | Performed by: INTERNAL MEDICINE

## 2023-08-07 PROCEDURE — 17110 DESTRUCTION B9 LES UP TO 14: CPT | Performed by: INTERNAL MEDICINE

## 2023-08-07 RX ORDER — GABAPENTIN 600 MG/1
TABLET ORAL
Qty: 360 TABLET | Refills: 3 | Status: SHIPPED | OUTPATIENT
Start: 2023-08-07 | End: 2024-08-19

## 2023-08-07 RX ORDER — ATORVASTATIN CALCIUM 20 MG/1
20 TABLET, FILM COATED ORAL DAILY
Qty: 90 TABLET | Refills: 3 | Status: SHIPPED | OUTPATIENT
Start: 2023-08-07

## 2023-08-07 ASSESSMENT — ENCOUNTER SYMPTOMS
HOARSE VOICE: 0
MUSCLE CRAMPS: 1
SMELL DISTURBANCE: 0
NECK PAIN: 1
EYE REDNESS: 0
HEADACHES: 0
TREMORS: 0
NUMBNESS: 0
TASTE DISTURBANCE: 0
SEIZURES: 0
DOUBLE VISION: 0
WEAKNESS: 0
MYALGIAS: 1
SORE THROAT: 0
SPEECH CHANGE: 0
TROUBLE SWALLOWING: 0
SINUS CONGESTION: 0
EYE IRRITATION: 1
JOINT SWELLING: 0
EYE WATERING: 1
LOSS OF CONSCIOUSNESS: 0
TINGLING: 0
DIZZINESS: 0
PARALYSIS: 0
ARTHRALGIAS: 0
SINUS PAIN: 0
DISTURBANCES IN COORDINATION: 0
EYE PAIN: 0
MEMORY LOSS: 1
BACK PAIN: 1
MUSCLE WEAKNESS: 0
STIFFNESS: 1
NECK MASS: 0

## 2023-08-07 ASSESSMENT — ASTHMA QUESTIONNAIRES: ACT_TOTALSCORE: 23

## 2023-08-07 NOTE — PROGRESS NOTES
"    Medicare Annual Wellness Questionnaire:  This 68 year old year old male presents for a Medicare Wellness Exam.    Providers/clinics involved in care:  Patient Care Team         Relationship Specialty Notifications Start End    Annalisa Sauceda MD PCP - General Internal Medicine  2/24/23     Phone: [phone] Fax: [phone]         [address] [place] [address]    Deneen Burton APRN CNP Nurse Practitioner Nurse Practitioner - Gerontology  9/15/20     Phone: [phone] Fax: [phone]         [place] [address]    Arron Cooley MD MD Orthopedics  9/15/20     Phone: [phone] Fax: [phone]         [address] [place] [address]    Brenda Arias MD MD Ophthalmology  4/16/21     Phone: [phone] Fax: [phone]         [address] [place] [address]    Sonal Herndon MD Assigned Surgical Provider   1/16/22     Phone: [phone] Pager: [phone] Fax: [phone]        [facility] [address] [place], [place] [id number] [place] [address]    Ariella Corrales AuD Audiologist Audiology  4/27/22     Phone: [phone] Fax: [phone]         [address] [place] [address]    Taurus Donald MD Assigned Musculoskeletal Provider   11/26/22     Phone: [phone] Fax: [phone]         [address] [place] [address]    Annalisa Sauceda MD Assigned PCP   2/25/23     Phone: [phone] Fax: [phone]         [address] [place] [address]    Aneesh Patel OD  Optometry  5/18/23     Phone: [phone] Fax: [phone]         [facility] [address]            Fall Risk Assessment:    Have you fallen 2 or more times in the last year?  Yes     How many times were you injured due to a fall in the last year?  Patient answered \"yes to falls with injury\"  PHQ-2:    Over the last 2 weeks, how often have you been bothered by feeling down, depressed, or hopeless?     Not at all " (0)     Over the last 2 weeks, how often have you had little interest or pleasure in doing things?     Not at all (0)     Social History:    What is your marital status?      Who lives in your household?  Wife and I    Does your home have loose rugs in the hallway:     Yes     Does your home have grab bars in the bathroom:    Yes     Does your home have handrails on the stairs?  Yes     Does your home have poorly lit areas?    No    Do you feel threatened or controlled by a partner, ex-partner or anyone in your life?   No    Has anyone hurt you physically, for example by pushing, hitting, slapping or kicking you   or forcing you to have sex?   No    Do you need help with the phone, transportation, shopping, preparing meals, housework, laundry, medications or managing money?   No    Sexual Health:    Are you sexually active?    Yes   If yes, with men, women, or both?   Women     If yes, how many partners?  1    If yes, are you using condoms?    No    Have you had any sexually transmitted infections in the last year?   No    Do you have any sexual concerns?    No      General Health Assessment:    Have you noticed any hearing difficulties?   Yes     Do you wear hearing aids?   Yes     Have you seen a hearing professional such as an audiologist in the last 1 year?   No    Do you have vision difficulty?    Yes     Do you wear glasses or contacts?   Yes     Have you seen an eye doctor in the last 1 year?   Yes     How many servings of fruits and vegetables do you eat a day?  4    How often do you exercise in a week?  7    How long and what kind of exercise do you do?  Minimum 35 minutes, walking/PT/gardening/bike rides    Tobacco and Alcohol History:    Do you use tobacco/nicotine products?    No    If yes, please list the method of use and average weekly consumption?      Do you use any other drugs?   No         Do you drink alcohol?   Yes     If you drink alcohol, how many drinks per week?  6      Advance  Directive:    Have you completed an Advance Directives document?  Yes     If yes, have you given a copy to the clinic?   unsure    Do you need information on Advance Directives?   No

## 2023-08-07 NOTE — NURSING NOTE
Jose Carlos Bolden is a 68 year old male patient that presents today in clinic for the following:    Chief Complaint   Patient presents with    Physical     Moles on chest and back per pt, some cancer concern      The patient's allergies and medications were reviewed as noted. A set of vitals were recorded as noted without incident: /70 (BP Location: Right arm, Patient Position: Sitting, Cuff Size: Adult Regular)   Pulse 59   Wt 61.1 kg (134 lb 12.8 oz)   SpO2 96%   BMI 20.50 kg/m  . The patient does not have any other questions for the provider.    Lisa Ayala, EMT at 7:58 AM on 8/7/2023

## 2023-08-07 NOTE — PROGRESS NOTES
"History of Present Illness:  Mr. Bolden is a 68 year old male who presents for  Chief Complaint   Patient presents with    Physical     Moles on chest and back per pt, some cancer concern      Unfortunately, broke hips around prothesis last winter after slipping on ice, Managed nonoperatively, states it's feeling okay, only \"dull pain\", still able to bike, garden    Unfortunately, diagnosed with glaucoma, may get surgery    Wonders about giving blood, has been anemic in past, currently not taking iron    He does have LUTS, leaking, trouble stopping stream, some ED, not interested in meds yet    Never smoker  6 drinks per week  Up to date on COVID vaccinations    Routine Health Maintenence:  PSA:   PSA   Date Value Ref Range Status   11/12/2020 0.90 0 - 4 ug/L Final     Comment:     Assay Method:  Chemiluminescence using Siemens Vista analyzer      AAA Screening (65-75 yrs): n/a  Lung Ca Screening (>20 pk age 50-80): n/a never smoker  Colonoscopy (45-75 yrs): 12/20, follow-up 7 years, due 12/27  Dexa (>65W or 70M yrs): n/a        Review of external notes as documented above                   A detailed Review of Systems was performed, verified and is negative except as documented in the HPI.  All health questionnaires were reviewed, verified and relevant information documented above.    Past Medical History:  Past Medical History:   Diagnosis Date    Allergy     sulfa, penicillium, food (tomatillos, ground cherries, some potato chips)    Back pain     Glaucoma suspect     Hearing loss     Hip pain, left     Hyperlipidemia LDL goal <130     Iron deficiency     Kyphosis     Osteoarthritis     Reactive airway disease     Restless leg syndrome     Scoliosis     Vitamin B12 deficiency        Past Surgical History:  Past Surgical History:   Procedure Laterality Date    COLONOSCOPY      Findings: normal    COLONOSCOPY N/A 12/17/2020    Procedure: COLONOSCOPY, WITH POLYPECTOMY AND BIOPSY;  Surgeon: Marvel Cook MD;  " Location: [facility] OR    JOINT REPLACEMENT Left     hip, CATALINA    VASECTOMY         Active Meds:  Current Outpatient Medications   Medication    albuterol (PROAIR HFA) 108 (90 BASE) MCG/ACT Inhaler    atorvastatin (LIPITOR) 20 MG tablet    Cyanocobalamin (VITAMIN B12) 1000 MCG TBCR    diphenhydrAMINE (BENADRYL) 25 MG capsule    ferrous gluconate (FERGON) 324 (38 Fe) MG tablet    gabapentin (NEURONTIN) 600 MG tablet    ketoconazole (NIZORAL) 2 % external cream    latanoprost (XALATAN) 0.005 % ophthalmic solution    metroNIDAZOLE (NORITATE) 1 % cream    triamcinolone (KENALOG) 0.1 % cream     Current Facility-Administered Medications   Medication    lidocaine (PF) (XYLOCAINE) 1 % injection 4 mL    triamcinolone (KENALOG-40) injection 40 mg        Allergies:  Cats, Feathers, Food, Seasonal allergies, Smoke., Sulfa antibiotics, and Penicillium notatum allergy skin test    Family History:  family history includes Allergy (Severe) in his mother; Anemia in his brother, mother, sister, sister, and sister; Brain Cancer in his mother; Breast Cancer in his sister; Cancer in his paternal grandmother; Coronary Artery Disease in his paternal grandmother; Depression in his mother and sister; Emphysema in his father; Glaucoma in his brother, father, and sister; Heart Disease in his father, maternal grandmother, paternal grandfather, and sister; Hyperlipidemia in his brother, brother, brother, brother, father, sister, sister, and sister; Neurologic Disorder in his brother, brother, father, and sister; Osteoporosis in his brother, mother, sister, sister, sister, and sister; Other - See Comments in his maternal grandfather and sister; Pancreatic Cancer in his sister; Prostate Problems in his brother and brother; Vascular Disease in his brother and sister.    Social History:  Social History     Tobacco Use    Smoking status: Never    Smokeless tobacco: Never   Substance Use Topics    Alcohol use: Yes     Comment: 7 drinks per week on average     Drug use: No       Physical Exam:  Vitals: /70 (BP Location: Right arm, Patient Position: Sitting, Cuff Size: Adult Regular)   Pulse 59   Wt 61.1 kg (134 lb 12.8 oz)   SpO2 96%   BMI 20.50 kg/m    Constitutional: Alert, oriented, pleasant, no acute distress  Head: Normocephalic, atraumatic  Eyes: Extra-ocular movements intact, pupils equally round and reactive bilaterally, no scleral icterus  ENT: Oropharynx clear, moist mucus membranes, good dentition  Neck: Supple, no lymphadenopathy  Cardiovascular: Regular rate and rhythm, no murmurs, rubs or gallops, peripheral pulses full/symmetric  Respiratory: Good air movement bilaterally, lungs clear, no wheezes/rales/rhonchi  GI: Abdomen soft, bowel sounds present, nondistended, nontender, no organomegaly or masses, no rebound/guarding  Musculoskeletal: No edema, normal muscle tone, normal gait  Neurologic: Alert and oriented, cranial nerves 2-12 intact, grossly non-focal  Skin: No rashes/lesions, several waxy brown stuck on lesions on trunk, few inflamed lesions on spine and one on sternum  Psychiatric: normal mentation, affect and mood      Diagnostics:  Labs reviewed in Epic          Assessment and Plan:  Jose Carlos was seen today for physical.    Diagnoses and all orders for this visit:    Encounter for Medicare annual wellness exam  Routine health care reviewed and updated as appropriate.    Hyperlipidemia LDL goal <130  -     atorvastatin (LIPITOR) 20 MG tablet; Take 1 tablet (20 mg) by mouth daily    Restless legs syndrome  -     gabapentin (NEURONTIN) 600 MG tablet; TAKE 1 TABLET EVERY MORNING AND AFTERNOON, AND 1 TABLET IN THE EVENING    Inflamed seborrheic keratosis  -     DESTRUCT BENIGN LESION, UP TO 14    Cryotherapy performed on above noted lesion located on the 3 back lesions, 1 anterior chest after informed consent obtained.  Three freeze-thaw cycles performed.  Patient tolerated the procedure well without complications.  Routine aftercare  instructions given.              Annalisa Sauceda MD  Internal Medicine              Answers submitted by the patient for this visit:  Symptoms you have experienced in the last 30 days (Submitted on 8/7/2023)  General Symptoms: No  Skin Symptoms: No  HENT Symptoms: Yes  EYE SYMPTOMS: Yes  HEART SYMPTOMS: No  LUNG SYMPTOMS: No  INTESTINAL SYMPTOMS: No  URINARY SYMPTOMS: No  REPRODUCTIVE SYMPTOMS: No  SKELETAL SYMPTOMS: Yes  BLOOD SYMPTOMS: No  NERVOUS SYSTEM SYMPTOMS: Yes  MENTAL HEALTH SYMPTOMS: No  Please answer the questions below to tell us what conditions you are experiencing: (Submitted on 8/7/2023)  Ear pain: No  Ear discharge: Yes  Hearing loss: Yes  Tinnitus: Yes  Nosebleeds: No  Congestion: No  Sinus pain: No  Trouble swallowing: No   Voice hoarseness: No  Mouth sores: No  Sore throat: No  Tooth pain: No  Gum tenderness: No  Bleeding gums: No  Change in taste: No  Change in sense of smell: No  Dry mouth: No  Hearing aid used: Yes  Neck lump: No  Please answer the questions below to tell us what conditions you are experiencing: (Submitted on 8/7/2023)  Eye pain: No  Vision loss: Yes  Dry eyes: Yes  Watery eyes: Yes  Eye bulging: No  Double vision: No  Flashing of lights: Yes  Spots: No  Floaters: Yes  Redness: No  Crossed eyes: No  Tunnel Vision: No  Yellowing of eyes: No  Eye irritation: Yes  Please answer the questions below to tell us what condition you are experiencing: (Submitted on 8/7/2023)  Back pain: Yes  Muscle aches: Yes  Neck pain: Yes  Swollen joints: No  Joint pain: No  Bone pain: Yes  Muscle cramps: Yes  Muscle weakness: No  Joint stiffness: Yes  Bone fracture: No  Please answer the questions below to tell us what condition you are experiencing: (Submitted on 8/7/2023)  Trouble with coordination: No  Dizziness or trouble with balance: No  Fainting or black-out spells: No  Memory loss: Yes  Headache: No  Seizures: No  Speech problems: No  Tingling: No  Tremor: No  Weakness: No  Difficulty walking:  No  Paralysis: No  Numbness: No

## 2023-08-07 NOTE — PROGRESS NOTES
"Unfortunately, broke hips around prothesis last winter after slipping on ice, Managed nonoperatively, states it's feeling okay, only \"dull pain\"    Unfortunately, diagnosed with glaucoma, may get surgery    Wonders about giving blood    He does have LUTS, leaking, trouble stopping stream    Never smoker  6 drinks per week  Up to date on COVID vaccinations  Answers submitted by the patient for this visit:  Symptoms you have experienced in the last 30 days (Submitted on 8/7/2023)  General Symptoms: No  Skin Symptoms: No  HENT Symptoms: Yes  EYE SYMPTOMS: Yes  HEART SYMPTOMS: No  LUNG SYMPTOMS: No  INTESTINAL SYMPTOMS: No  URINARY SYMPTOMS: No  REPRODUCTIVE SYMPTOMS: No  SKELETAL SYMPTOMS: Yes  BLOOD SYMPTOMS: No  NERVOUS SYSTEM SYMPTOMS: Yes  MENTAL HEALTH SYMPTOMS: No  Please answer the questions below to tell us what conditions you are experiencing: (Submitted on 8/7/2023)  Ear pain: No  Ear discharge: Yes  Hearing loss: Yes  Tinnitus: Yes  Nosebleeds: No  Congestion: No  Sinus pain: No  Trouble swallowing: No   Voice hoarseness: No  Mouth sores: No  Sore throat: No  Tooth pain: No  Gum tenderness: No  Bleeding gums: No  Change in taste: No  Change in sense of smell: No  Dry mouth: No  Hearing aid used: Yes  Neck lump: No  Please answer the questions below to tell us what conditions you are experiencing: (Submitted on 8/7/2023)  Eye pain: No  Vision loss: Yes  Dry eyes: Yes  Watery eyes: Yes  Eye bulging: No  Double vision: No  Flashing of lights: Yes  Spots: No  Floaters: Yes  Redness: No  Crossed eyes: No  Tunnel Vision: No  Yellowing of eyes: No  Eye irritation: Yes  Please answer the questions below to tell us what condition you are experiencing: (Submitted on 8/7/2023)  Back pain: Yes  Muscle aches: Yes  Neck pain: Yes  Swollen joints: No  Joint pain: No  Bone pain: Yes  Muscle cramps: Yes  Muscle weakness: No  Joint stiffness: Yes  Bone fracture: No  Please answer the questions below to tell us what condition " you are experiencing: (Submitted on 8/7/2023)  Trouble with coordination: No  Dizziness or trouble with balance: No  Fainting or black-out spells: No  Memory loss: Yes  Headache: No  Seizures: No  Speech problems: No  Tingling: No  Tremor: No  Weakness: No  Difficulty walking: No  Paralysis: No  Numbness: No

## 2023-08-25 ENCOUNTER — MYC MEDICAL ADVICE (OUTPATIENT)
Dept: INTERNAL MEDICINE | Facility: CLINIC | Age: 68
End: 2023-08-25
Payer: COMMERCIAL

## 2023-08-25 ENCOUNTER — OFFICE VISIT (OUTPATIENT)
Dept: URGENT CARE | Facility: URGENT CARE | Age: 68
End: 2023-08-25
Payer: COMMERCIAL

## 2023-08-25 VITALS
BODY MASS INDEX: 19.7 KG/M2 | HEIGHT: 68 IN | WEIGHT: 130 LBS | SYSTOLIC BLOOD PRESSURE: 145 MMHG | OXYGEN SATURATION: 99 % | DIASTOLIC BLOOD PRESSURE: 75 MMHG | RESPIRATION RATE: 16 BRPM | HEART RATE: 55 BPM | TEMPERATURE: 98.1 F

## 2023-08-25 DIAGNOSIS — R42 DIZZINESS: Primary | ICD-10-CM

## 2023-08-25 DIAGNOSIS — R21 RASH AND NONSPECIFIC SKIN ERUPTION: ICD-10-CM

## 2023-08-25 DIAGNOSIS — R26.89 POOR BALANCE: ICD-10-CM

## 2023-08-25 DIAGNOSIS — D50.9 IRON DEFICIENCY ANEMIA, UNSPECIFIED IRON DEFICIENCY ANEMIA TYPE: ICD-10-CM

## 2023-08-25 LAB
ALBUMIN UR-MCNC: NEGATIVE MG/DL
ANION GAP SERPL CALCULATED.3IONS-SCNC: 6 MMOL/L (ref 7–15)
APPEARANCE UR: CLEAR
BILIRUB UR QL STRIP: NEGATIVE
BUN SERPL-MCNC: 15.7 MG/DL (ref 8–23)
CALCIUM SERPL-MCNC: 9.8 MG/DL (ref 8.8–10.2)
CHLORIDE SERPL-SCNC: 105 MMOL/L (ref 98–107)
COLOR UR AUTO: YELLOW
CREAT SERPL-MCNC: 0.95 MG/DL (ref 0.67–1.17)
DEPRECATED HCO3 PLAS-SCNC: 31 MMOL/L (ref 22–29)
ERYTHROCYTE [DISTWIDTH] IN BLOOD BY AUTOMATED COUNT: 14.1 % (ref 10–15)
GFR SERPL CREATININE-BSD FRML MDRD: 86 ML/MIN/1.73M2
GLUCOSE SERPL-MCNC: 91 MG/DL (ref 70–99)
GLUCOSE UR STRIP-MCNC: NEGATIVE MG/DL
HCT VFR BLD AUTO: 39.9 % (ref 40–53)
HGB BLD-MCNC: 13.2 G/DL (ref 13.3–17.7)
HGB UR QL STRIP: NEGATIVE
HOLD SPECIMEN: NORMAL
KETONES UR STRIP-MCNC: NEGATIVE MG/DL
LEUKOCYTE ESTERASE UR QL STRIP: NEGATIVE
MCH RBC QN AUTO: 32.4 PG (ref 26.5–33)
MCHC RBC AUTO-ENTMCNC: 33.1 G/DL (ref 31.5–36.5)
MCV RBC AUTO: 98 FL (ref 78–100)
NITRATE UR QL: NEGATIVE
PH UR STRIP: 6 [PH] (ref 5–7)
PLATELET # BLD AUTO: 228 10E3/UL (ref 150–450)
POTASSIUM SERPL-SCNC: 4.9 MMOL/L (ref 3.4–5.3)
RBC # BLD AUTO: 4.07 10E6/UL (ref 4.4–5.9)
SODIUM SERPL-SCNC: 142 MMOL/L (ref 136–145)
SP GR UR STRIP: 1.01 (ref 1–1.03)
UROBILINOGEN UR STRIP-ACNC: 0.2 E.U./DL
WBC # BLD AUTO: 5.1 10E3/UL (ref 4–11)

## 2023-08-25 PROCEDURE — 80048 BASIC METABOLIC PNL TOTAL CA: CPT | Performed by: PHYSICIAN ASSISTANT

## 2023-08-25 PROCEDURE — 85027 COMPLETE CBC AUTOMATED: CPT | Performed by: PHYSICIAN ASSISTANT

## 2023-08-25 PROCEDURE — 99204 OFFICE O/P NEW MOD 45 MIN: CPT | Performed by: PHYSICIAN ASSISTANT

## 2023-08-25 PROCEDURE — 36415 COLL VENOUS BLD VENIPUNCTURE: CPT | Performed by: PHYSICIAN ASSISTANT

## 2023-08-25 PROCEDURE — 81003 URINALYSIS AUTO W/O SCOPE: CPT | Performed by: PHYSICIAN ASSISTANT

## 2023-08-25 RX ORDER — TRIAMCINOLONE ACETONIDE 1 MG/G
CREAM TOPICAL 2 TIMES DAILY
Qty: 30 G | Refills: 0 | Status: SHIPPED | OUTPATIENT
Start: 2023-08-25 | End: 2023-08-25

## 2023-08-25 RX ORDER — TRIAMCINOLONE ACETONIDE 1 MG/G
CREAM TOPICAL 2 TIMES DAILY
Qty: 30 G | Refills: 0 | Status: SHIPPED | OUTPATIENT
Start: 2023-08-25

## 2023-08-25 ASSESSMENT — ENCOUNTER SYMPTOMS
NUMBNESS: 0
HEADACHES: 1
WEAKNESS: 0
DIZZINESS: 1

## 2023-08-25 NOTE — PROGRESS NOTES
Assessment & Plan        1. Dizziness    -BMP shows anion gap, mildly low. Patient will follow up with PCP for recheck. I encouraged patient to increase fluid intake. UA and CBC reassuring. RBC and Hg improved from 9 months ago. I have referred patient to Neurology for further evaluation.   - Basic metabolic panel  - UA Macroscopic with reflex to Microscopic and Culture - Clinic Collect  - CBC with Platelets and Reflex to Iron Studies; Future  - CBC with Platelets and Reflex to Iron Studies  - Adult Neurology  Referral; Future    2. Rash and nonspecific skin eruption    - triamcinolone (KENALOG) 0.1 % external cream; Apply topically 2 times daily  Dispense: 30 g; Refill: 0    3. Iron deficiency anemia, unspecified iron deficiency anemia type    - CBC with Platelets and Reflex to Iron Studies; Future  - CBC with Platelets and Reflex to Iron Studies    4. Poor balance    - Adult Neurology  Referral; Future    Results for orders placed or performed in visit on 08/25/23   Basic metabolic panel     Status: Abnormal   Result Value Ref Range    Sodium 142 136 - 145 mmol/L    Potassium 4.9 3.4 - 5.3 mmol/L    Chloride 105 98 - 107 mmol/L    Carbon Dioxide (CO2) 31 (H) 22 - 29 mmol/L    Anion Gap 6 (L) 7 - 15 mmol/L    Urea Nitrogen 15.7 8.0 - 23.0 mg/dL    Creatinine 0.95 0.67 - 1.17 mg/dL    Calcium 9.8 8.8 - 10.2 mg/dL    Glucose 91 70 - 99 mg/dL    GFR Estimate 86 >60 mL/min/1.73m2   UA Macroscopic with reflex to Microscopic and Culture - Clinic Collect     Status: Normal    Specimen: Urethra; Urine   Result Value Ref Range    Color Urine Yellow Colorless, Straw, Light Yellow, Yellow    Appearance Urine Clear Clear    Glucose Urine Negative Negative mg/dL    Bilirubin Urine Negative Negative    Ketones Urine Negative Negative mg/dL    Specific Gravity Urine 1.010 1.003 - 1.035    Blood Urine Negative Negative    pH Urine 6.0 5.0 - 7.0    Protein Albumin Urine Negative Negative mg/dL    Urobilinogen  Urine 0.2 0.2, 1.0 E.U./dL    Nitrite Urine Negative Negative    Leukocyte Esterase Urine Negative Negative    Narrative    Microscopic not indicated   CBC with Platelets and Reflex to Iron Studies     Status: Abnormal   Result Value Ref Range    WBC Count 5.1 4.0 - 11.0 10e3/uL    RBC Count 4.07 (L) 4.40 - 5.90 10e6/uL    Hemoglobin 13.2 (L) 13.3 - 17.7 g/dL    Hematocrit 39.9 (L) 40.0 - 53.0 %    MCV 98 78 - 100 fL    MCH 32.4 26.5 - 33.0 pg    MCHC 33.1 31.5 - 36.5 g/dL    RDW 14.1 10.0 - 15.0 %    Platelet Count 228 150 - 450 10e3/uL   Extra Green Top (Lithium Heparin) Tube     Status: None   Result Value Ref Range    Hold Specimen JIC    CBC with Platelets and Reflex to Iron Studies     Status: Abnormal    Narrative    The following orders were created for panel order CBC with Platelets and Reflex to Iron Studies.  Procedure                               Abnormality         Status                     ---------                               -----------         ------                     CBC with Platelets and R...[[id number]]  Abnormal            Final result               Extra Green Top (Lithium...[[id number]]                      Final result                 Please view results for these tests on the individual orders.       Patient Instructions   Follow up with Neurology for dizziness and poor balance issues    Return for Follow up, with PCP.    At the end of the encounter, I discussed results, diagnosis, medications. Discussed red flags for immediate return to clinic/ER, as well as indications for follow up if no improvement. Patient understood and agreed to plan. Patient was stable for discharge.    Evans Branch is a 68 year old male who presents to clinic today  for the following health issues:  Chief Complaint   Patient presents with    Derm Problem     X8 days of rash that started mid left arm and has spread and caused itching     Dizziness      dizziness that on Monday lasted 4 hours then went  "away, today dizziness came back      HPI     Patient reports dizziness which lasted for 4 hours, 4 days ago. Patient reports he had a hard time getting out of bed. He notes having a sensation that he was falling. He notes the dizziness is mostly gone today.  He has not been on new medications. He is also concerned about his iron levels and poor balance issues.    Patient also reports rash which started 8 days ago. Rash is on the left arm. Rash is itching. He notes he noticed the symptoms after giving blood. He believes this may be a coincidence. He also reports spending a lot of time outdoors gardening. He tried calamine lotion, OTC antibiotic and steroid cream which did not help much. He denies fever and chills.     Review of Systems   Skin:  Positive for rash.   Neurological:  Positive for dizziness and headaches. Negative for weakness and numbness.       Problem List:  2023-02: Closed fracture of left hip with routine healing  2022-11: Fall, initial encounter  2021-12: Pain of left lower leg  2018-08: Restless legs syndrome  Hearing loss  Glaucoma suspect  Reactive airway disease  Kyphosis  Scoliosis  Vitamin B12 deficiency  Osteoarthritis  Iron deficiency  Hyperlipidemia LDL goal <130      Past Medical History:   Diagnosis Date    Allergy     sulfa, penicillium, food (tomatillos, ground cherries, some potato chips)    Back pain     Glaucoma suspect     Hearing loss     Hip pain, left     Hyperlipidemia LDL goal <130     Iron deficiency     Kyphosis     Osteoarthritis     Reactive airway disease     Restless leg syndrome     Scoliosis     Vitamin B12 deficiency        Social History     Tobacco Use    Smoking status: Never    Smokeless tobacco: Never   Substance Use Topics    Alcohol use: Yes     Comment: 7 drinks per week on average           Objective    BP (!) 145/75   Pulse 55   Temp 98.1  F (36.7  C) (Temporal)   Resp 16   Ht 1.727 m (5' 8\")   Wt 59 kg (130 lb)   SpO2 99%   BMI 19.77 kg/m    Physical " Exam  Constitutional:       Appearance: Normal appearance.   HENT:      Head: Normocephalic.      Right Ear: Tympanic membrane normal.      Left Ear: Tympanic membrane normal.      Mouth/Throat:      Mouth: Mucous membranes are moist.      Pharynx: Oropharynx is clear. Uvula midline. No posterior oropharyngeal erythema.   Cardiovascular:      Rate and Rhythm: Normal rate and regular rhythm.   Pulmonary:      Effort: Pulmonary effort is normal.      Breath sounds: Normal breath sounds.   Musculoskeletal:         General: Normal range of motion.   Lymphadenopathy:      Head:      Right side of head: No submental, submandibular or tonsillar adenopathy.      Left side of head: No submental, submandibular or tonsillar adenopathy.      Cervical: No cervical adenopathy.      Right cervical: No superficial cervical adenopathy.     Left cervical: No superficial cervical adenopathy.   Skin:     General: Skin is warm and dry.      Findings: Rash present. Rash is papular.             Comments: Single papular rash on the bilateral antecubital fossa    Neurological:      General: No focal deficit present.      Mental Status: He is alert and oriented to person, place, and time.      Sensory: Sensation is intact.      Motor: Motor function is intact.      Coordination: Coordination is intact.      Gait: Gait is intact.   Psychiatric:         Mood and Affect: Mood is anxious.         Behavior: Behavior normal.              Tia Ferreira PA-C

## 2023-08-29 ENCOUNTER — OFFICE VISIT (OUTPATIENT)
Dept: OPHTHALMOLOGY | Facility: CLINIC | Age: 68
End: 2023-08-29
Attending: OPHTHALMOLOGY
Payer: COMMERCIAL

## 2023-08-29 DIAGNOSIS — H40.1134 PRIMARY OPEN ANGLE GLAUCOMA (POAG) OF BOTH EYES, INDETERMINATE STAGE: Primary | ICD-10-CM

## 2023-08-29 PROCEDURE — G0463 HOSPITAL OUTPT CLINIC VISIT: HCPCS | Performed by: OPHTHALMOLOGY

## 2023-08-29 PROCEDURE — 99214 OFFICE O/P EST MOD 30 MIN: CPT | Mod: GC | Performed by: OPHTHALMOLOGY

## 2023-08-29 ASSESSMENT — TONOMETRY
OD_IOP_MMHG: 19
OS_IOP_MMHG: 15
OD_IOP_MMHG: 20
IOP_METHOD: TONOPEN
OS_IOP_MMHG: 15
OS_IOP_MMHG: 19
IOP_METHOD: TONOPEN
IOP_METHOD: TONOPEN
OD_IOP_MMHG: 19

## 2023-08-29 ASSESSMENT — REFRACTION_WEARINGRX
OD_AXIS: 175
OS_CYLINDER: +0.50
SPECS_TYPE: PAL
OD_ADD: +2.75
OD_CYLINDER: +0.50
OS_AXIS: 155
OS_SPHERE: +0.50
OS_ADD: +2.75
OD_SPHERE: +0.25

## 2023-08-29 ASSESSMENT — CUP TO DISC RATIO
OS_RATIO: 0.55
OD_RATIO: 0.7

## 2023-08-29 ASSESSMENT — VISUAL ACUITY
OS_CC+: -1
OS_CC: 20/20
OD_CC+: +3
OD_CC: 20/25
METHOD: SNELLEN - LINEAR
CORRECTION_TYPE: GLASSES

## 2023-08-29 ASSESSMENT — EXTERNAL EXAM - RIGHT EYE: OD_EXAM: NORMAL

## 2023-08-29 ASSESSMENT — CONF VISUAL FIELD
OD_SUPERIOR_NASAL_RESTRICTION: 0
OD_INFERIOR_NASAL_RESTRICTION: 0
OD_NORMAL: 1
OD_INFERIOR_TEMPORAL_RESTRICTION: 0
OD_SUPERIOR_TEMPORAL_RESTRICTION: 0
METHOD: COUNTING FINGERS

## 2023-08-29 ASSESSMENT — EXTERNAL EXAM - LEFT EYE: OS_EXAM: NORMAL

## 2023-08-29 ASSESSMENT — SLIT LAMP EXAM - LIDS
COMMENTS: DERMATOCHALASIS
COMMENTS: DERMATOCHALASIS

## 2023-08-29 NOTE — PROGRESS NOTES
Chief Complaint/Presenting Concern: New glaucoma consult    History of Present Illness:   Jose Carlos Bolden is a 68 year old patient who presents for evaluation of glaucoma. Patient previously followed with Dr. Arias and Dr. Patel as glaucoma suspect, but noted changed on OCT and VF at last visit 05/2023.    08/29/23: Patient doing well today. Started latanoprost at last visit, doing well and getting drops in every night. Does have some difficulty getting the drops in his eyes.    Relevant Past Medical/Family/Social History: HTN    Relevant Review of Systems: Negative     Diagnosis: Primary open angle glaucoma   Year diagnosis: 2023. Previously glaucoma suspect  Previous glaucoma surgery/laser: None  Maximum intraocular pressure 22/22  Currently Meds: None  Family history: positive, 2 brothers, sister, grandmother  CCT: 576/567  Gonio: Open 360, TM heavily pigmented both eyes  Refractive status: Mild hyperopia  Trauma history: positive, remote history of hitting eye with a soccer ball  Steroid exposure: negative  Vasospastic disease: Migraine/Raynaud phenomenon: negative  A past hemodynamic crisis or Low BP:: negative  Meds AEs/intolerance:  PMHx: Reactive airway disease, HLD, osteoarthritis  Anticoagulants: None     Previoius testing:  Visual field 5/19/23:   Right eye - Superior arcuate defect, scattered inferior changes, reliable   Left eye - ST changes, possible early superior arcuate defect, reliable   OCT Optic Nerve RNFL Spectralis 5/19/23  right eye: thinning IN today with increased thinning throughout compared to 2019  left eye: normal thickness 360, though thinning nasally compared to 2019    Today's testing:  IOP 20/19 with applanation    Additional Ocular History:     2. Early NS each eye     Plan/Recommendations:  Discussed findings with patient.  Patient previously not on any IOP lowering medications. Discussed that elevated IOP and changes on VF/OCT RNFL are consistent with diagnosis of primary open angle  glaucoma. Angles open on gonio but heavily pigmented, will assess for signs of PXF at next dilated exam.  Some improvement in IOP after starting Latanoprost each eye last visit. Still above target right eye, recommend SLT right eye, risk and benefit of SLT including IOP spike discussed with patient who agrees to proceed with procedure.   Continue latanoprost at bedtime both eyes    Schedule SLT right eye     Shira Villegas MD  Resident Physician, PGY-3  Department of Ophthalmology      Physician Attestation     Attending Physician Attestation:  Complete documentation of historical and exam elements from today's encounter can be found in the full encounter summary report (not reduplicated in this progress note). I personally obtained the chief complaint(s) and history of present illness. I confirmed and edited as necessary the review of systems, past medical/surgical history, family history, social history, and examination findings as documented by others; and I examined the patient myself. I personally reviewed the relevant tests, images, and reports as documented above. I formulated and edited as necessary the assessment and plan and discussed the findings and management plan with the patient and any family members present at the time of the visit.  Orlando Schilling M.D., Glaucoma, August 29, 2023

## 2023-08-29 NOTE — NURSING NOTE
Chief Complaints and History of Present Illnesses   Patient presents with    Glaucoma Follow-Up     Follow up primary open angle glaucoma.      Chief Complaint(s) and History of Present Illness(es)       Glaucoma Follow-Up              Laterality: both eyes    Quality: blurred    Associated symptoms: headache and floaters.  Negative for eye pain, photophobia and flashes    Treatment side effects: none    Pain scale: 0/10    Comments: Follow up primary open angle glaucoma.              Comments    Eye meds: latanaprost at bedtime each eye   At first eye drops caused irritation, no irritation now.  Headache at temples today.  Left eye > right eye blurry vision.  Floaters each eye, no new floaters.    KYARA Carr 8/29/2023 9:10 AM

## 2023-09-04 ENCOUNTER — MYC MEDICAL ADVICE (OUTPATIENT)
Dept: INTERNAL MEDICINE | Facility: CLINIC | Age: 68
End: 2023-09-04
Payer: COMMERCIAL

## 2023-09-05 NOTE — CONFIDENTIAL NOTE
Would be helpful to have video visit to review labs and discuss this new concern.  Thanks,  Annalisa Sauceda MD  Internal Medicine

## 2023-09-21 ENCOUNTER — TELEPHONE (OUTPATIENT)
Dept: DERMATOLOGY | Facility: CLINIC | Age: 68
End: 2023-09-21
Payer: COMMERCIAL

## 2023-09-21 NOTE — TELEPHONE ENCOUNTER
Spoke with patient regarding rescheduling appt with Dermatology. Patient accepted new appt and is rescheduled accordingly. Patient aware of new time, date and location.

## 2023-09-25 ENCOUNTER — VIRTUAL VISIT (OUTPATIENT)
Dept: INTERNAL MEDICINE | Facility: CLINIC | Age: 68
End: 2023-09-25
Payer: COMMERCIAL

## 2023-09-25 DIAGNOSIS — D50.0 IRON DEFICIENCY ANEMIA DUE TO CHRONIC BLOOD LOSS: Primary | ICD-10-CM

## 2023-09-25 PROCEDURE — 99213 OFFICE O/P EST LOW 20 MIN: CPT | Mod: 95 | Performed by: INTERNAL MEDICINE

## 2023-09-25 NOTE — PATIENT INSTRUCTIONS
Thank you for visiting the Primary Care Center today at the [facility]! The following is some information about our clinic:     Primary Care Center Frequently-Asked Questions    (1) How do I schedule appointments at the [facility]?     Primary Care--to schedule or make changes to an existing appointment, please call our primary care line at [phone].    Labs--to schedule a lab appointment at the [facility] you can use Zimory or call [phone]. If you have a [place] location that is closer to home, you can reach out to that location for scheduling options.     Imaging--if you need to schedule a CT, X-ray, MRI, ultrasound, or other imaging study you can call [phone] to schedule at the [facility] or any other [facility] imaging location.     Referrals--if a referral to another specialty was ordered you can expect a phone call from their scheduling team. If you have not heard from them in a week, please call us or send us a Zimory message to check the status or get a scheduling number. Please note that this only applies to internal [facility] referrals. If the referral is external you would need to contact their office for scheduling.     (2) I have a question about my visit, who do I contact?     You can call us at the primary care line at [phone] to ask questions about your visit. You can also send a secure message through Zimory, which is reviewed by clinic staff. Please note that Zimory messages have a twenty-four to forty-eight business hour turnaround time and should not be used for urgent concerns.    (3) How will I get the results of my tests?    If you are signed up for [facility] all tests will be released to you within twenty-four hours of resulting. Please allow three to five days for your doctor to review your results and place a note interpreting the results. If you do not have Clickerhart you will receive your  results through mail seven to ten business days following the return of the tests. Please note that if there should be any urgent or concerning results that your doctor or their registered nurse will reach out to you the same day as the tests come back. If you have follow up questions about your results or would like to discuss the results in detail please schedule a follow up with your provider either in person or virtually.     (4) How do I get refills of my prescriptions?     You should always first contact your pharmacy for refills of your medications. If submitting a refill request on Contently, please be sure to submit the request only once--repeat requests can cause delays in refill. If you are requesting a NEW medication or a medication related to new symptoms you will need to schedule an appointment with a provider prior to approval. Please note: Routine medication refills have up to one to three business day turnaround whereas controlled substances refills have up to five to seven business day turnaround.    (5) I have new symptoms, what do I do?     If you are having an immediate medical emergency, you should dial 911 for assistance.   For anything urgent that needs to be seen within a few hours to one day you should visit a local urgent care for assistance.  For non-urgent symptoms that need to be seen within a few days to a week you can schedule with an available provider in primary care by going to Adknowledge or calling [phone].   If you are not sure how serious your symptoms are or you would like to receive medical advice you can always call [phone] to speak with a triage nurse.

## 2023-09-25 NOTE — PROGRESS NOTES
"  Jose Carlos Bolden is a 68 year old male who is being evaluated via a billable video visit for the following health issues:    Chief Complaint   Patient presents with    RECHECK    Review lab results       HPI    Mr Bolden is here to discuss iron labs.  Brother was checked by integrative doc, he had high ferritin, can be elevated inflammation.  He was taking iron supplements for 15 years or so for RLS, stopped two weeks ago. Jose Carlos says he has low iron levels, B12.  He denies family hx of hemachromatosis.  Family hx of anemia.  Normal colonoscopy in 12/20.  Normal diet, eats meat.  He wonders about donating blood typically does every 3-4 months.                                  Review of Systems   A detailed ROS was performed and was negative unless indicated in the HPI above.        Physical Exam   There were no vitals taken for this visit.    Wt Readings from Last 2 Encounters:   08/25/23 59 kg (130 lb)   08/07/23 61.1 kg (134 lb 12.8 oz)      Ht Readings from Last 2 Encounters:   08/25/23 1.727 m (5' 8\")   11/23/22 1.727 m (5' 8\")     GENERAL: healthy, alert and no distress  HEAD: Normocephalic, atraumatic  EYES: Eyes grossly normal to inspection, EOMI and conjunctivae and sclerae normal  RESP: Speaking in full sentences, unlabored, no audible wheezes or cough  SKIN: no suspicious lesions or rashes, no jaundice  NEURO: oriented, and speech normal  PSYCH: mentation appears normal, affect normal/bright          Diagnostic Test Results:  Labs reviewed in Epic            Assessment and Plan:  Jose Carlos was seen today for recheck and review lab results.    Diagnoses and all orders for this visit:    Iron deficiency anemia due to chronic blood loss  Suspect patient's brothers' condition more likely related to inflammation, however, Mr. Bolden does not display any signs or symptoms of HHC.  Likely he runs a bit iron deficient due to blood donations. Advised it's often helpful to continue iron supplements when donating and for RLS. " However, if he is interested in determining his baseline off supplements, would advise repeating labs in 3-4 months.  -ferritin, iron studies, CBC pending      Video-Visit Details    Type of service:  Video Visit    Video Start/End Time: 8:33 AM 8:53 AM    Originating Location (pt. Location): Home    Distant Location (provider location):  OFF SITE    Mode of Communication:  Video Conference via  LendingRobot or  Conversocial        Annalisa Sauceda MD  Internal Medicine      >20 minutes spent today performing chart review, history and exam, documentation and further activities as noted above.

## 2023-09-25 NOTE — NURSING NOTE
Is the patient currently in the state of [place]? YES    Visit mode:VIDEO    If the visit is dropped, the patient can be reconnected by: VIDEO VISIT: Send to e-mail at: [email or web address]    Will anyone else be joining the visit? Wife, Madeleine, is present  (If patient encounters technical issues they should call [phone])    How would you like to obtain your AVS? MyChart    Are changes needed to the allergy or medication list? Pt stated no changes to allergies and Pt stated no med changes    Reason for visit: RECHECK and Review lab results    Chanda CASTELLON

## 2023-10-13 ENCOUNTER — OFFICE VISIT (OUTPATIENT)
Dept: OPHTHALMOLOGY | Facility: CLINIC | Age: 68
End: 2023-10-13
Attending: OPHTHALMOLOGY
Payer: COMMERCIAL

## 2023-10-13 DIAGNOSIS — H40.1132 PRIMARY OPEN ANGLE GLAUCOMA (POAG) OF BOTH EYES, MODERATE STAGE: Primary | ICD-10-CM

## 2023-10-13 PROCEDURE — 65855 TRABECULOPLASTY LASER SURG: CPT | Mod: RT | Performed by: OPHTHALMOLOGY

## 2023-10-13 ASSESSMENT — REFRACTION_WEARINGRX
SPECS_TYPE: PAL
OS_CYLINDER: +0.50
OS_ADD: +2.75
OS_SPHERE: +0.50
OD_CYLINDER: +0.50
OD_AXIS: 175
OS_AXIS: 155
OD_SPHERE: +0.25
OD_ADD: +2.75

## 2023-10-13 ASSESSMENT — CONF VISUAL FIELD
OS_NORMAL: 1
OS_SUPERIOR_NASAL_RESTRICTION: 0
OD_SUPERIOR_NASAL_RESTRICTION: 0
OS_SUPERIOR_TEMPORAL_RESTRICTION: 0
OD_SUPERIOR_TEMPORAL_RESTRICTION: 0
METHOD: COUNTING FINGERS
OS_INFERIOR_NASAL_RESTRICTION: 0
OD_INFERIOR_TEMPORAL_RESTRICTION: 0
OD_NORMAL: 1
OS_INFERIOR_TEMPORAL_RESTRICTION: 0
OD_INFERIOR_NASAL_RESTRICTION: 0

## 2023-10-13 ASSESSMENT — TONOMETRY
OS_IOP_MMHG: 21
IOP_METHOD: TONOPEN
OD_IOP_MMHG: 22
IOP_METHOD: TONOPEN
OD_IOP_MMHG: 21

## 2023-10-13 ASSESSMENT — VISUAL ACUITY
OD_CC: 20/20
OS_CC: 20/20
CORRECTION_TYPE: GLASSES
METHOD: SNELLEN - LINEAR
OS_CC+: -1

## 2023-10-13 NOTE — PROGRESS NOTES
Date of Service: October 13, 2023    Attending: Orlando Schilling MD  Assistant: Mishel Park MD    Preoperative diagnosis: Glaucoma, right eye  Postoperative diagnosis: same  Procedure: Selective laser trabeculoplasty right eye  Anesthesia: Topical proparacaine  Complications: none    After informed consent was obtained and apraclonidine was given 15 minutes prior to procedure, the patient was brought to the SLT laser room and given one drop of proparacaine. The goniolens was used to view the trabecular meshwork. Selective laser trabeculoplasty was performed 360 degrees using 0.4 mJ for power setting; 110 laser burns were placed over 360 degrees. The patient tolerated the procedure well. There were no complications.  The patient was given one drop of post-procedure apraclonidine and was asked to return in 45 minutes for intraocular pressure check, which did not increase significantly and was reassuring thus the patient was able to leave the clinic.     Mishel Park M.D.  Resident Physician  Department of Ophthalmology    Orlando SOTO M.D. was the responsible Attending Physician for this clinic visit. I was present for the entire procedure of SLT in right eye. This was performed by the resident Physician and myself under my supervision without complication.

## 2023-10-13 NOTE — PROGRESS NOTES
Chief Complaint/Presenting Concern: New glaucoma consult    History of Present Illness:   Jose Carlos Bolden is a 68 year old patient who presents for evaluation of glaucoma. Patient previously followed with Dr. Arias and Dr. Patel as glaucoma suspect, but noted changed on OCT and VF at last visit 05/2023.    10/13/2023: Patient doing well today. Toleratin latanoprost and getting drops in every night. Does have some difficulty getting the drops in his eyes.    Relevant Past Medical/Family/Social History: HTN    Relevant Review of Systems: Negative     Diagnosis: Primary open angle glaucoma   Year diagnosis: 2023. Previously glaucoma suspect  Previous glaucoma surgery/laser: None  Maximum intraocular pressure 22/22  Currently Meds: None  Family history: positive, 2 brothers, sister, grandmother  CCT: 576/567  Gonio: Open 360, TM heavily pigmented both eyes  Refractive status: Mild hyperopia  Trauma history: positive, remote history of hitting eye with a soccer ball  Steroid exposure: negative  Vasospastic disease: Migraine/Raynaud phenomenon: negative  A past hemodynamic crisis or Low BP:: negative  Meds AEs/intolerance:  PMHx: Reactive airway disease, HLD, osteoarthritis  Anticoagulants: None     Previoius testing:  Visual field 5/19/23:   Right eye - Superior arcuate defect, scattered inferior changes, reliable   Left eye - ST changes, possible early superior arcuate defect, reliable   OCT Optic Nerve RNFL Spectralis 5/19/23  right eye: thinning IN today with increased thinning throughout compared to 2019  left eye: normal thickness 360, though thinning nasally compared to 2019    Today's testing:  IOP 21/21 with applanation    Additional Ocular History:     2. Early NS each eye     Plan/Recommendations:  Discussed findings with patient.  Patient previously not on any IOP lowering medications. Discussed that elevated IOP and changes on VF/OCT RNFL are consistent with diagnosis of primary open angle glaucoma. Angles open  on gonio but heavily pigmented, will assess for signs of PXF at next dilated exam.  Some improvement in IOP after starting Latanoprost each eye last visit. Still above target right eye, recommend SLT right eye, risk and benefit of SLT including IOP spike discussed with patient who agrees to proceed with procedure.   Continue latanoprost at bedtime both eyes  SLT today right eye, please see procedure note    RTC as scheduled in one week    Mishel Park M.D.  Resident Physician  Department of Ophthalmology       Physician Attestation     Attending Physician Attestation:  Complete documentation of historical and exam elements from today's encounter can be found in the full encounter summary report (not reduplicated in this progress note). I personally obtained the chief complaint(s) and history of present illness. I confirmed and edited as necessary the review of systems, past medical/surgical history, family history, social history, and examination findings as documented by others; and I examined the patient myself. I personally reviewed the relevant tests, images, and reports as documented above. I formulated and edited as necessary the assessment and plan and discussed the findings and management plan with the patient and any family members present at the time of the visit.  Orlando Schilling M.D., Glaucoma, October 17, 2023

## 2023-10-13 NOTE — NURSING NOTE
Chief Complaints and History of Present Illnesses   Patient presents with    Glaucoma Follow-Up     6 week follow up Primary open angle glaucoma (POAG) of both eyes, indeterminate stage     Chief Complaint(s) and History of Present Illness(es)       Glaucoma Follow-Up              Associated symptoms: dryness.  Negative for eye pain, flashes and floaters    Pain scale: 0/10    Comments: 6 week follow up Primary open angle glaucoma (POAG) of both eyes, indeterminate stage              Comments    Once per week, feels pressure behind both eyes, with sinus pressure.   Constant dryness both eyes   No new flashes or floaters.     Ocular meds:  Latanaprost both eyes at bedtime.     Rosas Calderon 11:05 AM October 13, 2023

## 2023-10-15 ASSESSMENT — EXTERNAL EXAM - LEFT EYE: OS_EXAM: NORMAL

## 2023-10-15 ASSESSMENT — EXTERNAL EXAM - RIGHT EYE: OD_EXAM: NORMAL

## 2023-10-15 ASSESSMENT — SLIT LAMP EXAM - LIDS
COMMENTS: DERMATOCHALASIS
COMMENTS: DERMATOCHALASIS

## 2023-10-15 ASSESSMENT — CUP TO DISC RATIO
OD_RATIO: 0.7
OS_RATIO: 0.55

## 2023-10-23 ENCOUNTER — TRANSFERRED RECORDS (OUTPATIENT)
Dept: HEALTH INFORMATION MANAGEMENT | Facility: CLINIC | Age: 68
End: 2023-10-23
Payer: COMMERCIAL

## 2023-10-24 ENCOUNTER — OFFICE VISIT (OUTPATIENT)
Dept: OPHTHALMOLOGY | Facility: CLINIC | Age: 68
End: 2023-10-24
Attending: OPHTHALMOLOGY
Payer: COMMERCIAL

## 2023-10-24 DIAGNOSIS — Z98.890 STATUS POST LASER TRABECULOPLASTY OF EYE: Primary | ICD-10-CM

## 2023-10-24 PROCEDURE — G0463 HOSPITAL OUTPT CLINIC VISIT: HCPCS | Performed by: OPHTHALMOLOGY

## 2023-10-24 PROCEDURE — 99024 POSTOP FOLLOW-UP VISIT: CPT | Performed by: OPHTHALMOLOGY

## 2023-10-24 ASSESSMENT — VISUAL ACUITY
METHOD: SNELLEN - LINEAR
OD_CC: 20/25
OD_CC+: -1
OS_CC+: +2
OS_CC: 20/25

## 2023-10-24 ASSESSMENT — CUP TO DISC RATIO
OS_RATIO: 0.55
OD_RATIO: 0.7

## 2023-10-24 ASSESSMENT — EXTERNAL EXAM - LEFT EYE: OS_EXAM: NORMAL

## 2023-10-24 ASSESSMENT — TONOMETRY
OD_IOP_MMHG: 18
IOP_METHOD: TONOPEN
OS_IOP_MMHG: 13

## 2023-10-24 ASSESSMENT — EXTERNAL EXAM - RIGHT EYE: OD_EXAM: NORMAL

## 2023-10-24 ASSESSMENT — SLIT LAMP EXAM - LIDS
COMMENTS: DERMATOCHALASIS
COMMENTS: DERMATOCHALASIS

## 2023-10-24 NOTE — NURSING NOTE
Chief Complaints and History of Present Illnesses   Patient presents with    Follow Up     Primary open angle glaucoma (POAG) of both eyes     Chief Complaint(s) and History of Present Illness(es)       Follow Up              Comments: Primary open angle glaucoma (POAG) of both eyes              Comments    S/p SLT right eye 10/13/23  No flashes, floaters eye pain or  headaches  Using :  latanoprost at bedtime both eyes    Lucia Bryan COT 12:57 PM October 24, 2023

## 2023-10-24 NOTE — PROGRESS NOTES
Chief Complaint/Presenting Concern: Glaucoma     History of Present Illness:   Jose Carlos Bolden is a 68 year old patient who presents for evaluation of glaucoma. Patient previously followed with Dr. Arias and Dr. Patel as glaucoma suspect, but noted changed on OCT and VF at last visit 05/2023.    10/24/2023: Patient doing well today. Tolerating latanoprost and getting drops in every night. Does have some difficulty getting the drops in his eyes. SLT performed to right eye 1 week ago.     Relevant Past Medical/Family/Social History: HTN    Relevant Review of Systems: Negative     Diagnosis: Primary open angle glaucoma   Year diagnosis: 2023. Previously glaucoma suspect  Previous glaucoma surgery/laser: s/p SLT right eye 10/13/23  Maximum intraocular pressure 22/22  Currently Meds: None  Family history: positive, 2 brothers, sister, grandmother  CCT: 576/567  Gonio: Open 360, TM heavily pigmented both eyes  Refractive status: Mild hyperopia  Trauma history: positive, remote history of hitting eye with a soccer ball  Steroid exposure: negative  Vasospastic disease: Migraine/Raynaud phenomenon: negative  A past hemodynamic crisis or Low BP:: negative  Meds AEs/intolerance:  PMHx: Reactive airway disease, HLD, osteoarthritis  Anticoagulants: None     Previoius testing:  Visual field 5/19/23:   Right eye - Superior arcuate defect, scattered inferior changes, reliable   Left eye - ST changes, possible early superior arcuate defect, reliable   OCT Optic Nerve RNFL Spectralis 5/19/23  right eye: thinning IN today with increased thinning throughout compared to 2019  left eye: normal thickness 360, though thinning nasally compared to 2019    Today's testing:  IOP 18/13 with applanation    Additional Ocular History:     2. Early NS each eye     Plan/Recommendations:  Discussed findings with patient.  Patient previously not on any IOP lowering medications. Discussed that elevated IOP and changes on VF/OCT RNFL are consistent with  diagnosis of primary open angle glaucoma. Angles open on gonio but heavily pigmented, will assess for signs of PXF at next dilated exam.  Some improvement in IOP after starting Latanoprost each eye last visit. SLT performed on 10/13/23  Continue latanoprost at bedtime both eyes    RTC in 4 weeks to check full effect of SLT right eye         Physician Attestation     Attending Physician Attestation:  Complete documentation of historical and exam elements from today's encounter can be found in the full encounter summary report (not reduplicated in this progress note). I personally obtained the chief complaint(s) and history of present illness. I confirmed and edited as necessary the review of systems, past medical/surgical history, family history, social history, and examination findings as documented by others; and I examined the patient myself. I personally reviewed the relevant tests, images, and reports as documented above. I formulated and edited as necessary the assessment and plan and discussed the findings and management plan with the patient and any family members present at the time of the visit.  Orlando Schilling M.D., Glaucoma, October 24, 2023

## 2023-10-28 ENCOUNTER — TRANSFERRED RECORDS (OUTPATIENT)
Dept: HEALTH INFORMATION MANAGEMENT | Facility: CLINIC | Age: 68
End: 2023-10-28
Payer: COMMERCIAL

## 2023-10-30 ENCOUNTER — THERAPY VISIT (OUTPATIENT)
Dept: PHYSICAL THERAPY | Facility: CLINIC | Age: 68
End: 2023-10-30
Payer: COMMERCIAL

## 2023-10-30 DIAGNOSIS — R42 DIZZINESS: Primary | ICD-10-CM

## 2023-10-30 PROCEDURE — 97161 PT EVAL LOW COMPLEX 20 MIN: CPT | Mod: GP | Performed by: PHYSICAL THERAPIST

## 2023-10-30 PROCEDURE — 97112 NEUROMUSCULAR REEDUCATION: CPT | Mod: GP | Performed by: PHYSICAL THERAPIST

## 2023-10-30 NOTE — PROGRESS NOTES
PHYSICAL THERAPY EVALUATION  Type of Visit: Evaluation    See electronic medical record for Abuse and Falls Screening details.    Subjective       Presenting condition or subjective complaint:  One bout of dizziness in August that was pretty bad, lasted for 4 hours.  Some dizziness there still but not daily.  Feels it now just sitting here, but did walk up the stairs.  Was ok last night.  He reports he has always had bad balance.    Date of onset: (P) 08/24/23    Relevant medical history:     Dates & types of surgery:      Prior diagnostic imaging/testing results:     MRI  Prior therapy history for the same diagnosis, illness or injury:        Prior Level of Function  Transfers: Independent  Ambulation: Independent  ADL: Independent  IADL:     Living Environment  Social support:     Type of home:   house  Stairs to enter the home:       yes  Ramp:     Stairs inside the home:         Help at home:    Equipment owned:       Employment:      Hobbies/Interests:      Patient goals for therapy:  wonders about exercises for dizziness    Pain assessment:  Some pain in neck - arthritis     Objective   Cognitive Status Examination  Orientation: Oriented to person, place and time   Level of Consciousness: Alert  Follows Commands and Answers Questions: 100% of the time  Personal Safety and Judgement: Intact  Memory: Intact    OBSERVATION:   INTEGUMENTARY: Intact  POSTURE: WNL    STRENGTH: WNLs    BED MOBILITY: WNL    TRANSFERS: WNL    WHEELCHAIR MOBILITY:     GAIT:   Level of [place]: WNL  Assistive Device(s): None  Gait Deviations: WNL  Gait Distance:   Stairs:            Pertinent visual history:   Pertinent history of current vestibular problem:   DHI:      Cervicogenic Screen    Neck ROM Abnormal - Rotation limited to 50%     Oculomotor Screen    Ocular ROM Normal   Smooth Pursuit Normal   Saccades Normal   VOR Normal   VOR Cancellation    Head Impulse Test    Convergence Testing         Infrared Goggle Exam Vestibular  Suppressant in Last 24 Hours? No  Exam Completed With: Infrared goggles   Spontaneous Nystagmus Negative   Gaze Evoked Nystagmus Negative   Head Shake Horizontal Nystagmus Negative   Positional Testing    Supine Head-Hanging Test     Left Right   [place]-Hallpike Negative Negative   Sidelying Test     HSCC Supine Roll Test Negative Negative   HSCC Forward Roll Test                    BPPV Canal(s):  Negative  BPPV Type:  Negative    Dynamic Visual Acuity (DVA)    Static Acuity (LogMar)    Horizontal Head Movement at 1 Hz (LogMar)    Horizontal Head Movement at 2 Hz (LogMar)    WNLs       Assessment & Plan   CLINICAL IMPRESSIONS  Medical Diagnosis: BPPV    Treatment Diagnosis: (P) Dizziness   Impression/Assessment: Patient is a 68 year old male with a strong bout of dizziness 2 months ago and now some very intermittent mild dizziness complaints.  Today BPPV testing is negative and all other Ocuolomotor exam is WNLs.  Issued Johnson Daroffs to assist with any residual dizziness.  He may return if strong vertigo returns to recheck BPPV.  The following significant findings have been identified: Dizziness. These impairments interfere with their ability to perform household mobility as compared to previous level of function.     Clinical Decision Making (Complexity):  Clinical Presentation: Stable/Uncomplicated  Clinical Presentation Rationale: based on medical and personal factors listed in PT evaluation  Clinical Decision Making (Complexity): Low complexity    PLAN OF CARE  Treatment Interventions:  Interventions: Gait Training, Neuromuscular Re-education, Therapeutic Activity, Therapeutic Exercise, Self-Care/Home Management    Long Term Goals     PT Goal 1  Goal Identifier: (P) HEP  Goal Description: (P) Patient will be independent with HEP to allow for continued improvements in health and wellness upon DC  Rationale: (P) to maximize safety and independence within the community  Goal Progress: (P) met  Target Date: (P)  10/30/23  Date Met: (P) 10/30/23      Frequency of Treatment: (P) 4 sessions  Duration of Treatment: (P) 8 weeks    Recommended Referrals to Other Professionals:   Education Assessment:   Learner/Method: (P) Patient  Education Comments: (P) BPPV, POC, HEP    Risks and benefits of evaluation/treatment have been explained.   Patient/Family/caregiver agrees with Plan of Care.     Evaluation Time:     PT Diana, Low Complexity Minutes (06243): (P) 20       Signing Clinician: Brenda Harrington PT      [facility]

## 2023-11-16 ENCOUNTER — MYC MEDICAL ADVICE (OUTPATIENT)
Dept: INTERNAL MEDICINE | Facility: CLINIC | Age: 68
End: 2023-11-16
Payer: COMMERCIAL

## 2023-11-24 ENCOUNTER — LAB (OUTPATIENT)
Dept: LAB | Facility: CLINIC | Age: 68
End: 2023-11-24
Attending: INTERNAL MEDICINE
Payer: COMMERCIAL

## 2023-11-24 DIAGNOSIS — D50.0 IRON DEFICIENCY ANEMIA DUE TO CHRONIC BLOOD LOSS: ICD-10-CM

## 2023-11-24 LAB
BASOPHILS # BLD AUTO: 0 10E3/UL (ref 0–0.2)
BASOPHILS NFR BLD AUTO: 1 %
EOSINOPHIL # BLD AUTO: 0.1 10E3/UL (ref 0–0.7)
EOSINOPHIL NFR BLD AUTO: 3 %
ERYTHROCYTE [DISTWIDTH] IN BLOOD BY AUTOMATED COUNT: 13.5 % (ref 10–15)
FERRITIN SERPL-MCNC: 61 NG/ML (ref 31–409)
HCT VFR BLD AUTO: 42.5 % (ref 40–53)
HGB BLD-MCNC: 14.3 G/DL (ref 13.3–17.7)
IMM GRANULOCYTES # BLD: 0 10E3/UL
IMM GRANULOCYTES NFR BLD: 0 %
IRON BINDING CAPACITY (ROCHE): 268 UG/DL (ref 240–430)
IRON SATN MFR SERPL: 38 % (ref 15–46)
IRON SERPL-MCNC: 102 UG/DL (ref 61–157)
LYMPHOCYTES # BLD AUTO: 2.1 10E3/UL (ref 0.8–5.3)
LYMPHOCYTES NFR BLD AUTO: 40 %
MCH RBC QN AUTO: 32.9 PG (ref 26.5–33)
MCHC RBC AUTO-ENTMCNC: 33.6 G/DL (ref 31.5–36.5)
MCV RBC AUTO: 98 FL (ref 78–100)
MONOCYTES # BLD AUTO: 0.4 10E3/UL (ref 0–1.3)
MONOCYTES NFR BLD AUTO: 8 %
NEUTROPHILS # BLD AUTO: 2.5 10E3/UL (ref 1.6–8.3)
NEUTROPHILS NFR BLD AUTO: 48 %
NRBC # BLD AUTO: 0 10E3/UL
NRBC BLD AUTO-RTO: 0 /100
PLATELET # BLD AUTO: 199 10E3/UL (ref 150–450)
RBC # BLD AUTO: 4.35 10E6/UL (ref 4.4–5.9)
WBC # BLD AUTO: 5.1 10E3/UL (ref 4–11)

## 2023-11-24 PROCEDURE — 82728 ASSAY OF FERRITIN: CPT | Performed by: PATHOLOGY

## 2023-11-24 PROCEDURE — 85025 COMPLETE CBC W/AUTO DIFF WBC: CPT | Performed by: PATHOLOGY

## 2023-11-24 PROCEDURE — 36415 COLL VENOUS BLD VENIPUNCTURE: CPT | Performed by: PATHOLOGY

## 2023-11-24 PROCEDURE — 83550 IRON BINDING TEST: CPT | Performed by: PATHOLOGY

## 2023-11-24 PROCEDURE — 83540 ASSAY OF IRON: CPT | Performed by: PATHOLOGY

## 2023-11-28 ENCOUNTER — OFFICE VISIT (OUTPATIENT)
Dept: OPHTHALMOLOGY | Facility: CLINIC | Age: 68
End: 2023-11-28
Attending: OPHTHALMOLOGY
Payer: COMMERCIAL

## 2023-11-28 DIAGNOSIS — Z98.890 STATUS POST LASER TRABECULOPLASTY OF EYE: Primary | ICD-10-CM

## 2023-11-28 PROCEDURE — G0463 HOSPITAL OUTPT CLINIC VISIT: HCPCS | Performed by: OPHTHALMOLOGY

## 2023-11-28 PROCEDURE — 92012 INTRM OPH EXAM EST PATIENT: CPT | Performed by: OPHTHALMOLOGY

## 2023-11-28 ASSESSMENT — EXTERNAL EXAM - LEFT EYE: OS_EXAM: NORMAL

## 2023-11-28 ASSESSMENT — VISUAL ACUITY
OD_CC: 20/20
OS_CC: 20/20
CORRECTION_TYPE: GLASSES
OD_CC+: -2
OS_CC+: -1
METHOD: SNELLEN - LINEAR

## 2023-11-28 ASSESSMENT — SLIT LAMP EXAM - LIDS
COMMENTS: DERMATOCHALASIS
COMMENTS: DERMATOCHALASIS

## 2023-11-28 ASSESSMENT — CONF VISUAL FIELD
METHOD: COUNTING FINGERS
OD_SUPERIOR_TEMPORAL_RESTRICTION: 0
OS_SUPERIOR_TEMPORAL_RESTRICTION: 0
OS_INFERIOR_NASAL_RESTRICTION: 0
OD_INFERIOR_TEMPORAL_RESTRICTION: 0
OS_SUPERIOR_NASAL_RESTRICTION: 0
OD_NORMAL: 1
OD_SUPERIOR_NASAL_RESTRICTION: 0
OD_INFERIOR_NASAL_RESTRICTION: 0
OS_INFERIOR_TEMPORAL_RESTRICTION: 0
OS_NORMAL: 1

## 2023-11-28 ASSESSMENT — REFRACTION_WEARINGRX
OS_ADD: +2.75
OD_CYLINDER: +0.50
SPECS_TYPE: PAL
OD_AXIS: 175
OS_SPHERE: +0.50
OS_AXIS: 155
OS_CYLINDER: +0.50
OD_ADD: +2.75
OD_SPHERE: +0.25

## 2023-11-28 ASSESSMENT — TONOMETRY
OD_IOP_MMHG: 12
OS_IOP_MMHG: 14
OS_IOP_MMHG: 10
OD_IOP_MMHG: 12
IOP_METHOD: APPLANATION
IOP_METHOD: TONOPEN

## 2023-11-28 ASSESSMENT — EXTERNAL EXAM - RIGHT EYE: OD_EXAM: NORMAL

## 2023-11-28 NOTE — NURSING NOTE
Chief Complaints and History of Present Illnesses   Patient presents with    Glaucoma Follow-Up     SLT right eye 10/13/2023.      Chief Complaint(s) and History of Present Illness(es)       Glaucoma Follow-Up              Laterality: right eye    Associated symptoms: dryness.  Negative for headache, photophobia, flashes and floaters    Pain scale: 0/10    Comments: SLT right eye 10/13/2023.              Comments    Eye meds: latanaprost each eye at night, AT each eye   Right eye feels irritated, off and on.  No changes with vision right eye since SLT.  Floaters left eye > right eye.    KYARA Carr 11/28/2023 11:15 AM

## 2023-11-28 NOTE — PROGRESS NOTES
Chief Complaint/Presenting Concern: Glaucoma      History of Present Illness:   Jose Carlos Bolden is a 68 year old patient who presents for evaluation of glaucoma. Patient previously followed with Dr. Arias and Dr. Patel as glaucoma suspect, but noted changed on OCT and VF at last visit 05/2023. Underwent SLT 10/13/23 right eye.     11/28/23: Patient doing well today. Tolerating latanoprost and getting drops in every night. Has some right eye irritation but otherwise doing well.     Relevant Past Medical/Family/Social History: HTN     Relevant Review of Systems: Negative     Diagnosis: Primary open angle glaucoma   Year diagnosis: 2023. Previously glaucoma suspect  Previous glaucoma surgery/laser: s/p SLT right eye 10/13/23  Maximum intraocular pressure 22/22  Currently Meds: None  Family history: positive, 2 brothers, sister, grandmother  CCT: [id number]  Gonio: Open 360, TM heavily pigmented both eyes  Refractive status: Mild hyperopia  Trauma history: positive, remote history of hitting eye with a soccer ball  Steroid exposure: negative  Vasospastic disease: Migraine/Raynaud phenomenon: negative  A past hemodynamic crisis or Low BP:: negative  Meds AEs/intolerance:  PMHx: Reactive airway disease, HLD, osteoarthritis  Anticoagulants: None      Previous testing:  Visual field 5/19/23:   Right eye - Superior arcuate defect, scattered inferior changes, reliable   Left eye - ST changes, possible early superior arcuate defect, reliable   OCT Optic Nerve RNFL Spectralis 5/19/23  right eye: thinning IN today with increased thinning throughout compared to 2019  left eye: normal thickness 360, though thinning nasally compared to 2019     Today's testing:  IOP 12/10 with applanation     Additional Ocular History:      2. Early NS each eye      Plan/Recommendations:  Discussed findings with patient.  Patient previously not on any IOP lowering medications. Discussed that elevated IOP and changes on VF/OCT RNFL are consistent with  diagnosis of primary open angle glaucoma. Angles open on gonio but heavily pigmented, will assess for signs of PXF at next dilated exam.  IOP improved to 12 mmHg right eye today now 6 weeks post-SLT on 10/13/23  Continue latanoprost at bedtime both eyes  Consider SLT left eye at next visit     RTC in 3 months for OCT RNFL and VF 24-2         Suni Messina MD  PGY3 Ophthalmology Resident  [facility]       Physician Attestation   Attending Physician Attestation:  Complete documentation of historical and exam elements from today's encounter can be found in the full encounter summary report (not reduplicated in this progress note). I personally obtained the chief complaint(s) and history of present illness. I confirmed and edited as necessary the review of systems, past medical/surgical history, family history, social history, and examination findings as documented by others; and I examined the patient myself. I personally reviewed the relevant tests, images, and reports as documented above. I formulated and edited as necessary the assessment and plan and discussed the findings and management plan with the patient and any family members present at the time of the visit.  Orlando Schilling M.D., Glaucoma, October 24, 2023

## 2024-02-28 DIAGNOSIS — H40.1132 PRIMARY OPEN ANGLE GLAUCOMA (POAG) OF BOTH EYES, MODERATE STAGE: Primary | ICD-10-CM

## 2024-03-05 ENCOUNTER — OFFICE VISIT (OUTPATIENT)
Dept: OPHTHALMOLOGY | Facility: CLINIC | Age: 69
End: 2024-03-05
Attending: OPHTHALMOLOGY
Payer: COMMERCIAL

## 2024-03-05 DIAGNOSIS — H40.1131 PRIMARY OPEN ANGLE GLAUCOMA (POAG) OF BOTH EYES, MILD STAGE: ICD-10-CM

## 2024-03-05 PROCEDURE — G0463 HOSPITAL OUTPT CLINIC VISIT: HCPCS | Performed by: OPHTHALMOLOGY

## 2024-03-05 PROCEDURE — 92133 CPTRZD OPH DX IMG PST SGM ON: CPT | Performed by: OPHTHALMOLOGY

## 2024-03-05 PROCEDURE — 99214 OFFICE O/P EST MOD 30 MIN: CPT | Mod: GC | Performed by: OPHTHALMOLOGY

## 2024-03-05 PROCEDURE — 92083 EXTENDED VISUAL FIELD XM: CPT | Performed by: OPHTHALMOLOGY

## 2024-03-05 RX ORDER — DORZOLAMIDE HYDROCHLORIDE AND TIMOLOL MALEATE 20; 5 MG/ML; MG/ML
1 SOLUTION/ DROPS OPHTHALMIC 2 TIMES DAILY
Qty: 10 ML | Refills: 4 | Status: SHIPPED | OUTPATIENT
Start: 2024-03-05

## 2024-03-05 ASSESSMENT — VISUAL ACUITY
OS_CC+: -3
OS_CC: 20/20
OD_CC: 20/25
CORRECTION_TYPE: GLASSES
METHOD: SNELLEN - LINEAR
OD_CC+: +1

## 2024-03-05 ASSESSMENT — REFRACTION_WEARINGRX
OS_ADD: +2.75
OS_SPHERE: +0.50
OD_ADD: +2.75
OD_SPHERE: +0.25
OD_CYLINDER: +0.50
OS_AXIS: 155
SPECS_TYPE: PAL
OD_AXIS: 175
OS_CYLINDER: +0.50

## 2024-03-05 ASSESSMENT — TONOMETRY
OD_IOP_MMHG: 19
OD_IOP_MMHG: 12
OS_IOP_MMHG: 11
IOP_METHOD: TONOPEN
IOP_METHOD: APPLANATION
OS_IOP_MMHG: 15

## 2024-03-05 ASSESSMENT — EXTERNAL EXAM - LEFT EYE: OS_EXAM: NORMAL

## 2024-03-05 ASSESSMENT — CUP TO DISC RATIO
OD_RATIO: 0.7
OS_RATIO: 0.55

## 2024-03-05 ASSESSMENT — EXTERNAL EXAM - RIGHT EYE: OD_EXAM: NORMAL

## 2024-03-05 ASSESSMENT — SLIT LAMP EXAM - LIDS
COMMENTS: DERMATOCHALASIS
COMMENTS: DERMATOCHALASIS

## 2024-03-05 NOTE — NURSING NOTE
"Chief Complaints and History of Present Illnesses   Patient presents with    Primary Open Angle Glaucoma Follow Up     3 month follow up for POAG each eye.     Patient states vision is stable each eye in the last few months. \"I am almost out of my 3rd bottle of the Latanoprost. If I am to continue, I will need refills.\" Patient states compliant with glaucoma drops.      Chief Complaint(s) and History of Present Illness(es)       Primary Open Angle Glaucoma Follow Up              Laterality: both eyes    Associated symptoms: tearing (excessive tearing OU, especially outside in the wind or cold) and floaters (always in OS; not new).  Negative for eye pain and flashes    Compliance with Treatment: always    Pain scale: 0/10    Comments: 3 month follow up for POAG each eye.     Patient states vision is stable each eye in the last few months. \"I am almost out of my 3rd bottle of the Latanoprost. If I am to continue, I will need refills.\" Patient states compliant with glaucoma drops.               Comments    Ocular meds:   Latanoprost at bedtime each eye     MONIE Damon 8:37 AM 03/05/2024                     "

## 2024-03-05 NOTE — PATIENT INSTRUCTIONS
Continue Latanoprost (green top)- apply once drop at bedtime to each eye   Start Cosopt twice daily right eye

## 2024-03-05 NOTE — PROGRESS NOTES
Chief Complaint/Presenting Concern: Glaucoma     History of Present Illness:   Jose Carlos Bolden is a 68 year old patient who presents for evaluation of glaucoma. Patient previously followed with Dr. Arias and Dr. Patel as glaucoma suspect, but noted changed on OCT and VF at visit 05/2023. S/p SLT right eye 10/13/23    03/05/2024: Patient doing well today. Tolerating latanoprost and getting drops in most nights. Occasional cannabis, last IOP use last night    Relevant Past Medical/Family/Social History: HTN    Relevant Review of Systems: Negative     Diagnosis: Primary open angle glaucoma   Year diagnosis: 2023. Previously glaucoma suspect  Previous glaucoma surgery/laser:   s/p SLT right eye 10/13/23  Maximum intraocular pressure 22/22  Currently Meds: None  Family history: positive, 2 brothers, sister, grandmother  CCT: 576/567  Gonio: Open 360, TM heavily pigmented both eyes  Refractive status: Mild hyperopia  Trauma history: positive, remote history of hitting eye with a soccer ball  Steroid exposure: negative  Vasospastic disease: Migraine/Raynaud phenomenon: negative  A past hemodynamic crisis or Low BP:: negative  Meds AEs/intolerance:  PMHx: Reactive airway disease, HLD, osteoarthritis  Anticoagulants: None     Previoius testing:  Updated    Today's testing:  IOP 19/15 mmHg  Visual field 03/05/24   Right eye - Superior arcuate defect, scattered inferior changes possibly progressed since 5/2023   Left eye - wnl, reliable  OCT Optic Nerve RNFL Spectralis 03/05/24   right eye: superior thinning today compared to May 2023  left eye: normal thickness 360, though possible thinning inferiorly since 2023    Additional Ocular History:   Early NS each eye     Plan/Recommendations:  Discussed findings with patient.  Patient previously not on any IOP lowering medications. Discussed that elevated IOP and changes on VF/OCT RNFL are consistent with diagnosis of primary open angle glaucoma. Angles open on gonio but heavily  pigmented  SLT right eye performed on 10/13/23, RNFL progression right eye since then   Continue latanoprost at bedtime both eyes  Start Cosopt twice daily right eye     RTC in 6 weeks [facility], IOP      Thank you for entrusting us with your care  Nubia Smith MD, PGY3  Ophthalmology Resident  [facility]      Physician Attestation     Attending Physician Attestation:  Complete documentation of historical and exam elements from today's encounter can be found in the full encounter summary report (not reduplicated in this progress note). I personally obtained the chief complaint(s) and history of present illness. I confirmed and edited as necessary the review of systems, past medical/surgical history, family history, social history, and examination findings as documented by others; and I examined the patient myself. I personally reviewed the relevant tests, images, and reports as documented above. I personally reviewed the ophthalmic test(s) associated with this encounter, agree with the interpretation(s) as documented by the resident/fellow and have edited the corresponding report(s) as necessary. I formulated and edited as necessary the assessment and plan and discussed the findings and management plan with the patient and any family members present at the time of the visit.  Orlando Schilling M.D., Glaucoma, March 5, 2024

## 2024-03-08 ENCOUNTER — TRANSFERRED RECORDS (OUTPATIENT)
Dept: HEALTH INFORMATION MANAGEMENT | Facility: CLINIC | Age: 69
End: 2024-03-08

## 2024-03-26 ENCOUNTER — OFFICE VISIT (OUTPATIENT)
Dept: DERMATOLOGY | Facility: CLINIC | Age: 69
End: 2024-03-26
Payer: COMMERCIAL

## 2024-03-26 DIAGNOSIS — L82.0 INFLAMED SEBORRHEIC KERATOSIS: ICD-10-CM

## 2024-03-26 DIAGNOSIS — D22.9 MULTIPLE NEVI: ICD-10-CM

## 2024-03-26 DIAGNOSIS — L21.9 SEBORRHEIC DERMATITIS: Primary | ICD-10-CM

## 2024-03-26 DIAGNOSIS — L57.0 ACTINIC KERATOSES: ICD-10-CM

## 2024-03-26 PROCEDURE — 99214 OFFICE O/P EST MOD 30 MIN: CPT | Mod: 25 | Performed by: PHYSICIAN ASSISTANT

## 2024-03-26 PROCEDURE — 17000 DESTRUCT PREMALG LESION: CPT | Mod: XS | Performed by: PHYSICIAN ASSISTANT

## 2024-03-26 PROCEDURE — 17111 DESTRUCTION B9 LESIONS 15/>: CPT | Performed by: PHYSICIAN ASSISTANT

## 2024-03-26 RX ORDER — HYDROCORTISONE 2.5 %
CREAM (GRAM) TOPICAL 2 TIMES DAILY
Qty: 20 G | Refills: 3 | Status: SHIPPED | OUTPATIENT
Start: 2024-03-26

## 2024-03-26 ASSESSMENT — PAIN SCALES - GENERAL: PAINLEVEL: NO PAIN (1)

## 2024-03-26 NOTE — LETTER
3/26/2024       RE: Jose Carlos Bolden  [address] [facility] [id number]     Dear Colleague,    Thank you for referring your patient, Jose Carlos Bolden, to the [facility] [place] at [facility]. Please see a copy of my visit note below.    [facility] Dermatology Note  Encounter Date: Mar 26, 2024  Office Visit     Reviewed patients past medical history and pertinent chart review prior to patients visit today.     Dermatology Problem List:  # Seborrheic dermatitis  - ketoconazole 2% cream, hydrocortisone 2.5% cream  # Actinic keratosis  - cryotherapy 3/26/2024  # Inflamed seborrheic keratosis  - cryotherapy 3/26/2024    ____________________________________________    Assessment & Plan:     # Inflamed seborrheic keratoses x17  The benign nature of the skin lesion(s) was discussed with the patient.  Due to the inflamed and irritated nature of the lesions I recommend cryotherapy and the patient was agreeable.      Cryotherapy procedure note, location(s): back x14, left flank x2, right flank x1 After verbal consent and discussion of risks and benefits including, but not limited to, dyspigmentation/scar, blister, and pain, the lesion(s) was(were) treated with 1-2 mm freeze border for 1-2 cycles with liquid nitrogen. Post cryotherapy instructions were provided.    #Actinic keratosis x1  - We discussed the precancerous nature of the skin lesions.  I recommended liquid nitrogen cryotherapy and the patient was agreeable.      Cryotherapy procedure note, location(s): right forearm x1 After verbal consent and discussion of risks and benefits including, but not limited to, dyspigmentation/scar, blister, and pain, the lesion(s) was(were) treated with 1-2 mm freeze border for 1-2 cycles with liquid nitrogen. Post cryotherapy instructions were provided.      # Seborrheic dermatitis, face  Discussed that this is a recurring condition for  most people and will need some maintenance even after rash has resolved. Plan as follows:  - ketoconazole 2% cream to daily while rash is present, 1-3 times weekly for maintenance.  - hydrocortisone 2.5% cream 1-2 times daily for up to 5 days when rash is itchy. We discussed potential side effects of topical steroids, including skin atrophy.       # Multiple nevi  - No concerning features on dermoscopy. We discussed the importance of self exams at home.   - ABCDEs: Counseled ABCDEs of melanoma: Asymmetry, Border (irregularity), Color (not uniform, changes in color), Diameter (greater than 6 mm which is about the size of a pencil eraser), and Evolving (any changes in preexisting moles).  - Sun protection: Counseled SPF 30+ sunscreen, UPF clothing, sun avoidance, tanning bed avoidance.      All risks, benefits and alternatives were discussed with patient.  Patient is in agreement and understands the assessment and plan.  All questions were answered.  Virginia Randle PA-C  [facility] Dermatology  _______________________________________    CC: Derm Problem (Jose Carlos is here today for a lesion of concern on the back and chest. He would also like his face checked.)    HPI:  Mr. Jose Carlos Bolden is a(n) 68 year old male who presents today as a return patient for irritation on his medial cheeks and eyebrows. The patient stopped using the ketoconazole cream days ago. Additionally, he requests removal of irritated lesion on his back and flank. He also requests evaluation of a lesion on the chest. Patient is otherwise feeling well, without additional skin concerns.      Physical Exam:  SKIN: Focused examination of face, ears, neck, chest, back, and forearms was performed.  - The back x14, left flank x2, right flank x1 demonstrates waxy papule(s) with an erythematous base, consistent with inflamed seborrheic keratoses.   - Greasy yellow scale with background erythema involving the eyebrows and nasolabial folds.   - Pink macule(s)  with gritty scale involving the right forearm, consistent with actinic keratosis.   -The upper chest demonstrates a 0.4 x 0.4 cm pink, fleshy papule, consistent with a intradermal nevus.    - No other lesions of concern on areas examined.     Medications:  Current Outpatient Medications   Medication    albuterol (PROAIR HFA) 108 (90 BASE) MCG/ACT Inhaler    atorvastatin (LIPITOR) 20 MG tablet    Cyanocobalamin (VITAMIN B12) 1000 MCG TBCR    dorzolamide-timolol (COSOPT) 2-0.5 % ophthalmic solution    gabapentin (NEURONTIN) 600 MG tablet    ketoconazole (NIZORAL) 2 % external cream    latanoprost (XALATAN) 0.005 % ophthalmic solution    metroNIDAZOLE (NORITATE) 1 % cream    triamcinolone (KENALOG) 0.1 % external cream    diphenhydrAMINE (BENADRYL) 25 MG capsule    ferrous gluconate (FERGON) 324 (38 Fe) MG tablet    triamcinolone (KENALOG) 0.1 % cream     Current Facility-Administered Medications   Medication    lidocaine (PF) (XYLOCAINE) 1 % injection 4 mL    triamcinolone (KENALOG-40) injection 40 mg      Past Medical History:   Patient Active Problem List   Diagnosis    Hearing loss    Glaucoma suspect    Reactive airway disease    Restless legs syndrome    Kyphosis    Scoliosis    Vitamin B12 deficiency    Osteoarthritis    Iron deficiency    Hyperlipidemia LDL goal <130    Fall, initial encounter     Past Medical History:   Diagnosis Date    Allergy     sulfa, penicillium, food (tomatillos, ground cherries, some potato chips)    Back pain     Glaucoma suspect     Hearing loss     Hip pain, left     Hyperlipidemia LDL goal <130     Iron deficiency     Kyphosis     Osteoarthritis     Reactive airway disease     Restless leg syndrome     Scoliosis     Vitamin B12 deficiency        CC Referred Self, MD  No address on file on close of this encounter.

## 2024-03-26 NOTE — PROGRESS NOTES
[facility] Dermatology Note  Encounter Date: Mar 26, 2024  Office Visit     Reviewed patients past medical history and pertinent chart review prior to patients visit today.     Dermatology Problem List:  # Seborrheic dermatitis  - ketoconazole 2% cream, hydrocortisone 2.5% cream  # Actinic keratosis  - cryotherapy 3/26/2024  # Inflamed seborrheic keratosis  - cryotherapy 3/26/2024    ____________________________________________    Assessment & Plan:     # Inflamed seborrheic keratoses x17  The benign nature of the skin lesion(s) was discussed with the patient.  Due to the inflamed and irritated nature of the lesions I recommend cryotherapy and the patient was agreeable.      Cryotherapy procedure note, location(s): back x14, left flank x2, right flank x1 After verbal consent and discussion of risks and benefits including, but not limited to, dyspigmentation/scar, blister, and pain, the lesion(s) was(were) treated with 1-2 mm freeze border for 1-2 cycles with liquid nitrogen. Post cryotherapy instructions were provided.    #Actinic keratosis x1  - We discussed the precancerous nature of the skin lesions.  I recommended liquid nitrogen cryotherapy and the patient was agreeable.      Cryotherapy procedure note, location(s): right forearm x1 After verbal consent and discussion of risks and benefits including, but not limited to, dyspigmentation/scar, blister, and pain, the lesion(s) was(were) treated with 1-2 mm freeze border for 1-2 cycles with liquid nitrogen. Post cryotherapy instructions were provided.      # Seborrheic dermatitis, face  Discussed that this is a recurring condition for most people and will need some maintenance even after rash has resolved. Plan as follows:  - ketoconazole 2% cream to daily while rash is present, 1-3 times weekly for maintenance.  - hydrocortisone 2.5% cream 1-2 times daily for up to 5 days when rash is itchy. We discussed potential side effects of topical  steroids, including skin atrophy.       # Multiple nevi  - No concerning features on dermoscopy. We discussed the importance of self exams at home.   - ABCDEs: Counseled ABCDEs of melanoma: Asymmetry, Border (irregularity), Color (not uniform, changes in color), Diameter (greater than 6 mm which is about the size of a pencil eraser), and Evolving (any changes in preexisting moles).  - Sun protection: Counseled SPF 30+ sunscreen, UPF clothing, sun avoidance, tanning bed avoidance.      All risks, benefits and alternatives were discussed with patient.  Patient is in agreement and understands the assessment and plan.  All questions were answered.  Virginia Randle PA-C  [facility] Dermatology  _______________________________________    CC: Derm Problem (Jose Carlos is here today for a lesion of concern on the back and chest. He would also like his face checked.)    HPI:  Mr. Jose Carlos Bolden is a(n) 68 year old male who presents today as a return patient for irritation on his medial cheeks and eyebrows. The patient stopped using the ketoconazole cream days ago. Additionally, he requests removal of irritated lesion on his back and flank. He also requests evaluation of a lesion on the chest. Patient is otherwise feeling well, without additional skin concerns.      Physical Exam:  SKIN: Focused examination of face, ears, neck, chest, back, and forearms was performed.  - The back x14, left flank x2, right flank x1 demonstrates waxy papule(s) with an erythematous base, consistent with inflamed seborrheic keratoses.   - Greasy yellow scale with background erythema involving the eyebrows and nasolabial folds.   - Pink macule(s) with gritty scale involving the right forearm, consistent with actinic keratosis.   -The upper chest demonstrates a 0.4 x 0.4 cm pink, fleshy papule, consistent with a intradermal nevus.    - No other lesions of concern on areas examined.     Medications:  Current Outpatient Medications   Medication     albuterol (PROAIR HFA) 108 (90 BASE) MCG/ACT Inhaler    atorvastatin (LIPITOR) 20 MG tablet    Cyanocobalamin (VITAMIN B12) 1000 MCG TBCR    dorzolamide-timolol (COSOPT) 2-0.5 % ophthalmic solution    gabapentin (NEURONTIN) 600 MG tablet    ketoconazole (NIZORAL) 2 % external cream    latanoprost (XALATAN) 0.005 % ophthalmic solution    metroNIDAZOLE (NORITATE) 1 % cream    triamcinolone (KENALOG) 0.1 % external cream    diphenhydrAMINE (BENADRYL) 25 MG capsule    ferrous gluconate (FERGON) 324 (38 Fe) MG tablet    triamcinolone (KENALOG) 0.1 % cream     Current Facility-Administered Medications   Medication    lidocaine (PF) (XYLOCAINE) 1 % injection 4 mL    triamcinolone (KENALOG-40) injection 40 mg      Past Medical History:   Patient Active Problem List   Diagnosis    Hearing loss    Glaucoma suspect    Reactive airway disease    Restless legs syndrome    Kyphosis    Scoliosis    Vitamin B12 deficiency    Osteoarthritis    Iron deficiency    Hyperlipidemia LDL goal <130    Fall, initial encounter     Past Medical History:   Diagnosis Date    Allergy     sulfa, penicillium, food (tomatillos, ground cherries, some potato chips)    Back pain     Glaucoma suspect     Hearing loss     Hip pain, left     Hyperlipidemia LDL goal <130     Iron deficiency     Kyphosis     Osteoarthritis     Reactive airway disease     Restless leg syndrome     Scoliosis     Vitamin B12 deficiency        CC Referred Self, MD  No address on file on close of this encounter.

## 2024-04-02 ENCOUNTER — MYC MEDICAL ADVICE (OUTPATIENT)
Dept: INTERNAL MEDICINE | Facility: CLINIC | Age: 69
End: 2024-04-02
Payer: COMMERCIAL

## 2024-04-02 DIAGNOSIS — G25.81 RESTLESS LEGS SYNDROME: Primary | ICD-10-CM

## 2024-04-16 ENCOUNTER — OFFICE VISIT (OUTPATIENT)
Dept: OPHTHALMOLOGY | Facility: CLINIC | Age: 69
End: 2024-04-16
Attending: OPHTHALMOLOGY
Payer: COMMERCIAL

## 2024-04-16 DIAGNOSIS — H40.1131 PRIMARY OPEN ANGLE GLAUCOMA (POAG) OF BOTH EYES, MILD STAGE: Primary | ICD-10-CM

## 2024-04-16 PROCEDURE — G0463 HOSPITAL OUTPT CLINIC VISIT: HCPCS | Performed by: OPHTHALMOLOGY

## 2024-04-16 PROCEDURE — 92012 INTRM OPH EXAM EST PATIENT: CPT | Mod: GC | Performed by: OPHTHALMOLOGY

## 2024-04-16 ASSESSMENT — REFRACTION_WEARINGRX
OS_ADD: +2.75
OD_ADD: +2.75
OD_SPHERE: +0.25
OS_AXIS: 155
OS_SPHERE: +0.50
OS_CYLINDER: +0.50
OD_AXIS: 175
SPECS_TYPE: PAL
OD_CYLINDER: +0.50

## 2024-04-16 ASSESSMENT — TONOMETRY
OS_IOP_MMHG: 14
IOP_METHOD: TONOPEN
OD_IOP_MMHG: 12
OS_IOP_MMHG: 12
OD_IOP_MMHG: 9
IOP_METHOD: APPLANATION

## 2024-04-16 ASSESSMENT — CUP TO DISC RATIO
OS_RATIO: 0.55
OD_RATIO: 0.7

## 2024-04-16 ASSESSMENT — SLIT LAMP EXAM - LIDS
COMMENTS: DERMATOCHALASIS
COMMENTS: DERMATOCHALASIS

## 2024-04-16 ASSESSMENT — EXTERNAL EXAM - RIGHT EYE: OD_EXAM: NORMAL

## 2024-04-16 ASSESSMENT — VISUAL ACUITY
OD_CC+: -2
OS_CC: 20/25
METHOD: SNELLEN - LINEAR
OS_CC+: -2+2
CORRECTION_TYPE: GLASSES
OD_CC: 20/20

## 2024-04-16 ASSESSMENT — EXTERNAL EXAM - LEFT EYE: OS_EXAM: NORMAL

## 2024-04-16 NOTE — NURSING NOTE
Chief Complaints and History of Present Illnesses   Patient presents with    Glaucoma Follow-Up     6 week follow up Primary open angle glaucoma (POAG) of both eyes, mild stage     Chief Complaint(s) and History of Present Illness(es)       Glaucoma Follow-Up              Associated symptoms: Negative for dryness, eye pain, headache, photophobia, flashes and floaters    Pain scale: 0/10    Comments: 6 week follow up Primary open angle glaucoma (POAG) of both eyes, mild stage              Comments    Pt states since starting Cosopt right eye it stings when uses it.   He suspects it may be due to a possible Sulfa allergy.   Pt feels that vision may have slightly worsened RE>LE    Ocular Meds:  Latanaprost qPM each eye   Cosopt BID right eye   Systane PRN each eye     Rosas Ho 9:52 AM April 16, 2024

## 2024-04-16 NOTE — PATIENT INSTRUCTIONS
Continue Latanoprost (green top)- apply once drop at bedtime to each eye   Continue Cosopt twice daily right eye

## 2024-04-16 NOTE — PROGRESS NOTES
Chief Complaint/Presenting Concern: Glaucoma     History of Present Illness:   Jose Carlos Bolden is a 68 year old patient who presents for evaluation of glaucoma. Patient previously followed with Dr. Arias and Dr. Patel as glaucoma suspect, but noted changed on OCT and VF at visit 05/2023. S/p SLT right eye 10/13/23    04/16/2024: Patient doing well today. At last visit he was noted to have VF progression and was started on cosopt twice daily each eye - notices some stinging.     Relevant Past Medical/Family/Social History: HTN    Relevant Review of Systems: Negative     Diagnosis: Primary open angle glaucoma   Year diagnosis: 2023. Previously glaucoma suspect  Previous glaucoma surgery/laser:   s/p SLT right eye 10/13/23  Maximum intraocular pressure 22/22  Currently Meds: None  Family history: positive, 2 brothers, sister, grandmother  CCT: [id number]  Gonio: Open 360, TM heavily pigmented both eyes  Refractive status: Mild hyperopia  Trauma history: positive, remote history of hitting eye with a soccer ball  Steroid exposure: negative  Vasospastic disease: Migraine/Raynaud phenomenon: negative  A past hemodynamic crisis or Low BP:: negative  Meds AEs/intolerance:  PMHx: Reactive airway disease, HLD, osteoarthritis  Anticoagulants: None     Previoius testing:  Visual field 03/05/24   Right eye - Superior arcuate defect, scattered inferior changes possibly progressed since 5/2023   Left eye - wnl, reliable  OCT Optic Nerve RNFL Spectralis 03/05/24   right eye: superior thinning today compared to May 2023  left eye: normal thickness 360, though possible thinning inferiorly since 2023    Today's testing:  See exam for applanation    Additional Ocular History:   Early NS each eye     Plan/Recommendations:  Discussed findings with patient.  Patient previously not on any IOP lowering medications. Discussed that elevated IOP and changes on VF/OCT RNFL are consistent with diagnosis of primary open angle glaucoma. Angles open on  gonio but heavily pigmented.   SLT right eye performed on 10/13/23, RNFL progression right eye since then, started cosopt 3/5/24  Continue latanoprost at bedtime both eyes  Continue Cosopt twice daily right eye     RTC in 5 months VT, VF, RNFL    Thank you for entrusting us with your care  Nubia Smith MD, PGY3  Ophthalmology Resident  [facility]      Physician Attestation     Attending Physician Attestation:  Complete documentation of historical and exam elements from today's encounter can be found in the full encounter summary report (not reduplicated in this progress note). I personally obtained the chief complaint(s) and history of present illness. I confirmed and edited as necessary the review of systems, past medical/surgical history, family history, social history, and examination findings as documented by others; and I examined the patient myself. I personally reviewed the relevant tests, images, and reports as documented above. I formulated and edited as necessary the assessment and plan and discussed the findings and management plan with the patient and any family members present at the time of the visit.  Orlando Schilling M.D., Glaucoma, April 16, 2024

## 2024-04-20 ENCOUNTER — MYC MEDICAL ADVICE (OUTPATIENT)
Dept: INTERNAL MEDICINE | Facility: CLINIC | Age: 69
End: 2024-04-20
Payer: COMMERCIAL

## 2024-04-23 ENCOUNTER — OFFICE VISIT (OUTPATIENT)
Dept: INTERNAL MEDICINE | Facility: CLINIC | Age: 69
End: 2024-04-23
Payer: COMMERCIAL

## 2024-04-23 VITALS
SYSTOLIC BLOOD PRESSURE: 127 MMHG | HEART RATE: 57 BPM | BODY MASS INDEX: 20.02 KG/M2 | DIASTOLIC BLOOD PRESSURE: 78 MMHG | WEIGHT: 131.7 LBS | OXYGEN SATURATION: 99 %

## 2024-04-23 DIAGNOSIS — E78.5 HYPERLIPIDEMIA LDL GOAL <130: Primary | ICD-10-CM
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
## 2024-04-23 PROCEDURE — 99214 OFFICE O/P EST MOD 30 MIN: CPT | Performed by: NURSE PRACTITIONER

## 2024-04-23 RX ORDER — DOXYCYCLINE 100 MG/1
200 CAPSULE ORAL 2 TIMES DAILY
Qty: 2 CAPSULE | Refills: 0 | Status: SHIPPED | OUTPATIENT
Start: 2024-04-23 | End: 2024-06-03

## 2024-04-23 NOTE — TELEPHONE ENCOUNTER
The RN called and spoke the patient.    The patient confirmed that he recently removed a tick from his hip. The patient stated that he did not feel like the area looked infected while on the phone with the RN. However, the patient sent MyChart message with questions regarding testing for Lyme disease.     The RN scheduled the patient for an appointment at the [facility] on 04/23/2024 at 3 PM with Amparo Riley NP to discuss further.

## 2024-04-23 NOTE — PROGRESS NOTES
"  Assessment & Plan       Tick bite, unspecified site, initial encounter  - doxycycline monohydrate (MONODOX) 100 MG capsule; Take 2 capsules (200 mg) by mouth 2 times daily     We discussed clothing treatment with Permethrin and use of repellents with 30% DEET.    I spent a total of 30  minutes in the care of this pt during today's office visit. This time includes reviewing the patient's chart and prior history, obtaining a history, performing an examination and evaluation and counseling the patient. This time also includes ordering medications or tests necessary in addition to communication to other member's of the patient's health care team. Time spent in documentation and care coordination is included.     Amparo Jefferykamp APRN, CNP    Subjective   Jose Carlos is a 68 year old, presenting for the following health issues:  Insect Bites (Pt here to discuss recent tick bite 4/20/24)      4/23/2024     3:01 PM   Additional Questions   Roomed by SANTIAGO, EMT     Pt was clearing roadside of trash in the morning and later noted a tick embedded in his right lateral posterior thigh.  His wife had to \"dig it out\" as it was deeply embedded. He was also outside the two previous days bird watching but did not look for ticks on those days.  He denies any rash or bulls-eye skin markings.  He feels fine.           Patient Active Problem List   Diagnosis    Hearing loss    Glaucoma suspect    Reactive airway disease    Restless legs syndrome    Kyphosis    Scoliosis    Vitamin B12 deficiency    Osteoarthritis    Iron deficiency    Hyperlipidemia LDL goal <130    Fall, initial encounter     Current Outpatient Medications   Medication Sig Dispense Refill    albuterol (PROAIR HFA) 108 (90 BASE) MCG/ACT Inhaler Inhale 2 puffs into the lungs every 4 hours as needed for shortness of breath / dyspnea 1 Inhaler 0    atorvastatin (LIPITOR) 20 MG tablet Take 1 tablet (20 mg) by mouth daily 90 tablet 3    Cyanocobalamin (VITAMIN B12) 1000 MCG TBCR " Take 1 tablet by mouth daily 90 tablet 3    dorzolamide-timolol (COSOPT) 2-0.5 % ophthalmic solution Place 1 drop into the right eye 2 times daily 10 mL 4    doxycycline monohydrate (MONODOX) 100 MG capsule Take 2 capsules (200 mg) by mouth 2 times daily 2 capsule 0    gabapentin (NEURONTIN) 600 MG tablet TAKE 1 TABLET EVERY MORNING AND AFTERNOON, AND 1 TABLET IN THE EVENING 360 tablet 3    hydrocortisone 2.5 % cream Apply topically 2 times daily Apply for 5-7 days for irritation. 20 g 3    ketoconazole (NIZORAL) 2 % external cream Apply topically 2 times daily To face as needed for rash 60 g 11    latanoprost (XALATAN) 0.005 % ophthalmic solution Place 1 drop into both eyes daily 7.5 mL 4    metroNIDAZOLE (NORITATE) 1 % cream Apply topically daily 60 g 0    triamcinolone (KENALOG) 0.1 % external cream Apply topically 2 times daily 30 g 0     Current Facility-Administered Medications   Medication Dose Route Frequency Provider Last Rate Last Admin    lidocaine (PF) (XYLOCAINE) 1 % injection 4 mL  4 mL   Jesu Gore MD   4 mL at 05/05/22 1715    triamcinolone (KENALOG-40) injection 40 mg  40 mg   Jesu Gore MD   40 mg at 05/05/22 1715            Objective    /78 (BP Location: Right arm, Patient Position: Sitting, Cuff Size: Adult Regular)   Pulse 57   Wt 59.7 kg (131 lb 11.2 oz)   SpO2 99%   BMI 20.02 kg/m    Body mass index is 20.02 kg/m .  Physical Exam   GENERAL: alert and no distress  RESP: no distress  CV: see VS  MS: no gross musculoskeletal defects noted.  SKIN: no suspicious lesions or rashes.  Small scab where tick was embedded.  NEURO:  mentation intact and speech normal  PSYCH: mentation appears normal, affect normal/bright          Signed Electronically by: BAM Watkins CNP

## 2024-04-30 NOTE — TELEPHONE ENCOUNTER
Action 4/30/24 MV 2.22pm   Action Taken Imaging request faxed to [facility]     Action 5/13/24 MV 2.52pm   Action Taken Images resolved in PACS         RECORDS RECEIVED FROM: internal   REASON FOR VISIT: RLS   PROVIDER: Dr. Eileen Christopher   DATE OF APPT: 6/3/24   NOTES (FOR ALL VISITS) STATUS DETAILS   OFFICE NOTE from referring provider Internal Dr Annalisa Sauceda @ [facility] Internal Med:  4/2/24 mychart encounter  9/25/23 8/7/23   OFFICE NOTE from other specialist Received/CE Dr Huber Martinez @ [facility]:  10/23/23    Dr Eve Porras @  [facility]:  10/3/17  4/5/16  (Additional encounters)   MEDICATION LIST Internal    IMAGING  (FOR ALL VISITS)     MRI (HEAD, NECK, SPINE) PACS [facility]:  MRI Brain 10/26/23

## 2024-06-03 ENCOUNTER — OFFICE VISIT (OUTPATIENT)
Dept: NEUROLOGY | Facility: CLINIC | Age: 69
End: 2024-06-03
Attending: INTERNAL MEDICINE
Payer: COMMERCIAL

## 2024-06-03 ENCOUNTER — PRE VISIT (OUTPATIENT)
Dept: NEUROLOGY | Facility: CLINIC | Age: 69
End: 2024-06-03

## 2024-06-03 ENCOUNTER — LAB (OUTPATIENT)
Dept: LAB | Facility: CLINIC | Age: 69
End: 2024-06-03
Payer: COMMERCIAL

## 2024-06-03 VITALS
HEART RATE: 75 BPM | SYSTOLIC BLOOD PRESSURE: 110 MMHG | OXYGEN SATURATION: 96 % | DIASTOLIC BLOOD PRESSURE: 59 MMHG | RESPIRATION RATE: 16 BRPM

## 2024-06-03 DIAGNOSIS — R71.8 OTHER ABNORMALITY OF RED BLOOD CELLS: ICD-10-CM

## 2024-06-03 DIAGNOSIS — G25.81 RESTLESS LEGS SYNDROME: ICD-10-CM

## 2024-06-03 DIAGNOSIS — R41.9 COGNITIVE COMPLAINTS: ICD-10-CM

## 2024-06-03 DIAGNOSIS — G25.81 RESTLESS LEGS SYNDROME: Primary | ICD-10-CM

## 2024-06-03 LAB — FERRITIN SERPL-MCNC: 123 NG/ML (ref 31–409)

## 2024-06-03 PROCEDURE — 82728 ASSAY OF FERRITIN: CPT | Performed by: PATHOLOGY

## 2024-06-03 PROCEDURE — G2211 COMPLEX E/M VISIT ADD ON: HCPCS | Performed by: STUDENT IN AN ORGANIZED HEALTH CARE EDUCATION/TRAINING PROGRAM

## 2024-06-03 PROCEDURE — 99204 OFFICE O/P NEW MOD 45 MIN: CPT | Performed by: STUDENT IN AN ORGANIZED HEALTH CARE EDUCATION/TRAINING PROGRAM

## 2024-06-03 PROCEDURE — 36415 COLL VENOUS BLD VENIPUNCTURE: CPT | Performed by: PATHOLOGY

## 2024-06-03 RX ORDER — FERROUS GLUCONATE 324(38)MG
324 TABLET ORAL
COMMUNITY

## 2024-06-03 ASSESSMENT — PAIN SCALES - GENERAL: PAINLEVEL: MILD PAIN (3)

## 2024-06-03 NOTE — PROGRESS NOTES
Department of Neurology  Movement Disorders Division   New Patient Visit    Patient: Jose Carlos Bolden   MRN: [id number]   : 1955   Date of Visit: 6/3/2024    CC: restless leg syndrome    HPI:  Mr. Bolden is a 67 yo man who presents for treatment of RLS.  Has had restless legs for decades.  Presently he has symptoms 3-4 nights per week when trying to fall asleep.  May occur while sitting still such as at a performance.  This causes his legs to jerk and th desire to move. This is relieved with movement.  He once took gabapentin 900mg at night but reduced when symptoms were well controlled.  Previously took ropinirole which caused dizziness and sleepiness.    He hasn't noticed side effects; although he is concerned about dementia symptoms.  He is forgetful.  For example, things of other things to do and forgets the first task.  He has longstanding hearing loss, as far back as his draft physical.  Got hearing aids in his 60s.    He also notes psychiatric traits like, procrastination, disorganization, desire to complete a task once started.  He is concerned about ADHD, OCD, or OCPD.  As a career he performed psychiatric assessments.  He has had neuropsych testing 20 years ago at [facility].      Related Medications      Gabapentin 600mg 1 1 1   Ferrous gluconate 325mg 1           Past Medical History:   Diagnosis Date    Allergy     sulfa, penicillium, food (tomatillos, ground cherries, some potato chips)    Back pain     Dementia (H) 2005    mild sx    Depressive disorder 1974    Glaucoma suspect     Head injury 1966    not diagnosed    Hearing loss     Hip pain, left     Hyperlipidemia LDL goal <130     Hypertension 2019    sub threshold    Iron deficiency     Kyphosis     Osteoarthritis     Other nervous system complications ?    RLS    Reactive airway disease     Restless leg syndrome     Scoliosis     Vitamin B12 deficiency       Patient Active Problem List   Diagnosis    Hearing loss    Glaucoma suspect     Reactive airway disease    Restless legs syndrome    Kyphosis    Scoliosis    Vitamin B12 deficiency    Osteoarthritis    Iron deficiency    Hyperlipidemia LDL goal <130    Fall, initial encounter       Past Surgical History:   Procedure Laterality Date    COLONOSCOPY      Findings: normal    COLONOSCOPY N/A 12/17/2020    Procedure: COLONOSCOPY, WITH POLYPECTOMY AND BIOPSY;  Surgeon: Marvel Cook MD;  Location: [facility] OR    EYE SURGERY  2023    for glaucoma    JOINT REPLACEMENT Left     hip, CATALINA    ORTHOPEDIC SURGERY  2014    hip replacement    VASECTOMY          Current Outpatient Medications   Medication Sig Dispense Refill    Cyanocobalamin (VITAMIN B12) 1000 MCG TBCR Take 1 tablet by mouth daily 90 tablet 3    ferrous gluconate (FERGON) 324 (38 Fe) MG tablet Take 324 mg by mouth daily (with breakfast)      gabapentin (NEURONTIN) 600 MG tablet TAKE 1 TABLET EVERY MORNING AND AFTERNOON, AND 1 TABLET IN THE EVENING 360 tablet 3    albuterol (PROAIR HFA) 108 (90 BASE) MCG/ACT Inhaler Inhale 2 puffs into the lungs every 4 hours as needed for shortness of breath / dyspnea 1 Inhaler 0    atorvastatin (LIPITOR) 20 MG tablet Take 1 tablet (20 mg) by mouth daily 90 tablet 3    dorzolamide-timolol (COSOPT) 2-0.5 % ophthalmic solution Place 1 drop into the right eye 2 times daily 10 mL 4    hydrocortisone 2.5 % cream Apply topically 2 times daily Apply for 5-7 days for irritation. 20 g 3    ketoconazole (NIZORAL) 2 % external cream Apply topically 2 times daily To face as needed for rash 60 g 11    latanoprost (XALATAN) 0.005 % ophthalmic solution Place 1 drop into both eyes daily 7.5 mL 4    metroNIDAZOLE (NORITATE) 1 % cream Apply topically daily 60 g 0    triamcinolone (KENALOG) 0.1 % external cream Apply topically 2 times daily 30 g 0       Allergies   Allergen Reactions    Cats     Feathers     Food      Potato chips, tomatillos, cherry de la fuente    Seasonal Allergies     Smoke.     Penicillium Notatum Allergy Skin Test      Sulfa Antibiotics Other (See Comments)     Confusion and dizziness        Family History   Problem Relation Age of Onset    Skin Cancer Mother     Brain Cancer Mother         age 65  of glioblastoma    Depression Mother         post partum    Osteoporosis Mother     Anemia Mother     Allergy (Severe) Mother         penicillin    Brain Tumor Mother         Glio Blastoma    Cancer Mother         glio blastoma    Heart Disease Father          of emphysema    Emphysema Father         smoker    Glaucoma Father     Hyperlipidemia Father     Neurologic Disorder Father         Jansen's Palsy    Heart Disease Maternal Grandmother         MI    Cancer Maternal Grandmother     Other - See Comments Maternal Grandfather         seasonal allergies    Coronary Artery Disease Paternal Grandmother         age 87 MI    Cancer Paternal Grandmother         Uterine/vaginal    Heart Disease Paternal Grandmother     Heart Disease Paternal Grandfather          of MI    Glaucoma Brother     Hyperlipidemia Brother     Prostate Problems Brother         BPH    Hyperlipidemia Brother     Neurologic Disorder Brother         Bell's palsy    Osteoporosis Brother     Anemia Brother     Vascular Disease Brother         Raynaud's    Prostate Problems Brother         BPH    Hyperlipidemia Brother     Hyperlipidemia Brother     Neurologic Disorder Brother         Bell's palsy    Glaucoma Sister     Pancreatic Cancer Sister          age 71 approximately    Osteoporosis Sister     Anemia Sister     Hyperlipidemia Sister     Heart Disease Sister         Surgery    Osteoporosis Sister     Anemia Sister     Migraines Sister     Depression Sister     Hyperlipidemia Sister     Other - See Comments Sister         Multiple chemical sensitivity    Neurologic Disorder Sister         Bell's palsy    Osteoporosis Sister     Anemia Sister     Breast Cancer Sister     Osteoporosis Sister         kyphosis, scoliosis    Hyperlipidemia Sister      Vascular Disease Sister         Raynaud's    Cancer Sister         breast    Cancer Sister         pancreatic    Depression Sister     Migraines Cousin         priscila, actually    Macular Degeneration No family hx of     Melanoma No family hx of         Social History     Socioeconomic History    Marital status:      Spouse name: Not on file    Number of children: Not on file    Years of education: Not on file    Highest education level: Not on file   Occupational History    Not on file   Tobacco Use    Smoking status: Never    Smokeless tobacco: Never    Tobacco comments:     heavy childhood exposure   Substance and Sexual Activity    Alcohol use: Yes     Comment: moderate use throughout all adulthood    Drug use: Yes     Types: Marijuana     Comment: Occsnl CBD, rare THC, night for RLS & glaucoma. Hx when ale    Sexual activity: Yes     Partners: Female     Birth control/protection: Male Surgical   Other Topics Concern    Parent/sibling w/ CABG, MI or angioplasty before 65F 55M? 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     Social Determinants of Health     Financial Resource Strain: Low Risk  (9/22/2023)    Financial Resource Strain     Within the past 12 months, have you or your family members you live with been unable to get utilities (heat, electricity) when it was really needed?: No   Food Insecurity: Low Risk  (9/22/2023)    Food Insecurity     Within the past 12 months, did you worry that your food would run out before you got money to buy more?: No     Within the past 12 months, did the food you bought just not last and you didn t have money to get more?: No   Transportation Needs: Low Risk  (9/22/2023)    Transportation Needs     Within the past 12 months, has lack of transportation kept you from medical appointments, getting your medicines, non-medical meetings or appointments, work, or from getting things that you need?: No    Physical Activity: Not on file   Stress: Not on file   Social Connections: Not on file   Interpersonal Safety: Low Risk  (4/23/2024)    Interpersonal Safety     Do you feel physically and emotionally safe where you currently live?: Yes     Within the past 12 months, have you been hit, slapped, kicked or otherwise physically hurt by someone?: No     Within the past 12 months, have you been humiliated or emotionally abused in other ways by your partner or ex-partner?: No   Housing Stability: Low Risk  (9/22/2023)    Housing Stability     Do you have housing? : Yes     Are you worried about losing your housing?: No        PHYSICAL EXAM:  /59   Pulse 75   Resp 16   SpO2 96%     Gen: alert, active, attentive, appropriately groomed   HEENT: normocephalic, eyes open with no discharge  Psych: anxious appearing    NEURO:  CN:  II: VFF.  III, IV, VI: EOM normal range, no nystagmus.  Normal saccades.  V:  Facial sensation intact.  VII: Face symmetric at rest and with activation  VIII: Intact to finger rub bilaterally.  IX, X: Palate rise b/l, uvula midline.  No dysarthria.  XI: Trapezius and SCM 5/5 bilaterally.  XII: Tongue protrudes midline. No fasciculation or atrophy noted.    Motor:  Normal bulk throughout upper and lower extremities.  Mild BUE action tremor.  Increased LUE tone, RUE paratonia.  R/L  Shoulder abd      5/5  Elbow flex  5/5  Elbow ext  5/5     5/5    Hip flex  5/5  Knee flex  5/5  Knee ext  5/5  Dorsiflex  5/5  Plantar flex  5/5    Reflexes:  R/L  Biceps   2+/2+  Patellar  2+/2+  No clonus.    Sensation:  Intact to LT in all extremities.    Coordination:  FTN and HTN intact bilaterally.    Gait:  Mildly stooped.  Arms held extended at shoulder, reduced arm swing on right.  Tandem gait intact.  Romberg negative.        LABS:   Latest Reference Range & Units 11/24/23 07:35   Ferritin 31 - 409 ng/mL 61   Iron 61 - 157 ug/dL 102   Iron Binding Capacity 240 - 430 ug/dL 268   Iron Sat Index 15 - 46  % 38      Latest Reference Range & Units 08/25/23 13:27   Sodium 136 - 145 mmol/L 142   Potassium 3.4 - 5.3 mmol/L 4.9   Chloride 98 - 107 mmol/L 105   Carbon Dioxide (CO2) 22 - 29 mmol/L 31 (H)   Urea Nitrogen 8.0 - 23.0 mg/dL 15.7   Creatinine 0.67 - 1.17 mg/dL 0.95   GFR Estimate >60 mL/min/1.73m2 86   Calcium 8.8 - 10.2 mg/dL 9.8   Anion Gap 7 - 15 mmol/L 6 (L)   Glucose 70 - 99 mg/dL 91      Latest Reference Range & Units 11/24/23 07:35   WBC 4.0 - 11.0 10e3/uL 5.1   Hemoglobin 13.3 - 17.7 g/dL 14.3   Hematocrit 40.0 - 53.0 % 42.5   Platelet Count 150 - 450 10e3/uL 199   RBC Count 4.40 - 5.90 10e6/uL 4.35 (L)   MCV 78 - 100 fL 98   MCH 26.5 - 33.0 pg 32.9   MCHC 31.5 - 36.5 g/dL 33.6   RDW 10.0 - 15.0 % 13.5       IMAGING:  MRI brain 10/26/2023 - personally reviewed: cerebellar atrophy, otherwise unremarkable          ASSESSMENT/PLAN:  Mr. Bolden is a 67 yo man who presents for treatment of RLS.  He is maintained on gabapentin and had side effects on ropinirole.  He has symptoms many days per week at sleep onset and if sitting during a performance.  Will move his gabapentin dosing to increase throughout the day.    He also has many cognitive concerns so will repeat neuropsych testing.  Last done 20 years ago.    The longitudinal plan of care for the diagnosis(es)/condition(s) as documented were addressed during this visit. Due to the added complexity in care, I will continue to support Jose Carlos in the subsequent management and with ongoing continuity of care.    - Neuropsychology testing  - Repeat ferritin testing  - Continue daily iron supplementation  - Adjust gabapentin to 300/600/900  - RTC 2 months, 30 mins      Eileen Christopher MD   of Neurology  Movement Disorders Division

## 2024-06-03 NOTE — LETTER
6/3/2024       RE: Jose Carlos Bolden  [address] [id number]       Dear Colleague,    Thank you for referring your patient, Jose Carlos Bolden, to the [facility] [place] [facility]. Please see a copy of my visit note below.    Department of Neurology  Movement Disorders Division   New Patient Visit    Patient: Jose Carlos Bolden   MRN: [id number]   : 1955   Date of Visit: 6/3/2024    CC: restless leg syndrome    HPI:  Mr. Bolden is a 67 yo man who presents for treatment of RLS.  Has had restless legs for decades.  Presently he has symptoms 3-4 nights per week when trying to fall asleep.  May occur while sitting still such as at a performance.  This causes his legs to jerk and th desire to move. This is relieved with movement.  He once took gabapentin 900mg at night but reduced when symptoms were well controlled.  Previously took ropinirole which caused dizziness and sleepiness.    He hasn't noticed side effects; although he is concerned about dementia symptoms.  He is forgetful.  For example, things of other things to do and forgets the first task.  He has longstanding hearing loss, as far back as his draft physical.  Got hearing aids in his 60s.    He also notes psychiatric traits like, procrastination, disorganization, desire to complete a task once started.  He is concerned about ADHD, OCD, or OCPD.  As a career he performed psychiatric assessments.  He has had neuropsych testing 20 years ago at [facility].      Related Medications      Gabapentin 600mg 1 1 1   Ferrous gluconate 325mg 1           Past Medical History:   Diagnosis Date    Allergy     sulfa, penicillium, food (tomatillos, ground cherries, some potato chips)    Back pain     Dementia (H)     mild sx    Depressive disorder 1974    Glaucoma suspect     Head injury 1966    not diagnosed    Hearing loss     Hip pain, left     Hyperlipidemia LDL goal <130      Hypertension 2019    sub threshold    Iron deficiency     Kyphosis     Osteoarthritis     Other nervous system complications 1990?    RLS    Reactive airway disease     Restless leg syndrome     Scoliosis     Vitamin B12 deficiency       Patient Active Problem List   Diagnosis    Hearing loss    Glaucoma suspect    Reactive airway disease    Restless legs syndrome    Kyphosis    Scoliosis    Vitamin B12 deficiency    Osteoarthritis    Iron deficiency    Hyperlipidemia LDL goal <130    Fall, initial encounter       Past Surgical History:   Procedure Laterality Date    COLONOSCOPY      Findings: normal    COLONOSCOPY N/A 12/17/2020    Procedure: COLONOSCOPY, WITH POLYPECTOMY AND BIOPSY;  Surgeon: Marvel Cook MD;  Location: [facility] [place] [facility] OR    EYE SURGERY  2023    for glaucoma    JOINT REPLACEMENT Left     hip, CATALINA    ORTHOPEDIC SURGERY  2014    hip replacement    VASECTOMY          Current Outpatient Medications   Medication Sig Dispense Refill    Cyanocobalamin (VITAMIN B12) 1000 MCG TBCR Take 1 tablet by mouth daily 90 tablet 3    ferrous gluconate (FERGON) 324 (38 Fe) MG tablet Take 324 mg by mouth daily (with breakfast)      gabapentin (NEURONTIN) 600 MG tablet TAKE 1 TABLET EVERY MORNING AND AFTERNOON, AND 1 TABLET IN THE EVENING 360 tablet 3    albuterol (PROAIR HFA) 108 (90 BASE) MCG/ACT Inhaler Inhale 2 puffs into the lungs every 4 hours as needed for shortness of breath / dyspnea 1 Inhaler 0    atorvastatin (LIPITOR) 20 MG tablet Take 1 tablet (20 mg) by mouth daily 90 tablet 3    dorzolamide-timolol (COSOPT) 2-0.5 % ophthalmic solution Place 1 drop into the right eye 2 times daily 10 mL 4    hydrocortisone 2.5 % cream Apply topically 2 times daily Apply for 5-7 days for irritation. 20 g 3    ketoconazole (NIZORAL) 2 % external cream Apply topically 2 times daily To face as needed for rash 60 g 11    latanoprost (XALATAN) 0.005 % ophthalmic solution Place 1 drop into both eyes daily 7.5 mL 4    metroNIDAZOLE  (NORITATE) 1 % cream Apply topically daily 60 g 0    triamcinolone (KENALOG) 0.1 % external cream Apply topically 2 times daily 30 g 0       Allergies   Allergen Reactions    Cats     Feathers     Food      Potato chips, tomatillos, cherry de la fuente    Seasonal Allergies     Smoke.     Penicillium Notatum Allergy Skin Test     Sulfa Antibiotics Other (See Comments)     Confusion and dizziness        Family History   Problem Relation Age of Onset    Skin Cancer Mother     Brain Cancer Mother         age 65  of glioblastoma    Depression Mother         post partum    Osteoporosis Mother     Anemia Mother     Allergy (Severe) Mother         penicillin    Brain Tumor Mother         Glio Blastoma    Cancer Mother         glio blastoma    Heart Disease Father          of emphysema    Emphysema Father         smoker    Glaucoma Father     Hyperlipidemia Father     Neurologic Disorder Father         Jansen's Palsy    Heart Disease Maternal Grandmother         MI    Cancer Maternal Grandmother     Other - See Comments Maternal Grandfather         seasonal allergies    Coronary Artery Disease Paternal Grandmother         age 87 MI    Cancer Paternal Grandmother         Uterine/vaginal    Heart Disease Paternal Grandmother     Heart Disease Paternal Grandfather          of MI    Glaucoma Brother     Hyperlipidemia Brother     Prostate Problems Brother         BPH    Hyperlipidemia Brother     Neurologic Disorder Brother         Bell's palsy    Osteoporosis Brother     Anemia Brother     Vascular Disease Brother         Raynaud's    Prostate Problems Brother         BPH    Hyperlipidemia Brother     Hyperlipidemia Brother     Neurologic Disorder Brother         Bell's palsy    Glaucoma Sister     Pancreatic Cancer Sister          age 71 approximately    Osteoporosis Sister     Anemia Sister     Hyperlipidemia Sister     Heart Disease Sister         Surgery    Osteoporosis Sister     Anemia Sister     Migraines Sister      Depression Sister     Hyperlipidemia Sister     Other - See Comments Sister         Multiple chemical sensitivity    Neurologic Disorder Sister         Bell's palsy    Osteoporosis Sister     Anemia Sister     Breast Cancer Sister     Osteoporosis Sister         kyphosis, scoliosis    Hyperlipidemia Sister     Vascular Disease Sister         Raynaud's    Cancer Sister         breast    Cancer Sister         pancreatic    Depression Sister     Migraines Cousin         priscila, actually    Macular Degeneration No family hx of     Melanoma No family hx of         Social History     Socioeconomic History    Marital status:      Spouse name: Not on file    Number of children: Not on file    Years of education: Not on file    Highest education level: Not on file   Occupational History    Not on file   Tobacco Use    Smoking status: Never    Smokeless tobacco: Never    Tobacco comments:     heavy childhood exposure   Substance and Sexual Activity    Alcohol use: Yes     Comment: moderate use throughout all adulthood    Drug use: Yes     Types: Marijuana     Comment: Occsnl CBD, rare THC, night for RLS & glaucoma. Hx when ale    Sexual activity: Yes     Partners: Female     Birth control/protection: Male Surgical   Other Topics Concern    Parent/sibling w/ CABG, MI or angioplasty before 65F 55M? 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     Social Determinants of Health     Financial Resource Strain: Low Risk  (9/22/2023)    Financial Resource Strain     Within the past 12 months, have you or your family members you live with been unable to get utilities (heat, electricity) when it was really needed?: No   Food Insecurity: Low Risk  (9/22/2023)    Food Insecurity     Within the past 12 months, did you worry that your food would run out before you got money to buy more?: No     Within the past 12 months, did the food you bought just not last and you  didn t have money to get more?: No   Transportation Needs: Low Risk  (9/22/2023)    Transportation Needs     Within the past 12 months, has lack of transportation kept you from medical appointments, getting your medicines, non-medical meetings or appointments, work, or from getting things that you need?: No   Physical Activity: Not on file   Stress: Not on file   Social Connections: Not on file   Interpersonal Safety: Low Risk  (4/23/2024)    Interpersonal Safety     Do you feel physically and emotionally safe where you currently live?: Yes     Within the past 12 months, have you been hit, slapped, kicked or otherwise physically hurt by someone?: No     Within the past 12 months, have you been humiliated or emotionally abused in other ways by your partner or ex-partner?: No   Housing Stability: Low Risk  (9/22/2023)    Housing Stability     Do you have housing? : Yes     Are you worried about losing your housing?: No        PHYSICAL EXAM:  /59   Pulse 75   Resp 16   SpO2 96%     Gen: alert, active, attentive, appropriately groomed   HEENT: normocephalic, eyes open with no discharge  Psych: anxious appearing    NEURO:  CN:  II: VFF.  III, IV, VI: EOM normal range, no nystagmus.  Normal saccades.  V:  Facial sensation intact.  VII: Face symmetric at rest and with activation  VIII: Intact to finger rub bilaterally.  IX, X: Palate rise b/l, uvula midline.  No dysarthria.  XI: Trapezius and SCM 5/5 bilaterally.  XII: Tongue protrudes midline. No fasciculation or atrophy noted.    Motor:  Normal bulk throughout upper and lower extremities.  Mild BUE action tremor.  Increased LUE tone, RUE paratonia.  R/L  Shoulder abd      5/5  Elbow flex  5/5  Elbow ext  5/5     5/5    Hip flex  5/5  Knee flex  5/5  Knee ext  5/5  Dorsiflex  5/5  Plantar flex  5/5    Reflexes:  R/L  Biceps   2+/2+  Patellar  2+/2+  No clonus.    Sensation:  Intact to LT in all extremities.    Coordination:  FTN and HTN intact  bilaterally.    Gait:  Mildly stooped.  Arms held extended at shoulder, reduced arm swing on right.  Tandem gait intact.  Romberg negative.        LABS:   Latest Reference Range & Units 11/24/23 07:35   Ferritin 31 - 409 ng/mL 61   Iron 61 - 157 ug/dL 102   Iron Binding Capacity 240 - 430 ug/dL 268   Iron Sat Index 15 - 46 % 38      Latest Reference Range & Units 08/25/23 13:27   Sodium 136 - 145 mmol/L 142   Potassium 3.4 - 5.3 mmol/L 4.9   Chloride 98 - 107 mmol/L 105   Carbon Dioxide (CO2) 22 - 29 mmol/L 31 (H)   Urea Nitrogen 8.0 - 23.0 mg/dL 15.7   Creatinine 0.67 - 1.17 mg/dL 0.95   GFR Estimate >60 mL/min/1.73m2 86   Calcium 8.8 - 10.2 mg/dL 9.8   Anion Gap 7 - 15 mmol/L 6 (L)   Glucose 70 - 99 mg/dL 91      Latest Reference Range & Units 11/24/23 07:35   WBC 4.0 - 11.0 10e3/uL 5.1   Hemoglobin 13.3 - 17.7 g/dL 14.3   Hematocrit 40.0 - 53.0 % 42.5   Platelet Count 150 - 450 10e3/uL 199   RBC Count 4.40 - 5.90 10e6/uL 4.35 (L)   MCV 78 - 100 fL 98   MCH 26.5 - 33.0 pg 32.9   MCHC 31.5 - 36.5 g/dL 33.6   RDW 10.0 - 15.0 % 13.5       IMAGING:  MRI brain 10/26/2023 - personally reviewed: cerebellar atrophy, otherwise unremarkable          ASSESSMENT/PLAN:  Mr. Bolden is a 69 yo man who presents for treatment of RLS.  He is maintained on gabapentin and had side effects on ropinirole.  He has symptoms many days per week at sleep onset and if sitting during a performance.  Will move his gabapentin dosing to increase throughout the day.    He also has many cognitive concerns so will repeat neuropsych testing.  Last done 20 years ago.    The longitudinal plan of care for the diagnosis(es)/condition(s) as documented were addressed during this visit. Due to the added complexity in care, I will continue to support Jose Carlos in the subsequent management and with ongoing continuity of care.    - Neuropsychology testing  - Repeat ferritin testing  - Continue daily iron supplementation  - Adjust gabapentin to 300/600/900  - RTC 2  months, 30 mins      Again, thank you for allowing me to participate in the care of your patient.      Sincerely,    Eileen Christopher MD

## 2024-06-03 NOTE — PATIENT INSTRUCTIONS
Let's adjust your gabapentin 600mg tablet to see if we can get your symptoms under getter control:   Morning Noon Evening   Gabapentin 600mg 1/2 tab (300mg) 1 tab (600mg) 1.5 tab (900mg)     I've also ordered a ferritin test.  Our goal is for this to be >75.    Finally I've ordered some neuropsychological testing given your concerns.

## 2024-06-10 ASSESSMENT — ASTHMA QUESTIONNAIRES
QUESTION_3 LAST FOUR WEEKS HOW OFTEN DID YOUR ASTHMA SYMPTOMS (WHEEZING, COUGHING, SHORTNESS OF BREATH, CHEST TIGHTNESS OR PAIN) WAKE YOU UP AT NIGHT OR EARLIER THAN USUAL IN THE MORNING: NOT AT ALL
ACT_TOTALSCORE: 23
QUESTION_2 LAST FOUR WEEKS HOW OFTEN HAVE YOU HAD SHORTNESS OF BREATH: ONCE OR TWICE A WEEK
QUESTION_4 LAST FOUR WEEKS HOW OFTEN HAVE YOU USED YOUR RESCUE INHALER OR NEBULIZER MEDICATION (SUCH AS ALBUTEROL): NOT AT ALL
QUESTION_1 LAST FOUR WEEKS HOW MUCH OF THE TIME DID YOUR ASTHMA KEEP YOU FROM GETTING AS MUCH DONE AT WORK, SCHOOL OR AT HOME: NONE OF THE TIME
ACT_TOTALSCORE: 23
QUESTION_5 LAST FOUR WEEKS HOW WOULD YOU RATE YOUR ASTHMA CONTROL: WELL CONTROLLED

## 2024-06-11 ENCOUNTER — LAB (OUTPATIENT)
Dept: LAB | Facility: CLINIC | Age: 69
End: 2024-06-11
Payer: COMMERCIAL

## 2024-06-11 ENCOUNTER — OFFICE VISIT (OUTPATIENT)
Dept: INTERNAL MEDICINE | Facility: CLINIC | Age: 69
End: 2024-06-11
Payer: COMMERCIAL

## 2024-06-11 VITALS
SYSTOLIC BLOOD PRESSURE: 124 MMHG | DIASTOLIC BLOOD PRESSURE: 70 MMHG | HEART RATE: 59 BPM | BODY MASS INDEX: 20.41 KG/M2 | WEIGHT: 134.2 LBS | OXYGEN SATURATION: 98 %

## 2024-06-11 DIAGNOSIS — E78.5 HYPERLIPIDEMIA LDL GOAL <130: Primary | ICD-10-CM

## 2024-06-11 DIAGNOSIS — K04.7 TOOTH INFECTION: ICD-10-CM

## 2024-06-11 DIAGNOSIS — E78.5 HYPERLIPIDEMIA LDL GOAL <130: ICD-10-CM

## 2024-06-11 LAB
CHOLEST SERPL-MCNC: 123 MG/DL
FASTING STATUS PATIENT QL REPORTED: NO
HDLC SERPL-MCNC: 31 MG/DL
LDLC SERPL CALC-MCNC: 63 MG/DL
NONHDLC SERPL-MCNC: 92 MG/DL
TRIGL SERPL-MCNC: 146 MG/DL

## 2024-06-11 PROCEDURE — 80061 LIPID PANEL: CPT | Performed by: PATHOLOGY

## 2024-06-11 PROCEDURE — 99213 OFFICE O/P EST LOW 20 MIN: CPT | Mod: GC

## 2024-06-11 PROCEDURE — 36415 COLL VENOUS BLD VENIPUNCTURE: CPT | Performed by: PATHOLOGY

## 2024-06-11 NOTE — PROGRESS NOTES
Assessment & Plan   Jose Carlos is a 68 year old, pmh s/f hyperlipidemia and glaucoma, presenting for neck pain.    Tooth infection  Patient presents with one month duration of episodic sharp jaw and ear pain. Has small tender lymph nodes, pain exacerbated by chewing. Ear exam unremarkable. Likely representing tooth abscess. Prescrived antibiotic, patient will schedule an appointment with his dentist.  - amoxicillin-clavulanate (AUGMENTIN) 875-125 MG tablet; Take 1 tablet by mouth 2 times daily    Hyperlipidemia LDL goal <130  - Lipid panel reflex to direct LDL Non-fasting; Future        No follow-ups on file.    Subjective   Jose Carlos is a 68 year old, pmh s/f hyperlipidemia and glaucoma, presenting for the following health issues:  Neck Pain (Pt reports neck, jaw, and ear pain that has persisted over the last month since having a URI). Tried debrox for right ear fullness sensation that came together with the cold, has now gone away. Concerned that over the last month his pain has worsened. Starts next to the ear, radiates to the proximal mandible and down his neck. The pain is sharp. Comes and goes, each episode lasting about 30 minutes, two episodes daily. Chewing, eating bread and crunching is exacerbating the pain, also triggered by wearing the hearing aids. Worse in the morning, does not wake him up. No fevers or chills, not sure if he had a mild swelling at the beginning. Endorses good tooth hygiene. Denies numbness.        6/11/2024     7:56 AM   Additional Questions   Roomed by ALYSHA HENDERSON     History of Present Illness       Reason for visit:  Neck/jaw/ear/tooth pain  Symptom onset:  More than a month  Symptoms include:  Neck/jaw/ear/tooth pain  Symptom intensity:  Moderate  Symptom progression:  Staying the same  Had these symptoms before:  Yes  Has tried/received treatment for these symptoms:  Yes  Previous treatment was successful:  Yes  Prior treatment description:  Depends: antibiotics for ear infection, dental  care for tooth or gums, PT and massage for TMD  What makes it worse:  Wearing my hearing aid, chewing, acidic and sweet foods, maybe caffeine.  What makes it better:  Ibuprofen, not wearing hearing aid, self massage.    He eats 4 or more servings of fruits and vegetables daily.He consumes 1 sweetened beverage(s) daily.He exercises with enough effort to increase his heart rate 20 to 29 minutes per day.  He exercises with enough effort to increase his heart rate 5 days per week.   He is taking medications regularly.       Review of Systems  Constitutional, HEENT, cardiovascular, pulmonary, gi and gu systems are negative, except as otherwise noted.      Objective    /70 (BP Location: Right arm, Patient Position: Sitting, Cuff Size: Adult Regular)   Pulse 59   Wt 60.9 kg (134 lb 3.2 oz)   SpO2 98%   BMI 20.41 kg/m    Body mass index is 20.41 kg/m .  Physical Exam   GENERAL: alert and no distress  EYES: Eyes grossly normal to inspection, PERRL and conjunctivae and sclerae normal  HENT: ear canals and TM's normal, nose and mouth without ulcers or lesions. Mild tenderness on soft tissue palpation near the TMJ. Dental exam showing some plaque without any obvious cavities  NECK: Small mobile firm R submandibular lymph nodes, tender on palpation   RESP: lungs clear to auscultation - no rales, rhonchi or wheezes  CV: regular rate and rhythm, normal S1 S2, no S3 or S4, no murmur, click or rub, no peripheral edema  ABDOMEN: soft, nontender, no hepatosplenomegaly, no masses and bowel sounds normal  SKIN: no suspicious lesions or rashes  NEURO: Normal strength and tone, mentation intact and speech normal  PSYCH: mentation appears normal, affect normal/bright      Signed Electronically by: Apolinar Allred MD    Patient seen and discussed with Dr Vera.

## 2024-07-09 ENCOUNTER — OFFICE VISIT (OUTPATIENT)
Dept: NEUROPSYCHOLOGY | Facility: CLINIC | Age: 69
End: 2024-07-09
Attending: STUDENT IN AN ORGANIZED HEALTH CARE EDUCATION/TRAINING PROGRAM
Payer: COMMERCIAL

## 2024-07-09 DIAGNOSIS — R41.3 COMPLAINTS OF MEMORY DISTURBANCE: Primary | ICD-10-CM

## 2024-07-09 PROCEDURE — 96133 NRPSYC TST EVAL PHYS/QHP EA: CPT

## 2024-07-09 PROCEDURE — 96139 PSYCL/NRPSYC TST TECH EA: CPT

## 2024-07-09 PROCEDURE — 96132 NRPSYC TST EVAL PHYS/QHP 1ST: CPT

## 2024-07-09 PROCEDURE — 90791 PSYCH DIAGNOSTIC EVALUATION: CPT

## 2024-07-09 PROCEDURE — 96138 PSYCL/NRPSYC TECH 1ST: CPT

## 2024-07-09 NOTE — LETTER
2024      RE: Jose Carlos Bolden  [address] [address]       NEUROPSYCHOLOGICAL EVALUATION  **CONFIDENTIAL**    **This is a medical document intended for communication with the referring provider. It is written in medical language and may contain abbreviations or verbiage that are unfamiliar. It is recommended that you follow up with your neuropsychologist and/or referring provider to discuss the results of this evaluation. This report and the results within are not intended to be interpreted in isolation without consultation with your medical provider. **    Name: Jose Carlos Bolden Education: Bachelor's degree (16 years)    (age): 1955 (69 years) ALLEN:  2024   Referral: Eileen Christopher MD  Department of Neurology Handedness:  MRN (Epic): Right  [id number]     IDENTIFYING INFORMATION AND REASON FOR REFERRAL   Mr. Bolden is a 69-year-old, right-handed White male with a history of hyperlipidemia, hearing loss, depression, and anxiety. This evaluation was requested to provide a comprehensive assessment of his current neuropsychological status to inform treatment planning.     IMPRESSION  See below for relevant background information and detailed test results. See separate abstract for supporting documentation including a list of neuropsychological measures and test scores.    Mr. Bolden's neuropsychological profile revealed a select weakness (low average score) on a test of mental flexibility. Performance was within expectation across other cognitive tests including immediate auditory attention and working memory, speeded processing, language, visuospatial processing, verbal and visual learning and memory, and nonverbal abstract reasoning. Strengths were noted on tests of naming, cognitive inhibition, and verbal abstract reasoning with scores in the exceptionally high range. Assessment of current psychological and emotional status revealed mild-to-moderate symptoms of depression and  anxiety.    Overall, Mr. Bolden is functioning quite well from a neuropsychological standpoint and does not evidence any objective cognitive deficits. A select weakness in mental flexibility is a nonspecific finding but in the context of other very strong executive functioning scores is not suggestive of focal or lateralized cerebral dysfunction, and it does not raise concerns about acquired neurologic disease. This finding and his subjective cognitive complaints are likely attributable to longstanding weaknesses in complex attention and normative age-related cognitive lapses that may be exacerbated in the context of anxiety, stress, low mood, sleep disturbance, and pain.     RECOMMENDATIONS  Mr. Bolden is encouraged to live a healthy lifestyle that promotes brain health including getting appropriate exercise, as advised by his healthcare providers, practicing good sleep hygiene, and eating healthy.    Mr. Bolden reported mild-to-moderate symptoms of anxiety and low mood. Therefore, interventions to assist with coping with these mental health symptoms likely would be helpful.  [facility] can be reached at [phone]. Additionally, a number of therapists can be found in your local community through [email or web address].  Mr. Bolden is encouraged to utilize the following behavioral strategies to maximize cognitive functioning in his daily life:   -Eliminate distraction. Eliminating environmental distracters can facilitate attention and memory performance. For example, turning off music or the television while having a conversation, so that all of your attention is focused on the task at hand.  -Work on decreasing interference from intrusive thoughts. When intrusive thoughts begin to interfere with your thinking, do not concentrate on them. Instead, observe them passively and calmly redirect your attention back to task.   -Engage in calming activities that increase focus on the present. Incorporating  mindfulness techniques such as meditation, relaxation, yoga, and moderate cardiovascular exercise, into your daily routine will help keep you present-oriented and consequently improve attention and memory.   -Pay mindful attention. You may find that you need to make a conscious effort to pay attention to conversations or when learning new information. When attention is not focused, it is difficult for the brain to learn new information. Thus, making a mindful effort to pay attention as you go through daily tasks will assist and facilitate memory recall later on.  -Use external aids to increase structure in daily life. Ongoing use of compensatory strategies such as notes, lists, and a centralized calendar, are supported. Tools such as smart phones with alarms and reminders are helpful in bringing structure to daily activities.  -Allow for time. Take the time needed to learn and recall information. Some people tend to worry if they can't immediately recall something. Instead, you should take time to complete or express a thought, or complete a task rather than be critical or harsh on yourself.  The current evaluation will serve as an objective cognitive baseline against which results of future evaluations may be compared.  No follow-up is currently indicated; however, Mr. Bolden may be referred for a repeat neuropsychological evaluation if there is significant change in cognitive status in the future.     Thank you for the opportunity to participate in Mr. Bolden's care.  These findings and recommendations were reviewed with the patient in a separate feedback session on 7/16/2024 and all his questions were answered. If you have any questions regarding this evaluation, please do not hesitate to contact me.       Ramona Jin, Ph.D.,   Clinical Neuropsychologist    RELEVANT BACKGROUND INFORMATION AND SUPPORTING DOCUMENTATION  Gathered from a clinical interview with the patient and reviews of electronic medical  record.     History of Presenting Problem(s)  Mr. Bloden presented with a history including hyperlipidemia, hearing loss, depression, and anxiety. He stated he underwent neuropsychological testing 20 years ago in the context of similar concerns. Specifically, he described longstanding challenges with attention and reported he suspects he has ADHD but was never diagnosed. He described distractibility, procrastination, and difficulty getting things done. He noted worsening forgetfulness over time described as forgetting why he walked into a room and forgetting names of familiar people. He noted taking longer to complete tasks, word finding difficulties, and occasional speech errors (e.g., saying garden instead of bed). He noted more trouble with cognitive games but otherwise denied difficulties with executive functioning such as planning and organization. He noted longstanding difficulties with managing his medications and noted he may forget a dose once every other week if he turns off his phone alarm and does not take them immediately. He independently manages his appointments without difficulty with the help of alarms on his smart phone. He denied any difficulties with managing finances, meal preparation, driving, or basic self-care. Physically, he reported balance changes, falls (most recently a few months ago), and occasional tremor in his hands and lips. He noted hearing difficulties, and he wears hearing aids regularly. He also described visual disturbances characterized by floater and  marsh  as well as vision changes due to glaucoma and cataracts. He also noted changes to his sense of smell (finding things more malodorous), and taste (metallic taste at times). Emotionally, he reported feeling  okay  and described occasional periods of low mood, anhedonia, and very fleeting thoughts of death. He reported a longstanding history of depression with his last major depressive episode occurring approximately 15 years  ago. He also noted anxiety characterized by tension, restlessness, social anxiety, and lack of confidence. 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 He noted some irritability but otherwise denied notable behavioral or personality changes.     Neurodiagnostic Findings   Brain MRI w/wo contrast (10/26/2023): Impression: 1. No acute intracranial abnormality. 2. Mild generalized volume loss. Otherwise normal brain parenchymal morphology and signal intensity. 3. No abnormal enhancement or enhancing lesions. 4. Dedicated sequences of the skull base nice is demonstrates normal course of the cranial nerves. No abnormal mass or enhancement. 5. Small amount of fluid within the right mastoid air cells.    Medical History  Hyperlipidemia  Vitamin b12 deficiency (Above normal range on 11/12/2020)  RLS  Osteoarthritis  Mild uncomplicated head injury (slipped on ice and hit forehead) without LOC or persisting post-concussive symptoms  Denied history of seizure or stroke    Health Behaviors   Sleep: ~7.5 hours of cumulative sleep nightly of variable quality. He noted trouble with sleep onset and maintenance due to RLS. He reported history of snoring but denied BISI or parasomnic behaviors. Energy is good most days though he may take a nap during the day.  Exercise: Bike riding or walking daily.  Pain: Occasional pain in his back, neck, legs, elbows, and discomfort due to TMJ. Denied pain at the time of this evaluation.    Psychiatric History  Mr. Bolden reported current mood is  okay . He acknowledged brief periods of low mood (more good days than bad). He acknowledged some anhedonia at times though he finds pleasure in bird watching, bike riding, and gardening.    He reported a longstanding history of depression, but specified he has not had a major depressive episode in ~15 years  He endorsed anxiety characterized by tension, restlessness,  and lack of confidence. He stated he tries not to worry or ruminate and has been utilizing thought stopping to manage anxiety. He also described longstanding social anxiety and social avoidance.   He denied history of alteration of sensory perceptual processes or disordered thought. He stated he was incorrectly diagnosed with schizophrenia at the age of 17 or 18.   Suicidality/ Self-harm: He endorsed very fleeting suicidal ideation at times (approximately once every 3 weeks, most recently ~1 month ago), with thoughts of a plan (jumping). He adamantly denied any recent suicidal intent. History is notable for 3 past suicide attempts between the ages of [age].  Psychiatric treatment: None currently. Participated in individual psychotherapy in the past.     Substance Use History  Alcohol: Consumes 1 alcoholic drink 6 times per week on average  Nicotine: None  Cannabis:  Infrequent consumption of THC edibles for RLS and pain; last used 4 days ago  Problematic Substance Use: History of illicit drug use in the past (cannabis, cocaine, heroin) for a couple of years with brief periods of daily use. Attended substance use treatment.     Current Medications (per EMR)  Mr. Bolden has a current medication list which includes the following prescription(s): albuterol, amoxicillin-clavulanate, atorvastatin, vitamin b12, dorzolamide-timolol, ferrous gluconate, gabapentin, hydrocortisone, ketoconazole, latanoprost, metronidazole, and triamcinolone.    Developmental, Educational, & Occupational History  Gestational: Reported he was born ~1 month premature   complications: None reported  Developmental milestones: Stated he may have been late to talk (instead he whispered and he specified he probably could talk but didn't)  Childhood behavioral / emotional / academic problems: Difficulty with inattention and procrastination. Slow to start reading and reported he reads slowly. Described self as a B-average student.   Education:  Graduated high school on time. Earned bachelor's degree from [employer] in applied studies.   Occupation: Addictions counselor, . 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 Stopped working in 2014 due to work stress.     Psychosocial History  Born and raised in [place], [place]  Marital:   Children: 2 kids  Housing: Lives with wife  Social support:  Good enough  social support network of friends    Family History   No known immediate family history of dementia or neurological disorders.     RESULTS  See separate abstract for list of neuropsychological measures and test scores. Descriptive ranges are based on American Academy of Clinical Neuropsychology (2020) consensus guidelines, or test manuals where appropriate. All standardized scores are adjusted for age and additional adjustments for demographic factors such as education were performed where applicable.    PRE-MORBID ABILITY: Premorbid abilities were estimated within the high average range based on single word reading ability and demographic factors including sex, ethnicity, education, occupation, and geographic region.  ATTENTION/EXECUTIVE FUNCTIONS: Immediate auditory attention and working memory performances were average. Verbal abstract reasoning performance was exceptionally high. Visual reasoning through pattern identification was high average. Performance on a test of set-shifting/cognitive flexibility was low average. Copy of a complex figure requiring planning and organization was within normal limits. Response inhibition performance was high average to exceptionally high. Psychomotor processing speed performances were low average to high average.  LANGUAGE: Confrontation naming performance was exceptionally high and without error. Letter-cue verbal fluency performance was average. Semantic verbal fluency performance was high average.   VISUOSPATIAL PROCESSING: Copy of increasingly intricate designs was high average and without  error.  Copy of a complex figure was within normal limits.   LEARNING AND MEMORY: Initial encoding of a word list over multiple learning trials was average. Delayed recall of the list was average as he was able to recall all the words he initially encoded. Recognition of the word list was average. Initial encoding of contextualized verbal information in the form of short stories was high average and delayed retrieval was high average. Recognition of story details was high average. Encoding, retrieval and recognition of visual information were average.   PSYCHOLOGICAL AND BEHAVIORAL: On self-report questionnaires, he endorsed mild-to-moderate symptoms of depression. He also endorsed mild symptoms of anxiety characterized by moderate somatic and affective symptoms and mild cognitive symptoms of anxiety.   PERFORMANCE VALIDITY: Performance on neuropsychological tests is dependent upon a number of factors, including sufficient engagement and motivation, to reliably establish an examinee's level of cognitive functioning.  Based upon observations made throughout the evaluation, the patient did not appear to deliberately perform in a suboptimal manner and demonstrated good frustration tolerance on cognitively challenging tasks. Score on an imbedded index of performance validity was in the valid range. Overall, test results are believed to accurately represent the patient's current neurocognitive status.    BEHAVIORAL OBSERVATIONS  Presented on time and was unaccompanied  Alert, oriented to self, time, place, and circumstance; attentive and focused while undergoing testing  Appearance: Well-groomed, casually dressed  Gait/Posture: No abnormalities noted  Sensorimotor: No abnormalities noted  Behavior: Cooperative, pleasant, no behavioral abnormalities noted  Speech: Fluent, articulate, normal rate, prosody, and volume; no conversational word finding difficulty  Thought process: Logical, linear, and goal-directed  Thought  content: Logical, appropriate  Affect: Broad, responsive, consistent with reported mood; good eye contact  Mood: Anxious  Insight and Judgment: Intact  Approach to testing: Efficient, deliberate; good frustration on cognitively demanding tasks  Rapport: Easily established and maintained      Activities included in this evaluation: CPT Code #Units Time (min)   Psychiatric diagnostic interview 80439 [id number] --   Test evaluation services by professional; first hour. [id number]   Test evaluation services by professional, additional hour (+) 31003 2    Test administration and scoring by technician, first 30 mins [id number]   Test administration and scoring by technician, additional 30 mins (+) [id number]    Dx: R41.3        ANI MARSHALL, PhD

## 2024-07-09 NOTE — LETTER
2024       RE: Jose Carlos Bolden  [address] [facility] [id number]     Dear Colleague,    Thank you for referring your patient, Jose Carlos Bolden, to the [facility] NEUROPSYCHOLOGY [place] at [facility]. Please see a copy of my visit note below.    NEUROPSYCHOLOGICAL EVALUATION  **CONFIDENTIAL**    **This is a medical document intended for communication with the referring provider. It is written in medical language and may contain abbreviations or verbiage that are unfamiliar. It is recommended that you follow up with your neuropsychologist and/or referring provider to discuss the results of this evaluation. This report and the results within are not intended to be interpreted in isolation without consultation with your medical provider. **    Name: Jose Carlos Bolden Education: Bachelor's degree (16 years)    (age): 1955 (69 years) ALLEN:  2024   Referral: Eileen Christopher MD  Department of Neurology Handedness:  MRN (Epic): Right  [id number]     IDENTIFYING INFORMATION AND REASON FOR REFERRAL   Mr. Bolden is a 69-year-old, right-handed White male with a history of hyperlipidemia, hearing loss, depression, and anxiety. This evaluation was requested to provide a comprehensive assessment of his current neuropsychological status to inform treatment planning.     IMPRESSION  See below for relevant background information and detailed test results. See separate abstract for supporting documentation including a list of neuropsychological measures and test scores.    Mr. Bolden's neuropsychological profile revealed a select weakness (low average score) on a test of mental flexibility. Performance was within expectation across other cognitive tests including immediate auditory attention and working memory, speeded processing, language, visuospatial processing, verbal and visual learning and memory, and nonverbal abstract reasoning. Strengths were noted on tests  of naming, cognitive inhibition, and verbal abstract reasoning with scores in the exceptionally high range. Assessment of current psychological and emotional status revealed mild-to-moderate symptoms of depression and anxiety.    Overall, Mr. Bolden is functioning quite well from a neuropsychological standpoint and does not evidence any objective cognitive deficits. A select weakness in mental flexibility is a nonspecific finding but in the context of other very strong executive functioning scores is not suggestive of focal or lateralized cerebral dysfunction, and it does not raise concerns about acquired neurologic disease. This finding and his subjective cognitive complaints are likely attributable to longstanding weaknesses in complex attention and normative age-related cognitive lapses that may be exacerbated in the context of anxiety, stress, low mood, sleep disturbance, and pain.     RECOMMENDATIONS  Mr. Bolden is encouraged to live a healthy lifestyle that promotes brain health including getting appropriate exercise, as advised by his healthcare providers, practicing good sleep hygiene, and eating healthy.    Mr. Bolden reported mild-to-moderate symptoms of anxiety and low mood. Therefore, interventions to assist with coping with these mental health symptoms likely would be helpful. [facility] can be reached at [phone]. Additionally, a number of therapists can be found in your local community through [email or web address].  Mr. Bolden is encouraged to utilize the following behavioral strategies to maximize cognitive functioning in his daily life:   -Eliminate distraction. Eliminating environmental distracters can facilitate attention and memory performance. For example, turning off music or the television while having a conversation, so that all of your attention is focused on the task at hand.  -Work on decreasing interference from intrusive thoughts. When intrusive thoughts begin to interfere  with your thinking, do not concentrate on them. Instead, observe them passively and calmly redirect your attention back to task.   -Engage in calming activities that increase focus on the present. Incorporating mindfulness techniques such as meditation, relaxation, yoga, and moderate cardiovascular exercise, into your daily routine will help keep you present-oriented and consequently improve attention and memory.   -Pay mindful attention. You may find that you need to make a conscious effort to pay attention to conversations or when learning new information. When attention is not focused, it is difficult for the brain to learn new information. Thus, making a mindful effort to pay attention as you go through daily tasks will assist and facilitate memory recall later on.  -Use external aids to increase structure in daily life. Ongoing use of compensatory strategies such as notes, lists, and a centralized calendar, are supported. Tools such as smart phones with alarms and reminders are helpful in bringing structure to daily activities.  -Allow for time. Take the time needed to learn and recall information. Some people tend to worry if they can't immediately recall something. Instead, you should take time to complete or express a thought, or complete a task rather than be critical or harsh on yourself.  The current evaluation will serve as an objective cognitive baseline against which results of future evaluations may be compared.  No follow-up is currently indicated; however, Mr. Bolden may be referred for a repeat neuropsychological evaluation if there is significant change in cognitive status in the future.     Thank you for the opportunity to participate in Mr. Bolden's care.  These findings and recommendations were reviewed with the patient in a separate feedback session on 7/16/2024 and all his questions were answered. If you have any questions regarding this evaluation, please do not hesitate to contact me.        Ramona Jin, Ph.D.,   Clinical Neuropsychologist    RELEVANT BACKGROUND INFORMATION AND SUPPORTING DOCUMENTATION  Gathered from a clinical interview with the patient and reviews of electronic medical record.     History of Presenting Problem(s)  Mr. Bolden presented with a history including hyperlipidemia, hearing loss, depression, and anxiety. He stated he underwent neuropsychological testing 20 years ago in the context of similar concerns. Specifically, he described longstanding challenges with attention and reported he suspects he has ADHD but was never diagnosed. He described distractibility, procrastination, and difficulty getting things done. He noted worsening forgetfulness over time described as forgetting why he walked into a room and forgetting names of familiar people. He noted taking longer to complete tasks, word finding difficulties, and occasional speech errors (e.g., saying garden instead of bed). He noted more trouble with cognitive games but otherwise denied difficulties with executive functioning such as planning and organization. He noted longstanding difficulties with managing his medications and noted he may forget a dose once every other week if he turns off his phone alarm and does not take them immediately. He independently manages his appointments without difficulty with the help of alarms on his smart phone. He denied any difficulties with managing finances, meal preparation, driving, or basic self-care. Physically, he reported balance changes, falls (most recently a few months ago), and occasional tremor in his hands and lips. He noted hearing difficulties, and he wears hearing aids regularly. He also described visual disturbances characterized by floater and  marsh  as well as vision changes due to glaucoma and cataracts. He also noted changes to his sense of smell (finding things more malodorous), and taste (metallic taste at times). Emotionally, he reported feeling  okay   and described occasional periods of low mood, anhedonia, and very fleeting thoughts of death. He reported a longstanding history of depression with his last major depressive episode occurring approximately 15 years ago. He also noted anxiety characterized by tension, restlessness, social anxiety, and lack of confidence. 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 He noted some irritability but otherwise denied notable behavioral or personality changes.     Neurodiagnostic Findings   Brain MRI w/wo contrast (10/26/2023): Impression: 1. No acute intracranial abnormality. 2. Mild generalized volume loss. Otherwise normal brain parenchymal morphology and signal intensity. 3. No abnormal enhancement or enhancing lesions. 4. Dedicated sequences of the skull base nice is demonstrates normal course of the cranial nerves. No abnormal mass or enhancement. 5. Small amount of fluid within the right mastoid air cells.    Medical History  Hyperlipidemia  Vitamin b12 deficiency (Above normal range on 11/12/2020)  RLS  Osteoarthritis  Mild uncomplicated head injury (slipped on ice and hit forehead) without LOC or persisting post-concussive symptoms  Denied history of seizure or stroke    Health Behaviors   Sleep: ~7.5 hours of cumulative sleep nightly of variable quality. He noted trouble with sleep onset and maintenance due to RLS. He reported history of snoring but denied BISI or parasomnic behaviors. Energy is good most days though he may take a nap during the day.  Exercise: Bike riding or walking daily.  Pain: Occasional pain in his back, neck, legs, elbows, and discomfort due to TMJ. Denied pain at the time of this evaluation.    Psychiatric History  Mr. Bolden reported current mood is  okay . He acknowledged brief periods of low mood (more good days than bad). He acknowledged some anhedonia at times though he finds pleasure in bird  watching, bike riding, and gardening.    He reported a longstanding history of depression, but specified he has not had a major depressive episode in ~15 years  He endorsed anxiety characterized by tension, restlessness, and lack of confidence. He stated he tries not to worry or ruminate and has been utilizing thought stopping to manage anxiety. He also described longstanding social anxiety and social avoidance.   He denied history of alteration of sensory perceptual processes or disordered thought. He stated he was incorrectly diagnosed with schizophrenia at the age of 17 or 18.   Suicidality/ Self-harm: He endorsed very fleeting suicidal ideation at times (approximately once every 3 weeks, most recently ~1 month ago), with thoughts of a plan (jumping). He adamantly denied any recent suicidal intent. History is notable for 3 past suicide attempts between the ages of [age].  Psychiatric treatment: None currently. Participated in individual psychotherapy in the past.     Substance Use History  Alcohol: Consumes 1 alcoholic drink 6 times per week on average  Nicotine: None  Cannabis:  Infrequent consumption of THC edibles for RLS and pain; last used 4 days ago  Problematic Substance Use: History of illicit drug use in the past (cannabis, cocaine, heroin) for a couple of years with brief periods of daily use. Attended substance use treatment.     Current Medications (per EMR)  Mr. Boldne has a current medication list which includes the following prescription(s): albuterol, amoxicillin-clavulanate, atorvastatin, vitamin b12, dorzolamide-timolol, ferrous gluconate, gabapentin, hydrocortisone, ketoconazole, latanoprost, metronidazole, and triamcinolone.    Developmental, Educational, & Occupational History  Gestational: Reported he was born ~1 month premature   complications: None reported  Developmental milestones: Stated he may have been late to talk (instead he whispered and he specified he probably could talk  but didn't)  Childhood behavioral / emotional / academic problems: Difficulty with inattention and procrastination. Slow to start reading and reported he reads slowly. Described self as a B-average student.   Education: Graduated high school on time. Earned bachelor's degree from [employer] in applied studies.   Occupation: Addictions counselor, . 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 Stopped working in 2014 due to work stress.     Psychosocial History  Born and raised in [place], [place]  Marital:   Children: 2 kids  Housing: Lives with wife  Social support:  Good enough  social support network of friends    Family History   No known immediate family history of dementia or neurological disorders.     RESULTS  See separate abstract for list of neuropsychological measures and test scores. Descriptive ranges are based on American Academy of Clinical Neuropsychology (2020) consensus guidelines, or test manuals where appropriate. All standardized scores are adjusted for age and additional adjustments for demographic factors such as education were performed where applicable.    PRE-MORBID ABILITY: Premorbid abilities were estimated within the high average range based on single word reading ability and demographic factors including sex, ethnicity, education, occupation, and geographic region.  ATTENTION/EXECUTIVE FUNCTIONS: Immediate auditory attention and working memory performances were average. Verbal abstract reasoning performance was exceptionally high. Visual reasoning through pattern identification was high average. Performance on a test of set-shifting/cognitive flexibility was low average. Copy of a complex figure requiring planning and organization was within normal limits. Response inhibition performance was high average to exceptionally high. Psychomotor processing speed performances were low average to high average.  LANGUAGE: Confrontation naming performance was exceptionally high  and without error. Letter-cue verbal fluency performance was average. Semantic verbal fluency performance was high average.   VISUOSPATIAL PROCESSING: Copy of increasingly intricate designs was high average and without error.  Copy of a complex figure was within normal limits.   LEARNING AND MEMORY: Initial encoding of a word list over multiple learning trials was average. Delayed recall of the list was average as he was able to recall all the words he initially encoded. Recognition of the word list was average. Initial encoding of contextualized verbal information in the form of short stories was high average and delayed retrieval was high average. Recognition of story details was high average. Encoding, retrieval and recognition of visual information were average.   PSYCHOLOGICAL AND BEHAVIORAL: On self-report questionnaires, he endorsed mild-to-moderate symptoms of depression. He also endorsed mild symptoms of anxiety characterized by moderate somatic and affective symptoms and mild cognitive symptoms of anxiety.   PERFORMANCE VALIDITY: Performance on neuropsychological tests is dependent upon a number of factors, including sufficient engagement and motivation, to reliably establish an examinee's level of cognitive functioning.  Based upon observations made throughout the evaluation, the patient did not appear to deliberately perform in a suboptimal manner and demonstrated good frustration tolerance on cognitively challenging tasks. Score on an imbedded index of performance validity was in the valid range. Overall, test results are believed to accurately represent the patient's current neurocognitive status.    BEHAVIORAL OBSERVATIONS  Presented on time and was unaccompanied  Alert, oriented to self, time, place, and circumstance; attentive and focused while undergoing testing  Appearance: Well-groomed, casually dressed  Gait/Posture: No abnormalities noted  Sensorimotor: No abnormalities noted  Behavior:  Cooperative, pleasant, no behavioral abnormalities noted  Speech: Fluent, articulate, normal rate, prosody, and volume; no conversational word finding difficulty  Thought process: Logical, linear, and goal-directed  Thought content: Logical, appropriate  Affect: Broad, responsive, consistent with reported mood; good eye contact  Mood: Anxious  Insight and Judgment: Intact  Approach to testing: Efficient, deliberate; good frustration on cognitively demanding tasks  Rapport: Easily established and maintained      Activities included in this evaluation: CPT Code #Units Time (min)   Psychiatric diagnostic interview 22764 1 --   Test evaluation services by professional; first hour. [id number] 158   Test evaluation services by professional, additional hour (+) 08113 2    Test administration and scoring by technician, first 30 mins [id number] 165   Test administration and scoring by technician, additional 30 mins (+) [id number] 4    Dx: R41.3        Again, thank you for allowing me to participate in the care of your patient.      Sincerely,    ANI MARSHALL, PhD

## 2024-07-10 NOTE — NURSING NOTE
The patient was seen for neuropsychological evaluation at the request of Dr. Eileen Christopher, for the purposes of diagnostic clarification and treatment planning. 165 minutes of test administration and scoring were provided by this writer, Jun Dominguez. Please see Dr. Ramona Jin's report for a full interpretation of the findings.

## 2024-07-16 NOTE — PROGRESS NOTES
NEUROPSYCHOLOGICAL EVALUATION  **CONFIDENTIAL**    **This is a medical document intended for communication with the referring provider. It is written in medical language and may contain abbreviations or verbiage that are unfamiliar. It is recommended that you follow up with your neuropsychologist and/or referring provider to discuss the results of this evaluation. This report and the results within are not intended to be interpreted in isolation without consultation with your medical provider. **    Name: Jose Carlos Bolden Education: Bachelor's degree (16 years)    (age): 1955 (69 years) ALLEN:  2024   Referral: Eileen Christopher MD  Department of Neurology Handedness:  MRN (Epic): Right  [id number]     IDENTIFYING INFORMATION AND REASON FOR REFERRAL   Mr. Bolden is a 69-year-old, right-handed White male with a history of hyperlipidemia, hearing loss, depression, and anxiety. This evaluation was requested to provide a comprehensive assessment of his current neuropsychological status to inform treatment planning.     IMPRESSION  See below for relevant background information and detailed test results. See separate abstract for supporting documentation including a list of neuropsychological measures and test scores.    Mr. Bolden's neuropsychological profile revealed a select weakness (low average score) on a test of mental flexibility. Performance was within expectation across other cognitive tests including immediate auditory attention and working memory, speeded processing, language, visuospatial processing, verbal and visual learning and memory, and nonverbal abstract reasoning. Strengths were noted on tests of naming, cognitive inhibition, and verbal abstract reasoning with scores in the exceptionally high range. Assessment of current psychological and emotional status revealed mild-to-moderate symptoms of depression and anxiety.    Overall, Mr. Bolden is functioning quite well from a neuropsychological standpoint and  does not evidence any objective cognitive deficits. A select weakness in mental flexibility is a nonspecific finding but in the context of other very strong executive functioning scores is not suggestive of focal or lateralized cerebral dysfunction, and it does not raise concerns about acquired neurologic disease. This finding and his subjective cognitive complaints are likely attributable to longstanding weaknesses in complex attention and normative age-related cognitive lapses that may be exacerbated in the context of anxiety, stress, low mood, sleep disturbance, and pain.     RECOMMENDATIONS  Mr. Bolden is encouraged to live a healthy lifestyle that promotes brain health including getting appropriate exercise, as advised by his healthcare providers, practicing good sleep hygiene, and eating healthy.    Mr. Bolden reported mild-to-moderate symptoms of anxiety and low mood. Therefore, interventions to assist with coping with these mental health symptoms likely would be helpful.  [facility] can be reached at [phone]. Additionally, a number of therapists can be found in your local community through [email or web address].  Mr. Bolden is encouraged to utilize the following behavioral strategies to maximize cognitive functioning in his daily life:   -Eliminate distraction. Eliminating environmental distracters can facilitate attention and memory performance. For example, turning off music or the television while having a conversation, so that all of your attention is focused on the task at hand.  -Work on decreasing interference from intrusive thoughts. When intrusive thoughts begin to interfere with your thinking, do not concentrate on them. Instead, observe them passively and calmly redirect your attention back to task.   -Engage in calming activities that increase focus on the present. Incorporating mindfulness techniques such as meditation, relaxation, yoga, and moderate cardiovascular exercise, into  your daily routine will help keep you present-oriented and consequently improve attention and memory.   -Pay mindful attention. You may find that you need to make a conscious effort to pay attention to conversations or when learning new information. When attention is not focused, it is difficult for the brain to learn new information. Thus, making a mindful effort to pay attention as you go through daily tasks will assist and facilitate memory recall later on.  -Use external aids to increase structure in daily life. Ongoing use of compensatory strategies such as notes, lists, and a centralized calendar, are supported. Tools such as smart phones with alarms and reminders are helpful in bringing structure to daily activities.  -Allow for time. Take the time needed to learn and recall information. Some people tend to worry if they can't immediately recall something. Instead, you should take time to complete or express a thought, or complete a task rather than be critical or harsh on yourself.  The current evaluation will serve as an objective cognitive baseline against which results of future evaluations may be compared.  No follow-up is currently indicated; however, Mr. Bolden may be referred for a repeat neuropsychological evaluation if there is significant change in cognitive status in the future.     Thank you for the opportunity to participate in Mr. Bolden's care.  These findings and recommendations were reviewed with the patient in a separate feedback session on 7/16/2024 and all his questions were answered. If you have any questions regarding this evaluation, please do not hesitate to contact me.       Ramona Jin, Ph.D.,   Clinical Neuropsychologist    RELEVANT BACKGROUND INFORMATION AND SUPPORTING DOCUMENTATION  Gathered from a clinical interview with the patient and reviews of electronic medical record.     History of Presenting Problem(s)  Mr. Bolden presented with a history including hyperlipidemia,  hearing loss, depression, and anxiety. He stated he underwent neuropsychological testing 20 years ago in the context of similar concerns. Specifically, he described longstanding challenges with attention and reported he suspects he has ADHD but was never diagnosed. He described distractibility, procrastination, and difficulty getting things done. He noted worsening forgetfulness over time described as forgetting why he walked into a room and forgetting names of familiar people. He noted taking longer to complete tasks, word finding difficulties, and occasional speech errors (e.g., saying garden instead of bed). He noted more trouble with cognitive games but otherwise denied difficulties with executive functioning such as planning and organization. He noted longstanding difficulties with managing his medications and noted he may forget a dose once every other week if he turns off his phone alarm and does not take them immediately. He independently manages his appointments without difficulty with the help of alarms on his smart phone. He denied any difficulties with managing finances, meal preparation, driving, or basic self-care. Physically, he reported balance changes, falls (most recently a few months ago), and occasional tremor in his hands and lips. He noted hearing difficulties, and he wears hearing aids regularly. He also described visual disturbances characterized by floater and  marsh  as well as vision changes due to glaucoma and cataracts. He also noted changes to his sense of smell (finding things more malodorous), and taste (metallic taste at times). Emotionally, he reported feeling  okay  and described occasional periods of low mood, anhedonia, and very fleeting thoughts of death. He reported a longstanding history of depression with his last major depressive episode occurring approximately 15 years ago. He also noted anxiety characterized by tension, restlessness, social anxiety, and lack of  confidence. 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 He noted some irritability but otherwise denied notable behavioral or personality changes.     Neurodiagnostic Findings   Brain MRI w/wo contrast (10/26/2023): Impression: 1. No acute intracranial abnormality. 2. Mild generalized volume loss. Otherwise normal brain parenchymal morphology and signal intensity. 3. No abnormal enhancement or enhancing lesions. 4. Dedicated sequences of the skull base nice is demonstrates normal course of the cranial nerves. No abnormal mass or enhancement. 5. Small amount of fluid within the right mastoid air cells.    Medical History  Hyperlipidemia  Vitamin b12 deficiency (Above normal range on 11/12/2020)  RLS  Osteoarthritis  Mild uncomplicated head injury (slipped on ice and hit forehead) without LOC or persisting post-concussive symptoms  Denied history of seizure or stroke    Health Behaviors   Sleep: ~7.5 hours of cumulative sleep nightly of variable quality. He noted trouble with sleep onset and maintenance due to RLS. He reported history of snoring but denied BISI or parasomnic behaviors. Energy is good most days though he may take a nap during the day.  Exercise: Bike riding or walking daily.  Pain: Occasional pain in his back, neck, legs, elbows, and discomfort due to TMJ. Denied pain at the time of this evaluation.    Psychiatric History  Mr. Bolden reported current mood is  okay . He acknowledged brief periods of low mood (more good days than bad). He acknowledged some anhedonia at times though he finds pleasure in bird watching, bike riding, and gardening.    He reported a longstanding history of depression, but specified he has not had a major depressive episode in ~15 years  He endorsed anxiety characterized by tension, restlessness, and lack of confidence. He stated he tries not to worry or ruminate and has been utilizing  thought stopping to manage anxiety. He also described longstanding social anxiety and social avoidance.   He denied history of alteration of sensory perceptual processes or disordered thought. He stated he was incorrectly diagnosed with schizophrenia at the age of 17 or 18.   Suicidality/ Self-harm: He endorsed very fleeting suicidal ideation at times (approximately once every 3 weeks, most recently ~1 month ago), with thoughts of a plan (jumping). He adamantly denied any recent suicidal intent. History is notable for 3 past suicide attempts between the ages of [age].  Psychiatric treatment: None currently. Participated in individual psychotherapy in the past.     Substance Use History  Alcohol: Consumes 1 alcoholic drink 6 times per week on average  Nicotine: None  Cannabis:  Infrequent consumption of THC edibles for RLS and pain; last used 4 days ago  Problematic Substance Use: History of illicit drug use in the past (cannabis, cocaine, heroin) for a couple of years with brief periods of daily use. Attended substance use treatment.     Current Medications (per EMR)  Mr. Bolden has a current medication list which includes the following prescription(s): albuterol, amoxicillin-clavulanate, atorvastatin, vitamin b12, dorzolamide-timolol, ferrous gluconate, gabapentin, hydrocortisone, ketoconazole, latanoprost, metronidazole, and triamcinolone.    Developmental, Educational, & Occupational History  Gestational: Reported he was born ~1 month premature   complications: None reported  Developmental milestones: Stated he may have been late to talk (instead he whispered and he specified he probably could talk but didn't)  Childhood behavioral / emotional / academic problems: Difficulty with inattention and procrastination. Slow to start reading and reported he reads slowly. Described self as a B-average student.   Education: Graduated high school on time. Earned bachelor's degree from [employer] in  applied studies.   Occupation: Addictions counselor, . 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 Stopped working in 2014 due to work stress.     Psychosocial History  Born and raised in [place], [place]  Marital:   Children: 2 kids  Housing: Lives with wife  Social support:  Good enough  social support network of friends    Family History   No known immediate family history of dementia or neurological disorders.     RESULTS  See separate abstract for list of neuropsychological measures and test scores. Descriptive ranges are based on American Academy of Clinical Neuropsychology (2020) consensus guidelines, or test manuals where appropriate. All standardized scores are adjusted for age and additional adjustments for demographic factors such as education were performed where applicable.    PRE-MORBID ABILITY: Premorbid abilities were estimated within the high average range based on single word reading ability and demographic factors including sex, ethnicity, education, occupation, and geographic region.  ATTENTION/EXECUTIVE FUNCTIONS: Immediate auditory attention and working memory performances were average. Verbal abstract reasoning performance was exceptionally high. Visual reasoning through pattern identification was high average. Performance on a test of set-shifting/cognitive flexibility was low average. Copy of a complex figure requiring planning and organization was within normal limits. Response inhibition performance was high average to exceptionally high. Psychomotor processing speed performances were low average to high average.  LANGUAGE: Confrontation naming performance was exceptionally high and without error. Letter-cue verbal fluency performance was average. Semantic verbal fluency performance was high average.   VISUOSPATIAL PROCESSING: Copy of increasingly intricate designs was high average and without error.  Copy of a complex figure was within normal limits.   LEARNING AND MEMORY: Initial  encoding of a word list over multiple learning trials was average. Delayed recall of the list was average as he was able to recall all the words he initially encoded. Recognition of the word list was average. Initial encoding of contextualized verbal information in the form of short stories was high average and delayed retrieval was high average. Recognition of story details was high average. Encoding, retrieval and recognition of visual information were average.   PSYCHOLOGICAL AND BEHAVIORAL: On self-report questionnaires, he endorsed mild-to-moderate symptoms of depression. He also endorsed mild symptoms of anxiety characterized by moderate somatic and affective symptoms and mild cognitive symptoms of anxiety.   PERFORMANCE VALIDITY: Performance on neuropsychological tests is dependent upon a number of factors, including sufficient engagement and motivation, to reliably establish an examinee's level of cognitive functioning.  Based upon observations made throughout the evaluation, the patient did not appear to deliberately perform in a suboptimal manner and demonstrated good frustration tolerance on cognitively challenging tasks. Score on an imbedded index of performance validity was in the valid range. Overall, test results are believed to accurately represent the patient's current neurocognitive status.    BEHAVIORAL OBSERVATIONS  Presented on time and was unaccompanied  Alert, oriented to self, time, place, and circumstance; attentive and focused while undergoing testing  Appearance: Well-groomed, casually dressed  Gait/Posture: No abnormalities noted  Sensorimotor: No abnormalities noted  Behavior: Cooperative, pleasant, no behavioral abnormalities noted  Speech: Fluent, articulate, normal rate, prosody, and volume; no conversational word finding difficulty  Thought process: Logical, linear, and goal-directed  Thought content: Logical, appropriate  Affect: Broad, responsive, consistent with reported mood;  good eye contact  Mood: Anxious  Insight and Judgment: Intact  Approach to testing: Efficient, deliberate; good frustration on cognitively demanding tasks  Rapport: Easily established and maintained      Activities included in this evaluation: CPT Code #Units Time (min)   Psychiatric diagnostic interview 91111 1 --   Test evaluation services by professional; first hour. 10137 [id number] 158   Test evaluation services by professional, additional hour (+) [id number] 2    Test administration and scoring by technician, first 30 mins [id number] [phone]   Test administration and scoring by technician, additional 30 mins (+) [id number]    Dx: R41.3

## 2024-07-16 NOTE — PROGRESS NOTES
Provider: CE      Tech: YSABEL      Patient Name: Jose Carlos Bolden      : [birth year]      MRN: [id number]      ALLEN: 24      Age: 69      Education: 16      Ethnicity: W      Handedness: Right      Station: OP             NEUROPSYCHOLOGICAL TESTS              PREMORBID ABILITY RAW SCORE STANDARDIZED SCORE* DESCRIPTIVE RANGE*   WAIS-IV ACS Test of Premorbid Functioning (TOPF) 63 SS= 121 Above Average     Estimated FSIQ = 118 High Average          ATTENTION AND EXECUTIVE FUNCTIONS RAW SCORE STANDARDIZED SCORE* DESCRIPTIVE RANGE**   Wechsler Adult Intelligence Scale - 4th Edition (WAIS-IV)      Digit Span Forward 10 ss= 10 Average   Digit Span Backward 7 ss= 9 Average   Digit Span Sequencing 5 ss= 7 Low Average   Digit Span Total 22 ss= 9 Average   Coding 59 ss= 12 High Average   Similarities 35 ss= 18 Exceptionally High   Matrix Reasoning 17 ss= 13 High Average   Trail Making Test (Time/errors)        Part A s,r,e 45/0 TS= 38 Low Average   Part B s,r,e 108/0 TS= 42 Low Average   Stroop       Word e 75 TS= 33 Below Average   Color e 55 TS= 41 Low Average   Color/Word e 43 TS= 57 High Average   Interference e 12 TS= 70 Exceptionally High          LANGUAGE RAW SCORE STANDARDIZED SCORE* DESCRIPTIVE RANGE**   Damar Naming Test s,r,e 60 TS= 74 Exceptionally High   Letter-Cue Verbal Fluency s,r,e 14-11-15 (40) TS= 48 Average   Animal Fluency s,r,e 24 TS=  60 High Average          VISUOSPATIAL PROCESSING RAW SCORE STANDARDIZED SCORE* DESCRIPTIVE RANGE**   Leobardo Complex Figure Test (RCFT) Copy 34 %ile= >16 WNL   WMS-IV Visual Reproduction Copy 43 %= >75 High Average          LEARNING & MEMORY RAW SCORE STANDARDIZED SCORE* DESCRIPTIVE RANGE**   Wechsler Memory Scale-4th Edition (WMS-IV)       Logical Memory I 38 ss= 13 High Average   Logical Memory II 24 ss= 12 High Average   Logical Memory Recognition 22 %= >75 High Average   Visual Reproduction I 26 ss= 8 Average   Visual Reproduction II 17 ss= 10 Average   Visual Reproduction  Recognition 4 %= 26-50 Average   Nichole Verbal Learning Test - Revised (HVLT-R; Form 1)      Total Trials 1-3 6-10-9 (25) TS= 52 Average   Delayed Recall 10 TS= 56 Average   Retention (%) 100% TS= 56 Average   Recognition Hits 12 --     Recognition False Alarms 2 --     Recognition Discriminability 10 TS= 47 Average          PSYCHOLOGICAL & BEHAVIORAL SYMPTOMS RAW SCORE STANDARDIZED SCORE* DESCRIPTIVE RANGE**   Geriatric Depression Scale (GDS) 13 --  Mild/Moderate   Geriatric Anxiey Scale (GAS)       Somatic 10 --  Moderate   Cognitive 3 --  Mild   Affective 7 --  Moderate   Total 20 --  Mild          PERFORMANCE VALIDITY RAW SCORE   DESCRIPTIVE RANGE**   RDS 8 --  Valid Range          * All standardized scores are adjusted for age. Superscripts denote additional adjustment for (s)ex, (r)ace/ethnicity, (l)anguage, and/or (e)ducation.   ** Descriptive ranges are based on American Academy of Clinical Neuropsychology (2020) consensus guidelines, or test manuals where appropriate.    WNL = within normal limits. DC = discontinued due to patient s inability to complete the test.      Standardized scores: TS = T-score; mean = 50, standard deviation =10; z = z-score; mean = 0, standard deviation = 1; ss = scaled score; mean = 10, standard deviation = 3; MAS = [place]ford Older Adult Normative Study age adjusted scaled score; mean = 10, standard deviation = 3; SS = standard score; mean = 100, standard deviation = 15.

## 2024-07-23 DIAGNOSIS — H40.1132 PRIMARY OPEN ANGLE GLAUCOMA (POAG) OF BOTH EYES, MODERATE STAGE: ICD-10-CM

## 2024-07-26 RX ORDER — LATANOPROST 50 UG/ML
1 SOLUTION/ DROPS OPHTHALMIC DAILY
Qty: 7.5 ML | Refills: 1 | Status: SHIPPED | OUTPATIENT
Start: 2024-07-26 | End: 2024-07-26

## 2024-07-26 NOTE — TELEPHONE ENCOUNTER
"Medication Requested:  latanoprost (XALATAN) 0.005 % ophthalmic solution 7.5 mL 4 7/11/2023 -- --   Sig - Route: Place 1 drop into both eyes daily - Both Eyes   Class: Local Print     ----------------------  Last Office Visit : 4/16/2024  [facility]      Future Office visit:     9/3/2024 10:15 AM (15 min)  Math[facility]   Arrive by: 10:00 AM   RETURN ADULT [facility]   [facility] ([facility])   Orlando Schilling MD     ----------------------  Per last visit note:  \"Patient previously not on any IOP lowering medications. Discussed that elevated IOP and changes on VF/OCT RNFL are consistent with diagnosis of primary open angle glaucoma. Angles open on gonio but heavily pigmented.   SLT right eye performed on 10/13/23, RNFL progression right eye since then, started cosopt 3/5/24  Continue latanoprost at bedtime both eyes  Continue Cosopt twice daily right eye      RTC in 5 months VT, VF, RNFL\"              "

## 2024-08-19 ENCOUNTER — OFFICE VISIT (OUTPATIENT)
Dept: NEUROLOGY | Facility: CLINIC | Age: 69
End: 2024-08-19
Payer: COMMERCIAL

## 2024-08-19 VITALS
WEIGHT: 130 LBS | DIASTOLIC BLOOD PRESSURE: 76 MMHG | BODY MASS INDEX: 19.77 KG/M2 | HEART RATE: 54 BPM | SYSTOLIC BLOOD PRESSURE: 131 MMHG | OXYGEN SATURATION: 99 %

## 2024-08-19 DIAGNOSIS — G25.81 RESTLESS LEGS SYNDROME: Primary | ICD-10-CM

## 2024-08-19 PROCEDURE — 99213 OFFICE O/P EST LOW 20 MIN: CPT | Performed by: STUDENT IN AN ORGANIZED HEALTH CARE EDUCATION/TRAINING PROGRAM

## 2024-08-19 PROCEDURE — G2211 COMPLEX E/M VISIT ADD ON: HCPCS | Performed by: STUDENT IN AN ORGANIZED HEALTH CARE EDUCATION/TRAINING PROGRAM

## 2024-08-19 RX ORDER — GABAPENTIN 600 MG/1
900 TABLET ORAL 2 TIMES DAILY
Qty: 270 TABLET | Refills: 3 | Status: SHIPPED | OUTPATIENT
Start: 2024-08-19

## 2024-08-19 NOTE — PROGRESS NOTES
Department of Neurology  Movement Disorders Division   Follow-up Note    Patient: Jose Carlos Bolden   MRN: [id number]   : 1955   Date of Visit: 2024    INTERVAL EVENTS:  Jose Carlos Bolden is a 69 year old male who returns to clinic for follow up of RLS.  He was last seen 6/3/2024 at which time neuropsychology testing was ordered and gabapentin was adjusted.    His RLS symptoms have been fair.  Some nights are worse if medications are taken late.  Other nights are poor for no clear reason.  Symptoms begin about 8pm.  He has shifted his dosing towards the afternoon and evening but this hasn't been effective.    Balance is fine but he is trying to be mindful when moving around.      Related Medications 4pm  6pm 8-9:30pm   Gabapentin 600mg 1 1 1   Ferrous gluconate 325mg 1            Current Outpatient Medications   Medication Sig Dispense Refill    albuterol (PROAIR HFA) 108 (90 BASE) MCG/ACT Inhaler Inhale 2 puffs into the lungs every 4 hours as needed for shortness of breath / dyspnea 1 Inhaler 0    amoxicillin-clavulanate (AUGMENTIN) 875-125 MG tablet Take 1 tablet by mouth 2 times daily 20 tablet 0    atorvastatin (LIPITOR) 20 MG tablet Take 1 tablet (20 mg) by mouth daily 90 tablet 3    Cyanocobalamin (VITAMIN B12) 1000 MCG TBCR Take 1 tablet by mouth daily 90 tablet 3    dorzolamide-timolol (COSOPT) 2-0.5 % ophthalmic solution Place 1 drop into the right eye 2 times daily 10 mL 4    ferrous gluconate (FERGON) 324 (38 Fe) MG tablet Take 324 mg by mouth daily (with breakfast)      gabapentin (NEURONTIN) 600 MG tablet Take 1.5 tablets (900 mg) by mouth 2 times daily At 4pm and 8pm. 270 tablet 3    hydrocortisone 2.5 % cream Apply topically 2 times daily Apply for 5-7 days for irritation. 20 g 3    ketoconazole (NIZORAL) 2 % external cream Apply topically 2 times daily To face as needed for rash 60 g 11    latanoprost (XALATAN) 0.005 % ophthalmic solution Place 1 drop into both eyes daily 2.5 mL 11    metroNIDAZOLE  (NORITATE) 1 % cream Apply topically daily 60 g 0    triamcinolone (KENALOG) 0.1 % external cream Apply topically 2 times daily 30 g 0       Allergies   Allergen Reactions    Cats     Feathers     Food      Potato chips, tomatillos, cherry de la fuente    Seasonal Allergies     Smoke.     Penicillium Notatum Allergy Skin Test     Sulfa Antibiotics Other (See Comments)     Confusion and dizziness          PHYSICAL EXAM:  /76 (BP Location: Right arm, Patient Position: Sitting, Cuff Size: Adult Regular)   Pulse 54   Wt 59 kg (130 lb)   SpO2 99%   BMI 19.77 kg/m    Sitting comfortably, no abnormal or involuntary symptoms observed      LABS:   Latest Reference Range & Units 06/03/24 08:56   Ferritin 31 - 409 ng/mL 123           ASSESSMENT/PLAN:  Jose Carlos Bolden is a 69 year old male who returns to clinic for follow up of RLS.  His symptoms are not adequately controlled with 3 doses of gabapentin 600mg.    The longitudinal plan of care for the diagnosis(es)/condition(s) as documented were addressed during this visit. Due to the added complexity in care, I will continue to support Jose Carlos in the subsequent management and with ongoing continuity of care.    - Increase gabapentin to 900mg at 4pm and 8pm  - Continue iron supplementation  - RTC 3 months, at [facility] [place]      Eileen Christopher MD   of Neurology  Movement Disorders Division

## 2024-08-19 NOTE — LETTER
2024       RE: Jose Carlos Bolden  [address] [address]     Dear Colleague,    Thank you for referring your patient, Jose Carlos Bolden, to the [facility] [place] at [facility]. Please see a copy of my visit note below.    Department of Neurology  Movement Disorders Division   Follow-up Note    Patient: Jose Carlos Bolden   MRN: [id number]   : 1955   Date of Visit: 2024    INTERVAL EVENTS:  Jose Carlos Bolden is a 69 year old male who returns to clinic for follow up of RLS.  He was last seen 6/3/2024 at which time neuropsychology testing was ordered and gabapentin was adjusted.    His RLS symptoms have been fair.  Some nights are worse if medications are taken late.  Other nights are poor for no clear reason.  Symptoms begin about 8pm.  He has shifted his dosing towards the afternoon and evening but this hasn't been effective.    Balance is fine but he is trying to be mindful when moving around.      Related Medications 4pm  6pm 8-9:30pm   Gabapentin 600mg 1 1 1   Ferrous gluconate 325mg 1            Current Outpatient Medications   Medication Sig Dispense Refill     albuterol (PROAIR HFA) 108 (90 BASE) MCG/ACT Inhaler Inhale 2 puffs into the lungs every 4 hours as needed for shortness of breath / dyspnea 1 Inhaler 0     amoxicillin-clavulanate (AUGMENTIN) 875-125 MG tablet Take 1 tablet by mouth 2 times daily 20 tablet 0     atorvastatin (LIPITOR) 20 MG tablet Take 1 tablet (20 mg) by mouth daily 90 tablet 3     Cyanocobalamin (VITAMIN B12) 1000 MCG TBCR Take 1 tablet by mouth daily 90 tablet 3     dorzolamide-timolol (COSOPT) 2-0.5 % ophthalmic solution Place 1 drop into the right eye 2 times daily 10 mL 4     ferrous gluconate (FERGON) 324 (38 Fe) MG tablet Take 324 mg by mouth daily (with breakfast)       gabapentin (NEURONTIN) 600 MG tablet Take 1.5 tablets (900 mg) by mouth 2 times daily At 4pm and 8pm. 270 tablet 3      hydrocortisone 2.5 % cream Apply topically 2 times daily Apply for 5-7 days for irritation. 20 g 3     ketoconazole (NIZORAL) 2 % external cream Apply topically 2 times daily To face as needed for rash 60 g 11     latanoprost (XALATAN) 0.005 % ophthalmic solution Place 1 drop into both eyes daily 2.5 mL 11     metroNIDAZOLE (NORITATE) 1 % cream Apply topically daily 60 g 0     triamcinolone (KENALOG) 0.1 % external cream Apply topically 2 times daily 30 g 0       Allergies   Allergen Reactions     Cats      Feathers      Food      Potato chips, tomatillos, cherry de la fuente     Seasonal Allergies      Smoke.      Penicillium Notatum Allergy Skin Test      Sulfa Antibiotics Other (See Comments)     Confusion and dizziness          PHYSICAL EXAM:  /76 (BP Location: Right arm, Patient Position: Sitting, Cuff Size: Adult Regular)   Pulse 54   Wt 59 kg (130 lb)   SpO2 99%   BMI 19.77 kg/m    Sitting comfortably, no abnormal or involuntary symptoms observed      LABS:   Latest Reference Range & Units 06/03/24 08:56   Ferritin 31 - 409 ng/mL 123           ASSESSMENT/PLAN:  Jose Carlos Bolden is a 69 year old male who returns to clinic for follow up of RLS.  His symptoms are not adequately controlled with 3 doses of gabapentin 600mg.    The longitudinal plan of care for the diagnosis(es)/condition(s) as documented were addressed during this visit. Due to the added complexity in care, I will continue to support Jose Carlos in the subsequent management and with ongoing continuity of care.    - Increase gabapentin to 900mg at 4pm and 8pm  - Continue iron supplementation  - RTC 3 months, at [facility] [place]      Eileen Christopher MD   of Neurology  Movement Disorders Division      Again, thank you for allowing me to participate in the care of your patient.      Sincerely,    Eileen Christopher MD

## 2024-08-25 DIAGNOSIS — G25.81 RESTLESS LEGS SYNDROME: Primary | ICD-10-CM

## 2024-08-25 NOTE — CONFIDENTIAL NOTE
d   Aklief Pregnancy And Lactation Text: It is unknown if this medication is safe to use during pregnancy.  It is unknown if this medication is excreted in breast milk.  Breastfeeding women should use the topical cream on the smallest area of the skin for the shortest time needed while breastfeeding.  Do not apply to nipple and areola.

## 2024-08-25 NOTE — PROGRESS NOTES
[address] [facility] [address]  Mobile Phone  [phone]  Email  [email or web address]    Cognitive testing   Vitamin C  Timing of iron        Medications      4p 8p     Albuterol inhaler        Amoxicillin clavulanate augment         Atorvastatin lipitor 20mg         Cyancobolamin Vit B12 1000mcg        Dorzolamide timolol cosopt soln        Ferrous gluconate fergon 324        Gabapentin neurontin 600mg  1.5 1.5     Hydrocortisone 2.5% cream        Ketoconazole cream         Latanoprost Xalatan 0.005% soln        Metronidazole cream         Triamcinolone cream                                       Date of Visit: 08/19/2024     INTERVAL EVENTS:  Jose Carlos Bolden is a 69 year old male who returns to clinic for follow up of RLS.  He was last seen 6/3/2024 at which time neuropsychology testing was ordered and gabapentin was adjusted.     His RLS symptoms have been fair.  Some nights are worse if medications are taken late.  Other nights are poor for no clear reason.  Symptoms begin about 8pm.  He has shifted his dosing towards the afternoon and evening but this hasn't been effective.     Balance is fine but he is trying to be mindful when moving around.        Related Medications 4pm  6pm 8-9:30pm   Gabapentin 600mg 1 1 1   Ferrous gluconate 325mg 1           LABS:    Latest Reference Range & Units 06/03/24 08:56   Ferritin 31 - 409 ng/mL 123            ASSESSMENT/PLAN:  Jose Carlos Bolden is a 69 year old male who returns to clinic for follow up of RLS.  His symptoms are not adequately controlled with 3 doses of gabapentin 600mg.     The longitudinal plan of care for the diagnosis(es)/condition(s) as documented were addressed during this visit. Due to the added complexity in care, I will continue to support Jose Carlos in the subsequent management and with ongoing continuity of care.     - Increase gabapentin to 900mg at 4pm and 8pm  - Continue iron supplementation  - RTC 3 months, at [facility] [place]    Date of Visit: 6/3/2024     CC:  restless leg syndrome     HPI:  Mr. Bolden is a 67 yo man who presents for treatment of RLS.  Has had restless legs for decades.  Presently he has symptoms 3-4 nights per week when trying to fall asleep.  May occur while sitting still such as at a performance.  This causes his legs to jerk and th desire to move. This is relieved with movement.  He once took gabapentin 900mg at night but reduced when symptoms were well controlled.  Previously took ropinirole which caused dizziness and sleepiness.     He hasn't noticed side effects; although he is concerned about dementia symptoms.  He is forgetful.  For example, things of other things to do and forgets the first task.  He has longstanding hearing loss, as far back as his draft physical.  Got hearing aids in his 60s.     He also notes psychiatric traits like, procrastination, disorganization, desire to complete a task once started.  He is concerned about ADHD, OCD, or OCPD.  As a career he performed psychiatric assessments.  He has had neuropsych testing 20 years ago at [facility].        Related Medications         Gabapentin 600mg 1 1 1   Ferrous gluconate 325mg 1           IMAGING:  MRI brain 10/26/2023 - personally reviewed: cerebellar atrophy, otherwise unremarkable              ASSESSMENT/PLAN:  Mr. Bolden is a 67 yo man who presents for treatment of RLS.  He is maintained on gabapentin and had side effects on ropinirole.  He has symptoms many days per week at sleep onset and if sitting during a performance.  Will move his gabapentin dosing to increase throughout the day.     He also has many cognitive concerns so will repeat neuropsych testing.  Last done 20 years ago.     The longitudinal plan of care for the diagnosis(es)/condition(s) as documented were addressed during this visit. Due to the added complexity in care, I will continue to support Jose Carlos in the subsequent management and with ongoing continuity of care.     - Neuropsychology testing  - Repeat  ferritin testing  - Continue daily iron supplementation  - Adjust gabapentin to 300/600/900  - RTC 2 months, 30 mins

## 2024-08-30 DIAGNOSIS — H40.1132 PRIMARY OPEN ANGLE GLAUCOMA (POAG) OF BOTH EYES, MODERATE STAGE: Primary | ICD-10-CM

## 2024-09-03 ENCOUNTER — OFFICE VISIT (OUTPATIENT)
Dept: OPHTHALMOLOGY | Facility: CLINIC | Age: 69
End: 2024-09-03
Attending: OPHTHALMOLOGY
Payer: COMMERCIAL

## 2024-09-03 DIAGNOSIS — H40.1131 PRIMARY OPEN ANGLE GLAUCOMA (POAG) OF BOTH EYES, MILD STAGE: ICD-10-CM

## 2024-09-03 PROCEDURE — 92012 INTRM OPH EXAM EST PATIENT: CPT | Performed by: OPHTHALMOLOGY

## 2024-09-03 PROCEDURE — 92083 EXTENDED VISUAL FIELD XM: CPT | Performed by: OPHTHALMOLOGY

## 2024-09-03 PROCEDURE — 92133 CPTRZD OPH DX IMG PST SGM ON: CPT | Performed by: OPHTHALMOLOGY

## 2024-09-03 PROCEDURE — G0463 HOSPITAL OUTPT CLINIC VISIT: HCPCS | Performed by: OPHTHALMOLOGY

## 2024-09-03 ASSESSMENT — REFRACTION_WEARINGRX
OD_ADD: +2.75
OS_ADD: +2.75
OS_CYLINDER: +0.50
OD_SPHERE: +0.25
SPECS_TYPE: PAL
OS_AXIS: 155
OS_SPHERE: +0.50
OD_AXIS: 175
OD_CYLINDER: +0.50

## 2024-09-03 ASSESSMENT — VISUAL ACUITY
OD_CC+: -2
OS_CC: 20/20
METHOD: SNELLEN - LINEAR
CORRECTION_TYPE: GLASSES
OD_CC: 20/20

## 2024-09-03 ASSESSMENT — CUP TO DISC RATIO
OS_RATIO: 0.55
OD_RATIO: 0.7

## 2024-09-03 ASSESSMENT — EXTERNAL EXAM - RIGHT EYE: OD_EXAM: NORMAL

## 2024-09-03 ASSESSMENT — TONOMETRY
OS_IOP_MMHG: 19
OD_IOP_MMHG: 14
IOP_METHOD: TONOPEN
IOP_METHOD: APPLANATION
OD_IOP_MMHG: 11
OS_IOP_MMHG: 19

## 2024-09-03 ASSESSMENT — SLIT LAMP EXAM - LIDS
COMMENTS: DERMATOCHALASIS
COMMENTS: DERMATOCHALASIS

## 2024-09-03 ASSESSMENT — EXTERNAL EXAM - LEFT EYE: OS_EXAM: NORMAL

## 2024-09-03 NOTE — PROGRESS NOTES
Chief Complaint/Presenting Concern: Glaucoma     History of Present Illness:   Jose Carlos Bolden is a 68 year old patient who presents for evaluation of glaucoma. Patient previously followed with Dr. Arias and Dr. Patel as glaucoma suspect, but noted changed on OCT and VF at visit 05/2023. S/p SLT right eye 10/13/23    09/03/2024: Presents for follow up, last seen 4/2024. No Visual or Ocular Issues    Current Medications:  Latanoprost at bedtime each eye  Cosopt BID right eye     Relevant Past Medical/Family/Social History: HTN    Relevant Review of Systems: Negative     Diagnosis: Primary open angle glaucoma , mild stage each eye   Year diagnosis: 2023. Previously glaucoma suspect  Previous glaucoma surgery/laser:   s/p SLT right eye 10/13/23  Maximum intraocular pressure 22/22  Currently Meds: None  Family history: positive, 2 brothers, sister, grandmother  CCT: [phone]  Gonio: Open 360, TM heavily pigmented both eyes  Refractive status: Mild hyperopia  Trauma history: positive, remote history of hitting eye with a soccer ball  Steroid exposure: negative  Vasospastic disease: Migraine/Raynaud phenomenon: negative  A past hemodynamic crisis or Low BP:: negative  Meds AEs/intolerance:  PMHx: Reactive airway disease, HLD, osteoarthritis  Anticoagulants: None     Today's Testing:  Visual field 09/03/2024  Right eye - few scattered depressed points, stable    Left eye - few scattered depressed points, stable   OCT Optic Nerve RNFL Spectralis 09/03/2024  right eye: Superior and Inferior Thinning stable from 03/05/2024. Worse from 05/19/2023 scan  left eye: normal thickness 360, stable from previous     Additional Ocular History:   Early NS each eye     Plan/Recommendations:  Discussed findings with patient.  Patient previously not on any IOP lowering medications. Discussed that elevated IOP and changes on VF/OCT RNFL are consistent with diagnosis of primary open angle glaucoma. Angles open on gonio but heavily pigmented.    SLT right eye performed on 10/13/23, RNFL progression right eye since then, started cosopt 3/5/24  Continue latanoprost at bedtime both eyes  Continue Cosopt twice daily right eye     RTC in 6 months VT, VF, OCT RNFL       Physician Attestation     Attending Physician Attestation:  Complete documentation of historical and exam elements from today's encounter can be found in the full encounter summary report (not reduplicated in this progress note). I personally obtained the chief complaint(s) and history of present illness. I confirmed and edited as necessary the review of systems, past medical/surgical history, family history, social history, and examination findings as documented by others; and I examined the patient myself. I personally reviewed the relevant tests, images, and reports as documented above. I personally reviewed the ophthalmic test(s) associated with this encounter. I formulated and edited as necessary the assessment and plan and discussed the findings and management plan with the patient and any family members present at the time of the visit.  Orlando Schilling M.D., Glaucoma, September 3, 2024

## 2024-09-03 NOTE — NURSING NOTE
Chief Complaints and History of Present Illnesses   Patient presents with    Glaucoma Follow-Up     4 month follow up Primary open angle glaucoma (POAG) of both eyes, mild stage     Chief Complaint(s) and History of Present Illness(es)       Glaucoma Follow-Up              Comments: 4 month follow up Primary open angle glaucoma (POAG) of both eyes, mild stage              Comments    Pt states overall less discomfort and dryness compared to last visit.   No VA changes either eye.   Started using warm compress PRN after last visit.   Occasionally misses his evening Cosopt drop.     Ocular Meds:   Latanaprost qPM each eye   Cosopt BID right eye   Systane PRN each eye     Rosas Ho 10:17 AM September 3, 2024

## 2024-10-13 ENCOUNTER — HEALTH MAINTENANCE LETTER (OUTPATIENT)
Age: 69
End: 2024-10-13

## 2024-10-22 PROBLEM — H40.9 GLAUCOMA: Status: ACTIVE | Noted: 2023-05-19

## 2024-10-22 PROBLEM — H91.93 HEARING LOSS, BILATERAL: Status: ACTIVE | Noted: 2023-10-23

## 2024-10-22 PROBLEM — H81.10 BENIGN PAROXYSMAL POSITIONAL VERTIGO: Status: ACTIVE | Noted: 2023-10-23

## 2024-10-22 PROBLEM — M26.609 TMJ (TEMPOROMANDIBULAR JOINT SYNDROME): Status: ACTIVE | Noted: 2024-05-16

## 2024-10-22 PROBLEM — H93.19 TINNITUS: Status: ACTIVE | Noted: 2023-10-23
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
## 2024-10-23 NOTE — PROGRESS NOTES
Diagnosis/Summary/Recommendations:    PATIENT: Jose Carlos Bolden  69 year old male     : 1955    RACHAEL: 2024       MRN: [id number]      [facility] [address]  Mobile Phone  [phone]  Email  [email or web address]     Cognitive testing   Vitamin C  Timing of iron    He granted proxy access to his wife for his mychart       Assessment:  (G25.81) Restless legs syndrome  (primary encounter diagnosis)  No family history of RLS      MRI [facility] 10/26/2023   Mild volume loss  No other issues.     Onset 20s or 30s with RLS  He has been on medication for about 10 years.   Started on gabapentin then ropinirole and then back to gabapentin   He has been on ropinirole in the past.   He may have had dizziness/sleepiness using 0.25mg 3/day     He has been iron as well.     Has symptoms primarily at night but can occur in seated events (sports/play) in the afternoon.   He has problems sitting in an airplane  He has some numbness in his left foot.     May have a minimal neuropathy     Review of diagnosis    RLS    Avoidance of dopamine blockers   Not taking    Motor complication review   Has not had had ICD issues  May have some symptoms in the afternoon  No clear augmentation     Review of Impulse control disorders   Denies     Review of surgical or medication options   Gabapentin  Ropinirole in the past.     Gait/Balance/Falls   Stumbles in the past years    Exercise/Therapy performed/offered   Walks daily and has not been doing strength training     Cognitive/Driving   Retired and was a  and addiction Counsellor.   [place] problems [place]     Cognitive evaluation 2024 Annabel    Overall, Mr. Bolden is functioning quite well from a neuropsychological standpoint and does not evidence any objective cognitive deficits. A select weakness in mental flexibility is a nonspecific finding but in the context of other very strong executive functioning  scores is not suggestive of focal or lateralized cerebral dysfunction, and it does not raise concerns about acquired neurologic disease. This finding and his subjective cognitive complaints are likely attributable to longstanding weaknesses in complex attention and normative age-related cognitive lapses that may be exacerbated in the context of anxiety, stress, low mood, sleep disturbance, and pain.     Mood   Adopted kids - 2 kids - Servando and Radha   and lives with his wife - second marriage  40 years  1st marriage was 2 years.     Has had some depression and anxiety and managing them.     Hallucinations/delusions   Denies     Sleep   Taking iron and gabapentin  Restless legs RLS  Tries to go to bed at 10pm and may take up to 3 hours or typically 2 hours.   Sometimes within 1 hours     May get up and play solitaire and has problems and gets up and go to another bed and read.   When he falls asleep he stays asleep most of the night except having to get up once or twice to urinate.     Alex  Has not had a sleep study.   Rarely talking in his sleep  Rarely has he acted out his dreams.   Flailing  Wife sleeps in the same bed     Bladder/Renal/Prostate/Gyn/Other   Nocturia 1-2/noc  No daytime urinary problems other than some urgency   Has a chronic weak stream   Seen urology and not on medication  Has not seen a urologist recently  May be interested in discussing medication    GI/Constipation/GERD   Heartburn sometimes  May take tums as needed  Rare constipation or diarrhea.       ENDO/Lipid/DM/Bone density/Thyroid  Dyslipidemia  Hyperlipidemia  On a statin and taking atorvastatin lipitor   Has some leg pain    Recent Labs   Lab Test 06/11/24  0856 01/20/23  0834   CHOL 123 136   HDL 31* 33*   LDL 63 81   TRIG 146 110     No thyroid problems.     TSH   Date Value Ref Range Status   07/31/2019 1.91 0.40 - 4.00 mU/L Final     Bones are okay - not sure if he has had a bone density.     No diabetes   Lab Results  "  Component Value Date    A1C 4.7 07/31/2019       Cardio/heart/Hyper or Hypotensive   Blood pressure has increased over the years  Not taking medications for blood pressure.   No heart problems but there is a family history of heart disease.     Vision/Dry Eyes/Cataracts/Glaucoma/Macular   Using eye drops for pressure  Had unilateral eye surgery for glaucoma   Hyperopia  Glaucoma  Vision are okay  Pressures are a bit high.     Heme/Anticoagulation/Antiplatelet/Anemia/Other  No recent cbc  Not taking an aspirin     Iron deficiency  Ferritin was done recently and was normal.   Ferritin  6/30/2024  123    Vitamin B12 deficiency  B12 : 1100 as of 11/12/2020    Lab Results   Component Value Date    WBC 5.1 11/24/2023    WBC 5.7 11/12/2020     Lab Results   Component Value Date    RBC 4.35 11/24/2023    RBC 4.30 11/12/2020     Lab Results   Component Value Date    HGB 14.3 11/24/2023    HGB 14.0 11/12/2020     Lab Results   Component Value Date    HCT 42.5 11/24/2023    HCT 42.7 11/12/2020     No components found for: \"MCT\"  Lab Results   Component Value Date    MCV 98 11/24/2023    MCV 99 11/12/2020     Lab Results   Component Value Date    MCH 32.9 11/24/2023    MCH 32.6 11/12/2020     Lab Results   Component Value Date    MCHC 33.6 11/24/2023    MCHC 32.8 11/12/2020     Lab Results   Component Value Date    RDW 13.5 11/24/2023    RDW 13.0 11/12/2020     Lab Results   Component Value Date     11/24/2023     11/12/2020       ENT/Resp  May have lost his smell or taste  Non smoker   Benign positional vertigo  Hearing loss  Not wearing his hearing aides.   Tinnitus  tMJ  Reactive airway disease  Rare inhaler    Skin/Cancer/Seborrhea/other  No skin cancer  Rosacea vs seborrhea dermatitis     Musculoskeletal/Pain/Headache  Hip surgery in past on the left and had a fracture that was not surgically managed  Planning right hip surgery     Left hip pain  Sciatica  TMJ  Epicondylitis  Hip " arthritis  Arthritis  Lateral epicondylitis  Medial epicondylitis   Kyphosis  Scoliosis  osteoarthritis    Other:       Medications       4p 6p 8p       Albuterol inhaler  prn            Amoxicillin clavulanate augment   no            Atorvastatin lipitor 20mg        1       Cyancobolamin Vit B12 1000mcg       1       Dorzolamide timolol cosopt soln  right    right       Ferrous gluconate fergon 324      1        Gabapentin neurontin 600mg   1 1 1       Hydrocortisone 2.5% cream  prn            Ketoconazole cream   prn            Latanoprost Xalatan 0.005% soln      both        Metronidazole cream   prn            Triamcinolone cream   prn                                                            Possible neuropathy  Slight reduced pp at toes  Vibration borderline normal  Temperature probably okay for age  Jps normal    Plan:    He has been on gabapentin and ropinirole in the past.   He has ongoing primary insomnia that is related to his RLS and other factors.   He also other issues with his low back/torso pain    Discussed  Gabapentin  Pregabalin lyrica  Adjunctive therapy of long acting ropinirole XL 2mg from [facility] (he had problems with a low dose and would need to be aware of the risk of impulse control issues  Other option is short acting low dose ropinirole 0.25mg at night -   May want to go on a drug holiday and would have him work with pharmacy about this - ie slow wean if he is going to do this.   Reviewed neuropsych findings that were reassuring .  He has had a ferritin level that was fine.   May want to talk with pharmacy about timing of iron which he should remain on.   Return to see me in 6 months or Jacquelyn Gomes NP in 3 months.     Pharmacy (MT) consultation and medication management  Please call the scheduling number @ [phone] to set up an appointment with pharmacists Vandana Mcmahan, Rachel Mendenhall, or Waleska Broderick.     Treating insomnia can be difficult because the medications we  "use can be harmful, especially in older adults. In addition, sleep medications do not tend to be very effective and should only be used short-term. Cognitive behavioral therapy (CBT) is the most effective treatment for long-term insomnia. The goal of CBT for insomnia is to address the underlying causes of the sleep problems, including your habits and how you think about sleep. You can do CBT with a trained psychologist, or from the comfort of your home by following an online program or workbook. Here are some great resources for at-home CBT:    1) 6-week online CBT course through [facility] for $40: [employer]/sleep  2) \"Say Anali to Insomnia\" by Fran Cardoso, PhD at [employer]: 6-week program  3) \"Overcoming Insomnia: A Cognitive-Behavioral Therapy Approach Workbook\" by Saajn Werner and Jenn Jenkins  4) \"Quiet Your Mind and Get to Sleep: Solutions to Insomnia for Those with Depression, Anxiety or Chronic Pain ([place] Self-Help Workbook) by Jenn Jenkins and Sherron Fernandes    Coding statement:   Medical Decision Making:  #  Chronic progressive medical conditions addressed  - see above --   Review and/or interpretation of unique test or documentation from a provider outside of neurology yes   Independent historian provided additional details  no I  Prescription drug management and review of potential side effects and/or monitoring for side effects  -- see above ---  Health impacted by social determinants of health  no    I have reviewed the note as documented above.  This accurately captures the substance of my conversation with the patient and total time spent preparing for visit, executing visit and completing visit on the day of the visit:  45 minutes.  The portion of this total time included face to face time     The longitudinal plan of care for Jose Carlos Bolden was addressed during this visit. Due to the added complexity in care, I will continue to support " Jose Carlos Bolden in the subsequent management of this condition(s) and with the ongoing continuity of care of this condition(s).      aTurus Woodard MD     ______________________________________    Last visit date and details:          Date of Visit: 08/19/2024     INTERVAL EVENTS:  Jose Carlos Bolden is a 69 year old male who returns to clinic for follow up of RLS.  He was last seen 6/3/2024 at which time neuropsychology testing was ordered and gabapentin was adjusted.     His RLS symptoms have been fair.  Some nights are worse if medications are taken late.  Other nights are poor for no clear reason.  Symptoms begin about 8pm.  He has shifted his dosing towards the afternoon and evening but this hasn't been effective.     Balance is fine but he is trying to be mindful when moving around.        Related Medications 4pm  6pm 8-9:30pm   Gabapentin 600mg 1 1 1   Ferrous gluconate 325mg 1           LABS:    Latest Reference Range & Units 06/03/24 08:56   Ferritin 31 - 409 ng/mL 123            ASSESSMENT/PLAN:  Jose Carlos Bolden is a 69 year old male who returns to clinic for follow up of RLS.  His symptoms are not adequately controlled with 3 doses of gabapentin 600mg.     The longitudinal plan of care for the diagnosis(es)/condition(s) as documented were addressed during this visit. Due to the added complexity in care, I will continue to support Jose Carlos in the subsequent management and with ongoing continuity of care.     - Increase gabapentin to 900mg at 4pm and 8pm  - Continue iron supplementation  - RTC 3 months, at [facility]     Date of Visit: 6/3/2024     CC: restless leg syndrome     HPI:  Mr. Bolden is a 67 yo man who presents for treatment of RLS.  Has had restless legs for decades.  Presently he has symptoms 3-4 nights per week when trying to fall asleep.  May occur while sitting still such as at a performance.  This causes his legs to jerk and th desire to move. This is relieved with movement.  He once took gabapentin 900mg at night but reduced  when symptoms were well controlled.  Previously took ropinirole which caused dizziness and sleepiness.     He hasn't noticed side effects; although he is concerned about dementia symptoms.  He is forgetful.  For example, things of other things to do and forgets the first task.  He has longstanding hearing loss, as far back as his draft physical.  Got hearing aids in his 60s.     He also notes psychiatric traits like, procrastination, disorganization, desire to complete a task once started.  He is concerned about ADHD, OCD, or OCPD.  As a career he performed psychiatric assessments.  He has had neuropsych testing 20 years ago at [facility].        Related Medications         Gabapentin 600mg 1 1 1   Ferrous gluconate 325mg 1           IMAGING:  MRI brain 10/26/2023 - personally reviewed: cerebellar atrophy, otherwise unremarkable         ASSESSMENT/PLAN:  Mr. Bolden is a 67 yo man who presents for treatment of RLS.  He is maintained on gabapentin and had side effects on ropinirole.  He has symptoms many days per week at sleep onset and if sitting during a performance.  Will move his gabapentin dosing to increase throughout the day.     He also has many cognitive concerns so will repeat neuropsych testing.  Last done 20 years ago.     The longitudinal plan of care for the diagnosis(es)/condition(s) as documented were addressed during this visit. Due to the added complexity in care, I will continue to support Jose Carlos in the subsequent management and with ongoing continuity of care.     - Neuropsychology testing  - Repeat ferritin testing  - Continue daily iron supplementation  - Adjust gabapentin to 300/600/900  - RTC 2 months, 30 mins        ______________________________________      Patient was asked about 14 Review of systems including changes in vision (dry eyes, double vision), hearing, heart, lungs, musculoskeletal, depression, anxiety, snoring, RBD, insomnia, urinary frequency, urinary urgency, constipation,  swallowing problems, hematological, ID, allergies, skin problems: seborrhea, endocrinological: thyroid, diabetes, cholesterol; balance, weight changes, and other neurological problems and these were not significant at this time except for   Patient Active Problem List   Diagnosis    Hearing loss    Reactive airway disease    Restless legs syndrome    Kyphosis    Scoliosis    Vitamin B12 deficiency    Osteoarthritis    Iron deficiency    Hyperlipidemia LDL goal <130    Benign paroxysmal positional vertigo    Dyslipidemia    Family history of ischemic heart disease    Hearing loss, bilateral    Left hip pain    Lateral epicondylitis    Medial epicondylitis of elbow    Pure hypercholesterolemia    Rosacea    S/P total hip arthroplasty    Sciatica    Tinnitus    Hyperopia    Glaucoma    Arthritis    Osteoarthritis of hip    TMJ (temporomandibular joint syndrome)          Allergies   Allergen Reactions    Penicillium Notatum Allergy Skin Test     Other Food Allergy     Cats     Feathers     Food      Potato chips, tomatillos, cherry de la fuente    Seasonal Allergies     Smoke.     Sulfa Antibiotics Other (See Comments)     Confusion and dizziness    Other Reaction(s): Other (See Comments)     Past Surgical History:   Procedure Laterality Date    COLONOSCOPY      Findings: normal    COLONOSCOPY N/A 12/17/2020    Procedure: COLONOSCOPY, WITH POLYPECTOMY AND BIOPSY;  Surgeon: Marvel Cook MD;  [place] [facility] [place]    EYE SURGERY  2023    for glaucoma    JOINT REPLACEMENT Left     hip, CATALINA    ORTHOPEDIC SURGERY  2014    hip replacement    VASECTOMY       Past Medical History:   Diagnosis Date    Allergy     sulfa, penicillium, food (tomatillos, ground cherries, some potato chips)    Anxiety     Back pain     BPPV (benign paroxysmal positional vertigo)     Dementia (H) 2005    mild sx    Depressive disorder 1974    Glaucoma suspect     Head injury 1966    not diagnosed    Headache     Hearing loss     Hip pain, left      Hyperlipidemia LDL goal <130     Hypertension 2019    sub threshold    Iron deficiency     Kyphosis     Osteoarthritis     Other nervous system complications 1990?    RLS    Reactive airway disease     Restless leg syndrome     Scoliosis     Vitamin B12 deficiency      Social History     Socioeconomic History    Marital status:      Spouse name: Not on file    Number of children: Not on file    Years of education: Not on file    Highest education level: Not on file   Occupational History    Not on file   Tobacco Use    Smoking status: Never    Smokeless tobacco: Never    Tobacco comments:     heavy childhood exposure   Substance and Sexual Activity    Alcohol use: Yes     Comment: moderate use throughout all adulthood    Drug use: Yes     Types: Marijuana     Comment: Occsnl CBD, rare THC, night for RLS & glaucoma. Hx when ale    Sexual activity: Yes     Partners: Female     Birth control/protection: Male Surgical   Other Topics Concern    Parent/sibling w/ CABG, MI or angioplasty before 65F 55M? 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     Social Drivers of Health     Financial Resource Strain: Low Risk  (9/22/2023)    Financial Resource Strain     Within the past 12 months, have you or your family members you live with been unable to get utilities (heat, electricity) when it was really needed?: No   Food Insecurity: Low Risk  (9/22/2023)    Food Insecurity     Within the past 12 months, did you worry that your food would run out before you got money to buy more?: No     Within the past 12 months, did the food you bought just not last and you didn t have money to get more?: No   Transportation Needs: Low Risk  (9/22/2023)    Transportation Needs     Within the past 12 months, has lack of transportation kept you from medical appointments, getting your medicines, non-medical meetings or appointments, work, or from getting things that you  need?: No   Physical Activity: Not on file   Stress: Not on file   Social Connections: Not on file   Interpersonal Safety: Low Risk  (4/23/2024)    Interpersonal Safety     Do you feel physically and emotionally safe where you currently live?: Yes     Within the past 12 months, have you been hit, slapped, kicked or otherwise physically hurt by someone?: No     Within the past 12 months, have you been humiliated or emotionally abused in other ways by your partner or ex-partner?: No   Housing Stability: Low Risk  (9/22/2023)    Housing Stability     Do you have housing? : Yes     Are you worried about losing your housing?: No       Drug and lactation database from the [place] [employer] of Medicine:  http://toxnet.nlm.nih.gov/cgi-bin/sis/htmlgen?LACT      B/P: Data Unavailable, T: Data Unavailable, P: Data Unavailable, R: Data Unavailable 0 lbs 0 oz  There were no vitals taken for this visit., There is no height or weight on file to calculate BMI.  Medications and Vitals not listed above were documented in the cart and reviewed by me.     Current Outpatient Medications   Medication Sig Dispense Refill    albuterol (PROAIR HFA) 108 (90 BASE) MCG/ACT Inhaler Inhale 2 puffs into the lungs every 4 hours as needed for shortness of breath / dyspnea 1 Inhaler 0    amoxicillin-clavulanate (AUGMENTIN) 875-125 MG tablet Take 1 tablet by mouth 2 times daily 20 tablet 0    atorvastatin (LIPITOR) 20 MG tablet Take 1 tablet (20 mg) by mouth daily 90 tablet 3    Cyanocobalamin (VITAMIN B12) 1000 MCG TBCR Take 1 tablet by mouth daily 90 tablet 3    dorzolamide-timolol (COSOPT) 2-0.5 % ophthalmic solution Place 1 drop into the right eye 2 times daily 10 mL 4    ferrous gluconate (FERGON) 324 (38 Fe) MG tablet Take 324 mg by mouth daily (with breakfast)      gabapentin (NEURONTIN) 600 MG tablet Take 1.5 tablets (900 mg) by mouth 2 times daily At 4pm and 8pm. 270 tablet 3    hydrocortisone 2.5 % cream Apply topically 2 times  daily Apply for 5-7 days for irritation. 20 g 3    ketoconazole (NIZORAL) 2 % external cream Apply topically 2 times daily To face as needed for rash 60 g 11    latanoprost (XALATAN) 0.005 % ophthalmic solution Place 1 drop into both eyes daily 2.5 mL 11    metroNIDAZOLE (NORITATE) 1 % cream Apply topically daily 60 g 0    triamcinolone (KENALOG) 0.1 % external cream Apply topically 2 times daily 30 g 0         Taurus Woodard MD

## 2024-10-28 DIAGNOSIS — E78.5 HYPERLIPIDEMIA LDL GOAL <130: ICD-10-CM

## 2024-10-31 RX ORDER — ATORVASTATIN CALCIUM 20 MG/1
20 TABLET, FILM COATED ORAL DAILY
Qty: 90 TABLET | Refills: 2 | Status: SHIPPED | OUTPATIENT
Start: 2024-10-31

## 2024-10-31 NOTE — TELEPHONE ENCOUNTER
LVD:  4/23/2024  [facility] Internal Medicine Amparo Tariq, APRN [facility]  Internal Medicine     LDL Cholesterol Calculated   Date Value Ref Range Status   06/11/2024 63 <=100 mg/dL Final   03/02/2020 77 <100 mg/dL Final     Comment:     Desirable:       <100 mg/dl       Refilled per protocol.

## 2024-11-06 ENCOUNTER — ANCILLARY PROCEDURE (OUTPATIENT)
Dept: GENERAL RADIOLOGY | Facility: CLINIC | Age: 69
End: 2024-11-06
Attending: STUDENT IN AN ORGANIZED HEALTH CARE EDUCATION/TRAINING PROGRAM
Payer: COMMERCIAL

## 2024-11-06 ENCOUNTER — OFFICE VISIT (OUTPATIENT)
Dept: ORTHOPEDICS | Facility: CLINIC | Age: 69
End: 2024-11-06
Payer: COMMERCIAL

## 2024-11-06 VITALS — DIASTOLIC BLOOD PRESSURE: 78 MMHG | BODY MASS INDEX: 19.74 KG/M2 | SYSTOLIC BLOOD PRESSURE: 133 MMHG | WEIGHT: 129.8 LBS

## 2024-11-06 DIAGNOSIS — M25.552 BILATERAL HIP PAIN: ICD-10-CM

## 2024-11-06 DIAGNOSIS — M25.551 BILATERAL HIP PAIN: ICD-10-CM

## 2024-11-06 DIAGNOSIS — Z96.642 S/P HIP REPLACEMENT, LEFT: ICD-10-CM

## 2024-11-06 DIAGNOSIS — M16.11 OSTEOARTHRITIS OF ONE HIP, RIGHT: Primary | ICD-10-CM

## 2024-11-06 DIAGNOSIS — S72.102D CLOSED FRACTURE OF TROCHANTER OF LEFT FEMUR WITH ROUTINE HEALING, SUBSEQUENT ENCOUNTER: ICD-10-CM

## 2024-11-06 PROCEDURE — 73522 X-RAY EXAM HIPS BI 3-4 VIEWS: CPT | Mod: TC | Performed by: STUDENT IN AN ORGANIZED HEALTH CARE EDUCATION/TRAINING PROGRAM

## 2024-11-06 PROCEDURE — 99214 OFFICE O/P EST MOD 30 MIN: CPT | Performed by: STUDENT IN AN ORGANIZED HEALTH CARE EDUCATION/TRAINING PROGRAM

## 2024-11-06 RX ORDER — TRANEXAMIC ACID 650 MG/1
1950 TABLET ORAL ONCE
OUTPATIENT
Start: 2024-11-06 | End: 2024-11-06

## 2024-11-06 ASSESSMENT — HOOS JR
GOING UP OR DOWN STAIRS: MILD
WALKING ON UNEVEN SURFACE: MODERATE
RISING FROM SITTING: MILD
BENDING TO THE FLOOR TO PICK UP OBJECT: MODERATE
LYING IN BED (TURNING OVER, MAINTAINING HIP POSITION): MILD
SITTING: MODERATE
HOOS JR TOTAL INTERVAL SCORE: 61.82

## 2024-11-06 NOTE — PROGRESS NOTES
[facility]  Orthopaedic Surgery Consultation by Taurus Donald M.D.    Jose Carlos Bolden MRN# [id number]   Age: 67 year old YOB: 1955     Requesting physician: No ref. provider found  Adam Peace     Background history:  DX:  Kyphosis  Scoliosis  Restless legs syndrome  Vitamin B12 deficiency  Iron deficiency  Hyperlipidemia  Hearing loss  Reactive airway disease    TREATMENTS:  March 2014, left CATALINA, Dr. Jean [facility]           History of Present Illness:   69 year old male who is known to our clinic in setting of left nondisplaced proximal femur periprosthetic fracture [place] A(g) for which nonsurgical treatment.    Today patient presents with significant right hip/groin/buttock pain that radiates down his thigh as well as intermittent left hip pain.  In regards to the left hip patient states intermittent episodes of pain for few days of pain and discomfort on the lateral side of the left hip.  This is exacerbated by increased activity.  It is reduced with rest and over-the-counter analgesics.  He is experiencing these episodes a few days a month.    His right hip is bothering him more.  He describes hip pain, groin pain and buttock pain that radiates down his thigh but never past.  It has been progressive over time.  No antecedent trauma.  He has started to experience clicking of the right hip.  Since this visit they report less painful than last week.  Pain is described as sharp when going up stairs and sometimes will sitting.  Increased pain with movement, and sometimes when standing or at rest.  He has trouble getting shoes and socks.  Patient has been doing a home exercise regimen but has not been to formal physical therapy recently.  Pain sometimes wakes at nighttime. Pain is improved by ibuprofen.  Patient reports having an injection a couple years ago, which improved the pain short-term.    Social:   Occupation: Retired -   Living situation: Lives with  wife  Hobbies / Sports: cycling, walking, gardening    Smoking: No  Alcohol: Yes socially  Illicit drug use: No         Physical Exam:     EXAMINATION pertinent findings:   PSYCH: Pleasant, healthy-appearing, alert, oriented x3, cooperative. Normal mood and affect.  VITAL SIGNS: Blood pressure 133/78, weight 58.9 kg (129 lb 12.8 oz).  Reviewed nursing intake notes.   Body mass index is 19.74 kg/m .  RESP: non labored breathing   ABD: benign, soft, non-tender, no acute peritoneal findings  SKIN: grossly normal   LYMPHATIC: grossly normal, no adenopathy, no extremity edema  NEURO: grossly normal , no motor deficits  VASCULAR: satisfactory perfusion of all extremities   MUSCULOSKELETAL:   Gait:  slight antalgic gait over right lower extremity.    Left hip full range of motion.  No pain upon rotations.  There is some tenderness to palpation over the greater trochanteric region.    Right hip exhibits a range of motion that is limited in internal rotation and flexion.  Rotations are recognizably painful in both flexion and extension.  Lasegue's test is negative.  No tenderness to palpation over greater trochanteric region.    Bilateral LE:   Thigh and leg compartments soft and compressible   +Quad/TA/GSC/FHL/EHL   SILT DP/SP/Angie/Saph/Tib nerve distributions   Palpable dorsalis pedis pulse          Data:   All laboratory data reviewed  All imaging studies reviewed by me personally.    XR pelvis/hip left 11/6/2024:  My interpretation: Compared to previous imaging studies the [place] B1 fracture visualized does not show any signs of secondary displacement.  Progressive fracture healing visualized.    No signs of loosening of femoral component.  End-stage osteoarthritic changes of right femoral acetabular joint with complete obliteration of superior joint space, presence of marginal osteophytes, sclerosis and subchondral cyst.           Assessment and Plan:   Assessment:  69 year old male who is known to our clinic in  setting of left nondisplaced proximal femur periprosthetic fracture [place] A(g) for which nonsurgical treatment.  Intermittent left hip pain without signs of prosthetic joint infection, fractures, dislocations or loosening of components.  Potentially due to greater trochanteric bursitis.  Also presenting with chronic right hip/groin/buttock pain due to end-stage osteoarthritic changes of femoral acetabular joint.     Plan:  I extensively discussed the findings today with the patient.  Regarding the left hip I do not have any indication for loosening of components, prosthetic joint infection, fractures or dislocations.  Most likely this is caused by greater trochanteric bursitis.     Regarding the right hip We had a long discussion with the patient regarding etiology and ongoing management options.  Reviewed surgical and nonsurgical treatments.  The non-operative management options consist of activity modification, pain medication, PT and injection therapy.  Patient would like to trial preoperative physical therapy.  A referral was provided.  I do not believe that preoperative physical therapy will provide long-term or significant relief but it may be helpful in optimizing his situation for the postsurgical phase.  Nonsurgical treatment seems to have been exhausted.  We reviewed total hip replacement in detail including the procedure, the implants, the recovery process, and long-term outcomes.  We reviewed that the risks of the surgery include but are not limited to infection, wound problems, dislocation, persistent pain, leg length discrepancy, loosening, revision surgery.  We also reviewed less common risks such as neurovascular injury fracture, and other implant-related issues.  We reviewed other medical complications such as a blood clot which we would be mitigating by oral anticoagulants.  We discussed that the vast majority of cases have a highly successful outcome.  However there is a small subset of  patients that do experience complications or problems following the hip replacement.  Based on a discussion of the risks and benefits, we believe that the benefits far outweigh the risks at this point and the patient would like to proceed with right total hip replacement surgery via direct anterior approach.  We will work on scheduling surgery at a time that works well for them in the next few months.  Patient will contact us if they have any questions or concerns leading up to surgery. Before surgery the patient will attend a joint replacement class and will be seen by the PCP/anesthesiologist and dentist.    Candidate for fast-track total hip arthroplasty surgery.    For additional information and frequently asked questions regarding joint replacements, scan the QR code image below on your phone camera or go to: [email or web address]           Taurus Donald MD, PhD     Adult Reconstruction  [facility] Department of Orthopaedic Surgery

## 2024-11-06 NOTE — PATIENT INSTRUCTIONS
1. Bilateral hip pain      Schedule surgery.   Surgery Scheduler will contact you to assist with scheduling surgery.   You can contact her directly at [phone].   Prior to your scheduled surgery we advise scheduling with your dentist to obtain clearance for surgery, and to complete any recommended dental work prior.     Pre-operative Physical needed within 30 days of scheduled proceedure  Physical Therapy will be scheduled to start post op day 1.    For mor information regarding total joint surgery please visit our website:   [email or web address]    FORMS:   If you are needing any forms completed relating to your upcoming procedure, please send them to our office with a completed Release of Information.   Forms will be completed AFTER your procedure. A letter can be sent to your employer prior to surgery to inform them of your anticipated time off.    Please notify our staff if you would like a letter to do so.   Forms can be faxed directly to our clinic at [phone].     DO NOT BRING FORMS ON THE DATE OF SURGERY.     MEDICATION REFILL:   Please allow 3 business days for completion of medication refills for any surgery related prescription.   Medication refills submitted on Friday, may not be addressed until the following Monday.  You may request a refill via [employer], or by calling our Nurse Triage at [phone].      Call my office with any questions or concerns, [phone].

## 2024-11-06 NOTE — LETTER
11/6/2024      Jose Carlos Bolden  [address] [id number]      Dear Colleague,    Thank you for referring your patient, Jose Carlos Bolden, to the [facility] [place]. Please see a copy of my visit note below.        [facility]  Orthopaedic Surgery Consultation by Taurus Donald M.D.    Jose Carlos Bolden MRN# [id number]   Age: 67 year old YOB: 1955     Requesting physician: No ref. provider found  Adam Peace     Background history:  DX:  Kyphosis  Scoliosis  Restless legs syndrome  Vitamin B12 deficiency  Iron deficiency  Hyperlipidemia  Hearing loss  Reactive airway disease    TREATMENTS:  March 2014, left CATALINA, Dr. [facility]           History of Present Illness:   69 year old male who is known to our clinic in setting of left nondisplaced proximal femur periprosthetic fracture [place] A(g) for which nonsurgical treatment.    Today patient presents with significant right hip/groin/buttock pain that radiates down his thigh as well as intermittent left hip pain.  In regards to the left hip patient states intermittent episodes of pain for few days of pain and discomfort on the lateral side of the left hip.  This is exacerbated by increased activity.  It is reduced with rest and over-the-counter analgesics.  He is experiencing these episodes a few days a month.    His right hip is bothering him more.  He describes hip pain, groin pain and buttock pain that radiates down his thigh but never past.  It has been progressive over time.  No antecedent trauma.  He has started to experience clicking of the right hip.  Since this visit they report less painful than last week.  Pain is described as sharp when going up stairs and sometimes will sitting.  Increased pain with movement, and sometimes when standing or at rest.  He has trouble getting shoes and socks.  Patient has been doing a home exercise regimen but has not been to formal physical therapy  recently.  Pain sometimes wakes at nighttime. Pain is improved by ibuprofen.  Patient reports having an injection a couple years ago, which improved the pain short-term.    Social:   Occupation: Retired -   Living situation: Lives with wife  Hobbies / Sports: cycling, walking, gardening    Smoking: No  Alcohol: Yes socially  Illicit drug use: No         Physical Exam:     EXAMINATION pertinent findings:   PSYCH: Pleasant, healthy-appearing, alert, oriented x3, cooperative. Normal mood and affect.  VITAL SIGNS: Blood pressure 133/78, weight 58.9 kg (129 lb 12.8 oz).  Reviewed nursing intake notes.   Body mass index is 19.74 kg/m .  RESP: non labored breathing   ABD: benign, soft, non-tender, no acute peritoneal findings  SKIN: grossly normal   LYMPHATIC: grossly normal, no adenopathy, no extremity edema  NEURO: grossly normal , no motor deficits  VASCULAR: satisfactory perfusion of all extremities   MUSCULOSKELETAL:   Gait:  slight antalgic gait over right lower extremity.    Left hip full range of motion.  No pain upon rotations.  There is some tenderness to palpation over the greater trochanteric region.    Right hip exhibits a range of motion that is limited in internal rotation and flexion.  Rotations are recognizably painful in both flexion and extension.  Lasegue's test is negative.  No tenderness to palpation over greater trochanteric region.    Bilateral LE:   Thigh and leg compartments soft and compressible   +Quad/TA/GSC/FHL/EHL   SILT DP/SP/Angie/Saph/Tib nerve distributions   Palpable dorsalis pedis pulse          Data:   All laboratory data reviewed  All imaging studies reviewed by me personally.    XR pelvis/hip left 11/6/2024:  My interpretation: Compared to previous imaging studies the [place] B1 fracture visualized does not show any signs of secondary displacement.  Progressive fracture healing visualized.    No signs of loosening of femoral component.  End-stage osteoarthritic changes  of right femoral acetabular joint with complete obliteration of superior joint space, presence of marginal osteophytes, sclerosis and subchondral cyst.           Assessment and Plan:   Assessment:  69 year old male who is known to our clinic in setting of left nondisplaced proximal femur periprosthetic fracture [place] A(g) for which nonsurgical treatment.  Intermittent left hip pain without signs of prosthetic joint infection, fractures, dislocations or loosening of components.  Potentially due to greater trochanteric bursitis.  Also presenting with chronic right hip/groin/buttock pain due to end-stage osteoarthritic changes of femoral acetabular joint.     Plan:  I extensively discussed the findings today with the patient.  Regarding the left hip I do not have any indication for loosening of components, prosthetic joint infection, fractures or dislocations.  Most likely this is caused by greater trochanteric bursitis.     Regarding the right hip We had a long discussion with the patient regarding etiology and ongoing management options.  Reviewed surgical and nonsurgical treatments.  The non-operative management options consist of activity modification, pain medication, PT and injection therapy.  Patient would like to trial preoperative physical therapy.  A referral was provided.  I do not believe that preoperative physical therapy will provide long-term or significant relief but it may be helpful in optimizing his situation for the postsurgical phase.  Nonsurgical treatment seems to have been exhausted.  We reviewed total hip replacement in detail including the procedure, the implants, the recovery process, and long-term outcomes.  We reviewed that the risks of the surgery include but are not limited to infection, wound problems, dislocation, persistent pain, leg length discrepancy, loosening, revision surgery.  We also reviewed less common risks such as neurovascular injury fracture, and other implant-related  issues.  We reviewed other medical complications such as a blood clot which we would be mitigating by oral anticoagulants.  We discussed that the vast majority of cases have a highly successful outcome.  However there is a small subset of patients that do experience complications or problems following the hip replacement.  Based on a discussion of the risks and benefits, we believe that the benefits far outweigh the risks at this point and the patient would like to proceed with right total hip replacement surgery via direct anterior approach.  We will work on scheduling surgery at a time that works well for them in the next few months.  Patient will contact us if they have any questions or concerns leading up to surgery. Before surgery the patient will attend a joint replacement class and will be seen by the PCP/anesthesiologist and dentist.    Candidate for fast-track total hip arthroplasty surgery.    For additional information and frequently asked questions regarding joint replacements, scan the QR code image below on your phone camera or go to: [email or web address]           Taurus Donald MD, PhD     Adult Reconstruction  [facility] Department of Orthopaedic Surgery        Again, thank you for allowing me to participate in the care of your patient.        Sincerely,        Taurus Donald MD

## 2024-11-19 ENCOUNTER — OFFICE VISIT (OUTPATIENT)
Dept: NEUROLOGY | Facility: CLINIC | Age: 69
End: 2024-11-19
Payer: COMMERCIAL

## 2024-11-19 VITALS — OXYGEN SATURATION: 96 % | DIASTOLIC BLOOD PRESSURE: 69 MMHG | HEART RATE: 61 BPM | SYSTOLIC BLOOD PRESSURE: 137 MMHG

## 2024-11-19 DIAGNOSIS — G25.81 RESTLESS LEGS SYNDROME: Primary | ICD-10-CM

## 2024-11-19 RX ORDER — CALCIUM CARBONATE 500 MG/1
1 TABLET, CHEWABLE ORAL DAILY PRN
COMMUNITY
Start: 2024-11-19

## 2024-11-19 ASSESSMENT — PAIN SCALES - GENERAL: PAINLEVEL_OUTOF10: MILD PAIN (2)

## 2024-11-19 NOTE — PATIENT INSTRUCTIONS
Assessment:  (G25.81) Restless legs syndrome  (primary encounter diagnosis)  No family history of RLS      MRI [facility] 10/26/2023   Mild volume loss  No other issues.     Onset 20s or 30s with RLS  He has been on medication for about 10 years.   Started on gabapentin then ropinirole and then back to gabapentin   He has been on ropinirole in the past.   He may have had dizziness/sleepiness using 0.25mg 3/day     He has been iron as well.     Has symptoms primarily at night but can occur in seated events (sports/play) in the afternoon.   He has problems sitting in an airplane  He has some numbness in his left foot.     May have a minimal neuropathy     Review of diagnosis    RLS    Avoidance of dopamine blockers   Not taking    Motor complication review   Has not had had ICD issues  May have some symptoms in the afternoon  No clear augmentation     Review of Impulse control disorders   Denies     Review of surgical or medication options   Gabapentin  Ropinirole in the past.     Gait/Balance/Falls   Stumbles in the past years    Exercise/Therapy performed/offered   Walks daily and has not been doing strength training     Cognitive/Driving   Retired and was a  and addiction Counsellor.   [place] problems [place]     Cognitive evaluation 7/9/2024 Annabel    Overall, Mr. Bolden is functioning quite well from a neuropsychological standpoint and does not evidence any objective cognitive deficits. A select weakness in mental flexibility is a nonspecific finding but in the context of other very strong executive functioning scores is not suggestive of focal or lateralized cerebral dysfunction, and it does not raise concerns about acquired neurologic disease. This finding and his subjective cognitive complaints are likely attributable to longstanding weaknesses in complex attention and normative age-related cognitive lapses that may be exacerbated in the context of anxiety,  stress, low mood, sleep disturbance, and pain.     Mood   Adopted kids - 2 kids - Servando and Radha   and lives with his wife - second marriage  40 years  1st marriage was 2 years.     Has had some depression and anxiety and managing them.     Hallucinations/delusions   Denies     Sleep   Taking iron and gabapentin  Restless legs RLS  Tries to go to bed at 10pm and may take up to 3 hours or typically 2 hours.   Sometimes within 1 hours     May get up and play solitaire and has problems and gets up and go to another bed and read.   When he falls asleep he stays asleep most of the night except having to get up once or twice to urinate.     Alex  Has not had a sleep study.   Rarely talking in his sleep  Rarely has he acted out his dreams.   Flailing  Wife sleeps in the same bed     Bladder/Renal/Prostate/Gyn/Other   Nocturia 1-2/noc  No daytime urinary problems other than some urgency   Has a chronic weak stream   Seen urology and not on medication  Has not seen a urologist recently  May be interested in discussing medication    GI/Constipation/GERD   Heartburn sometimes  May take tums as needed  Rare constipation or diarrhea.       ENDO/Lipid/DM/Bone density/Thyroid  Dyslipidemia  Hyperlipidemia  On a statin and taking atorvastatin lipitor   Has some leg pain    Recent Labs   Lab Test 06/11/24  0856 01/20/23  0834   CHOL 123 136   HDL 31* 33*   LDL 63 81   TRIG 146 110     No thyroid problems.     TSH   Date Value Ref Range Status   07/31/2019 1.91 0.40 - 4.00 mU/L Final     Bones are okay - not sure if he has had a bone density.     No diabetes   Lab Results   Component Value Date    A1C 4.7 07/31/2019       Cardio/heart/Hyper or Hypotensive   Blood pressure has increased over the years  Not taking medications for blood pressure.   No heart problems but there is a family history of heart disease.     Vision/Dry Eyes/Cataracts/Glaucoma/Macular   Using eye drops for pressure  Had unilateral eye surgery for  "glaucoma   Hyperopia  Glaucoma  Vision are okay  Pressures are a bit high.     Heme/Anticoagulation/Antiplatelet/Anemia/Other  No recent cbc  Not taking an aspirin     Iron deficiency  Ferritin was done recently and was normal.   Ferritin  6/30/2024  123    Vitamin B12 deficiency  B12 : 1100 as of 11/12/2020    Lab Results   Component Value Date    WBC 5.1 11/24/2023    WBC 5.7 11/12/2020     Lab Results   Component Value Date    RBC 4.35 11/24/2023    RBC 4.30 11/12/2020     Lab Results   Component Value Date    HGB 14.3 11/24/2023    HGB 14.0 11/12/2020     Lab Results   Component Value Date    HCT 42.5 11/24/2023    HCT 42.7 11/12/2020     No components found for: \"MCT\"  Lab Results   Component Value Date    MCV 98 11/24/2023    MCV 99 11/12/2020     Lab Results   Component Value Date    MCH 32.9 11/24/2023    MCH 32.6 11/12/2020     Lab Results   Component Value Date    MCHC 33.6 11/24/2023    MCHC 32.8 11/12/2020     Lab Results   Component Value Date    RDW 13.5 11/24/2023    RDW 13.0 11/12/2020     Lab Results   Component Value Date     11/24/2023     11/12/2020       ENT/Resp  May have lost his smell or taste  Non smoker   Benign positional vertigo  Hearing loss  Not wearing his hearing aides.   Tinnitus  tMJ  Reactive airway disease  Rare inhaler    Skin/Cancer/Seborrhea/other  No skin cancer  Rosacea vs seborrhea dermatitis     Musculoskeletal/Pain/Headache  Hip surgery in past on the left and had a fracture that was not surgically managed  Planning right hip surgery     Left hip pain  Sciatica  TMJ  Epicondylitis  Hip arthritis  Arthritis  Lateral epicondylitis  Medial epicondylitis   Kyphosis  Scoliosis  osteoarthritis    Other:       Medications       4p 6p 8p       Albuterol inhaler  prn            Amoxicillin clavulanate augment   no            Atorvastatin lipitor 20mg        1       Cyancobolamin Vit B12 1000mcg       1       Dorzolamide timolol cosopt soln  right    right     "   Ferrous gluconate fergon 324      1        Gabapentin neurontin 600mg   1 1 1       Hydrocortisone 2.5% cream  prn            Ketoconazole cream   prn            Latanoprost Xalatan 0.005% soln      both        Metronidazole cream   prn            Triamcinolone cream   prn                                                            Possible neuropathy  Slight reduced pp at toes  Vibration borderline normal  Temperature probably okay for age  Jps normal    Plan:    He has been on gabapentin and ropinirole in the past.   He has ongoing primary insomnia that is related to his RLS and other factors.   He also other issues with his low back/torso pain    Discussed  Gabapentin  Pregabalin lyrica  Adjunctive therapy of long acting ropinirole XL 2mg from [employer] (he had problems with a low dose and would need to be aware of the risk of impulse control issues  Other option is short acting low dose ropinirole 0.25mg at night -   May want to go on a drug holiday and would have him work with pharmacy about this - ie slow wean if he is going to do this.   Reviewed neuropsych findings that were reassuring .  He has had a ferritin level that was fine.   May want to talk with pharmacy about timing of iron which he should remain on.   Return to see me in 6 months or Jacquelyn Gomes NP in 3 months.     Pharmacy (MTM) consultation and medication management  Please call the scheduling number @ [phone] to set up an appointment with pharmacists Vandana Mcmahan, Rachel Mendenhall, or Waleska Broderick.     Treating insomnia can be difficult because the medications we use can be harmful, especially in older adults. In addition, sleep medications do not tend to be very effective and should only be used short-term. Cognitive behavioral therapy (CBT) is the most effective treatment for long-term insomnia. The goal of CBT for insomnia is to address the underlying causes of the sleep problems, including your habits and how you think about  "sleep. You can do CBT with a trained psychologist, or from the comfort of your home by following an online program or workbook. Here are some great resources for at-home CBT:    1) 6-week online CBT course through [facility] for $40: "[employer]/sleep  2) \"Say Anali to Insomnia\" by Fran Cardoso, PhD at [employer]: 6-week program  3) \"Overcoming Insomnia: A Cognitive-Behavioral Therapy Approach Workbook\" by Sajan Werner and Jenn Jenkins  4) \"Quiet Your Mind and Get to Sleep: Solutions to Insomnia for Those with Depression, Anxiety or Chronic Pain ([facility] Self-Help Workbook) by Jenn Jenkins and Sherron Fernandes  "

## 2024-11-21 ENCOUNTER — TELEPHONE (OUTPATIENT)
Dept: NEUROLOGY | Facility: CLINIC | Age: 69
End: 2024-11-21
Payer: COMMERCIAL

## 2024-11-21 NOTE — TELEPHONE ENCOUNTER
MTM referral from: [facility] visit (referral by provider)    MTM referral outreach attempt #2 on November 21, 2024 at 9:16 AM      Outcome: Spoke with patient this morning he said he would call back to schedule. He was driving when we spoke.     Use hbc for the carrier/Plan on the flowsheet      [employer] Message Sent    Jacque Warner CPhT  MTM      Patient called back and scheduled visit.

## 2024-11-25 ENCOUNTER — THERAPY VISIT (OUTPATIENT)
Dept: PHYSICAL THERAPY | Facility: CLINIC | Age: 69
End: 2024-11-25
Attending: STUDENT IN AN ORGANIZED HEALTH CARE EDUCATION/TRAINING PROGRAM
Payer: COMMERCIAL

## 2024-11-25 DIAGNOSIS — M16.11 PRIMARY OSTEOARTHRITIS OF RIGHT HIP: Primary | ICD-10-CM

## 2024-11-25 DIAGNOSIS — M16.11 OSTEOARTHRITIS OF ONE HIP, RIGHT: ICD-10-CM

## 2024-11-25 DIAGNOSIS — R29.898 WEAKNESS OF BOTH HIPS: ICD-10-CM

## 2024-11-25 PROCEDURE — 97161 PT EVAL LOW COMPLEX 20 MIN: CPT | Mod: GP

## 2024-11-25 PROCEDURE — 97110 THERAPEUTIC EXERCISES: CPT | Mod: GP

## 2024-11-25 ASSESSMENT — ACTIVITIES OF DAILY LIVING (ADL)
SPORTS_SCORE(%): 0
WALKING_15_MINUTES_OR_GREATER: SLIGHT DIFFICULTY
GETTING_INTO_AND_OUT_OF_AN_AVERAGE_CAR: MODERATE DIFFICULTY
SITTING FOR 15 MINUTES: SLIGHT DIFFICULTY
GOING_DOWN_1_FLIGHT_OF_STAIRS: SLIGHT DIFFICULTY
JUMPING: MODERATE DIFFICULTY
ADL_COUNT: 17
WALKING_INITIALLY: SLIGHT DIFFICULTY
RECREATIONAL_ACTIVITIES: MODERATE DIFFICULTY
HOS_ADL_SCORE(%): 60.29
DEEP SQUATTING: EXTREME DIFFICULTY
WALKING_DOWN_STEEP_HILLS: MODERATE DIFFICULTY
ABILITY_TO_PARTICIPATE_IN_YOUR_DESIRED_SPORT_AS_LONG_AS_YOU_WOULD_LIKE: EXTREME DIFFICULTY
WALKING_UP_STEEP_HILLS: MODERATE DIFFICULTY
LIGHT_TO_MODERATE_WORK: SLIGHT DIFFICULTY
PUTTING ON SOCKS AND SHOES: MODERATE DIFFICULTY
ADL_HIGHEST_POTENTIAL_SCORE: 68
STEPPING_UP_AND_DOWN_CURBS: MODERATE DIFFICULTY
LOW_IMPACT_ACTIVITIES_LIKE_FAST_WALKING: SLIGHT DIFFICULTY
HEAVY_WORK: MODERATE DIFFICULTY
HOW_WOULD_YOU_RATE_YOUR_CURRENT_LEVEL_OF_FUNCTION?: ABNORMAL
HEAVY_WORK: MODERATE DIFFICULTY
GETTING INTO AND OUT OF AN AVERAGE CAR: MODERATE DIFFICULTY
RECREATIONAL ACTIVITIES: MODERATE DIFFICULTY
STANDING FOR 15 MINUTES: MODERATE DIFFICULTY
SPORTS_TOTAL_ITEM_SCORE: 0
ABILITY_TO_PERFORM_ACTIVITY_WITH_YOUR_NORMAL_TECHNIQUE: MODERATE DIFFICULTY
TWISTING/PIVOTING ON INVOLVED LEG: SLIGHT DIFFICULTY
ROLLING_OVER_IN_BED: SLIGHT DIFFICULTY
HOW_WOULD_YOU_RATE_YOUR_CURRENT_LEVEL_OF_FUNCTION_DURING_YOUR_SPORTS_RELATED_ACTIVITIES_FROM_0_TO_100_WITH_100_BEING_YOUR_LEVEL_OF_FUNCTION_PRIOR_TO_YOUR_HIP_PROBLEM_AND_0_BEING_THE_INABILITY_TO_PERFORM_ANY_OF_YOUR_USUAL_DAILY_ACTIVITIES?: 20
WALKING_UP_STEEP_HILLS: MODERATE DIFFICULTY
TWISTING/PIVOTING_ON_INVOLVED_LEG: SLIGHT DIFFICULTY
HOS_ADL_ITEM_SCORE_TOTAL: 41
CUTTING/LATERAL_MOVEMENTS: EXTREME DIFFICULTY
SPORTS_COUNT: 9
SWINGING_OBJECTS_LIKE_A_GOLF_CLUB: SLIGHT DIFFICULTY
ADL_TOTAL_ITEM_SCORE: 0
GETTING_INTO_AND_OUT_OF_A_BATHTUB: MODERATE DIFFICULTY
GETTING_INTO_AND_OUT_OF_A_BATHTUB: MODERATE DIFFICULTY
STARTING_AND_STOPPING_QUICKLY: MODERATE DIFFICULTY
PUTTING_ON_SOCKS_AND_SHOES: MODERATE DIFFICULTY
WALKING_APPROXIMATELY_10_MINUTES: SLIGHT DIFFICULTY
PLEASE_INDICATE_YOR_PRIMARY_REASON_FOR_REFERRAL_TO_THERAPY:: HIP
HOS_ADL_HIGHEST_POTENTIAL_SCORE: 68
SITTING_FOR_15_MINUTES: SLIGHT DIFFICULTY
HOW_WOULD_YOU_RATE_YOUR_CURRENT_LEVEL_OF_FUNCTION_DURING_YOUR_USUAL_ACTIVITIES_OF_DAILY_LIVING_FROM_0_TO_100_WITH_100_BEING_YOUR_LEVEL_OF_FUNCTION_PRIOR_TO_YOUR_HIP_PROBLEM_AND_0_BEING_THE_INABILITY_TO_PERFORM_ANY_OF_YOUR_USUAL_DAILY_ACTIVITIES?: 70
WALKING_FOR_APPROXIMATELY_10_MINUTES: SLIGHT DIFFICULTY
SPORTS_HIGHEST_POTENTIAL_SCORE: 36
ROLLING OVER IN BED: SLIGHT DIFFICULTY
ADL_SCORE(%): 0
HOW_WOULD_YOU_RATE_YOUR_CURRENT_LEVEL_OF_FUNCTION_DURING_YOUR_USUAL_ACTIVITIES_OF_DAILY_LIVING_FROM_0_TO_100_WITH_100_BEING_YOUR_LEVEL_OF_FUNCTION_PRIOR_TO_YOUR_HIP_PROBLEM_AND_0_BEING_THE_INABILITY_TO_PERFORM_ANY_OF_YOUR_USUAL_DAILY_ACTIVITIES?: 70
DEEP_SQUATTING: EXTREME DIFFICULTY
GOING DOWN 1 FLIGHT OF STAIRS: SLIGHT DIFFICULTY
LIGHT_TO_MODERATE_WORK: SLIGHT DIFFICULTY
RUNNING_ONE_MILE: UNABLE TO DO
WALKING_INITIALLY: SLIGHT DIFFICULTY
GOING_UP_1_FLIGHT_OF_STAIRS: SLIGHT DIFFICULTY
GOING UP 1 FLIGHT OF STAIRS: SLIGHT DIFFICULTY
LANDING: MODERATE DIFFICULTY
WALKING_DOWN_STEEP_HILLS: MODERATE DIFFICULTY
STEPPING UP AND DOWN CURBS: MODERATE DIFFICULTY
STANDING_FOR_15_MINUTES: MODERATE DIFFICULTY
WALKING_15_MINUTES_OR_GREATER: SLIGHT DIFFICULTY

## 2024-11-25 NOTE — PROGRESS NOTES
"PHYSICAL THERAPY EVALUATION  Type of Visit: Evaluation              Subjective   Patient reports pain in the right hip. Comes and goes with increases in pain. Has been around for many years, at times can be pretty. Sitting for long periods of time, laying in bed at times. Partly pain and partly \"catching\" limit activities. Biking and moving around are ways to feel better. CATALINA scheduled for February.      Presenting condition or subjective complaint: (Patient-Rptd) pre op conditioning  Date of onset: 11/25/22 (chronic)    Relevant medical history: (Patient-Rptd) Arthritis; Bladder or bowel problems; Depression; Hearing problems; Incontinence; Mental Illness; Osteoarthritis; Pain at night or rest; Severe dizziness   Dates & types of surgery: (Patient-Rptd) hip replacement  vasectomy  laser surgery for glaucoma    Prior diagnostic imaging/testing results: (Patient-Rptd) X-ray     Prior therapy history for the same diagnosis, illness or injury: (Patient-Rptd) Yes      Prior Level of Function  Transfers: Independent  Ambulation: Independent  ADL: Independent    Living Environment  Social support: (Patient-Rptd) With a significant other or spouse   Type of home: (Patient-Rptd) House   Stairs to enter the home:         Ramp: (Patient-Rptd) No   Stairs inside the home: (Patient-Rptd) Yes (Patient-Rptd) 26 Is there a railing: (Patient-Rptd) Yes     Help at home: (Patient-Rptd) None  Equipment owned: (Patient-Rptd) Straight Cane; Walker; Crutches; Grab bars     Employment: (Patient-Rptd) No    Hobbies/Interests: (Patient-Rptd) volunteering-tutoring, gardening, bicycling, bird watching    Patient goals for therapy:      Pain assessment: Pain present     Objective   HIP EVALUATION  PAIN: Pain Level at Rest: 2/10  Pain Level with Use: 8/10  Pain Location: hip  Pain Quality: constantly dull, intermittent sharp  Pain Frequency: constant  Pain is Exacerbated By: Sitting, laying down  Pain is Relieved By: stretch and " use  INTEGUMENTARY (edema, incisions): WNL  POSTURE: Standing Posture: Rounded shoulders, Forward head  GAIT:   Gait Deviations: Antalgic  Justine decreased  BALANCE/PROPRIOCEPTION: Single Leg Stance Eyes Open (seconds): 30s on L with increased sway, 20s on R with increased sway  ROM: Lumbar WNL  Hip - generally limited ROM, particularly IR and Flexion B, ER on R  PELVIC/SI SCREEN: WNL  STRENGTH:  Hip Flexion/ABD 4/5 B  - rest of LE WNL  LE FLEXIBILITY:  decreased hamstring  FUNCTIONAL TESTS: Double Leg Squat: Mild discomfort with squat to chair height, though good form  JOINT MOBILITY: not assessed    Assessment & Plan   CLINICAL IMPRESSIONS  Medical Diagnosis: R Hip OA    Treatment Diagnosis: R Hip OA   Impression/Assessment: Patient is a 69 year old male with R Hip Pain complaints.  The following significant findings have been identified: Pain, Decreased ROM/flexibility, Decreased joint mobility, Decreased strength, Impaired balance, Impaired gait, Impaired muscle performance, and Decreased activity tolerance. These impairments interfere with their ability to perform recreational activities, household chores, and community mobility as compared to previous level of function.     Clinical Decision Making (Complexity):  Clinical Presentation: Stable/Uncomplicated  Clinical Presentation Rationale: based on medical and personal factors listed in PT evaluation  Clinical Decision Making (Complexity): Low complexity    PLAN OF CARE  Treatment Interventions:  Modalities: Cryotherapy, Dry Needling, E-stim, Hot Pack  Interventions: Gait Training, Manual Therapy, Neuromuscular Re-education, Therapeutic Activity, Therapeutic Exercise, Self-Care/Home Management    Long Term Goals     PT Goal 1  Goal Identifier: Return to PLOF  Goal Description: Patient will have decreased pain and stiffness, allowing them to return to PLOF and sit for greater than 1 hour without incrase in pain  Rationale: to maximize safety and independence  with performance of ADLs and functional tasks  Goal Progress: Ongoing  Target Date: 02/17/25      Frequency of Treatment: every other week  Duration of Treatment: 12 weeks    Recommended Referrals to Other Professionals: None  Education Assessment:   Learner/Method: Patient;Demonstration;Pictures/Video  Education Comments: No other questions at this time    Risks and benefits of evaluation/treatment have been explained.   Patient/Family/caregiver agrees with Plan of Care.     Evaluation Time:     PT Eval, Low Complexity Minutes (37804): 15     Signing Clinician: ROQUE Tran [facility]                                                                                   OUTPATIENT PHYSICAL THERAPY      PLAN OF TREATMENT FOR OUTPATIENT REHABILITATION   Patient's Last Name, First Name, Jose Carlos Yee YOB: 1955   Provider's Name   [facility]   Medical Record No.  [id number]     Onset Date: 11/25/22 (chronic)  Start of Care Date: 11/25/24     Medical Diagnosis:  R Hip OA      PT Treatment Diagnosis:  R Hip OA Plan of Treatment  Frequency/Duration: every other week/ 12 weeks    Certification date from 11/25/24 to 02/17/25         See note for plan of treatment details and functional goals     Victor Manuel Rasmussen PT                         I CERTIFY THE NEED FOR THESE SERVICES FURNISHED UNDER        THIS PLAN OF TREATMENT AND WHILE UNDER MY CARE     (Physician attestation of this document indicates review and certification of the therapy plan).              Referring Provider:  Taurus Donald    Initial Assessment  See Epic Evaluation- Start of Care Date: 11/25/24

## 2024-12-03 ENCOUNTER — TELEPHONE (OUTPATIENT)
Dept: ORTHOPEDICS | Facility: CLINIC | Age: 69
End: 2024-12-03
Payer: COMMERCIAL

## 2024-12-03 NOTE — TELEPHONE ENCOUNTER
Patient Returning Call    Reason for call:  patient is needing reschedule RN visit    Information relayed to patient:  te sent to clinic     Patient has additional questions:  No      [facility] we [facility] this [facility] you [facility] or would you prefer to receive a phone call?:   Patient would prefer a phone call   Okay to leave a detailed message?: Yes at Cell number on file:    Telephone Information:   Mobile [phone]

## 2024-12-04 NOTE — TELEPHONE ENCOUNTER
Patient confirmed scheduled appointment:  Date: 1/21 to 1/29/25  Time: 8:00am  Visit type: Pre-Op phone visit  Provider: [address]

## 2024-12-09 ENCOUNTER — THERAPY VISIT (OUTPATIENT)
Dept: PHYSICAL THERAPY | Facility: CLINIC | Age: 69
End: 2024-12-09
Payer: COMMERCIAL

## 2024-12-09 DIAGNOSIS — M16.11 PRIMARY OSTEOARTHRITIS OF RIGHT HIP: Primary | ICD-10-CM

## 2024-12-09 DIAGNOSIS — R29.898 WEAKNESS OF BOTH HIPS: ICD-10-CM

## 2024-12-09 PROCEDURE — 97112 NEUROMUSCULAR REEDUCATION: CPT | Mod: GP

## 2024-12-09 PROCEDURE — 97110 THERAPEUTIC EXERCISES: CPT | Mod: GP

## 2024-12-16 ENCOUNTER — VIRTUAL VISIT (OUTPATIENT)
Dept: PHARMACY | Facility: CLINIC | Age: 69
End: 2024-12-16
Attending: PSYCHIATRY & NEUROLOGY
Payer: COMMERCIAL

## 2024-12-16 DIAGNOSIS — E78.5 HYPERLIPIDEMIA LDL GOAL <130: ICD-10-CM

## 2024-12-16 DIAGNOSIS — Z78.9 TAKES DIETARY SUPPLEMENTS: ICD-10-CM

## 2024-12-16 DIAGNOSIS — G25.81 RESTLESS LEGS SYNDROME: Primary | ICD-10-CM

## 2024-12-16 RX ORDER — ACETAMINOPHEN 500 MG
500-1000 TABLET ORAL EVERY 6 HOURS PRN
COMMUNITY

## 2024-12-16 RX ORDER — IBUPROFEN 200 MG
400 TABLET ORAL EVERY 4 HOURS PRN
COMMUNITY

## 2024-12-16 NOTE — Clinical Note
FYI - he did not want to make any med changes today   Waleska Broderick, Pharm.D., MPH Medication Therapy Management Pharmacist  [facility]

## 2024-12-16 NOTE — PATIENT INSTRUCTIONS
Recommendations from today's MTM visit:                                                    MTM (medication therapy management) is a service provided by a clinical pharmacist designed to help you get the most of out of your medicines.      Medication list updated and reviewed   We discussed options for RLS and you have decided to not make any changes at this time to your medication therapy   Non-medication ways to help manage RLS include compression socks, heating bads, leg massagers, and warm baths  Discuss with primary care provider about if you need labs done to see if your atorvastatin could be causing muscle pains/cramps   I would discuss your history of hiccups post-surgery with the anesthesiologist      Follow-up: as needed with MTM for medication questions or concerns   Appointments in Next Year      Dec 23, 2024 5:20 PM  Extremity Treatment with Victor Manuel Rasmussen PT  [facility] ([facility] ) [phone]     Jan 06, 2025 2:30 PM  Extremity Treatment with Victor Manuel Rasmussen PT  [facility] ([facility] ) [phone]     Daljit 15, 2025 9:50 AM  Extremity Treatment with Victor Manuel Rasmussen PT  [facility] ([facility] ) [phone]     Jan 29, 2025 8:00 AM  Telephone Visit with [facility] ORTHO NURSE PRE OP  [facility] ([facility] ) [phone]     Jan 31, 2025 10:00 AM  (Arrive by 9:45 AM)  PRE-OP with Annalisa Sauceda MD  [facility] [place] ([facility] ) [phone]     Feb 03, 2025 8:20 AM  Extremity Treatment  "with Victor Manuel Rasmussen PT  [facility] ([facility] ) [phone]     Feb 17, 2025 9:00 AM  Extremity Treatment with Victor Manuel Rasmussen PT  [facility] ([facility] ) [phone]     Mar 04, 2025 9:30 AM  (Arrive by 9:15 AM)  RETURN GLAUCOMA with Orlando Schilling MD  [facility] [place] ([facility] ) [phone]     Mar 05, 2025 11:00 AM  (Arrive by 10:45 AM)  Post-Op Hip Replacement with Taurus Donald MD  [facility] ([facility] ) [phone]     Apr 11, 2025 10:30 AM  (Arrive by 10:15 AM)  Post-Op Hip Replacement with Ari Forrest PA-C  [facility] ([facility] ) [phone]     May 29, 2025 1:40 PM  (Arrive by 1:25 PM)  Return Movement Disorder with Taurus Woodard MD  [facility] [place] ([facility] ) [phone]            It was great speaking with you today.  I value your experience and would be very thankful for your time in providing feedback in our clinic survey. In the next few days, you may receive an email or text message from [facility] with a link to a survey related to your  clinical pharmacist.\"     To schedule another MTM appointment, please call the clinic directly or you may call the MTM scheduling line at [phone].    My Clinical Pharmacist's contact information:                                                      Please feel free to contact me with any questions or concerns you have.      Waleska Broderick, Pharm.D., MPH  Medication Therapy Management Pharmacist   [facility] " [facility]

## 2024-12-16 NOTE — PROGRESS NOTES
Medication Therapy Management (MTM) Encounter    ASSESSMENT:                            Medication Adherence/Access: No issues identified.    RLS:   Discussed various options to further control symptoms. Patient would prefer to not make changes to medications at this time. Education provided on non-pharmacologic ways to help manage symptoms. All questions answered.     Hyperlipidemia:   Recommend he discuss with primary care provider about if further testing is needed to assess for statin-induced myalgias.     Supplements:   Discussed there is not specific time of the day that is best to take vitamin B12.     PLAN:                            Medication list updated and reviewed   We discussed options for RLS and you have decided to not make any changes at this time to your medication therapy   Non-medication ways to help manage RLS include compression socks, heating bads, leg massagers, and warm baths  Discuss with primary care provider about if you need labs done to see if your atorvastatin could be causing muscle pains/cramps   I would discuss your history of hiccups post-surgery with the anesthesiologist      Follow-up: as needed with MTM for medication questions or concerns   Appointments in Next Year      Dec 23, 2024 5:20 PM  Extremity Treatment with ROQUE Tran [facility] ([facility] ) [phone]     Jan 06, 2025 2:30 PM  Extremity Treatment with ROQUE Tran [facility] ([facility] ) [phone]     Daljit 15, 2025 9:50 AM  Extremity Treatment with ROQUE Tran [facility] ([facility] ) [phone]     Jan  29, 2025 8:00 AM  Telephone Visit with [facility]TH[facility] NURSE PRE OP  [facility] ([facility] [place] ) [phone]     Jan 31, 2025 10:00 AM  (Arrive by 9:45 AM)  PRE-OP with Annalisa Sauceda MD  [facility] [place] ([facility] ) [phone]     Feb 03, 2025 8:20 AM  Extremity Treatment with Victor Manuel Rasmussen PT  [facility] ([facility] ) [phone]     Feb 17, 2025 9:00 AM  Extremity Treatment with Victor Manuel Rasmussen PT  [facility] ([facility] ) [phone]     Mar 04, 2025 9:30 AM  (Arrive by 9:15 AM)  RETURN GLAUCOMA with Orlando Schilling MD  [facility] ([facility] ) [phone]     Mar 05, 2025 11:00 AM  (Arrive by 10:45 AM)  Post-Op Hip Replacement with Taurus Donald MD  [facility] ([facility] ) [phone]     Apr 11, 2025 10:30 AM  (Arrive by 10:15 AM)  Post-Op Hip Replacement with Ari Forrest PA-C  [facility] ([facility] ) [phone]     May 29, 2025 1:40 PM  (Arrive by 1:25 PM)  Return Movement Disorder with Taurus Woodard MD  [facility] ([facility] ) [phone]          SUBJECTIVE/OBJECTIVE:                          Jose Carlos Bolden is a 69 year old male seen for an initial visit. He was referred to me from Taurus Woodard.      Reason for visit: Initial MTM.    Allergies/ADRs: Reviewed in chart  Past Medical History: Reviewed in  chart  Tobacco: He reports that he has never smoked. He has never used smokeless tobacco.  Alcohol: about 7 beverages / week  THC/CBD: yes  Specialty Providers: Neurologist: Dr. Woodard    Medication Adherence/Access: Patient uses a pillbox and phone alarms but he sometimes silences his alarm and takes medications later than planned.     RLS:   - Ferrous gluconate 324mg every day   - Gabapentin 600- 900mg (1-1.5x 600mg) two - three times per day. Totals 1800mg per day.     Wonders what the best time of day is to take his iron. Questions if he can take gabapentin prior to car rides/driving- does not feel tired after taking gabapentin.    RLS symptoms can start in afternoon and last until past midnight. For the past 2 weeks, his RLS has been minimal and improved. He is not sure why this is the case. Right now he feels that things are not bad enough to make any medication changes at this time. Willing to try non-pharm options to help control RLS.     Has tried ropinirole in the past; still has a bottle at home form a few years back.     Would like to know if RLS has anything to do with the severe hiccups he developed after his last hip surgery. He is scheduled for surgery again in Feb.      Hyperlipidemia:   - Atorvastatin 20mg at bedtime   He is wondering about statins causing leg pains and if that is the RLS he is experiencing. Does have some cramping in his calves that wake him up.     Supplements:   - Vitamin B12 1000mcg every day   No concerns.     Today's Vitals: There were no vitals taken for this visit.  ----------------    I spent 26 minutes with this patient today. All changes were made via collaborative practice agreement with Taurus Woodard.     A summary of these recommendations was sent via [employer].    Waleska Broderick, Pharm.D., MPH  Medication Therapy Management Pharmacist   [facility]    Telemedicine Visit Details  The patient's medications can be safely assessed via a telemedicine  encounter.  Type of service:  Telephone visit  Originating Location (pt. Location): Home    Distant Location (provider location):  Off-site  Start Time: 9:01 AM  End Time: 9:27 AM     Medication Therapy Recommendations  No medication therapy recommendations to display

## 2024-12-23 ENCOUNTER — THERAPY VISIT (OUTPATIENT)
Dept: PHYSICAL THERAPY | Facility: CLINIC | Age: 69
End: 2024-12-23
Payer: COMMERCIAL

## 2024-12-23 DIAGNOSIS — M16.11 PRIMARY OSTEOARTHRITIS OF RIGHT HIP: Primary | ICD-10-CM

## 2024-12-23 DIAGNOSIS — R29.898 WEAKNESS OF BOTH HIPS: ICD-10-CM

## 2024-12-23 PROCEDURE — 97112 NEUROMUSCULAR REEDUCATION: CPT | Mod: GP

## 2024-12-23 PROCEDURE — 97140 MANUAL THERAPY 1/> REGIONS: CPT | Mod: GP

## 2024-12-23 PROCEDURE — 97110 THERAPEUTIC EXERCISES: CPT | Mod: GP

## 2025-01-06 ENCOUNTER — THERAPY VISIT (OUTPATIENT)
Dept: PHYSICAL THERAPY | Facility: CLINIC | Age: 70
End: 2025-01-06
Payer: COMMERCIAL

## 2025-01-06 DIAGNOSIS — R29.898 WEAKNESS OF BOTH HIPS: ICD-10-CM

## 2025-01-06 DIAGNOSIS — M16.11 PRIMARY OSTEOARTHRITIS OF RIGHT HIP: Primary | ICD-10-CM

## 2025-01-06 PROCEDURE — 97112 NEUROMUSCULAR REEDUCATION: CPT | Mod: GP

## 2025-01-06 PROCEDURE — 97110 THERAPEUTIC EXERCISES: CPT | Mod: GP

## 2025-01-15 ENCOUNTER — THERAPY VISIT (OUTPATIENT)
Dept: PHYSICAL THERAPY | Facility: CLINIC | Age: 70
End: 2025-01-15
Payer: COMMERCIAL

## 2025-01-15 DIAGNOSIS — R29.898 WEAKNESS OF BOTH HIPS: ICD-10-CM

## 2025-01-15 DIAGNOSIS — M16.11 PRIMARY OSTEOARTHRITIS OF RIGHT HIP: Primary | ICD-10-CM

## 2025-01-15 PROCEDURE — 97110 THERAPEUTIC EXERCISES: CPT | Mod: GP

## 2025-01-15 PROCEDURE — 97112 NEUROMUSCULAR REEDUCATION: CPT | Mod: GP

## 2025-01-29 ENCOUNTER — VIRTUAL VISIT (OUTPATIENT)
Dept: ORTHOPEDICS | Facility: CLINIC | Age: 70
End: 2025-01-29
Payer: COMMERCIAL

## 2025-01-29 DIAGNOSIS — M16.11 OSTEOARTHRITIS OF RIGHT HIP: Primary | ICD-10-CM

## 2025-01-29 NOTE — PROGRESS NOTES
Teaching Flowsheet     Visit Type: Telephone    Time Start: 8am  Time End: 8:20am  Total Time Spent: 20 minutes    Surgeon: Dr. Donald  [place] [facility] [place] of Anesthesia: Choice    Pertinent Medical History: PAST MEDICAL HISTORY:   Past Medical History:   Diagnosis Date    Allergy     sulfa, penicillium, food (tomatillos, ground cherries, some potato chips)    Anxiety     Back pain     BPPV (benign paroxysmal positional vertigo)     Dementia (H)     mild sx    Depressive disorder 1974    Glaucoma suspect     Head injury 1966    not diagnosed    Headache     Hearing loss     Hip pain, left     Hyperlipidemia LDL goal <130     Hypertension 2019    sub threshold    Iron deficiency     Kyphosis     Osteoarthritis     Other nervous system complications ?    RLS    Reactive airway disease     Restless leg syndrome     Scoliosis     Vitamin B12 deficiency        PAST SURGICAL HISTORY:   Past Surgical History:   Procedure Laterality Date    COLONOSCOPY      normal    COLONOSCOPY N/A 2020    Procedure: COLONOSCOPY, WITH POLYPECTOMY AND BIOPSY;  Surgeon: Marvel Cook MD;  Location: [facility] OR    EYE SURGERY Right     for glaucoma  - laser treatment    JOINT REPLACEMENT Left     hip, CATALINA    ORTHOPEDIC SURGERY Left     no weight bearing for 3.5 months - no surgery  - fracture    OTHER SURGICAL HISTORY  1985    attempted to reverse his vasectomy    VASECTOMY         FAMILY HISTORY:   Family History   Problem Relation Age of Onset    Skin Cancer Mother     Brain Cancer Mother         age 65  of glioblastoma    Depression Mother         post partum    Osteoporosis Mother     Anemia Mother     Allergy (Severe) Mother         penicillin    Brain Tumor Mother         Glio Blastoma    Cancer Mother         glio blastoma    Heart Disease Father          of emphysema    Emphysema Father         smoker    Glaucoma Father     Hyperlipidemia  Father     Neurologic Disorder Father         Jansen's Palsy    Heart Disease Maternal Grandmother         MI    Cancer Maternal Grandmother     Other - See Comments Maternal Grandfather         seasonal allergies    Coronary Artery Disease Paternal Grandmother         age 87 MI    Cancer Paternal Grandmother         Uterine/vaginal    Heart Disease Paternal Grandmother     Heart Disease Paternal Grandfather          of MI    Glaucoma Brother     Hyperlipidemia Brother     Prostate Problems Brother         BPH    Hyperlipidemia Brother     Neurologic Disorder Brother         Bell's palsy    Osteoporosis Brother     Anemia Brother     Vascular Disease Brother         Raynaud's    Prostate Problems Brother         BPH    Hyperlipidemia Brother     Hyperlipidemia Brother     Neurologic Disorder Brother         Bell's palsy    Glaucoma Sister     Pancreatic Cancer Sister          age 71 approximately    Osteoporosis Sister     Anemia Sister     Hyperlipidemia Sister     Heart Disease Sister         Surgery    Osteoporosis Sister     Anemia Sister     Migraines Sister     Depression Sister     Hyperlipidemia Sister     Other - See Comments Sister         Multiple chemical sensitivity    Neurologic Disorder Sister         Bell's palsy    Osteoporosis Sister     Anemia Sister     Breast Cancer Sister     Osteoporosis Sister         kyphosis, scoliosis    Hyperlipidemia Sister     Vascular Disease Sister         Raynaud's    Cancer Sister         breast    Cancer Sister         pancreatic    Depression Sister     Migraines Cousin         neice, actually    Macular Degeneration No family hx of     Melanoma No family hx of        SOCIAL HISTORY:   Social History     Tobacco Use    Smoking status: Never    Smokeless tobacco: Never    Tobacco comments:     heavy childhood exposure   Substance Use Topics    Alcohol use: Yes     Comment: moderate use throughout all adulthood       Were medical conditions reviewed and  appropriate for location? Yes  BMI: Normal BMI    Relevant Diagnosis: right hip osteoarthritis  Teaching Topic: right total hip arthroplasty    Patient elects to proceed with fast-track hip replacement.  Discussed total joint online class.  Patient will call if they require help for signing up for the total joint zoom class. Patient understands they will need a preop exam within 30 days of date of surgery. Patient understands the expected length of stay and the expectation of outpatient physical therapy. Patient understands the need for an at home  after surgery and need for transportation home.  Discussed dental/non-sterile procedure prophylaxis. Discussed the [facility] Companion service.       Person(s) involved in teaching:   Patient  : No.   Verified Patient's Phone Number: YES: [phone]    Caregiver//  Name: Madeleine Dias  Phone Number: [phone]   Relationship: Wife  Consent to Communicate on file: No       Motivation Level:  Asks Questions: Yes  Eager to Learn: Yes  Cooperative: Yes  Receptive (willing/able to accept information): Yes  Any cultural factors/Oriental orthodox beliefs that may influence understanding or compliance? No     Patient demonstrates understanding of the following:  Reason for the appointment, diagnosis and treatment plan: Yes  Knowledge of proper use of medications and conditions for which they are ordered (with special attention to potential side effects or drug interactions): Yes  Which situations necessitate calling provider and whom to contact: Yes     Teaching Concerns Addressed:   - Important Contact Info/Phone Numbers: emphasizing clinic number [phone] and after hours number [phone]  - Map/location of surgery and follow-up appointments  - Showering instructions  - [employer] Tool  - Does patient have difficulty getting a ride to appointments (post-ops, PT/OT): No  - Patient's plan after discharge: home with family or spouse     Proper use and care  of medical equip, care aids, etc.: Yes  Nutritional needs and diet plan: Yes  Pain management techniques: Yes  Wound Care: Yes  How and/when to access community resources: Yes  Need for pre-op with in 30 days: YES, will be done with PCP. I asked them to ensure they go over their daily medications during this visit and discuss what medications need to be stopped before surgery and when. If you are doing a pre-op with your PCP and they are not within the [facility], I ask them to fax it to our pre-op office. Patient verbalized understanding.        Instructional Materials Used/Given:  two bottles of chlorhexidine soap, a surgery packet, and a joint booklet given to patient in clinic.     Luz Elena St RN

## 2025-01-31 ENCOUNTER — LAB (OUTPATIENT)
Dept: LAB | Facility: CLINIC | Age: 70
End: 2025-01-31
Payer: COMMERCIAL

## 2025-01-31 DIAGNOSIS — M16.10 HIP ARTHRITIS: ICD-10-CM

## 2025-01-31 DIAGNOSIS — Z01.818 PREOP GENERAL PHYSICAL EXAM: ICD-10-CM

## 2025-01-31 DIAGNOSIS — Z13.1 SCREENING FOR DIABETES MELLITUS: ICD-10-CM

## 2025-01-31 LAB
ALBUMIN SERPL BCG-MCNC: 4.4 G/DL (ref 3.5–5.2)
ALP SERPL-CCNC: 57 U/L (ref 40–150)
ALT SERPL W P-5'-P-CCNC: 15 U/L (ref 0–70)
ANION GAP SERPL CALCULATED.3IONS-SCNC: 5 MMOL/L (ref 7–15)
AST SERPL W P-5'-P-CCNC: 20 U/L (ref 0–45)
BILIRUB SERPL-MCNC: 0.7 MG/DL
BUN SERPL-MCNC: 16.8 MG/DL (ref 8–23)
CALCIUM SERPL-MCNC: 9.2 MG/DL (ref 8.8–10.4)
CHLORIDE SERPL-SCNC: 104 MMOL/L (ref 98–107)
CREAT SERPL-MCNC: 0.95 MG/DL (ref 0.67–1.17)
EGFRCR SERPLBLD CKD-EPI 2021: 86 ML/MIN/1.73M2
ERYTHROCYTE [DISTWIDTH] IN BLOOD BY AUTOMATED COUNT: 13.7 % (ref 10–15)
EST. AVERAGE GLUCOSE BLD GHB EST-MCNC: 108 MG/DL
GLUCOSE SERPL-MCNC: 88 MG/DL (ref 70–99)
HBA1C MFR BLD: 5.4 %
HCO3 SERPL-SCNC: 31 MMOL/L (ref 22–29)
HCT VFR BLD AUTO: 41.7 % (ref 40–53)
HGB BLD-MCNC: 14.4 G/DL (ref 13.3–17.7)
MCH RBC QN AUTO: 32.9 PG (ref 26.5–33)
MCHC RBC AUTO-ENTMCNC: 34.5 G/DL (ref 31.5–36.5)
MCV RBC AUTO: 95 FL (ref 78–100)
PLATELET # BLD AUTO: 198 10E3/UL (ref 150–450)
POTASSIUM SERPL-SCNC: 4.4 MMOL/L (ref 3.4–5.3)
PROT SERPL-MCNC: 7.2 G/DL (ref 6.4–8.3)
RBC # BLD AUTO: 4.38 10E6/UL (ref 4.4–5.9)
SODIUM SERPL-SCNC: 140 MMOL/L (ref 135–145)
WBC # BLD AUTO: 6.3 10E3/UL (ref 4–11)

## 2025-01-31 PROCEDURE — 80053 COMPREHEN METABOLIC PANEL: CPT | Performed by: PATHOLOGY

## 2025-01-31 PROCEDURE — 36415 COLL VENOUS BLD VENIPUNCTURE: CPT | Performed by: PATHOLOGY

## 2025-01-31 PROCEDURE — 99000 SPECIMEN HANDLING OFFICE-LAB: CPT | Performed by: PATHOLOGY

## 2025-01-31 PROCEDURE — 83036 HEMOGLOBIN GLYCOSYLATED A1C: CPT | Performed by: INTERNAL MEDICINE

## 2025-01-31 PROCEDURE — 85027 COMPLETE CBC AUTOMATED: CPT | Performed by: PATHOLOGY

## 2025-02-03 ENCOUNTER — THERAPY VISIT (OUTPATIENT)
Dept: PHYSICAL THERAPY | Facility: CLINIC | Age: 70
End: 2025-02-03
Payer: COMMERCIAL

## 2025-02-03 DIAGNOSIS — R29.898 WEAKNESS OF BOTH HIPS: ICD-10-CM

## 2025-02-03 DIAGNOSIS — M16.11 PRIMARY OSTEOARTHRITIS OF RIGHT HIP: Primary | ICD-10-CM

## 2025-02-03 PROCEDURE — 97112 NEUROMUSCULAR REEDUCATION: CPT | Mod: GP

## 2025-02-03 PROCEDURE — 97110 THERAPEUTIC EXERCISES: CPT | Mod: GP

## 2025-02-04 NOTE — PROGRESS NOTES
Ortho Navigator Note      Pre-op Date 1/31/25     Medical Clearance  Cleared     Labs Stable      COVID Test Date No longer indicated      Skin  Intact      Activity: Ambulates independently without assistive device      Equipment Need Patient will likely need a walker for discharge. He has a cane and shower chair. Defer to PT/OT for recs.       Meds to Hold Held all supplements 14 days prior to surgery  Ibuprofen-Hold 48 hr prior to surgery   Sildenafil-Hold 24 hr prior to surgery   Hold all meds on morning of DOS   * Medication recommendations are not intended to be exhaustive; they are limited to common medications that are potentially dangerous if incorrectly managed   NPO Instructions  Instructed to stop solids 8 hr prior to arrival and clears 2 hr prior to arrival.       Pre-op Joint Education Complete? Will complete    Discharge Plan Patient has plan to discharge home on DOS as Fast Track   Spouse Madeleine will remain at hospital on DOS to participate in therapy and discharge education. They will then transport patient home    /Transportation Madeleine will be  and transportation.  is physically able to care for patient.      Barriers to Discharge No barriers to discharge.      Additional Info/   Special Needs : Patient had no unanswered questions or concerns.           02/04/25 1029   Discharge Planning   Patient/Family Anticipates Transition to home with family   Concerns to be Addressed no discharge needs identified   Living Arrangements   People in Home significant other   Type of Residence Private Residence   Is your private residence a single family home or apartment? Single family home   Number of Stairs, Within Home, Primary greater than 10 stairs   Once home, are you able to live on one level? Yes   Which level? Main Level   Bathroom Shower/Tub Tub/Shower unit   Equipment Currently Used at Home none   Support System   Support Systems Spouse/Significant Other   Do you have someone available to  stay with you one or two nights once you are home? Yes   Medical Clearance   Date of Physical 01/31/25   It is recommended that you call and check with any specialty providers before surgery to see if you need surgical clearance.  Do you see any specialty providers outside of your primary care provider? No   Blood   Known Bleeding Disorder or Coagulopathy No   Does the patient have any Jainism/cultural preferences related to blood products? No   Education   Has the patient scheduled or completed pre-op total joint education, either in class or online, in the last 12 months? Yes   Patient attended total joint pre-op class/received pre-op teaching  online   Relationship/Environment   Name(s) of People in Home Madeleine (spouse)

## 2025-02-17 ENCOUNTER — TELEPHONE (OUTPATIENT)
Dept: ORTHOPEDICS | Facility: CLINIC | Age: 70
End: 2025-02-17

## 2025-02-17 NOTE — TELEPHONE ENCOUNTER
Other: Patient would like to reschedule both post op appointments on 3/5 and 4/11 to be in the [facility] instead of the [facility]. Please call him back.      Could we send this information to you in [facility] or would you prefer to receive a phone call?:   Patient would prefer a phone call   Okay to leave a detailed message?: Yes at Cell number on file:    Telephone Information:   Mobile [phone]

## 2025-02-21 ENCOUNTER — APPOINTMENT (OUTPATIENT)
Dept: GENERAL RADIOLOGY | Facility: CLINIC | Age: 70
End: 2025-02-21
Attending: STUDENT IN AN ORGANIZED HEALTH CARE EDUCATION/TRAINING PROGRAM
Payer: COMMERCIAL

## 2025-02-21 ENCOUNTER — HOSPITAL ENCOUNTER (OUTPATIENT)
Facility: CLINIC | Age: 70
Discharge: HOME | End: 2025-02-21
Attending: STUDENT IN AN ORGANIZED HEALTH CARE EDUCATION/TRAINING PROGRAM | Admitting: STUDENT IN AN ORGANIZED HEALTH CARE EDUCATION/TRAINING PROGRAM
Payer: COMMERCIAL

## 2025-02-21 ENCOUNTER — APPOINTMENT (OUTPATIENT)
Dept: PHYSICAL THERAPY | Facility: CLINIC | Age: 70
End: 2025-02-21
Attending: STUDENT IN AN ORGANIZED HEALTH CARE EDUCATION/TRAINING PROGRAM
Payer: COMMERCIAL

## 2025-02-21 VITALS
WEIGHT: 132.06 LBS | DIASTOLIC BLOOD PRESSURE: 66 MMHG | HEART RATE: 90 BPM | OXYGEN SATURATION: 99 % | SYSTOLIC BLOOD PRESSURE: 128 MMHG | HEIGHT: 68 IN | RESPIRATION RATE: 16 BRPM | TEMPERATURE: 97.7 F | BODY MASS INDEX: 20.01 KG/M2

## 2025-02-21 DIAGNOSIS — M16.11 OSTEOARTHRITIS OF ONE HIP, RIGHT: Primary | ICD-10-CM

## 2025-02-21 LAB
ABO + RH BLD: NORMAL
BLD GP AB SCN SERPL QL: NEGATIVE
GLUCOSE BLDC GLUCOMTR-MCNC: 80 MG/DL (ref 70–99)
SPECIMEN EXP DATE BLD: NORMAL

## 2025-02-21 PROCEDURE — 272N000001 HC OR GENERAL SUPPLY STERILE: Performed by: STUDENT IN AN ORGANIZED HEALTH CARE EDUCATION/TRAINING PROGRAM

## 2025-02-21 PROCEDURE — 999N000180 XR SURGERY CARM FLUORO LESS THAN 5 MIN

## 2025-02-21 PROCEDURE — 97530 THERAPEUTIC ACTIVITIES: CPT | Mod: GP | Performed by: PHYSICAL THERAPIST

## 2025-02-21 PROCEDURE — 250N000025 HC SEVOFLURANE, PER MIN: Performed by: STUDENT IN AN ORGANIZED HEALTH CARE EDUCATION/TRAINING PROGRAM

## 2025-02-21 PROCEDURE — 999N000111 HC STATISTIC OT IP EVAL DEFER

## 2025-02-21 PROCEDURE — 370N000017 HC ANESTHESIA TECHNICAL FEE, PER MIN: Performed by: STUDENT IN AN ORGANIZED HEALTH CARE EDUCATION/TRAINING PROGRAM

## 2025-02-21 PROCEDURE — 360N000084 HC SURGERY LEVEL 4 W/ FLUORO, PER MIN: Performed by: STUDENT IN AN ORGANIZED HEALTH CARE EDUCATION/TRAINING PROGRAM

## 2025-02-21 PROCEDURE — C1776 JOINT DEVICE (IMPLANTABLE): HCPCS | Performed by: STUDENT IN AN ORGANIZED HEALTH CARE EDUCATION/TRAINING PROGRAM

## 2025-02-21 PROCEDURE — 258N000003 HC RX IP 258 OP 636: Performed by: STUDENT IN AN ORGANIZED HEALTH CARE EDUCATION/TRAINING PROGRAM

## 2025-02-21 PROCEDURE — 710N000010 HC RECOVERY PHASE 1, LEVEL 2, PER MIN: Performed by: STUDENT IN AN ORGANIZED HEALTH CARE EDUCATION/TRAINING PROGRAM

## 2025-02-21 PROCEDURE — 250N000013 HC RX MED GY IP 250 OP 250 PS 637

## 2025-02-21 PROCEDURE — 27130 TOTAL HIP ARTHROPLASTY: CPT | Mod: RT | Performed by: STUDENT IN AN ORGANIZED HEALTH CARE EDUCATION/TRAINING PROGRAM

## 2025-02-21 PROCEDURE — 258N000001 HC RX 258: Performed by: STUDENT IN AN ORGANIZED HEALTH CARE EDUCATION/TRAINING PROGRAM

## 2025-02-21 PROCEDURE — 250N000009 HC RX 250: Performed by: STUDENT IN AN ORGANIZED HEALTH CARE EDUCATION/TRAINING PROGRAM

## 2025-02-21 PROCEDURE — 97161 PT EVAL LOW COMPLEX 20 MIN: CPT | Mod: GP | Performed by: PHYSICAL THERAPIST

## 2025-02-21 PROCEDURE — 250N000013 HC RX MED GY IP 250 OP 250 PS 637: Performed by: STUDENT IN AN ORGANIZED HEALTH CARE EDUCATION/TRAINING PROGRAM

## 2025-02-21 PROCEDURE — 999N000141 HC STATISTIC PRE-PROCEDURE NURSING ASSESSMENT: Performed by: STUDENT IN AN ORGANIZED HEALTH CARE EDUCATION/TRAINING PROGRAM

## 2025-02-21 PROCEDURE — 250N000011 HC RX IP 250 OP 636: Mod: JW

## 2025-02-21 PROCEDURE — 999N000065 XR PELVIS AND HIP PORTABLE RIGHT 1 VIEW

## 2025-02-21 PROCEDURE — 250N000011 HC RX IP 250 OP 636: Performed by: STUDENT IN AN ORGANIZED HEALTH CARE EDUCATION/TRAINING PROGRAM

## 2025-02-21 PROCEDURE — 86850 RBC ANTIBODY SCREEN: CPT

## 2025-02-21 PROCEDURE — 710N000012 HC RECOVERY PHASE 2, PER MINUTE: Performed by: STUDENT IN AN ORGANIZED HEALTH CARE EDUCATION/TRAINING PROGRAM

## 2025-02-21 PROCEDURE — 97116 GAIT TRAINING THERAPY: CPT | Mod: GP | Performed by: PHYSICAL THERAPIST

## 2025-02-21 DEVICE — IMPLANTABLE DEVICE
Type: IMPLANTABLE DEVICE | Site: HIP | Status: FUNCTIONAL
Brand: G7® LONGEVITY®

## 2025-02-21 DEVICE — IMPLANTABLE DEVICE
Type: IMPLANTABLE DEVICE | Site: HIP | Status: FUNCTIONAL
Brand: TAPERLOC COMPLETE PRIMARY FEMORAL

## 2025-02-21 DEVICE — IMPLANTABLE DEVICE
Type: IMPLANTABLE DEVICE | Site: HIP | Status: FUNCTIONAL
Brand: G7® ACETABULAR SYSTEM

## 2025-02-21 DEVICE — HEAD FEM 0MM OFST 36MM HIP ACTB MDLR TY 1 BLX D G7: Type: IMPLANTABLE DEVICE | Site: HIP | Status: FUNCTIONAL

## 2025-02-21 RX ORDER — ACETAMINOPHEN 325 MG/1
650 TABLET ORAL EVERY 4 HOURS PRN
Qty: 100 TABLET | Refills: 0 | Status: SHIPPED | OUTPATIENT
Start: 2025-02-21

## 2025-02-21 RX ORDER — CEFAZOLIN SODIUM 1 G/3ML
1 INJECTION, POWDER, FOR SOLUTION INTRAMUSCULAR; INTRAVENOUS EVERY 8 HOURS
Status: DISCONTINUED | OUTPATIENT
Start: 2025-02-21 | End: 2025-02-21 | Stop reason: HOSPADM

## 2025-02-21 RX ORDER — BISACODYL 10 MG
10 SUPPOSITORY, RECTAL RECTAL DAILY PRN
Status: CANCELLED | OUTPATIENT
Start: 2025-02-21

## 2025-02-21 RX ORDER — OXYCODONE HYDROCHLORIDE 5 MG/1
10 TABLET ORAL
Status: DISCONTINUED | OUTPATIENT
Start: 2025-02-21 | End: 2025-02-21 | Stop reason: HOSPADM

## 2025-02-21 RX ORDER — ASPIRIN 81 MG/1
81 TABLET ORAL 2 TIMES DAILY
Qty: 60 TABLET | Refills: 0 | Status: SHIPPED | OUTPATIENT
Start: 2025-02-21

## 2025-02-21 RX ORDER — SODIUM CHLORIDE, SODIUM LACTATE, POTASSIUM CHLORIDE, CALCIUM CHLORIDE 600; 310; 30; 20 MG/100ML; MG/100ML; MG/100ML; MG/100ML
INJECTION, SOLUTION INTRAVENOUS CONTINUOUS
Status: CANCELLED | OUTPATIENT
Start: 2025-02-21

## 2025-02-21 RX ORDER — HYDROMORPHONE HYDROCHLORIDE 1 MG/ML
0.4 INJECTION, SOLUTION INTRAMUSCULAR; INTRAVENOUS; SUBCUTANEOUS
Status: DISCONTINUED | OUTPATIENT
Start: 2025-02-21 | End: 2025-02-21 | Stop reason: HOSPADM

## 2025-02-21 RX ORDER — AMOXICILLIN 250 MG
1 CAPSULE ORAL 2 TIMES DAILY
Status: CANCELLED | OUTPATIENT
Start: 2025-02-21

## 2025-02-21 RX ORDER — HYDROMORPHONE HYDROCHLORIDE 1 MG/ML
0.2 INJECTION, SOLUTION INTRAMUSCULAR; INTRAVENOUS; SUBCUTANEOUS EVERY 5 MIN PRN
Status: DISCONTINUED | OUTPATIENT
Start: 2025-02-21 | End: 2025-02-21 | Stop reason: HOSPADM

## 2025-02-21 RX ORDER — ONDANSETRON 2 MG/ML
4 INJECTION INTRAMUSCULAR; INTRAVENOUS EVERY 30 MIN PRN
Status: DISCONTINUED | OUTPATIENT
Start: 2025-02-21 | End: 2025-02-21 | Stop reason: HOSPADM

## 2025-02-21 RX ORDER — KETOROLAC TROMETHAMINE 30 MG/ML
15 INJECTION, SOLUTION INTRAMUSCULAR; INTRAVENOUS EVERY 6 HOURS
Status: DISCONTINUED | OUTPATIENT
Start: 2025-02-21 | End: 2025-02-21 | Stop reason: HOSPADM

## 2025-02-21 RX ORDER — POLYETHYLENE GLYCOL 3350 17 G/17G
1 POWDER, FOR SOLUTION ORAL DAILY
Qty: 7 PACKET | Refills: 0 | Status: SHIPPED | OUTPATIENT
Start: 2025-02-21

## 2025-02-21 RX ORDER — OXYCODONE HYDROCHLORIDE 5 MG/1
5 TABLET ORAL EVERY 4 HOURS PRN
Status: DISCONTINUED | OUTPATIENT
Start: 2025-02-21 | End: 2025-02-21 | Stop reason: HOSPADM

## 2025-02-21 RX ORDER — LIDOCAINE 40 MG/G
CREAM TOPICAL
Status: DISCONTINUED | OUTPATIENT
Start: 2025-02-21 | End: 2025-02-21 | Stop reason: HOSPADM

## 2025-02-21 RX ORDER — SODIUM CHLORIDE, SODIUM LACTATE, POTASSIUM CHLORIDE, CALCIUM CHLORIDE 600; 310; 30; 20 MG/100ML; MG/100ML; MG/100ML; MG/100ML
INJECTION, SOLUTION INTRAVENOUS CONTINUOUS
Status: DISCONTINUED | OUTPATIENT
Start: 2025-02-21 | End: 2025-02-21 | Stop reason: HOSPADM

## 2025-02-21 RX ORDER — OXYCODONE HYDROCHLORIDE 5 MG/1
5-10 TABLET ORAL EVERY 4 HOURS PRN
Qty: 26 TABLET | Refills: 0 | Status: SHIPPED | OUTPATIENT
Start: 2025-02-21

## 2025-02-21 RX ORDER — PROCHLORPERAZINE MALEATE 5 MG/1
5 TABLET ORAL EVERY 6 HOURS PRN
Status: CANCELLED | OUTPATIENT
Start: 2025-02-21

## 2025-02-21 RX ORDER — ACETAMINOPHEN 325 MG/1
975 TABLET ORAL EVERY 8 HOURS
Status: DISCONTINUED | OUTPATIENT
Start: 2025-02-21 | End: 2025-02-21 | Stop reason: HOSPADM

## 2025-02-21 RX ORDER — FENTANYL CITRATE 50 UG/ML
50 INJECTION, SOLUTION INTRAMUSCULAR; INTRAVENOUS EVERY 5 MIN PRN
Status: DISCONTINUED | OUTPATIENT
Start: 2025-02-21 | End: 2025-02-21 | Stop reason: HOSPADM

## 2025-02-21 RX ORDER — HYDROMORPHONE HYDROCHLORIDE 1 MG/ML
0.2 INJECTION, SOLUTION INTRAMUSCULAR; INTRAVENOUS; SUBCUTANEOUS
Status: DISCONTINUED | OUTPATIENT
Start: 2025-02-21 | End: 2025-02-21 | Stop reason: HOSPADM

## 2025-02-21 RX ORDER — CEFAZOLIN SODIUM/WATER 2 G/20 ML
2 SYRINGE (ML) INTRAVENOUS SEE ADMIN INSTRUCTIONS
Status: DISCONTINUED | OUTPATIENT
Start: 2025-02-21 | End: 2025-02-21 | Stop reason: HOSPADM

## 2025-02-21 RX ORDER — TRANEXAMIC ACID 650 MG/1
1950 TABLET ORAL ONCE
Status: COMPLETED | OUTPATIENT
Start: 2025-02-21 | End: 2025-02-21

## 2025-02-21 RX ORDER — ACETAMINOPHEN 325 MG/1
975 TABLET ORAL ONCE
Status: COMPLETED | OUTPATIENT
Start: 2025-02-21 | End: 2025-02-21

## 2025-02-21 RX ORDER — ONDANSETRON 2 MG/ML
4 INJECTION INTRAMUSCULAR; INTRAVENOUS EVERY 6 HOURS PRN
Status: CANCELLED | OUTPATIENT
Start: 2025-02-21

## 2025-02-21 RX ORDER — ONDANSETRON 4 MG/1
4 TABLET, ORALLY DISINTEGRATING ORAL EVERY 6 HOURS PRN
Status: CANCELLED | OUTPATIENT
Start: 2025-02-21

## 2025-02-21 RX ORDER — ONDANSETRON 4 MG/1
4 TABLET, ORALLY DISINTEGRATING ORAL EVERY 30 MIN PRN
Status: DISCONTINUED | OUTPATIENT
Start: 2025-02-21 | End: 2025-02-21 | Stop reason: HOSPADM

## 2025-02-21 RX ORDER — CEFAZOLIN SODIUM/WATER 2 G/20 ML
2 SYRINGE (ML) INTRAVENOUS
Status: COMPLETED | OUTPATIENT
Start: 2025-02-21 | End: 2025-02-21

## 2025-02-21 RX ORDER — DEXAMETHASONE SODIUM PHOSPHATE 4 MG/ML
4 INJECTION, SOLUTION INTRA-ARTICULAR; INTRALESIONAL; INTRAMUSCULAR; INTRAVENOUS; SOFT TISSUE
Status: DISCONTINUED | OUTPATIENT
Start: 2025-02-21 | End: 2025-02-21 | Stop reason: HOSPADM

## 2025-02-21 RX ORDER — OXYCODONE HYDROCHLORIDE 5 MG/1
5 TABLET ORAL
Status: COMPLETED | OUTPATIENT
Start: 2025-02-21 | End: 2025-02-21

## 2025-02-21 RX ORDER — IBUPROFEN 600 MG/1
600 TABLET, FILM COATED ORAL EVERY 6 HOURS PRN
Qty: 30 TABLET | Refills: 0 | Status: SHIPPED | OUTPATIENT
Start: 2025-02-21

## 2025-02-21 RX ORDER — NALOXONE HYDROCHLORIDE 0.4 MG/ML
0.1 INJECTION, SOLUTION INTRAMUSCULAR; INTRAVENOUS; SUBCUTANEOUS
Status: DISCONTINUED | OUTPATIENT
Start: 2025-02-21 | End: 2025-02-21 | Stop reason: HOSPADM

## 2025-02-21 RX ORDER — ASPIRIN 81 MG/1
81 TABLET ORAL 2 TIMES DAILY
Status: CANCELLED | OUTPATIENT
Start: 2025-02-21

## 2025-02-21 RX ORDER — FENTANYL CITRATE 50 UG/ML
25 INJECTION, SOLUTION INTRAMUSCULAR; INTRAVENOUS EVERY 5 MIN PRN
Status: DISCONTINUED | OUTPATIENT
Start: 2025-02-21 | End: 2025-02-21 | Stop reason: HOSPADM

## 2025-02-21 RX ORDER — LIDOCAINE 40 MG/G
CREAM TOPICAL
Status: CANCELLED | OUTPATIENT
Start: 2025-02-21

## 2025-02-21 RX ORDER — POLYETHYLENE GLYCOL 3350 17 G/17G
17 POWDER, FOR SOLUTION ORAL DAILY
Status: CANCELLED | OUTPATIENT
Start: 2025-02-22

## 2025-02-21 RX ORDER — OXYCODONE HYDROCHLORIDE 5 MG/1
10 TABLET ORAL EVERY 4 HOURS PRN
Status: DISCONTINUED | OUTPATIENT
Start: 2025-02-21 | End: 2025-02-21 | Stop reason: HOSPADM

## 2025-02-21 RX ORDER — AMOXICILLIN 250 MG
1-2 CAPSULE ORAL 2 TIMES DAILY
Qty: 30 TABLET | Refills: 0 | Status: SHIPPED | OUTPATIENT
Start: 2025-02-21

## 2025-02-21 RX ORDER — HYDROMORPHONE HYDROCHLORIDE 1 MG/ML
0.4 INJECTION, SOLUTION INTRAMUSCULAR; INTRAVENOUS; SUBCUTANEOUS EVERY 5 MIN PRN
Status: DISCONTINUED | OUTPATIENT
Start: 2025-02-21 | End: 2025-02-21 | Stop reason: HOSPADM

## 2025-02-21 RX ADMIN — ONDANSETRON 4 MG: 4 TABLET, ORALLY DISINTEGRATING ORAL at 13:37

## 2025-02-21 RX ADMIN — HYDROMORPHONE HYDROCHLORIDE 0.2 MG: 1 INJECTION, SOLUTION INTRAMUSCULAR; INTRAVENOUS; SUBCUTANEOUS at 10:56

## 2025-02-21 RX ADMIN — FENTANYL CITRATE 50 MCG: 0.05 INJECTION, SOLUTION INTRAMUSCULAR; INTRAVENOUS at 10:34

## 2025-02-21 RX ADMIN — HYDROMORPHONE HYDROCHLORIDE 0.2 MG: 1 INJECTION, SOLUTION INTRAMUSCULAR; INTRAVENOUS; SUBCUTANEOUS at 11:04

## 2025-02-21 RX ADMIN — OXYCODONE HYDROCHLORIDE 5 MG: 5 TABLET ORAL at 11:44

## 2025-02-21 RX ADMIN — CEFAZOLIN 1 G: 1 INJECTION, POWDER, FOR SOLUTION INTRAMUSCULAR; INTRAVENOUS at 14:26

## 2025-02-21 RX ADMIN — OXYCODONE 5 MG: 5 TABLET ORAL at 15:53

## 2025-02-21 RX ADMIN — KETOROLAC TROMETHAMINE 15 MG: 30 INJECTION, SOLUTION INTRAMUSCULAR at 11:44

## 2025-02-21 RX ADMIN — FENTANYL CITRATE 50 MCG: 0.05 INJECTION, SOLUTION INTRAMUSCULAR; INTRAVENOUS at 10:42

## 2025-02-21 RX ADMIN — ACETAMINOPHEN 975 MG: 325 TABLET, FILM COATED ORAL at 06:35

## 2025-02-21 RX ADMIN — TRANEXAMIC ACID 1950 MG: 650 TABLET ORAL at 06:35

## 2025-02-21 ASSESSMENT — ACTIVITIES OF DAILY LIVING (ADL)
ADLS_ACUITY_SCORE: 22
ADLS_ACUITY_SCORE: 21
ADLS_ACUITY_SCORE: 22
ADLS_ACUITY_SCORE: 21

## 2025-02-21 NOTE — PLAN OF CARE
Occupational Therapy: Orders received. Chart reviewed and discussed with care team.? Occupational Therapy not indicated due to per discussion with PT, pt has good set-up and support at home with no IP OT needs at this time.? Defer discharge recommendations to ortho team.? Will complete orders.

## 2025-02-21 NOTE — PROGRESS NOTES
02/21/25 1300   Appointment Info   Signing Clinician's Name / Credentials (PT) Jany Taveras, SPT   Student Supervision Direct supervision provided   Living Environment   People in Home spouse   Current Living Arrangements house   Home Accessibility stairs to enter home   Number of Stairs, Main Entrance greater than 10 stairs  (13)   Stair Railings, Main Entrance railing on right side (ascending)   Number of Stairs, Within Home, Primary greater than 10 stairs  (left rail)   Transportation Anticipated family or friend will provide   Living Environment Comments Pt can stay on main level if necessary, has bathroom and bedroom. 13 BETHEL with rail on R. Will have wife at home at all times, adult son can help out occasionally.   Self-Care   Usual Activity Tolerance good   Current Activity Tolerance moderate   Regular Exercise Yes   Activity/Exercise Type walking   Exercise Amount/Frequency daily   Equipment Currently Used at Home walker, standard;cane, straight;crutches;shower chair;dressing device;grab bar, toilet;grab bar, tub/shower;raised toilet seat   Fall history within last six months no   Activity/Exercise/Self-Care Comment IND at baseline, walking 3-5 mi a day no AD   General Information   Onset of Illness/Injury or Date of Surgery 02/21/25   Referring Physician Taurus Donald MD   Patient/Family Therapy Goals Statement (PT) return to home   Pertinent History of Current Problem (include personal factors and/or comorbidities that impact the POC) s/p R CATALINA   Existing Precautions/Restrictions fall;weight bearing   Weight-Bearing Status - RLE weight-bearing as tolerated   General Observations Pt laying in recliner upon arrival, agreeable to PT. Has not been up yet since surgery.   Cognition   Affect/Mental Status (Cognition) WNL   Pain Assessment   Patient Currently in Pain Yes, see Vital Sign flowsheet  (4/10)   Integumentary/Edema   Integumentary/Edema other (describe)   Integumentary/Edema Comments surgical  incisions due to procedure   Posture    Posture Forward head position   Range of Motion (ROM)   Range of Motion ROM is WFL   ROM Comment Pt able to complete functional activities with current ROM   Strength (Manual Muscle Testing)   Strength (Manual Muscle Testing) Deficits observed during functional mobility   Strength Comments Pt having difficulty completing R SLR but able to complete all other HEP exercises. Can WB on RLE.   Transfers   Transfers sit-stand transfer   Comment, (Transfers) STS with FWW and SBA   Gait/Stairs (Locomotion)   [place]ayce Level (Gait) contact guard   Assistive Device (Gait) walker, front-wheeled   Distance in Feet (Gait) 10   Comment, (Gait/Stairs) amb 10 ft with FWW and CGA   Balance   Balance no deficits were identified   Sensory Examination   Sensory Perception WFL   Sensory Perception Comments LT sensation intact RLE   Coordination   Coordination no deficits were identified   Muscle Tone   Muscle Tone no deficits were identified   Clinical Impression   Criteria for Skilled Therapeutic Intervention Yes, treatment indicated   PT Diagnosis (PT) impaired functional mobility   Influenced by the following impairments decreased strength and ROM and increased pain secondary to surgical procedures   Functional limitations due to impairments bed mobility, gait, transfers, stairs   Clinical Presentation (PT Evaluation Complexity) stable   Clinical Presentation Rationale per clinician judgement   Clinical Decision Making (Complexity) low complexity   Planned Therapy Interventions (PT) bed mobility training;gait training;home exercise program;patient/family education;ROM (range of motion);strengthening;transfer training;progressive activity/exercise;risk factor education;home program guidelines   Risk & Benefits of therapy have been explained evaluation/treatment results reviewed;care plan/treatment goals reviewed;risks/benefits reviewed;current/potential barriers reviewed;participants voiced  agreement with care plan;participants included;patient;spouse/significant other   PT Total Evaluation Time   PT Eval, Low Complexity Minutes (99386) 10   Therapy Certification   Start of care date 02/21/25   Certification date from 02/21/25   Certification date to 02/21/25   Medical Diagnosis s/p right CATALINA   Physical Therapy Goals   PT Frequency One time eval and treatment only   PT Predicted Duration/Target Date for Goal Attainment 02/21/25   PT Goals Bed Mobility;Transfers;Gait;Stairs   PT: Bed Mobility Supervision/stand-by assist;Supine to/from sit;Goal Met   PT: Transfers Supervision/stand-by assist;Sit to/from stand;Assistive device;Goal Met   PT: Gait Supervision/stand-by assist;Rolling walker;Within precautions;150 feet;Goal Met   PT: Stairs 9 stairs;Rail on right;Supervision/stand-by assist;Goal Met   Interventions   Interventions Quick Adds Gait Training;Therapeutic Activity;Therapeutic Procedure   Therapeutic Procedure/Exercise   Ther. Procedure: strength, endurance, ROM, flexibillity Minutes (30166) 5   Symptoms Noted During/After Treatment increased pain   Treatment Detail/Skilled Intervention Pt educated on HEP including ankle pumps, quad sets, glute sets, SAQ, hamstring sets, SLR, heel slides. Pt demoed 2-3 reps of each, difficulty with SLR and SAQ due to pain/chair positioning (recliner). Pt educated on dosing and frequency.   Therapeutic Activity   Therapeutic Activities: dynamic activities to improve functional performance Minutes (07605) 15   Symptoms Noted During/After Treatment Fatigue;Dizziness  (nausea)   Treatment Detail/Skilled Intervention Pt supine in recliner upon arrival, agreeable to PT. After therex, pt sat up in recliner. Upon scooting toward edge of recliner IND, pt feeling nauseous. BP taken 128/66. Pt feels nausea calming down, attempts STS with FWW and CGA, cues for leg placement. Pt standing for 1 minute, closing eyes, pt reports feeling dizzy and nauseous. STS back to recliner,  SBA. Pt given nausea meds via nursing staff, sit for a few minutes until nausea calms down. /61. After pt reports feeling better, attempt STS with FWW and SBA. Standing WS x1 minute. Pt then amb 10 ft in room with FWW and CGA, feeling okay after this. STS at  with SBA, pt wheeled to stairs for gait training. Upon return from gait training, STS at recliner, cues for hand and leg placement. Verbal review of getting in-out of bed, education on letting pain be guide, having someone around for assistance if necessary. Pt demoes being able to flex hip to 90* in sittng, feeling confident about being able to get in/out of bed. Pt having no other concerns for PT, screened for OT, no concerns. Pt left with call light in place, all cares met, nursing staff in room.   Gait Training   Gait Training Minutes (15919) 25   Symptoms Noted During/After Treatment (Gait Training) fatigue   Treatment Detail/Skilled Intervention Begin Stair training, pt educated on sequencing and trials x4 steps with BUE support at [facility], CGA. Pt able to complete safely. Pt trials x4 steps with 1 Rail on R to simulate home set up, BUE at rail, SBA. Pt forgetting which leg to lead with, educated on sequencing again. Pt completes one more set of 4 stairs, able to complete correct sequencing wihtout prompting, SBA. Pt then amb 200 ft back to room with FWW and SBA. Pt cued to keep pushing walker continuously, not stepping to close to front of walker. Pt reports having a SW at home, discussed benefits of FWW but pt wants to use SW and denies need for FWW at this time. End gait training.   Distance in Feet 200   PT Discharge Planning   PT Plan dc to home   PT Discharge Recommendation (DC Rec) home with outpatient physical therapy   PT Rationale for DC Rec pt mobilizing well POD 0, has good social support (wife will be around at all times), able to demonstrate ability to complete stairs and amb. Pt safe for dc home with OP PT lined up.   PT Brief  overview of current status SBA with FWW   PT Total Distance Amb During Session (feet) 210   Physical Therapy Time and Intention   Timed Code Treatment Minutes 45   Total Session Time (sum of timed and untimed services) 55     [facility]                                                                                   OUTPATIENT PHYSICAL THERAPY    PLAN OF TREATMENT FOR OUTPATIENT REHABILITATION   Patient's Last Name, First Name, Jose Carlos Yee YOB: 1955   Provider's Name   [facility]   Medical Record No.  [id number]     Onset Date: 02/21/25 Start of Care Date: (P) 02/21/25     Medical Diagnosis:  (P) s/p right CATALINA               PT Diagnosis:  impaired functional mobility Certification Dates:  From: (P) 02/21/25  To: (P) 02/21/25       See note for plan of treatment, functional goals, and certification details.    I CERTIFY THE NEED FOR THESE SERVICES FURNISHED UNDER        THIS PLAN OF TREATMENT AND WHILE UNDER MY CARE (Physician co-signature of this document indicates review and certification of the therapy plan).

## 2025-02-21 NOTE — PLAN OF CARE
Physical Therapy Discharge Summary    Reason for therapy discharge:    Discharged to home with outpatient therapy.    Progress towards therapy goal(s). See goals on Care Plan in [facility] electronic health record for goal details.  Goals met    Therapy recommendation(s):    Continued therapy is recommended.  Rationale/Recommendations:  OP PT to continue building strength and ROM in hip.

## 2025-02-21 NOTE — OP NOTE
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   Post-operative diagnosis Same as pre-operative diagnosis    Procedure: ARTHROPLASTY, HIP, TOTAL VIA DIRECT ANTERIOR APPROACH, Right - Hip    Surgeon: Surgeons and Role:     * Taurus Donald MD - Primary     * Ari Forrest PA-C - Assisting  Anesthesia: General   Estimated Blood Loss: 200 ml    Drains: None  Specimens: * No specimens in log *    Complications: None.  Implants:   Implant Name Type Inv. Item Serial No.  Lot No. LRB No. Used Action   IMP SHELL BIOM G7 ACETAB PPS VILLEDA HOLE 56MM SZ F [id number] - [id number] Total Joint Component/Insert IMP SHELL BIOM G7 ACETAB PPS VILLEDA HOLE 56MM SZ F [id number]  [employer] [id number] Right 1 Implanted   LINER ACTB G7 F 36MM LNGVT HIP STRL LUM LF [id number] - [id number] Total Joint Component/Insert LINER ACTB G7 F 36MM LNGVT HIP STRL LUM LF [id number]  [employer] [id number] Right 1 Implanted   IMP STEM FEM TPRLOC BIOM HI OFFSET [id number] - [id number] Total Joint Component/Insert IMP STEM FEM TPRLOC BIOM HI OFFSET [id number]  [employer] [id number] Right 1 Implanted     The presence of LAZ Chandler was essential throughout this case for assistance with patient transfer to and from the operative bed, draping, irrigation, cautery usage, retraction, implant placement, cement removal, arthrotomy closure,  incisional closure, dressing placement.    A resident or fellow was not available to assist.    COMPONENTS USED:  1. Biomet Taperloc Size 10, high Offset  2. Biomet G7 Acetabular Component Size 56mm,   3. Biomet ArCOM XL Polyethylene Liner neutral  4. Biolox Delta Ceramic Head Size 36mm +0         FINDINGS: Cartilage loss, irregularity of the femoral head, diffuse cartilage loss,  osteophyte formation of the native acetabulum.  Acetabular component well positioned. Femoral component well positioned.  Intraoperative stability with full extension and ER, flexion to 90 with IR to max. Appropriate limb length.  Verified clinically and by means of fluoroscopy.    OPERATIVE PROCEDURE:     Patient was seen in the pre-operative area.  Procedure, risks and complications, expectations and rehabilitation were discussed once more.  Patient understands and agrees to the treatment plan as set forth.  Informed consent was obtained.  The right hip was marked by the patient and the operating surgeon.  The patient was brought to the operating room and general anesthesia was induced. Sof-Rol was placed on operative foot along with a Coban wrap.  Appropriately sized, the boots were placed on bilateral feet.  The patient was transfered to the [place] table in a safe manner using a rolling board and an assistant at the foot of the table holding both boots and delivering them into the spars and locking them in place.  The locking perineal post was then placed between the patient's legs.  The ipsilateral arm of the procedure was draped across the patient's chest and padded and taped in an appropriate fashion. The contralateral arm was left on an armboard and securely fastened.  The patient's ipsilateral hip was placed in a position of 10 degrees of flexion.  A safety strap was placed around the patient securing him to the table.  Fluoroscopy was brought into the room on the contralateral side of the patient.  The patient's skin was prepped from the umbilicus to the ipsilateral knee. The surgical team was then gowned and gloved in standard sterile fashion. A bone sheet was placed inferiorly over the boots and up to the knee.    A shower curtain drape was then applied over the sticky blue U drape.  We then marked our incision 2 fingerbreadths distal and lateral to the anterior superior iliac spine with a skin marker.     The femoral elevator bracket was palpated through the drape, and using a scissors a hole was cut in the drape, the bracket was malleted on top of this component and this area was isolated with Ioban strips. Draping gloves were then removed and the scissors was removed from sterile field.  Prior to initiation of the procedure a final multidisciplinary timeout was performed to confirm the correct patient correct operative site, and that preoperative antibiotics had been administered.  All in the room were in agreement with the above assessment and plan.    Skin incision was performed 2 cm lateral and 2 centimeters distal to the ASIS centered over the tensor fascia sammi muscle belly.  This was carried down to the fascial layer which was incised in line with the skin, and retracted medially with Allis clamp.  Using blunt palpation the tensor fascia sammi muscle belly was isolated and retracted laterally. The space immediately above the femoral neck was palpated with blunt palpation and a cobra retractor was placed clamping it into the elevator bracket.  A right angle self retractor was utilized to provide visualization in the distal aspect of the wound.  The lateral femoral circumflex vessels were visualized, isolated, coagulated using Gayla clamp and suture ligation.  The floor of the fascial compartment was then incised providing visualization of the intracapsular fat pad.  This fat pad was excised using rongeur.  A second cobra was placed along the inferior aspect of the femoral neck and capsule.  When appropriate visualization of the capsule was verified, the anterior capsule was excised. The rotation lock was released and the leg was rotated to 70 degrees external rotation to provide an ability to release the inferior capsule down to the lesser trochanter and palpation in the same area.  The leg was brought back to neutral.  The 2 Cobra retractors were moved into the intra-capsular position. The superior cobra was  rotated with the handle towards the patient to put the superior capsule on stretch and provide appropriate visualization for further release of this tissue.  An sawblade of appropriate size and width was utilized to perform the femoral osteotomy.  Next utilizing an osteotome and a a corkscrew allowed controlled rotation of the femoral head to loosen all soft tissues and appropriately extract the femoral head which was then measured.    Attention was then directed at the acetabulum with retractors placed in the anterior, posterior proximal, posterior distal positions. This provided adequate visualization of the acetabulum. The pulvinar was excised.  Any remaining labrum was excised.  A starting small reamer was utilized to carry the reaming down to the appropriately templated depth of the cup.  Sequentially reamers were carried up to 1 minus the external diameter of the final cup size of  56 mm.  The definitive 56 millimeter liner was placed.  This had excellent initial stability. The cup was found to be stable and no screws were utilized for additional fixation. The final neutral liner was then placed.    Fluoroscopy was utilized to verify appropriate abduction and anteversion angles with the image machine placed over the pubic symphysis, as close to the patient's body as possible.   The final cup was placed under fluoroscopy as well noting no change in the abduction or anteversion angles.     The proximal femur was then positioned first by placing the femoral elevator hook in a loose unconnected fashion with the hook initially pointed proximally towards the patient's head then sliding between the tensor and vastus lateralis, with the hook sitting approximately at the vastus ridge or just distal to it. Green handle utilized to release all gross traction. The leg was externally rotated until some tension is reached. The hip was then extended to the floor and adducted.  The femoral hook then connected to the bracket.   And a Younger retractor placed over the medial calcar.  A bent Hohmann then placed proximally. The superior capsular leaflet then pulled distally and released from its lateral superior attachment to the greater trochanter.  The conjoint and piriformis tendon visualized and released.  Once appropriate visualization of the proximal femur was obtained, broaching was initiated utilizing a box osteotome, and canal finder to establish a collinear tract with intramedullary space.Next the broaching initiated.  This was conducted in a controlled and limited force manner. It was noted that our neck cut was of appropriate height and set in approximately the same position as we had planned based on our template.  A high offset neck with a 36+0 head was placed for trialing.  All retractors were removed.  The hip was brought out of abduction and extension.  A finger was held on the femoral head trial to provide stable compression, slight internal rotation, then traction and more internal rotation was utilized to reduce the hip.     Fluoroscopy was utilized to verify appropriate trial position, with regards to the neck resection height, the fill of the proximal metaphyseal region, rotation under live fluoroscopy was utilized to verify appropriate intramedullary position. Appropriate leg length was noted by placing a metallic diamond along the inferior ischium and comparing to position of the lesser trochanter bilaterally.  Which demonstrated appropriate leg length within 2 to 3 mm.     Stability examination was performed with a trial head by releasing the silver key boot clasp. With hyperextension and maximal external rotation no instability was appreciated.     Utilizing a bone hook with the hip in a position of neutral extension as well as external rotation and traction provided by the assistant the femoral head was dislocated.  Again the proximal femur was exposed by placing the femoral elevator hook, utilizing external rotation,  extension, adduction, connecting the femoral hook to the bracket, Younger retractor medially, bent Hohmann proximally.  The trial head and neck removed. The empty broach handle was utilized to remove the broach.  The canal was then prepared, and irrigated and the final stem was placed. The final head was then placed on the stem, malleted in place and confirmed to be stable, all retractors removed, one finger on the femoral head internal rotation applied gently, leg brought to neutral, with traction applied, further internal rotation which reduced the hip.     The hip was thoroughly irrigated with iodine soak and normal saline. Intracapsular block was delivered to the surrounding periarticular tissues.  Fascial layer closed with 0 Vicryl.  Skin closed with 2-0 Vicryl and running 3-0 Monocryl.  Sterile dressings applied patient transferred off of [place] table without incident.  Patient transferred to postoperative recovery suite.      POST OPERATIVE PLAN    Activity: Up with assist and assistive devices as needed until independent.  Precautions:No precautions  Weight bearing status: WBAT  Antibiotics per protocol  Diet: Begin with clear fluids and progress diet as tolerated. Bowel regimen. Anti-emetics PRN.    DVT prophylaxis: Aspirin 162 mg daily for 4 weeks   Elevation: Elevate heels off of bed on pillows   Pain management: Orals PRN, IV for breakthrough only  X-rays: AP Pelvis and lateral in PACU  Physical Therapy: Mobilization, ROM, ADL's, anterior hip precautions  Discharge today or tomorrow when requirements are met.      Taurus Donald MD, PhD     Adult Reconstruction  [facility] Department of Orthopaedic Surgery

## 2025-02-23 ASSESSMENT — ACTIVITIES OF DAILY LIVING (ADL)
HEAVY_WORK: UNABLE TO DO
GOING DOWN 1 FLIGHT OF STAIRS: EXTREME DIFFICULTY
WALKING_INITIALLY: MODERATE DIFFICULTY
HOW_WOULD_YOU_RATE_YOUR_CURRENT_LEVEL_OF_FUNCTION_DURING_YOUR_SPORTS_RELATED_ACTIVITIES_FROM_0_TO_100_WITH_100_BEING_YOUR_LEVEL_OF_FUNCTION_PRIOR_TO_YOUR_HIP_PROBLEM_AND_0_BEING_THE_INABILITY_TO_PERFORM_ANY_OF_YOUR_USUAL_DAILY_ACTIVITIES?: 10
SITTING_FOR_15_MINUTES: MODERATE DIFFICULTY
WALKING_DOWN_STEEP_HILLS: UNABLE TO DO
DEEP_SQUATTING: EXTREME DIFFICULTY
PLEASE_INDICATE_YOR_PRIMARY_REASON_FOR_REFERRAL_TO_THERAPY:: HIP
ROLLING OVER IN BED: MODERATE DIFFICULTY
TWISTING/PIVOTING ON INVOLVED LEG: EXTREME DIFFICULTY
ADL_COUNT: 17
HOW_WOULD_YOU_RATE_YOUR_CURRENT_LEVEL_OF_FUNCTION_DURING_YOUR_USUAL_ACTIVITIES_OF_DAILY_LIVING_FROM_0_TO_100_WITH_100_BEING_YOUR_LEVEL_OF_FUNCTION_PRIOR_TO_YOUR_HIP_PROBLEM_AND_0_BEING_THE_INABILITY_TO_PERFORM_ANY_OF_YOUR_USUAL_DAILY_ACTIVITIES?: 20
WALKING_UP_STEEP_HILLS: UNABLE TO DO
ROLLING_OVER_IN_BED: MODERATE DIFFICULTY
WALKING_INITIALLY: MODERATE DIFFICULTY
WALKING_UP_STEEP_HILLS: UNABLE TO DO
SPORTS_COUNT: 9
WALKING_DOWN_STEEP_HILLS: UNABLE TO DO
LIGHT_TO_MODERATE_WORK: EXTREME DIFFICULTY
PUTTING ON SOCKS AND SHOES: EXTREME DIFFICULTY
GETTING_INTO_AND_OUT_OF_AN_AVERAGE_CAR: MODERATE DIFFICULTY
SPORTS_HIGHEST_POTENTIAL_SCORE: 36
ADL_SCORE(%): 0
HOS_ADL_HIGHEST_POTENTIAL_SCORE: 56
HOW_WOULD_YOU_RATE_YOUR_CURRENT_LEVEL_OF_FUNCTION?: SEVERELY ABNORMAL
SPORTS_TOTAL_ITEM_SCORE: 0
GOING_DOWN_1_FLIGHT_OF_STAIRS: EXTREME DIFFICULTY
TWISTING/PIVOTING_ON_INVOLVED_LEG: EXTREME DIFFICULTY
STEPPING UP AND DOWN CURBS: MODERATE DIFFICULTY
PUTTING_ON_SOCKS_AND_SHOES: EXTREME DIFFICULTY
DEEP SQUATTING: EXTREME DIFFICULTY
ADL_TOTAL_ITEM_SCORE: 0
GETTING INTO AND OUT OF AN AVERAGE CAR: MODERATE DIFFICULTY
GOING UP 1 FLIGHT OF STAIRS: EXTREME DIFFICULTY
STANDING FOR 15 MINUTES: EXTREME DIFFICULTY
ABILITY_TO_PERFORM_ACTIVITY_WITH_YOUR_NORMAL_TECHNIQUE: UNABLE TO DO
STANDING_FOR_15_MINUTES: EXTREME DIFFICULTY
GOING_UP_1_FLIGHT_OF_STAIRS: EXTREME DIFFICULTY
SPORTS_SCORE(%): 0
STEPPING_UP_AND_DOWN_CURBS: MODERATE DIFFICULTY
HOS_ADL_SCORE(%): 28.57
HOS_ADL_ITEM_SCORE_TOTAL: 16
LIGHT_TO_MODERATE_WORK: EXTREME DIFFICULTY
HEAVY_WORK: UNABLE TO DO
ADL_HIGHEST_POTENTIAL_SCORE: 68
WALKING_APPROXIMATELY_10_MINUTES: MODERATE DIFFICULTY
SITTING FOR 15 MINUTES: MODERATE DIFFICULTY
WALKING_FOR_APPROXIMATELY_10_MINUTES: MODERATE DIFFICULTY
HOW_WOULD_YOU_RATE_YOUR_CURRENT_LEVEL_OF_FUNCTION_DURING_YOUR_USUAL_ACTIVITIES_OF_DAILY_LIVING_FROM_0_TO_100_WITH_100_BEING_YOUR_LEVEL_OF_FUNCTION_PRIOR_TO_YOUR_HIP_PROBLEM_AND_0_BEING_THE_INABILITY_TO_PERFORM_ANY_OF_YOUR_USUAL_DAILY_ACTIVITIES?: 20

## 2025-02-24 ENCOUNTER — THERAPY VISIT (OUTPATIENT)
Dept: PHYSICAL THERAPY | Facility: CLINIC | Age: 70
End: 2025-02-24
Payer: COMMERCIAL

## 2025-02-24 DIAGNOSIS — M16.11 OSTEOARTHRITIS OF RIGHT HIP: ICD-10-CM

## 2025-02-24 DIAGNOSIS — R29.898 WEAKNESS OF BOTH HIPS: Primary | ICD-10-CM

## 2025-02-24 DIAGNOSIS — M25.551 HIP PAIN, RIGHT: ICD-10-CM

## 2025-02-24 PROCEDURE — 97161 PT EVAL LOW COMPLEX 20 MIN: CPT | Mod: GP

## 2025-02-24 PROCEDURE — 97110 THERAPEUTIC EXERCISES: CPT | Mod: GP

## 2025-02-24 NOTE — PROGRESS NOTES
PHYSICAL THERAPY EVALUATION  Type of Visit: Evaluation              Subjective   Patient presents using walker. Pain has been tolerable for the most part with some spikes up to a 10 at times. Right now would rate pain at a 4/10. Pain ebbs and flows but this is pretty standard. Has had some muscle spasming that caused some pain. Using ice pretty often to help with swelling. Being up and moving is really helpful for soreness. No numbness/tingling reported.      Presenting condition or subjective complaint: Post op (hip replacement) assessment  Date of onset: 02/21/25    Relevant medical history:     Dates & types of surgery: I'd prefer to not have to do all this again.    Prior diagnostic imaging/testing results: X-ray     Prior therapy history for the same diagnosis, illness or injury: Yes Pre-op PT at this clinic.    Prior Level of Function  Transfers: Independent  Ambulation: Independent  ADL: Independent    Living Environment  Social support: With a significant other or spouse   Type of home: House; 2-story; Basement   Stairs to enter the home: Yes 13 Is there a railing: Yes     Ramp: No   Stairs inside the home: Yes 13 Is there a railing: Yes     Help at home: Self Cares (home health aide/personal care attendant, family, etc); Home management tasks (cooking, cleaning); Medication and/or finances; Home and Yard maintenance tasks; Assist for driving and community activities  Equipment owned: Straight Cane; Walker; Crutches; Grab bars; Raised toilet seat; Bath bench; Dressing equipment     Employment: No    Hobbies/Interests: Walking, bird watching, gardening, photography, travel.    Patient goals for therapy: Breathe normally, walk normally.    Pain assessment: Pain present     Objective   HIP EVALUATION  PAIN: Pain Level at Rest: 4/10  Pain Level with Use: 10/10  Pain Location: hip  Pain Quality: Aching, Dull, and occasionally sharp  Pain Frequency: intermittent or constant  Pain is Exacerbated By: mobility,  turning in bed  Pain is Relieved By: cold, otc medications, and rest/walking  INTEGUMENTARY (edema, incisions): WNL  POSTURE: Sitting Posture: Rounded shoulders, Forward head  GAIT:   Weightbearing Status: WBAT  Assistive Device(s): Walker (standard)  Gait Deviations: Antalgic  Step to pattern  BALANCE/PROPRIOCEPTION: SLS on R Unable  ROM:  L Hip WNL, R Hip appropriately limited per post-op status  STRENGTH:  Able to complete STS without UE support when cued  SLR Unable on R  LE FLEXIBILITY: WNL  FUNCTIONAL TESTS:  Sit to stand - able to complete from chair height    Assessment & Plan   CLINICAL IMPRESSIONS  Medical Diagnosis: R CATALINA    Treatment Diagnosis: R CATALINA, R hip pain/weakness   Impression/Assessment: Patient is a 69 year old male with R Hip Pain s/p CATALINA complaints.  The following significant findings have been identified: Pain, Decreased ROM/flexibility, Decreased joint mobility, Decreased strength, Impaired balance, Impaired gait, Impaired muscle performance, Decreased activity tolerance, and Impaired posture. These impairments interfere with their ability to perform self care tasks, work tasks, recreational activities, household chores, driving , household mobility, and community mobility as compared to previous level of function.     Clinical Decision Making (Complexity):  Clinical Presentation: Stable/Uncomplicated  Clinical Presentation Rationale: based on medical and personal factors listed in PT evaluation  Clinical Decision Making (Complexity): Low complexity    PLAN OF CARE  Treatment Interventions:  Modalities: Cryotherapy, Cupping, Dry Needling, E-stim, Hot Pack  Interventions: Gait Training, Manual Therapy, Neuromuscular Re-education, Therapeutic Activity, Therapeutic Exercise, Self-Care/Home Management    Long Term Goals     PT Goal 1  Goal Identifier: Ambulation  Goal Description: Patient will be able to walk greater than 20 minutes without AD or increase in pain >2/10.  Rationale: to maximize  safety and independence with performance of ADLs and functional tasks  Goal Progress: Ongoing  Target Date: 05/19/25      Frequency of Treatment: 1x/week  Duration of Treatment: 12 weeks    Recommended Referrals to Other Professionals: None  Education Assessment:   Learner/Method: Patient;No Barriers to Learning  Education Comments: No other questions at this time    Risks and benefits of evaluation/treatment have been explained.   Patient/Family/caregiver agrees with Plan of Care.     Evaluation Time:     PT Eval, Low Complexity Minutes (45245): 20     Signing Clinician: ROQUE Tran [facility]                                                                                   OUTPATIENT PHYSICAL THERAPY      PLAN OF TREATMENT FOR OUTPATIENT REHABILITATION   Patient's Last Name, First Name, Jose Carlos Yee  S YOB: 1955   Provider's Name   [facility]   Medical Record No.  [id number]     Onset Date: 02/21/25  Start of Care Date: 02/24/25     Medical Diagnosis:  R CATALINA      PT Treatment Diagnosis:  R CATALINA, R hip pain/weakness Plan of Treatment  Frequency/Duration: 1x/week/ 12 weeks    Certification date from 02/24/25 to 05/19/25         See note for plan of treatment details and functional goals     Victor Manuel Rasmussen PT                         I CERTIFY THE NEED FOR THESE SERVICES FURNISHED UNDER        THIS PLAN OF TREATMENT AND WHILE UNDER MY CARE     (Physician attestation of this document indicates review and certification of the therapy plan).              Referring Provider:  Taurus Donald    Initial Assessment  See Epic Evaluation- Start of Care Date: 02/24/25

## 2025-02-25 ENCOUNTER — TELEPHONE (OUTPATIENT)
Dept: ORTHOPEDICS | Facility: CLINIC | Age: 70
End: 2025-02-25
Payer: COMMERCIAL

## 2025-02-25 NOTE — TELEPHONE ENCOUNTER
Called patient back regarding post op pain.    Patient is s/p right total hip replacement on 02/21/25 with Dr. Donald.    Patient reports his pain is well controlled.  Denies needing pain medication refills at this time.  He reports post op constipation.   Had a small BM yesterday.  Advised continued Senna-Docusate use 2x daily and Miralax. Encouraged good fluid intake and exercise.  Patient also reports hiccups on and off since surgery.  Reviewed with patient that can be a common occurrence post anesthesia.      Recommended spirometer use, stretching hands/arms above head, and albuterol inhaler if he is feeling short of breath.  Educated s/sx that would warrant him to go to the emergency room include severe shortness of breath, cyanosis, confusion, chest pain, etc.      Patient also requests handicap parking pass. Spouse would like to pick this up tomorrow.    Luz Elena St RN on 2/25/2025 at 10:21 AM

## 2025-02-25 NOTE — TELEPHONE ENCOUNTER
Patient Returning Call    Reason for call:  patient wanting to consult about pain levels post hip surgery requesting callback         Could we send this information to you in [employer] system [employer]hatan or would you prefer to receive a phone call?:   Patient would prefer a phone call   Okay to leave a detailed message?: Yes at Other phone number:  [phone] *

## 2025-02-26 ENCOUNTER — HOSPITAL ENCOUNTER (EMERGENCY)
Facility: CLINIC | Age: 70
Discharge: HOME | End: 2025-02-26
Attending: EMERGENCY MEDICINE
Payer: COMMERCIAL

## 2025-02-26 ENCOUNTER — TELEPHONE (OUTPATIENT)
Dept: ORTHOPEDICS | Facility: CLINIC | Age: 70
End: 2025-02-26
Payer: COMMERCIAL

## 2025-02-26 ENCOUNTER — MYC MEDICAL ADVICE (OUTPATIENT)
Dept: INTERNAL MEDICINE | Facility: CLINIC | Age: 70
End: 2025-02-26

## 2025-02-26 ENCOUNTER — NURSE TRIAGE (OUTPATIENT)
Dept: NURSING | Facility: CLINIC | Age: 70
End: 2025-02-26

## 2025-02-26 ENCOUNTER — APPOINTMENT (OUTPATIENT)
Dept: GENERAL RADIOLOGY | Facility: CLINIC | Age: 70
End: 2025-02-26
Attending: EMERGENCY MEDICINE
Payer: COMMERCIAL

## 2025-02-26 VITALS
OXYGEN SATURATION: 100 % | RESPIRATION RATE: 22 BRPM | TEMPERATURE: 98.2 F | DIASTOLIC BLOOD PRESSURE: 66 MMHG | SYSTOLIC BLOOD PRESSURE: 133 MMHG | HEART RATE: 60 BPM

## 2025-02-26 DIAGNOSIS — R06.6 HICCUPS: ICD-10-CM

## 2025-02-26 DIAGNOSIS — D50.0 IRON DEFICIENCY ANEMIA DUE TO CHRONIC BLOOD LOSS: ICD-10-CM

## 2025-02-26 LAB
ANION GAP SERPL CALCULATED.3IONS-SCNC: 10 MMOL/L (ref 7–15)
BASOPHILS # BLD AUTO: 0 10E3/UL (ref 0–0.2)
BASOPHILS NFR BLD AUTO: 1 %
BUN SERPL-MCNC: 19.2 MG/DL (ref 8–23)
CALCIUM SERPL-MCNC: 8.8 MG/DL (ref 8.8–10.4)
CHLORIDE SERPL-SCNC: 106 MMOL/L (ref 98–107)
CREAT SERPL-MCNC: 0.74 MG/DL (ref 0.67–1.17)
EGFRCR SERPLBLD CKD-EPI 2021: >90 ML/MIN/1.73M2
EOSINOPHIL # BLD AUTO: 0.2 10E3/UL (ref 0–0.7)
EOSINOPHIL NFR BLD AUTO: 3 %
ERYTHROCYTE [DISTWIDTH] IN BLOOD BY AUTOMATED COUNT: 13.5 % (ref 10–15)
GLUCOSE SERPL-MCNC: 115 MG/DL (ref 70–99)
HCO3 SERPL-SCNC: 23 MMOL/L (ref 22–29)
HCT VFR BLD AUTO: 27.4 % (ref 40–53)
HGB BLD-MCNC: 9.4 G/DL (ref 13.3–17.7)
HOLD SPECIMEN: NORMAL
IMM GRANULOCYTES # BLD: 0 10E3/UL
IMM GRANULOCYTES NFR BLD: 1 %
LYMPHOCYTES # BLD AUTO: 1.5 10E3/UL (ref 0.8–5.3)
LYMPHOCYTES NFR BLD AUTO: 29 %
MCH RBC QN AUTO: 32.8 PG (ref 26.5–33)
MCHC RBC AUTO-ENTMCNC: 34.3 G/DL (ref 31.5–36.5)
MCV RBC AUTO: 96 FL (ref 78–100)
MONOCYTES # BLD AUTO: 0.5 10E3/UL (ref 0–1.3)
MONOCYTES NFR BLD AUTO: 9 %
NEUTROPHILS # BLD AUTO: 3 10E3/UL (ref 1.6–8.3)
NEUTROPHILS NFR BLD AUTO: 58 %
NRBC # BLD AUTO: 0 10E3/UL
NRBC BLD AUTO-RTO: 0 /100
PLATELET # BLD AUTO: 205 10E3/UL (ref 150–450)
POTASSIUM SERPL-SCNC: 3.8 MMOL/L (ref 3.4–5.3)
RBC # BLD AUTO: 2.87 10E6/UL (ref 4.4–5.9)
SODIUM SERPL-SCNC: 139 MMOL/L (ref 135–145)
TROPONIN T SERPL HS-MCNC: 8 NG/L
TROPONIN T SERPL HS-MCNC: NORMAL NG/L
WBC # BLD AUTO: 5.2 10E3/UL (ref 4–11)

## 2025-02-26 PROCEDURE — 93005 ELECTROCARDIOGRAM TRACING: CPT | Performed by: EMERGENCY MEDICINE

## 2025-02-26 PROCEDURE — 99285 EMERGENCY DEPT VISIT HI MDM: CPT | Mod: 25 | Performed by: EMERGENCY MEDICINE

## 2025-02-26 PROCEDURE — 93005 ELECTROCARDIOGRAM TRACING: CPT | Mod: RTG

## 2025-02-26 PROCEDURE — 82565 ASSAY OF CREATININE: CPT | Performed by: EMERGENCY MEDICINE

## 2025-02-26 PROCEDURE — 80048 BASIC METABOLIC PNL TOTAL CA: CPT | Performed by: EMERGENCY MEDICINE

## 2025-02-26 PROCEDURE — 71045 X-RAY EXAM CHEST 1 VIEW: CPT

## 2025-02-26 PROCEDURE — 99284 EMERGENCY DEPT VISIT MOD MDM: CPT | Performed by: EMERGENCY MEDICINE

## 2025-02-26 PROCEDURE — 96374 THER/PROPH/DIAG INJ IV PUSH: CPT | Performed by: EMERGENCY MEDICINE

## 2025-02-26 PROCEDURE — 250N000011 HC RX IP 250 OP 636: Performed by: EMERGENCY MEDICINE

## 2025-02-26 PROCEDURE — 71045 X-RAY EXAM CHEST 1 VIEW: CPT | Mod: 26 | Performed by: RADIOLOGY

## 2025-02-26 PROCEDURE — 84484 ASSAY OF TROPONIN QUANT: CPT | Performed by: EMERGENCY MEDICINE

## 2025-02-26 PROCEDURE — 84132 ASSAY OF SERUM POTASSIUM: CPT | Performed by: EMERGENCY MEDICINE

## 2025-02-26 PROCEDURE — 36415 COLL VENOUS BLD VENIPUNCTURE: CPT | Performed by: EMERGENCY MEDICINE

## 2025-02-26 PROCEDURE — 85025 COMPLETE CBC W/AUTO DIFF WBC: CPT | Performed by: EMERGENCY MEDICINE

## 2025-02-26 RX ORDER — METOCLOPRAMIDE HYDROCHLORIDE 5 MG/ML
5 INJECTION INTRAMUSCULAR; INTRAVENOUS ONCE
Status: COMPLETED | OUTPATIENT
Start: 2025-02-26 | End: 2025-02-26

## 2025-02-26 RX ADMIN — METOCLOPRAMIDE 5 MG: 5 INJECTION, SOLUTION INTRAMUSCULAR; INTRAVENOUS at 14:59

## 2025-02-26 ASSESSMENT — ACTIVITIES OF DAILY LIVING (ADL)
ADLS_ACUITY_SCORE: 41

## 2025-02-26 ASSESSMENT — COLUMBIA-SUICIDE SEVERITY RATING SCALE - C-SSRS
2. HAVE YOU ACTUALLY HAD ANY THOUGHTS OF KILLING YOURSELF IN THE PAST MONTH?: NO
1. IN THE PAST MONTH, HAVE YOU WISHED YOU WERE DEAD OR WISHED YOU COULD GO TO SLEEP AND NOT WAKE UP?: NO
6. HAVE YOU EVER DONE ANYTHING, STARTED TO DO ANYTHING, OR PREPARED TO DO ANYTHING TO END YOUR LIFE?: NO

## 2025-02-26 NOTE — ED PROVIDER NOTES
[place] EMERGENCY DEPARTMENT ([facility])    2/26/25       ED PROVIDER NOTE     History   No chief complaint on file.    The history is provided by the patient and medical records.     Jose Carlos Bolden is a 69 year old male with history of recent right total hip arthroplasty on 2/21/2025 who presents to the emergency department with hiccups, shortness of breath, and chest pain ever since his hip replacement 5 days ago.  The shortness of breath is intermittent but at times is severe, has pain with deep inspiration as well as low energy. He has tried his rescue inhaler without meaningful relief. Wife was concerned as she has noted that he has been gasping for air with possible periods of apnea at night.  He endorses some abdominal distention and lower abdominal pain. He also reports intermittent nausea. He reports constipation for the past few days that has improved. Here in the ED, he rates chest pain 1-2/10. He does have a prior history of similar symptoms when he went through a left hip replacement 11 years ago. During this time he was diagnosed with post-op pneumonia and GERD. He is concerned for recurrence of this. He is on gabapentin. He notes a medication allergy to thorazine. He is not on anticoagulation. Denies fevers or vomiting.     Past Medical History  Past Medical History:   Diagnosis Date    Allergy     sulfa, penicillium, food (tomatillos, ground cherries, some potato chips)    Anxiety     Back pain     BPPV (benign paroxysmal positional vertigo)     Depressive disorder 1974    Glaucoma suspect     Head injury 1966    not diagnosed    Headache     Hearing loss     Hip pain, left     Hyperlipidemia LDL goal <130     Hypertension 2019    sub threshold    Iron deficiency     Kyphosis     Osteoarthritis     Reactive airway disease     Restless leg syndrome     Scoliosis     Uncomplicated asthma 1970    recently dx, sub threshold?    Vitamin B12 deficiency      Past Surgical History:   Procedure  Laterality Date    ARTHROPLASTY HIP ANTERIOR Right 2/21/2025    Procedure: Right total hip arthroplasty via anterior approach;  Surgeon: Taurus Donald MD;  Location: [facility] OR    COLONOSCOPY      normal    COLONOSCOPY N/A 12/17/2020    Procedure: COLONOSCOPY, WITH POLYPECTOMY AND BIOPSY;  Surgeon: Marvel Cook MD;  Location: [facility] OR    EYE SURGERY Right 2023    for glaucoma 2023 - laser treatment    JOINT REPLACEMENT Left 2014    hip, CATALINA    ORTHOPEDIC SURGERY Left 2022    no weight bearing for 3.5 months - no surgery  - fracture    OTHER SURGICAL HISTORY  1985    attempted to reverse his vasectomy    VASECTOMY  1975     acetaminophen (TYLENOL) 325 MG tablet  acetaminophen (TYLENOL) 500 MG tablet  albuterol (PROAIR HFA) 108 (90 BASE) MCG/ACT Inhaler  aspirin 81 MG EC tablet  atorvastatin (LIPITOR) 20 MG tablet  calcium carbonate (TUMS) 500 MG chewable tablet  Cyanocobalamin (VITAMIN B12) 1000 MCG TBCR  dorzolamide-timolol (COSOPT) 2-0.5 % ophthalmic solution  ferrous gluconate (FERGON) 324 (38 Fe) MG tablet  gabapentin (NEURONTIN) 600 MG tablet  hydrocortisone 2.5 % cream  ibuprofen (ADVIL/MOTRIN) 200 MG tablet  ibuprofen (ADVIL/MOTRIN) 600 MG tablet  ketoconazole (NIZORAL) 2 % external cream  latanoprost (XALATAN) 0.005 % ophthalmic solution  metroNIDAZOLE (NORITATE) 1 % cream  oxyCODONE (ROXICODONE) 5 MG tablet  polyethylene glycol (MIRALAX) 17 g packet  senna-docusate (SENOKOT-S/PERICOLACE) 8.6-50 MG tablet  sildenafil (VIAGRA) 25 MG tablet  triamcinolone (KENALOG) 0.1 % external cream      Allergies   Allergen Reactions    Penicillium Notatum Allergy Skin Test      Tolerated amoxicillin 6/24    Other Food Allergy     Cats     Feathers     Food      Potato chips, tomatillos, cherry de la fuente    Seasonal Allergies     Smoke.     Sulfa Antibiotics Other (See Comments)     Confusion and dizziness  Questionable allergy     Family History  Family History   Problem Relation Age of Onset    Skin Cancer Mother     Brain  Cancer Mother         age 65  of glioblastoma    Depression Mother         post partum    Osteoporosis Mother     Anemia Mother     Allergy (Severe) Mother         penicillin    Brain Tumor Mother         Glio Blastoma    Cancer Mother         glio blastoma    Heart Disease Father          of emphysema    Emphysema Father         smoker    Glaucoma Father     Hyperlipidemia Father     Neurologic Disorder Father         Jansen's Palsy    Heart Disease Maternal Grandmother         MI    Cancer Maternal Grandmother     Hyperlipidemia Maternal Grandmother     Other - See Comments Maternal Grandfather         seasonal allergies    Coronary Artery Disease Paternal Grandmother         age 87 MI    Cancer Paternal Grandmother         Uterine/vaginal    Heart Disease Paternal Grandmother     Hyperlipidemia Paternal Grandmother     Heart Disease Paternal Grandfather          of MI    Coronary Artery Disease Paternal Grandfather     Hyperlipidemia Paternal Grandfather     Glaucoma Brother     Hyperlipidemia Brother     Prostate Problems Brother         BPH    Hyperlipidemia Brother     Neurologic Disorder Brother         Bell's palsy    Osteoporosis Brother     Anemia Brother     Vascular Disease Brother         Raynaud's    Prostate Problems Brother         BPH    Hyperlipidemia Brother     Hyperlipidemia Brother     Neurologic Disorder Brother         Bell's palsy    Glaucoma Sister     Pancreatic Cancer Sister          age 71 approximately    Osteoporosis Sister     Anemia Sister     Other Cancer Sister         Pancreatic    Hyperlipidemia Sister     Heart Disease Sister         Surgery    Osteoporosis Sister     Anemia Sister     Migraines Sister     Depression Sister     Hyperlipidemia Sister     Other - See Comments Sister         Multiple chemical sensitivity    Neurologic Disorder Sister         Bell's palsy    Osteoporosis Sister     Anemia Sister     Anxiety Disorder Sister     Breast Cancer Sister      "Osteoporosis Sister         kyphosis, scoliosis    Hyperlipidemia Sister     Vascular Disease Sister         Raynaud's    Cancer Sister         breast    Cancer Sister         pancreatic    Depression Sister     Coronary Artery Disease Sister     Hyperlipidemia Sister     Migraines Cousin         priscila, actually    Coronary Artery Disease Other         Paternal gr mother    Coronary Artery Disease Brother     Hyperlipidemia Brother     Macular Degeneration No family hx of     Melanoma No family hx of      Social History   Social History     Tobacco Use    Smoking status: Never    Smokeless tobacco: Never    Tobacco comments:     heavy childhood exposure   Substance Use Topics    Alcohol use: Yes     Comment: moderate use throughout all adulthood    Drug use: Yes     Types: Marijuana     Comment: Occsnl CBD, rare THC, night for RLS & glaucoma. Hx when ale      A complete review of systems was performed with pertinent positives and negatives noted in the HPI, and all other systems negative.    Physical Exam        Wt Readings from Last 10 Encounters:   02/21/25 59.9 kg (132 lb 0.9 oz)   01/31/25 60.5 kg (133 lb 6.4 oz)   11/06/24 58.9 kg (129 lb 12.8 oz)   08/19/24 59 kg (130 lb)   06/11/24 60.9 kg (134 lb 3.2 oz)   04/23/24 59.7 kg (131 lb 11.2 oz)   08/25/23 59 kg (130 lb)   08/07/23 61.1 kg (134 lb 12.8 oz)   11/23/22 62.1 kg (137 lb)   11/17/22 59 kg (130 lb)      Physical Exam  ***    ED Course, Procedures, & Data      Procedures       {ED Course Selections (Optional):934413}  {ED Sepsis CMS Documentation (Optional):341819::\" \"}       No results found for any visits on 02/26/25.  Medications - No data to display  Labs Ordered and Resulted from Time of ED Arrival to Time of ED Departure - No data to display  No orders to display          {Critical Care Performed?:941985}    Assessment & Plan    ***    I have reviewed the nursing notes. I have reviewed the findings, diagnosis, plan and need for follow up with the " patient.    New Prescriptions    No medications on file       Final diagnoses:   None   I, Sakina Palomo, am serving as a trained medical scribe to document services personally performed by Fish Dawn MD, based on the provider's statements to me.     IFish MD, was physically present and have reviewed and verified the accuracy of this note documented by Sakina Palomo.     Fish Dawn MD  [facility] EMERGENCY DEPARTMENT  2/26/2025

## 2025-02-26 NOTE — PLAN OF CARE
Orthopedic brief note:    Spoke with the emergency medicine physician regarding this patient.  Emergency medicine called to make orthopedic service aware that the patient presented postop day 5 from right hip total arthroplasty.  Patient presents to the emergency department for evaluation of hiccups.  Patient did report subjective incisional swelling, though emergency department is not concerned about right hip infection at this time.  Emergency medicine team will evaluate the incision.  Patient presents for unrelated chief complaint.  Patient has been mobilizing since the surgery without significant pain, and is afebrile in the emergency department.  If there is any concern for postoperative complication, please reach out to the orthopedic surgery service and consult for our input.  This was communicated with the emergency medicine team and they are in agreement with that plan.    Orthopedic surgery did not evaluate this patient in the emergency department. Dr. Dawn did not request that I personally see or examine the patient at this time.      My recommendations are not intended to take the place of Dr. Dawn's clinical judgment, which should always be utilized to provide the most appropriate care to meet the unique needs of each patient. Orthopedic Surgery did not see nor was involved with the physical examination of the patient for this encounter.    Patient was discussed with orthopedic surgery senior resident Dr. Giovanni Winkler, PGY4 who is in agreement with the note above.     --  Nicole Rosas MD  Orthopaedic Surgery Resident  Pager: [phone]  02/26/25          13-Oct-2022 01:38

## 2025-02-26 NOTE — DISCHARGE INSTRUCTIONS
Continue your medications as prescribed. Follow up with your primary care provider and orthopedics as an outpatient at your scheduled appointments.

## 2025-02-26 NOTE — CONSULTS
[facility]  ORTHOPAEDIC SURGERY CONSULT - HISTORY AND PHYSICAL    DATE OF CONSULT: 2/26/2025 4:44 PM    REQUESTING PROVIDER: Fish Dawn MD, MD - [facility] Staff.    CC: ED consulting for hemoglobin level     DATE OF INJURY: Surgical date of 2/21/2025    HISTORY OF PRESENT ILLNESS:   The orthopaedic surgery service was consulted by Fish Garcia MD for evaluation and treatment recommendations of hemoglobin level in the emergency department.    Jose Carlos Bolden is a 69 year old male with PMHx recent right sided total hip arthroplasty on 2/21/2025 who presented to the emergency department for evaluation of hiccups.  Upon checking the patient's hemoglobin level, this returned at 9.4.  At this point, the emergency department consulted orthopedic surgery for a hemoglobin decrease.  Notably, the most recent hemoglobin check for the patient was prior to his total hip arthroplasty.  At that time it returned at 14.4.  Today, the patient reports no dizziness or feelings of nausea upon standing.  He states he overall feels well, no weakness.  He states that he has been able to mobilize and has not had significant right hip pain.  Has had intermittent spasms of pain.  Notably, the patient reports at baseline he normally takes iron pills daily.  He states that he has not been taking his iron pills and the most recent days leading up to today.  He has no concern with regard to his surgical site, no concern for increased swelling recently.    PAST MEDICAL HISTORY:   Past Medical History:   Diagnosis Date    Allergy     sulfa, penicillium, food (tomatillos, ground cherries, some potato chips)    Anxiety     Back pain     BPPV (benign paroxysmal positional vertigo)     Depressive disorder 1974    Glaucoma suspect     Head injury 1966    not diagnosed    Headache     Hearing loss     Hip pain, left     Hyperlipidemia LDL goal <130     Hypertension 2019    sub threshold    Iron deficiency     Kyphosis      Osteoarthritis     Reactive airway disease     Restless leg syndrome     Scoliosis     Uncomplicated asthma 1970    recently dx, sub threshold?    Vitamin B12 deficiency      PAST SURGICAL HISTORY:    Past Surgical History:   Procedure Laterality Date    ARTHROPLASTY HIP ANTERIOR Right 2/21/2025    Procedure: Right total hip arthroplasty via anterior approach;  Surgeon: Taurus Donald MD;  Location: [facility] OR    COLONOSCOPY      normal    COLONOSCOPY N/A 12/17/2020    Procedure: COLONOSCOPY, WITH POLYPECTOMY AND BIOPSY;  Surgeon: Marvel Cook MD;  Location: [facility] OR    EYE SURGERY Right 2023    for glaucoma 2023 - laser treatment    JOINT REPLACEMENT Left 2014    hip, CATALINA    ORTHOPEDIC SURGERY Left 2022    no weight bearing for 3.5 months - no surgery  - fracture    OTHER SURGICAL HISTORY  1985    attempted to reverse his vasectomy    VASECTOMY  1975     MEDICATIONS:   Prior to Admission medications    Medication Sig Last Dose Taking? Auth Provider Long Term End Date   acetaminophen (TYLENOL) 325 MG tablet Take 2 tablets (650 mg) by mouth every 4 hours as needed for other (mild pain).   Taurus Donald MD     acetaminophen (TYLENOL) 500 MG tablet Take 500-1,000 mg by mouth every 6 hours as needed for mild pain.   Reported, Patient     albuterol (PROAIR HFA) 108 (90 BASE) MCG/ACT Inhaler Inhale 2 puffs into the lungs every 4 hours as needed for shortness of breath / dyspnea   Osmel Ingram MD Yes    aspirin 81 MG EC tablet Take 1 tablet (81 mg) by mouth 2 times daily.   Taurus Donald MD     atorvastatin (LIPITOR) 20 MG tablet TAKE 1 TABLET (20 MG) BY MOUTH DAILY   Annalisa Sauceda MD Yes    calcium carbonate (TUMS) 500 MG chewable tablet Take 1 tablet (500 mg) by mouth daily as needed for heartburn.   Taurus Woodard MD     Cyanocobalamin (VITAMIN B12) 1000 MCG TBCR Take 1 tablet by mouth at bedtime. Dose varies   Alyse Young MD     dorzolamide-timolol (COSOPT) 2-0.5 % ophthalmic solution Place 1  drop into the right eye 2 times daily   Shamika Smith MD     ferrous gluconate (FERGON) 324 (38 Fe) MG tablet Take 324 mg by mouth at bedtime.   Reported, Patient No    gabapentin (NEURONTIN) 600 MG tablet Take 1.5 tablets (900 mg) by mouth 2 times daily At 4pm and 8pm.  Patient taking differently: Take 600-900 mg by mouth. 2-3 times per day. Totals 1800mg per day   Eileen Christopher MD Yes    hydrocortisone 2.5 % cream Apply topically 2 times daily Apply for 5-7 days for irritation.  Patient taking differently: Apply topically as needed. Apply for 5-7 days for irritation.   Virginia Randle PA-C     ibuprofen (ADVIL/MOTRIN) 200 MG tablet Take 400 mg by mouth every 4 hours as needed for pain.   Reported, Patient     ibuprofen (ADVIL/MOTRIN) 600 MG tablet Take 1 tablet (600 mg) by mouth every 6 hours as needed for mild pain.   Taurus Donald MD     ketoconazole (NIZORAL) 2 % external cream Apply topically 2 times daily To face as needed for rash   Sonal Herndon MD     latanoprost (XALATAN) 0.005 % ophthalmic solution Place 1 drop into both eyes daily  Patient taking differently: Place 1 drop into both eyes at bedtime.   Orlando Schilling MD Yes    metroNIDAZOLE (NORITATE) 1 % cream Apply topically as needed.   Alyse Young MD     oxyCODONE (ROXICODONE) 5 MG tablet Take 1-2 tablets (5-10 mg) by mouth every 4 hours as needed for moderate to severe pain.   Taurus Donald MD     polyethylene glycol (MIRALAX) 17 g packet Take 17 g by mouth daily.   Taurus Donald MD     senna-docusate (SENOKOT-S/PERICOLACE) 8.6-50 MG tablet Take 1-2 tablets by mouth 2 times daily. Take while on oral narcotics to prevent or treat constipation.   Taurus Donald MD     sildenafil (VIAGRA) 25 MG tablet Take 1-4 tablets ( mg) by mouth daily as needed (30 min - 2 hours prior sexual activity).   Annalisa Sauceda MD Yes    triamcinolone (KENALOG) 0.1 % external cream Apply topically 2 times daily  Patient taking  differently: Apply topically as needed.   Tia Ferreira PA-C       ALLERGIES:   Penicillium notatum allergy skin test, Other food allergy, Cats, Feathers, Food, Seasonal allergies, Smoke., and Sulfa antibiotics    SOCIAL HISTORY:   Social History     Socioeconomic History    Marital status:      Spouse name: Not on file    Number of children: Not on file    Years of education: Not on file    Highest education level: Not on file   Occupational History    Not on file   Tobacco Use    Smoking status: Never    Smokeless tobacco: Never    Tobacco comments:     heavy childhood exposure   Substance and Sexual Activity    Alcohol use: Yes     Comment: moderate use throughout all adulthood    Drug use: Yes     Types: Marijuana     Comment: Occsnl CBD, rare THC, night for RLS & glaucoma. Hx when ale    Sexual activity: Yes     Partners: Female     Birth control/protection: Male Surgical   Other Topics Concern    Parent/sibling w/ CABG, MI or angioplasty before 65F 55M? 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     Social Drivers of Health     Financial Resource Strain: Low Risk  (9/22/2023)    Financial Resource Strain     Within the past 12 months, have you or your family members you live with been unable to get utilities (heat, electricity) when it was really needed?: No   Food Insecurity: Low Risk  (9/22/2023)    Food Insecurity     Within the past 12 months, did you worry that your food would run out before you got money to buy more?: No     Within the past 12 months, did the food you bought just not last and you didn t have money to get more?: No   Transportation Needs: Low Risk  (9/22/2023)    Transportation Needs     Within the past 12 months, has lack of transportation kept you from medical appointments, getting your medicines, non-medical meetings or appointments, work, or from getting things that you need?: No   Physical Activity: Not on  file   Stress: Not on file   Social Connections: Not on file   Interpersonal Safety: Low Risk  (2025)    Interpersonal Safety     Do you feel physically and emotionally safe where you currently live?: Yes     Within the past 12 months, have you been hit, slapped, kicked or otherwise physically hurt by someone?: No     Within the past 12 months, have you been humiliated or emotionally abused in other ways by your partner or ex-partner?: No   Housing Stability: Low Risk  (2023)    Housing Stability     Do you have housing? : Yes     Are you worried about losing your housing?: No     Living situation: Patient lives in a home with wife  Occupation: Not assessed  Hobbies: Active    FAMILY HISTORY:  Family History   Problem Relation Age of Onset    Skin Cancer Mother     Brain Cancer Mother         age 65  of glioblastoma    Depression Mother         post partum    Osteoporosis Mother     Anemia Mother     Allergy (Severe) Mother         penicillin    Brain Tumor Mother         Glio Blastoma    Cancer Mother         glio blastoma    Heart Disease Father          of emphysema    Emphysema Father         smoker    Glaucoma Father     Hyperlipidemia Father     Neurologic Disorder Father         Jansen's Palsy    Heart Disease Maternal Grandmother         MI    Cancer Maternal Grandmother     Hyperlipidemia Maternal Grandmother     Other - See Comments Maternal Grandfather         seasonal allergies    Coronary Artery Disease Paternal Grandmother         age 87 MI    Cancer Paternal Grandmother         Uterine/vaginal    Heart Disease Paternal Grandmother     Hyperlipidemia Paternal Grandmother     Heart Disease Paternal Grandfather          of MI    Coronary Artery Disease Paternal Grandfather     Hyperlipidemia Paternal Grandfather     Glaucoma Brother     Hyperlipidemia Brother     Prostate Problems Brother         BPH    Hyperlipidemia Brother     Neurologic Disorder Brother         Bell's palsy     Osteoporosis Brother     Anemia Brother     Vascular Disease Brother         Raynaud's    Prostate Problems Brother         BPH    Hyperlipidemia Brother     Hyperlipidemia Brother     Neurologic Disorder Brother         Bell's palsy    Glaucoma Sister     Pancreatic Cancer Sister          age 71 approximately    Osteoporosis Sister     Anemia Sister     Other Cancer Sister         Pancreatic    Hyperlipidemia Sister     Heart Disease Sister         Surgery    Osteoporosis Sister     Anemia Sister     Migraines Sister     Depression Sister     Hyperlipidemia Sister     Other - See Comments Sister         Multiple chemical sensitivity    Neurologic Disorder Sister         Bell's palsy    Osteoporosis Sister     Anemia Sister     Anxiety Disorder Sister     Breast Cancer Sister     Osteoporosis Sister         kyphosis, scoliosis    Hyperlipidemia Sister     Vascular Disease Sister         Raynaud's    Cancer Sister         breast    Cancer Sister         pancreatic    Depression Sister     Coronary Artery Disease Sister     Hyperlipidemia Sister     Migraines Cousin         priscila, actually    Coronary Artery Disease Other         Paternal gr mother    Coronary Artery Disease Brother     Hyperlipidemia Brother     Macular Degeneration No family hx of     Melanoma No family hx of        REVIEW OF SYSTEMS:   Positive for hiccups. Otherwise a 10-point reviews of systems was negative except as noted above in the HPI.     PHYSICAL EXAM:   Vitals:    25 1450 25 1500 25 1600 25 1630   BP:  129/65 119/69 123/64   Pulse:  62 61 62   Resp:       Temp:       TempSrc:       SpO2: 97% 100% 98% 96%     General: Awake, alert, appropriate, following commands, NAD.  Lungs: Breathing comfortably and nonlabored, no wheezes or stridor noted.  Heart/Cardiovascular: Regular pulse, no peripheral cyanosis.   Pelvis: Stable to AP and Lateral compression, non-tender.    Right Lower Extremity: No deformity, skin  "intact.  Surgical incision looks appropriate, no swelling, no erythema, no drainage.  Photo taken and present in media tab.  No significant tenderness to palpation over thigh, knee, leg, ankle/foot. No pain with ROM hip/knee/ankle. Motor intact distally TA/GSC/EHL/FHL with 5/5 strength. SILT sp/dp/tibial/saph/sural nerves.  Right thigh is entirely symmetric with left thigh.  Right thigh is soft, compressible, in no way tense.  DP/PT pulses palpable, 2+, toes warm and well perfused.     Left Lower Extremity: No deformity, skin intact. No significant tenderness to palpation over thigh, knee, leg, ankle/foot. No pain with ROM hip/knee/ankle. Motor intact distally TA/GSC/EHL/FHL with 5/5 strength. SILT sp/dp/tibial/saph/sural nerves. DP/PT pulses palpable, 2+, toes warm and well perfused.  Left thigh is soft and symmetric with the right thigh.    LABS:  Hemoglobin   Date Value Ref Range Status   02/26/2025 9.4 (L) 13.3 - 17.7 g/dL Final   01/31/2025 14.4 13.3 - 17.7 g/dL Final   11/12/2020 14.0 13.3 - 17.7 g/dL Final   09/10/2018 14.3 13.3 - 17.7 g/dL Final     WBC   Date Value Ref Range Status   11/12/2020 5.7 4.0 - 11.0 10e9/L Final     WBC Count   Date Value Ref Range Status   02/26/2025 5.2 4.0 - 11.0 10e3/uL Final     Platelet Count   Date Value Ref Range Status   02/26/2025 205 150 - 450 10e3/uL Final   11/12/2020 198 150 - 450 10e9/L Final     No results found for: \"INR\"  Creatinine   Date Value Ref Range Status   02/26/2025 0.74 0.67 - 1.17 mg/dL Final   09/10/2018 1.02 0.66 - 1.25 mg/dL Final     Glucose   Date Value Ref Range Status   02/26/2025 115 (H) 70 - 99 mg/dL Final   08/09/2022 92 70 - 99 mg/dL Final   09/10/2018 91 70 - 99 mg/dL Final     Comment:     Fasting specimen     GLUCOSE BY METER POCT   Date Value Ref Range Status   02/21/2025 80 70 - 99 mg/dL Final     No results found for: \"CRP\"  No results found for: \"SED\"    IMAGING:  No imaging currently indicated    IMPRESSION:   Jose Carlos Boldne is a 69 " year old  male w/ PMHx recent total hip arthroplasty on right side on 2/21/2025 who presented to the emergency department for evaluation of hiccups.  While here, the patient was found to have a hemoglobin level of 9.4.  At this point in the patient's workup the emergency department consulted orthopedic surgery.  The most recent hemoglobin level before the measurement obtained today was taken preoperatively before right hip arthroplasty, at which time it was measured at 14.4.  In talking with the patient, additionally, the patient normally takes iron tablets daily.  In recent days, the patient states he has not been taking his iron tablets.  The combination of having a recent total hip arthroplasty, in addition to not taking iron supplements as prescribed, fits with a picture of a decreased hemoglobin today.  The patient is clinically well, has no feelings of weakness, dizziness or malaise, and exam is entirely normal.  His surgical site incision looks appropriate, no erythema, swelling, drainage.  His right thigh is completely symmetric with his left thigh.  His right thigh is soft, compressible and symmetric to left thigh in this regard.  He has normal DP, PT pulses that are 2+ bilaterally.  Overall the patient is doing quite well postoperatively, surgical incision looks appropriate, and physical exam is extremely reassuring.      The patient has follow-up previously arranged with Dr. Donald and was instructed to attend this appointment as previously planned.  He and his wife were overall very understanding of the plan and in agreement with the plan in place.  Additionally, patient should continue to take iron supplements as prescribed, which was discussed with him.  All questions were answered at the bedside.  If there is an increase in swelling to the surgical area, new drainage or new erythema, they should present to the emergency department for evaluation.  They will plan to follow-up outpatient as was  previously scheduled with orthopedic provider.    -Okay to discharge from orthopedic perspective with follow-up already arranged    RECOMMENDATIONS:   - Antibiotics/Tetanus: None indicated  - X-rays/Imaging: None indicated  - Activity: Up with assist and assistive devices as needed until independent  - Weight bearing: No precautions, weightbearing as tolerated  - Follow-up: As previously arranged with index surgeon, Dr Donald  - Disposition: Okay to discharge from ED from orthopedic perspective    Assessment and Plan discussed with Dr. Giovanni Winkler, PGY4. Orthopaedic staff for this patient is Dr. Donald.    Voice-to-text dictation software was utilized in the creation of this note therefore there may be unintended word substitutions, although errors are generally corrected real-time, there is the potential for a rare error to be present in the completed chart. Please do not hesitate to reach out for clarification.    Nicole Rosas MD  Orthopaedic Surgery Resident  [facility]  02/26/25

## 2025-02-26 NOTE — TELEPHONE ENCOUNTER
"Nurse Triage SBAR    Is this a 2nd Level Triage? NO    Situation:  Patient and spouse calling to report respiratory symptoms and breathing difficulty present following recent hip surgery.    Background: s/p Right total hip arthroplasty via anterior approach     Hx of mild asthma    Assessment:    -Symptom reported: \"Breathing difficulty\" per pt and spouse. Hiccups, SOB, chest pain worse with breathing, spouse also reports she thinks she has noted him gasping for air/possible periods of apnea at night.  -Onset: Present since surgery on 2/21, has been worsening  -The SOB is intermittent, sometimes severe.  -Spouse states: \"He is uncomfortable, and is very low energy which is not his normal\"  -Has used rescue inhaler - unknown if beneficial  -Afebrile  -Hx of hip replacement 11yrs ago with same symptoms, resulted dx of pneumonia, thinks this feels similar    Protocol Recommended Disposition:   Go to ED Now    Recommendation:  Patient and spouse advised that patient should present to ER for evaluation. They agree with recommendation and will go to [facility] now. PCP and Ortho team notified of recommendation, follow-up after ER disposition.    Routed to provider        Reason for Disposition   Major surgery in the past month   MODERATE difficulty breathing (e.g., speaks in phrases, SOB even at rest, pulse 100-120) of new-onset or worse than normal    Additional Information   Negative: Chest pain   Negative: Wheezing (high pitched whistling sound) and previous asthma attacks or use of asthma medicines   Negative: Breathing difficulty and within 14 days of COVID-19 EXPOSURE (close contact) with someone diagnosed with COVID-19 (e.g., COVID test positive)   Negative: Breathing difficulty and COVID-19 is widespread in the community   Negative: Breathing diffculty and only present when coughing   Negative: Breathing difficulty and only from stuffy nose   Negative: Breathing diffculty and only from stuffy nose or runny nose from " "common cold   Negative: SEVERE difficulty breathing (e.g., struggling for each breath, speaks in single words, pulse > 120)   Negative: Breathing stopped and hasn't returned   Negative: Choking on something   Negative: Bluish (or gray) lips or face   Negative: Difficult to awaken or acting confused (e.g., disoriented, slurred speech)   Negative: Passed out (e.g., fainted, lost consciousness, blacked out and was not responding)   Negative: Wheezing started suddenly after medicine, an allergic food, or bee sting   Negative: Stridor (harsh sound while breathing in)   Negative: Slow, shallow and weak breathing   Negative: Sounds like a life-threatening emergency to the triager    Answer Assessment - Initial Assessment Questions  1. RESPIRATORY STATUS: \"Describe your breathing?\" (e.g., wheezing, shortness of breath, unable to speak, severe coughing)       SOB, hiccups, apneic/gasping for air periods at night noticed by spouse    2. ONSET: \"When did this breathing problem begin?\"       \"Since surgery\"    3. PATTERN \"Does the difficult breathing come and go, or has it been constant since it started?\"       Comes and goes but has been thought to be getting worse    4. SEVERITY: \"How bad is your breathing?\" (e.g., mild, moderate, severe)       Spouse states \"moderate\" - pt is \"uncomfortable\"    5. RECURRENT SYMPTOM: \"Have you had difficulty breathing before?\" If Yes, ask: \"When was the last time?\" and \"What happened that time?\"       Had similar symptoms after surgery 8yo and which resulted in dx of Pnu    6. CARDIAC HISTORY: \"Do you have any history of heart disease?\" (e.g., heart attack, angina, bypass surgery, angioplasty)       No    7. LUNG HISTORY: \"Do you have any history of lung disease?\"  (e.g., pulmonary embolus, asthma, emphysema)      Reports hx of Mild asthma    8. CAUSE: \"What do you think is causing the breathing problem?\"       Thought it is related to recent surgery    9. OTHER SYMPTOMS: \"Do you have any " "other symptoms?\" (e.g., chest pain, cough, dizziness, fever, runny nose)      Chest pain, hiccups, increased fatigue    10. O2 SATURATION MONITOR:  \"Do you use an oxygen saturation monitor (pulse oximeter) at home?\" If Yes, ask: \"What is your reading (oxygen level) today?\" \"What is your usual oxygen saturation reading?\" (e.g., 95%)        Jena    11. PREGNANCY: \"Is there any chance you are pregnant?\" \"When was your last menstrual period?\"        N/a    12. TRAVEL: \"Have you traveled out of the country in the last month?\" (e.g., travel history, exposures)        N/a    Protocols used: Breathing Difficulty-A-[place]    "

## 2025-02-26 NOTE — ED TRIAGE NOTES
Pt POD 5 of hip replacement with continuous hiccups. Pt now endorsing chest pain which radiates to his jaw, and shortness of breath.      Triage Assessment (Adult)       Row Name 02/26/25 0592          Triage Assessment    Airway WDL WDL        Respiratory WDL    Respiratory WDL X;rhythm/pattern     Rhythm/Pattern, Respiratory shortness of breath        Skin Circulation/Temperature WDL    Skin Circulation/Temperature WDL WDL        Cardiac WDL    Cardiac WDL X;chest pain        Chest Pain Assessment    Chest Pain Location anterior chest, left        Peripheral/Neurovascular WDL    Peripheral Neurovascular WDL WDL        Cognitive/Neuro/Behavioral WDL    Cognitive/Neuro/Behavioral WDL WDL

## 2025-02-27 LAB
ATRIAL RATE - MUSE: 63 BPM
DIASTOLIC BLOOD PRESSURE - MUSE: NORMAL MMHG
INTERPRETATION ECG - MUSE: NORMAL
P AXIS - MUSE: 46 DEGREES
PR INTERVAL - MUSE: 114 MS
QRS DURATION - MUSE: 88 MS
QT - MUSE: 418 MS
QTC - MUSE: 427 MS
R AXIS - MUSE: 55 DEGREES
SYSTOLIC BLOOD PRESSURE - MUSE: NORMAL MMHG
T AXIS - MUSE: 79 DEGREES
VENTRICULAR RATE- MUSE: 63 BPM

## 2025-03-03 NOTE — TELEPHONE ENCOUNTER
Patient confirmed rescheduled appointment:  Date: 3/5/25  Time: 4:20pm  Visit type: Post-Op Hip Replacement  Provider: Morena Ji  [place] [facility] [place]    Patient confirmed rescheduled appointment:  Date: 4/11/25 to 4/9/25  Time: 3:00pm  Visit type: Post-Op Hip Replacement  Provider: Dr. Donald  Location: [facility] [place]

## 2025-03-05 ENCOUNTER — DOCUMENTATION ONLY (OUTPATIENT)
Dept: OTHER | Facility: CLINIC | Age: 70
End: 2025-03-05

## 2025-03-05 ENCOUNTER — THERAPY VISIT (OUTPATIENT)
Dept: PHYSICAL THERAPY | Facility: CLINIC | Age: 70
End: 2025-03-05
Attending: STUDENT IN AN ORGANIZED HEALTH CARE EDUCATION/TRAINING PROGRAM
Payer: COMMERCIAL

## 2025-03-05 ENCOUNTER — OFFICE VISIT (OUTPATIENT)
Dept: ORTHOPEDICS | Facility: CLINIC | Age: 70
End: 2025-03-05
Payer: COMMERCIAL

## 2025-03-05 DIAGNOSIS — R29.898 WEAKNESS OF BOTH HIPS: ICD-10-CM

## 2025-03-05 DIAGNOSIS — Z96.641 STATUS POST TOTAL REPLACEMENT OF RIGHT HIP: Primary | ICD-10-CM

## 2025-03-05 DIAGNOSIS — Z96.641 STATUS POST TOTAL REPLACEMENT OF RIGHT HIP: ICD-10-CM

## 2025-03-05 DIAGNOSIS — M25.551 HIP PAIN, RIGHT: ICD-10-CM

## 2025-03-05 DIAGNOSIS — M16.11 PRIMARY OSTEOARTHRITIS OF RIGHT HIP: Primary | ICD-10-CM

## 2025-03-05 DIAGNOSIS — M16.11 OSTEOARTHRITIS OF RIGHT HIP: ICD-10-CM

## 2025-03-05 PROCEDURE — 99024 POSTOP FOLLOW-UP VISIT: CPT | Performed by: PHYSICIAN ASSISTANT

## 2025-03-05 PROCEDURE — 1125F AMNT PAIN NOTED PAIN PRSNT: CPT | Performed by: PHYSICIAN ASSISTANT

## 2025-03-05 ASSESSMENT — HOOS JR
BENDING TO THE FLOOR TO PICK UP OBJECT: SEVERE
LYING IN BED (TURNING OVER, MAINTAINING HIP POSITION): MODERATE
HOOS JR TOTAL INTERVAL SCORE: 49.86
GOING UP OR DOWN STAIRS: MODERATE
RISING FROM SITTING: MODERATE
SITTING: MODERATE
WALKING ON UNEVEN SURFACE: MODERATE

## 2025-03-05 NOTE — LETTER
3/5/2025      Jose Carlos Bolden  [address] [facility] [address] [facility] [address]      Dear Colleague,    Thank you for referring your patient, Jose Carlos Bolden, to the [facility] [place]. Please see a copy of my visit note below.    Chief Complaint: wound check  Pre-operative diagnosis:         69 year old male who is  presenting with chronic right hip/groin/buttock pain due to end-stage osteoarthritic changes of femoral acetabular joint.   Post-operative diagnosis        Same as pre-operative diagnosis  2/21/25 Procedure:      ARTHROPLASTY, HIP, TOTAL VIA DIRECT ANTERIOR APPROACH, Right - Hip  Surgeon:         Surgeons and Role:     * Taurus Donald MD - Primary    HPI: Jose Carlos is a 69 year old man here with his wife today 3 weeks status post above procedure.  Patient also has a history of a left total hip arthroplasty.  Patient reports overall he is doing well.  He has some discomfort, but it is mild.  He is mostly taking Tylenol for the pain.  He uses a 4-point cane for ambulation.  He just arrived from physical therapy today.  He is not sleeping as well as he would like.  He feels that his right leg is significantly longer than his left leg.  No other concerns.    Physical Exam: Jose Carlos is a pleasant 69-year-old man who is alert and oriented no apparent distress.  He has a mildly antalgic gait with 4-point cane today.  His right anterior hip dressings were removed.  Incision is healing well with no erythema or drainage.  He has minimal swelling.  No calf tenderness.  He is neurovascular intact distally.  Upon standing, he wants to bend the right knee to feel level.  When he stands with both legs extended, his left leg is quite a bit off the floor.  However his pelvis really only looks a few millimeters higher on the right than the left when he is standing with both legs extended.    Imaging: Immediate postop x-rays were reviewed today.  This shows bilateral total hip arthroplasty in excellent  alignment with no sign of fracture or lucency.  When I measure off the lesser trochanters and bisect the pelvis, the leg lengths appear equal, however this is a supine exam and positioning may be an issue.    Impression: 69-year-old man 3 weeks status post right total hip arthroplasty via direct anterior approach with assumed leg length discrepancy, right greater than left    Plan: Patient can shower and get the incision wet.  No soaking in a tub or pool for 2 weeks.  OK to use vitamin E oil or cocoa butter on the incision.  Cover incision as needed for comfort.  Continue to ice and elevate for swelling control.  Continue to work with PT for range of motion and strengthening, as well as working on home exercises.  Follow-up in 1 month for check of progress.  We discussed the presumed leg length discrepancy.  I showed him that his leg on the right was probably shorter before he had surgery, and now appears more equal.  He may feel that it is longer, but hopefully over time his body will adjust and he will feel more even.  In the meantime, I did give him a heel lift to wear in the left shoe to see if that helps.  He will follow-up in a month with Dr. Donald and see how his leg lengths feel at that time.  He agrees with this plan.  All questions were answered today.        Again, thank you for allowing me to participate in the care of your patient.        Sincerely,        Suze Ji PA-C    Electronically signed

## 2025-03-05 NOTE — PROGRESS NOTES
Chief Complaint: wound check  Pre-operative diagnosis:         69 year old male who is  presenting with chronic right hip/groin/buttock pain due to end-stage osteoarthritic changes of femoral acetabular joint.   Post-operative diagnosis        Same as pre-operative diagnosis  2/21/25 Procedure:      ARTHROPLASTY, HIP, TOTAL VIA DIRECT ANTERIOR APPROACH, Right - Hip  Surgeon:         Surgeons and Role:     * Taurus Donald MD - Primary    HPI: Jose Carlos is a 69 year old man here with his wife today 3 weeks status post above procedure.  Patient also has a history of a left total hip arthroplasty.  Patient reports overall he is doing well.  He has some discomfort, but it is mild.  He is mostly taking Tylenol for the pain.  He uses a 4-point cane for ambulation.  He just arrived from physical therapy today.  He is not sleeping as well as he would like.  He feels that his right leg is significantly longer than his left leg.  No other concerns.    Physical Exam: Jose Carlos is a pleasant 69-year-old man who is alert and oriented no apparent distress.  He has a mildly antalgic gait with 4-point cane today.  His right anterior hip dressings were removed.  Incision is healing well with no erythema or drainage.  He has minimal swelling.  No calf tenderness.  He is neurovascular intact distally.  Upon standing, he wants to bend the right knee to feel level.  When he stands with both legs extended, his left leg is quite a bit off the floor.  However his pelvis really only looks a few millimeters higher on the right than the left when he is standing with both legs extended.    Imaging: Immediate postop x-rays were reviewed today.  This shows bilateral total hip arthroplasty in excellent alignment with no sign of fracture or lucency.  When I measure off the lesser trochanters and bisect the pelvis, the leg lengths appear equal, however this is a supine exam and positioning may be an issue.    Impression: 69-year-old man 3 weeks status  post right total hip arthroplasty via direct anterior approach with assumed leg length discrepancy, right greater than left    Plan: Patient can shower and get the incision wet.  No soaking in a tub or pool for 2 weeks.  OK to use vitamin E oil or cocoa butter on the incision.  Cover incision as needed for comfort.  Continue to ice and elevate for swelling control.  Continue to work with PT for range of motion and strengthening, as well as working on home exercises.  Follow-up in 1 month for check of progress.  We discussed the presumed leg length discrepancy.  I showed him that his leg on the right was probably shorter before he had surgery, and now appears more equal.  He may feel that it is longer, but hopefully over time his body will adjust and he will feel more even.  In the meantime, I did give him a heel lift to wear in the left shoe to see if that helps.  He will follow-up in a month with Dr. Donald and see how his leg lengths feel at that time.  He agrees with this plan.  All questions were answered today.

## 2025-03-05 NOTE — NURSING NOTE
Reason For Visit:   Chief Complaint   Patient presents with    Consult     DOS 2/21/25 S/P Right total hip arthroplasty via anterior approach       There were no vitals taken for this visit.    Pain Assessment  Patient Currently in Pain: Yes  0-10 Pain Scale: 4  Primary Pain Location: Hip  Pain Descriptors: Radiating    Dianne Acosta LPN

## 2025-03-12 ENCOUNTER — THERAPY VISIT (OUTPATIENT)
Dept: PHYSICAL THERAPY | Facility: CLINIC | Age: 70
End: 2025-03-12
Attending: STUDENT IN AN ORGANIZED HEALTH CARE EDUCATION/TRAINING PROGRAM
Payer: COMMERCIAL

## 2025-03-12 DIAGNOSIS — M16.11 PRIMARY OSTEOARTHRITIS OF RIGHT HIP: ICD-10-CM

## 2025-03-12 DIAGNOSIS — M25.551 HIP PAIN, RIGHT: ICD-10-CM

## 2025-03-12 DIAGNOSIS — Z96.641 STATUS POST TOTAL REPLACEMENT OF RIGHT HIP: Primary | ICD-10-CM

## 2025-03-12 DIAGNOSIS — R29.898 WEAKNESS OF BOTH HIPS: ICD-10-CM

## 2025-03-12 PROCEDURE — 97530 THERAPEUTIC ACTIVITIES: CPT | Mod: GP

## 2025-03-12 PROCEDURE — 97110 THERAPEUTIC EXERCISES: CPT | Mod: GP

## 2025-04-02 DIAGNOSIS — H40.1131 PRIMARY OPEN ANGLE GLAUCOMA (POAG) OF BOTH EYES, MILD STAGE: Primary | ICD-10-CM

## 2025-04-03 ENCOUNTER — OFFICE VISIT (OUTPATIENT)
Dept: OPHTHALMOLOGY | Facility: CLINIC | Age: 70
End: 2025-04-03
Attending: OPHTHALMOLOGY
Payer: COMMERCIAL

## 2025-04-03 DIAGNOSIS — H40.1131 PRIMARY OPEN ANGLE GLAUCOMA (POAG) OF BOTH EYES, MILD STAGE: ICD-10-CM

## 2025-04-03 PROCEDURE — G0463 HOSPITAL OUTPT CLINIC VISIT: HCPCS | Performed by: OPHTHALMOLOGY

## 2025-04-03 PROCEDURE — 92133 CPTRZD OPH DX IMG PST SGM ON: CPT | Performed by: OPHTHALMOLOGY

## 2025-04-03 PROCEDURE — 92083 EXTENDED VISUAL FIELD XM: CPT | Performed by: OPHTHALMOLOGY

## 2025-04-03 ASSESSMENT — CONF VISUAL FIELD
OD_SUPERIOR_NASAL_RESTRICTION: 0
OD_INFERIOR_TEMPORAL_RESTRICTION: 0
OS_SUPERIOR_TEMPORAL_RESTRICTION: 0
OS_SUPERIOR_NASAL_RESTRICTION: 0
OD_SUPERIOR_TEMPORAL_RESTRICTION: 0
OD_NORMAL: 1
OD_INFERIOR_NASAL_RESTRICTION: 0
METHOD: COUNTING FINGERS
OS_NORMAL: 1
OS_INFERIOR_TEMPORAL_RESTRICTION: 0
OS_INFERIOR_NASAL_RESTRICTION: 0

## 2025-04-03 ASSESSMENT — VISUAL ACUITY
OS_CC+: -1
OS_CC: 20/20
CORRECTION_TYPE: GLASSES
METHOD: SNELLEN - LINEAR
OD_CC+: -3
OD_CC: 20/20

## 2025-04-03 ASSESSMENT — TONOMETRY
OS_IOP_MMHG: 16
IOP_METHOD: TONOPEN
OD_IOP_MMHG: 21
IOP_METHOD: APPLANATION
OD_IOP_MMHG: 16
OS_IOP_MMHG: 20

## 2025-04-03 ASSESSMENT — SLIT LAMP EXAM - LIDS
COMMENTS: DERMATOCHALASIS
COMMENTS: DERMATOCHALASIS

## 2025-04-03 ASSESSMENT — REFRACTION_WEARINGRX
SPECS_TYPE: PAL
OS_ADD: +2.75
OS_SPHERE: +0.50
OD_AXIS: 175
OD_CYLINDER: +0.50
OD_ADD: +2.75
OS_AXIS: 155
OS_CYLINDER: +0.50
OD_SPHERE: +0.25

## 2025-04-03 ASSESSMENT — CUP TO DISC RATIO
OS_RATIO: 0.55
OD_RATIO: 0.7

## 2025-04-03 ASSESSMENT — EXTERNAL EXAM - LEFT EYE: OS_EXAM: NORMAL

## 2025-04-03 ASSESSMENT — EXTERNAL EXAM - RIGHT EYE: OD_EXAM: NORMAL

## 2025-04-03 NOTE — NURSING NOTE
Chief Complaints and History of Present Illnesses   Patient presents with    Follow Up     Glaucoma     Chief Complaint(s) and History of Present Illness(es)       Follow Up              Laterality: both eyes    Course: gradually worsening    Associated symptoms: dryness.  Negative for eye pain, headache and photophobia    Treatments tried: eye drops and artificial tears    Pain scale: 0/10    Comments: Glaucoma              Comments    He states that his vision seems a little worse since his last exam.    He is using:  Latanaprost qPM each eye   Cosopt BID right eye (stings at times)  Systane PRN each eye     MONIE Trevizo 1:07 PM  April 3, 2025

## 2025-04-04 NOTE — PROGRESS NOTES
[facility]  Orthopaedic Surgery Consultation by Taurus Donald M.D.    Jose Carlos Bolden MRN# [id number]   Age: 67 year old YOB: 1955     Requesting physician: No ref. provider found  Adam Peace     Background history:  DX:  Kyphosis  Scoliosis  Restless legs syndrome  Vitamin B12 deficiency  Iron deficiency  Hyperlipidemia  Hearing loss  Reactive airway disease    TREATMENTS:  March 2014, left CATALINA, Dr. Jean [facility]  2/21/2025, right total hip arthroplasty via direct anterior approach, Dr. Donald           History of Present Illness:     69-year-old male presenting approximately 6 weeks after the above-mentioned procedure.  Today he states that he is doing relatively well.  His presurgical pain is gone.  His postsurgical pain is well-controlled and diminishing over time.  Last week he walked for approximately 6 miles without significant issues.  The incision has healed well.  He remains a little stiff and has some trouble putting on his shoes and socks on the right side still.  He does report that he notices a leg length discrepancy with the right leg being longer than the left.  He has completed his DVT prophylaxis.    Social:   Occupation: Retired -   Living situation: Lives with wife  Hobbies / Sports: cycling, walking, gardening    Smoking: No  Alcohol: Yes socially  Illicit drug use: No         Physical Exam:     EXAMINATION pertinent findings:   PSYCH: Pleasant, healthy-appearing, alert, oriented x3, cooperative. Normal mood and affect.  VITAL SIGNS: There were no vitals taken for this visit.  Reviewed nursing intake notes.   There is no height or weight on file to calculate BMI.  RESP: non labored breathing   ABD: benign, soft, non-tender, no acute peritoneal findings  SKIN: grossly normal   LYMPHATIC: grossly normal, no adenopathy, no extremity edema  NEURO: grossly normal , no motor deficits  VASCULAR: satisfactory perfusion of all extremities    MUSCULOSKELETAL:   Gait: Ambulates with a limp due to leg length discrepancy of approximately 1 cm with the right leg being longer than the left.    Right hip: Well-healed incision.  No signs of infection.  Range of motion not extensively tested due to recent surgery.    Bilateral LE:   Thigh and leg compartments soft and compressible   +Quad/TA/GSC/FHL/EHL   SILT DP/SP/Angie/Saph/Tib nerve distributions   Palpable dorsalis pedis pulse          Data:   All laboratory data reviewed  All imaging studies reviewed by me personally.    XR pelvis/hip l right 4/9/2025:  My interpretation: Status post right total hip arthroplasty.  Adequate sizing, orientation and fixation of components.  Leg length discrepancy of approximately 1 cm with the right leg being longer than the left.  Slight increase in offset.         Assessment and Plan:   Assessment:  69-year-old male presenting with chronic right hip/groin/buttock pain due to end-stage osteoarthritic changes of femoral acetabular joint.  Patient underwent right total hip arthroplasty via direct anterior approach on 2/21/2025.     Plan:  I extensively discussed the findings today with the patient and his spouse.  He appears to be recovering appropriately from a total hip arthroplasty perspective.  Based on today's imaging studies and clinical examination is clear that he is electing discrepancy of approximately 1 cm which has been unintentional and iatrogenic.  Reviewing the imaging studies from intraoperatively together with patient and spouse it appears that based on those imaging studies the leg lengths appear to be equal equal or close to equal.  I apologized for this outcome.  We discussed the possibilities of a referral to an orthotist for a shoe lift on the left of approximately 1 cm that will help him level out his pelvis during ambulation.  We discussed that there is a chance that over time the leg length discrepancy becomes less noticeable for him.  Alternatively 1  could consider a femoral component revision.  Given the associated morbidity and risks that would not be my recommendation at this point in time.  Patient articulates that the preference of trialing a shoe adaptation.  All questions were answered.  Patient understands and agrees to the treatment plan as set forth.  Will follow-up with him at his 1 year postsurgical date with renewed radiographic imaging studies or anytime sooner when questions or concerns may arise.    Taurus Donald MD, PhD     Adult Reconstruction  [facility] Department of Orthopaedic Surgery

## 2025-04-07 ASSESSMENT — HOOS JR
RISING FROM SITTING: MILD
BENDING TO THE FLOOR TO PICK UP OBJECT: SEVERE
LYING IN BED (TURNING OVER, MAINTAINING HIP POSITION): MILD
GOING UP OR DOWN STAIRS: MILD
SITTING: MODERATE
HOOS JR TOTAL INTERVAL SCORE: 58.93
WALKING ON UNEVEN SURFACE: MODERATE

## 2025-04-09 ENCOUNTER — ANCILLARY PROCEDURE (OUTPATIENT)
Dept: GENERAL RADIOLOGY | Facility: CLINIC | Age: 70
End: 2025-04-09
Attending: STUDENT IN AN ORGANIZED HEALTH CARE EDUCATION/TRAINING PROGRAM
Payer: COMMERCIAL

## 2025-04-09 ENCOUNTER — OFFICE VISIT (OUTPATIENT)
Dept: ORTHOPEDICS | Facility: CLINIC | Age: 70
End: 2025-04-09
Payer: COMMERCIAL

## 2025-04-09 DIAGNOSIS — Z96.641 S/P HIP REPLACEMENT, RIGHT: ICD-10-CM

## 2025-04-09 DIAGNOSIS — M16.11 PRIMARY OSTEOARTHRITIS OF RIGHT HIP: Primary | ICD-10-CM

## 2025-04-09 DIAGNOSIS — M16.11 OSTEOARTHRITIS OF ONE HIP, RIGHT: ICD-10-CM

## 2025-04-09 PROCEDURE — 99024 POSTOP FOLLOW-UP VISIT: CPT | Performed by: STUDENT IN AN ORGANIZED HEALTH CARE EDUCATION/TRAINING PROGRAM

## 2025-04-09 PROCEDURE — 73502 X-RAY EXAM HIP UNI 2-3 VIEWS: CPT | Mod: RT | Performed by: RADIOLOGY

## 2025-04-09 NOTE — LETTER
4/9/2025      Jose Carlos Bolden  [address] [facility] [address]      Dear Colleague,    Thank you for referring your patient, Jose Carlos Bolden, to the [facility] [place]. Please see a copy of my visit note below.        [facility]  Orthopaedic Surgery Consultation by Taurus Donald M.D.    Jose Carlos Bolden MRN# [id number]   Age: 67 year old YOB: 1955     Requesting physician: No ref. provider found  Adam Peace     Background history:  DX:  Kyphosis  Scoliosis  Restless legs syndrome  Vitamin B12 deficiency  Iron deficiency  Hyperlipidemia  Hearing loss  Reactive airway disease    TREATMENTS:  March 2014, left CATALINA, Dr. Jean [facility]  2/21/2025, right total hip arthroplasty via direct anterior approach, Dr. Donald           History of Present Illness:     69-year-old male presenting approximately 6 weeks after the above-mentioned procedure.  Today he states that he is doing relatively well.  His presurgical pain is gone.  His postsurgical pain is well-controlled and diminishing over time.  Last week he walked for approximately 6 miles without significant issues.  The incision has healed well.  He remains a little stiff and has some trouble putting on his shoes and socks on the right side still.  He does report that he notices a leg length discrepancy with the right leg being longer than the left.  He has completed his DVT prophylaxis.    Social:   Occupation: Retired -   Living situation: Lives with wife  Hobbies / Sports: cycling, walking, gardening    Smoking: No  Alcohol: Yes socially  Illicit drug use: No         Physical Exam:     EXAMINATION pertinent findings:   PSYCH: Pleasant, healthy-appearing, alert, oriented x3, cooperative. Normal mood and affect.  VITAL SIGNS: There were no vitals taken for this visit.  Reviewed nursing intake notes.   There is no height or weight on file to calculate BMI.  RESP: non labored breathing    ABD: benign, soft, non-tender, no acute peritoneal findings  SKIN: grossly normal   LYMPHATIC: grossly normal, no adenopathy, no extremity edema  NEURO: grossly normal , no motor deficits  VASCULAR: satisfactory perfusion of all extremities   MUSCULOSKELETAL:   Gait: Ambulates with a limp due to leg length discrepancy of approximately 1 cm with the right leg being longer than the left.    Right hip: Well-healed incision.  No signs of infection.  Range of motion not extensively tested due to recent surgery.    Bilateral LE:   Thigh and leg compartments soft and compressible   +Quad/TA/GSC/FHL/EHL   SILT DP/SP/Angie/Saph/Tib nerve distributions   Palpable dorsalis pedis pulse          Data:   All laboratory data reviewed  All imaging studies reviewed by me personally.    XR pelvis/hip l right 4/9/2025:  My interpretation: Status post right total hip arthroplasty.  Adequate sizing, orientation and fixation of components.  Leg length discrepancy of approximately 1 cm with the right leg being longer than the left.  Slight increase in offset.         Assessment and Plan:   Assessment:  69-year-old male presenting with chronic right hip/groin/buttock pain due to end-stage osteoarthritic changes of femoral acetabular joint.  Patient underwent right total hip arthroplasty via direct anterior approach on 2/21/2025.     Plan:  I extensively discussed the findings today with the patient and his spouse.  He appears to be recovering appropriately from a total hip arthroplasty perspective.  Based on today's imaging studies and clinical examination is clear that he is electing discrepancy of approximately 1 cm which has been unintentional and iatrogenic.  Reviewing the imaging studies from intraoperatively together with patient and spouse it appears that based on those imaging studies the leg lengths appear to be equal equal or close to equal.  I apologized for this outcome.  We discussed the possibilities of a referral to an  orthotist for a shoe lift on the left of approximately 1 cm that will help him level out his pelvis during ambulation.  We discussed that there is a chance that over time the leg length discrepancy becomes less noticeable for him.  Alternatively 1 could consider a femoral component revision.  Given the associated morbidity and risks that would not be my recommendation at this point in time.  Patient articulates that the preference of trialing a shoe adaptation.  All questions were answered.  Patient understands and agrees to the treatment plan as set forth.  Will follow-up with him at his 1 year postsurgical date with renewed radiographic imaging studies or anytime sooner when questions or concerns may arise.    Taurus Donald MD, PhD     Adult Reconstruction  [facility] Department of Orthopaedic Surgery        Again, thank you for allowing me to participate in the care of your patient.        Sincerely,        Taurus Donald MD    Electronically signed

## 2025-04-23 ENCOUNTER — MYC MEDICAL ADVICE (OUTPATIENT)
Dept: INTERNAL MEDICINE | Facility: CLINIC | Age: 70
End: 2025-04-23
Payer: COMMERCIAL

## 2025-04-24 NOTE — TELEPHONE ENCOUNTER
Left Voicemail (1st Attempt) and Sent Mychart (1st Attempt) for the patient to call back and schedule the following:    Appointment type: ump return  Provider: PCP  Return date: first available  Specialty phone number: [phone]  Additional appointment(s) needed: -  Additonal Notes: -

## 2025-04-30 ENCOUNTER — OFFICE VISIT (OUTPATIENT)
Dept: INTERNAL MEDICINE | Facility: CLINIC | Age: 70
End: 2025-04-30
Payer: COMMERCIAL

## 2025-04-30 ENCOUNTER — LAB (OUTPATIENT)
Dept: LAB | Facility: CLINIC | Age: 70
End: 2025-04-30
Payer: COMMERCIAL

## 2025-04-30 VITALS
SYSTOLIC BLOOD PRESSURE: 127 MMHG | HEART RATE: 66 BPM | BODY MASS INDEX: 20.25 KG/M2 | WEIGHT: 129 LBS | DIASTOLIC BLOOD PRESSURE: 70 MMHG | OXYGEN SATURATION: 97 % | HEIGHT: 67 IN | TEMPERATURE: 98 F | RESPIRATION RATE: 18 BRPM

## 2025-04-30 DIAGNOSIS — R53.83 OTHER FATIGUE: ICD-10-CM

## 2025-04-30 DIAGNOSIS — G25.81 RESTLESS LEGS SYNDROME (RLS): ICD-10-CM

## 2025-04-30 DIAGNOSIS — I83.811 VARICOSE VEINS OF RIGHT LOWER EXTREMITY WITH PAIN: ICD-10-CM

## 2025-04-30 DIAGNOSIS — D50.0 IRON DEFICIENCY ANEMIA DUE TO CHRONIC BLOOD LOSS: ICD-10-CM

## 2025-04-30 DIAGNOSIS — G25.81 RESTLESS LEGS SYNDROME (RLS): Primary | ICD-10-CM

## 2025-04-30 LAB
BASOPHILS # BLD AUTO: 0.1 10E3/UL (ref 0–0.2)
BASOPHILS NFR BLD AUTO: 1 %
EOSINOPHIL # BLD AUTO: 0.1 10E3/UL (ref 0–0.7)
EOSINOPHIL NFR BLD AUTO: 2 %
ERYTHROCYTE [DISTWIDTH] IN BLOOD BY AUTOMATED COUNT: 14 % (ref 10–15)
FERRITIN SERPL-MCNC: 99 NG/ML (ref 31–409)
HCT VFR BLD AUTO: 37.5 % (ref 40–53)
HGB BLD-MCNC: 12.5 G/DL (ref 13.3–17.7)
IMM GRANULOCYTES # BLD: 0 10E3/UL
IMM GRANULOCYTES NFR BLD: 0 %
IRON BINDING CAPACITY (ROCHE): 266 UG/DL (ref 240–430)
IRON SATN MFR SERPL: 20 % (ref 15–46)
IRON SERPL-MCNC: 52 UG/DL (ref 61–157)
LYMPHOCYTES # BLD AUTO: 2 10E3/UL (ref 0.8–5.3)
LYMPHOCYTES NFR BLD AUTO: 36 %
MCH RBC QN AUTO: 32.1 PG (ref 26.5–33)
MCHC RBC AUTO-ENTMCNC: 33.3 G/DL (ref 31.5–36.5)
MCV RBC AUTO: 96 FL (ref 78–100)
MONOCYTES # BLD AUTO: 0.5 10E3/UL (ref 0–1.3)
MONOCYTES NFR BLD AUTO: 9 %
NEUTROPHILS # BLD AUTO: 2.8 10E3/UL (ref 1.6–8.3)
NEUTROPHILS NFR BLD AUTO: 52 %
NRBC # BLD AUTO: 0 10E3/UL
NRBC BLD AUTO-RTO: 0 /100
PLATELET # BLD AUTO: 201 10E3/UL (ref 150–450)
RBC # BLD AUTO: 3.9 10E6/UL (ref 4.4–5.9)
WBC # BLD AUTO: 5.5 10E3/UL (ref 4–11)

## 2025-04-30 PROCEDURE — 82728 ASSAY OF FERRITIN: CPT | Performed by: PATHOLOGY

## 2025-04-30 PROCEDURE — 83540 ASSAY OF IRON: CPT | Performed by: PATHOLOGY

## 2025-04-30 PROCEDURE — 83550 IRON BINDING TEST: CPT | Performed by: PATHOLOGY

## 2025-04-30 PROCEDURE — 85025 COMPLETE CBC W/AUTO DIFF WBC: CPT | Performed by: PATHOLOGY

## 2025-04-30 PROCEDURE — 36415 COLL VENOUS BLD VENIPUNCTURE: CPT | Performed by: PATHOLOGY

## 2025-04-30 NOTE — PROGRESS NOTES
Due to the patient's arrival time, assigned questionnaires were unable to be completed.     Preventive Care Visit  [facility]  BAM Conde CNP, Internal Medicine  Apr 30, 2025      Assessment & Plan     Restless legs syndrome (RLS)  Iron deficiency anemia due to chronic blood loss  Other fatigue  Endorses persistent RLS symptoms, worsened since his hip surgery 2 months ago.  Recheck CBC and iron studies; he has been on ferrous gluconate for several years, as well as gabapentin 900 mg 2-3x daily and Vit B12 supplements. He follows with Dr. Woodard in Neurology.  Neuropsych testing completed last summer without objective cognitive deficits.  - CBC with platelets and differential; Future  - Ferritin; Future  - Iron and iron binding capacity; Future  - TSH with free T4 reflex; Future    Varicose veins of right lower extremity with pain  He experienced pain and swelling over R leg varicose vein, which he attributed to friction from a PT exercise--he has since modified this activity and seen resolution in the symptoms. He would benefit from compression stockings to help with symptom management in the future. Discussed potential referral to vascular surgery, he declines this for now in favor of more conservative management if possible.   - Compression Sleeve/Stocking Order    Follow-up  Return in about 3 months (around 7/30/2025) for Routine preventive.      25 minutes spent by me on the date of the encounter doing chart review, history and exam, documentation and further activities per the note      Subjective   Jose Carlos is a 69 year old, presenting for the following:  Physical (Painful varicose veins on right thigh./Check iron levels.)        4/30/2025     4:06 PM   Additional Questions   Roomed by KTR           History of Present Illness       Reason for visit:  General exam and iron levels, veins  Symptom onset:  More than a month  Symptoms include:  Fatigue,  "pain  Symptom intensity:  Moderate  Symptom progression:  Staying the same  Had these symptoms before:  Yes  Has tried/received treatment for these symptoms:  No  What makes it worse:  Pressure on the area He is missing 2 dose(s) of medications per week.    Feeling tired, sleeping later in the AM.    RLS has been worse since surgery.  Taking ferrous gluconate for years d/t RLS.    Takes gabapentin 900 mg BID-TID.  Tried Requip in the past, but had SEs with this (drowsy and wasn't as effective).    Takes B12 supplement.      Varicose veins--have settled down a bit.  Suspects one of the PT exercises for the hips aggravated it the vein. It was painful and swollen.  No swelling in the lower legs.    R hip replaced in February.  Now 1.8 cm taller on R side, impacts the gait.  Using heel riser and saw orthotics.  Met with Dr. Donald for follow-up 4/9/25.      Objective    /70 (BP Location: Right arm, Patient Position: Sitting, Cuff Size: Adult Regular)   Pulse 66   Temp 98  F (36.7  C) (Oral)   Resp 18   Ht 1.702 m (5' 7\")   Wt 58.5 kg (129 lb)   SpO2 97%   BMI 20.20 kg/m    Body mass index is 20.2 kg/m .  Physical Exam   GENERAL: alert and no distress  NECK: no adenopathy, no asymmetry, masses, or scars  RESP: lungs clear to auscultation - no rales, rhonchi or wheezes  CV: regular rate and rhythm, normal S1 S2, no S3 or S4, no murmur, click or rub, no peripheral edema, varicosity over right medial thigh without tenderness or palpable cord  MS: no gross musculoskeletal defects noted, no edema  SKIN: no suspicious lesions or rashes  NEURO: Normal strength and tone, mentation intact and speech normal  PSYCH: mentation appears normal, affect normal/bright            Signed Electronically by: BAM Conde CNP    DME (Durable Medical Equipment) Orders and Documentation  Orders Placed This Encounter   Procedures    Compression Sleeve/Stocking Order        The patient was assessed and it was " determined the patient is in need of the following listed DME Supplies/Equipment. Please complete supporting documentation below to demonstrate medical necessity.    Painful varicose vein in R leg requires compression stocking to help provide symptom relief

## 2025-05-01 LAB — TSH SERPL DL<=0.005 MIU/L-ACNC: 1.57 UIU/ML (ref 0.3–4.2)

## 2025-05-12 PROBLEM — M16.11 OSTEOARTHRITIS OF RIGHT HIP: Status: RESOLVED | Noted: 2025-02-24 | Resolved: 2025-05-12

## 2025-05-12 PROBLEM — R29.898 WEAKNESS OF BOTH HIPS: Status: RESOLVED | Noted: 2024-11-25 | Resolved: 2025-05-12

## 2025-05-12 PROBLEM — M25.551 HIP PAIN, RIGHT: Status: RESOLVED | Noted: 2025-02-24 | Resolved: 2025-05-12

## 2025-05-14 NOTE — PROGRESS NOTES
Diagnosis/Summary/Recommendations:    PATIENT: Jose Carlos Bolden  69 year old male     : 1955    RACHAEL: May 29, 2025       MRN: [id number]  [facility] [address]  Mobile Phone  [phone]  Email  [email or web address]     Cognitive testing   Vitamin C  Timing of iron     He granted proxy access to his wife for his mychart         Assessment:  (G25.81) Restless legs syndrome  (primary encounter diagnosis)  No family history of RLS        MRI [facility] 10/26/2023   Mild volume loss  No other issues.      Onset 20s or 30s with RLS  He has been on medication for about 10 years.   Started on gabapentin then ropinirole and then back to gabapentin   He has been on ropinirole in the past.   He may have had dizziness/sleepiness using 0.25mg 3/day      He has been iron as well.      Has symptoms primarily at night but can occur in seated events (sports/play) in the afternoon.   He has problems sitting in an airplane  He has some numbness in his left foot.      May have a minimal neuropathy      Review of diagnosis    RLS     Avoidance of dopamine blockers   Not taking     Motor complication review   Has not had had ICD issues  May have some symptoms in the afternoon  No clear augmentation      Review of Impulse control disorders   Denies      Review of surgical or medication options   Gabapentin  Ropinirole in the past.      Gait/Balance/Falls   Stumbles in the past years     Exercise/Therapy performed/offered   Walks daily and has not been doing strength training      Cognitive/Driving   Retired and was a  and addiction Counsellor.   [place]  [place] problems [place]      Cognitive evaluation 2024 Annabel     Overall, Mr. Bolden is functioning quite well from a neuropsychological standpoint and does not evidence any objective cognitive deficits. A select weakness in mental flexibility is a nonspecific finding but in the context of other very strong executive  functioning scores is not suggestive of focal or lateralized cerebral dysfunction, and it does not raise concerns about acquired neurologic disease. This finding and his subjective cognitive complaints are likely attributable to longstanding weaknesses in complex attention and normative age-related cognitive lapses that may be exacerbated in the context of anxiety, stress, low mood, sleep disturbance, and pain.      Mood   Adopted kids - 2 kids - Servando and Radha   and lives with his wife - second marriage  40 years  1st marriage was 2 years.      Has had some depression and anxiety and managing them.      Hallucinations/delusions   Denies      Sleep   Taking iron and gabapentin  Restless legs RLS  Tries to go to bed at 10pm and may take up to 3 hours or typically 2 hours.   Sometimes within 1 hours      May get up and play solitaire and has problems and gets up and go to another bed and read.   When he falls asleep he stays asleep most of the night except having to get up once or twice to urinate.      Alex  Has not had a sleep study.   Rarely talking in his sleep  Rarely has he acted out his dreams.   Flailing  Wife sleeps in the same bed      Bladder/Renal/Prostate/Gyn/Other   Nocturia 1-2/noc  No daytime urinary problems other than some urgency   Has a chronic weak stream   Seen urology and not on medication  Has not seen a urologist recently  May be interested in discussing medication     GI/Constipation/GERD   Heartburn sometimes  May take tums as needed  Rare constipation or diarrhea.         ENDO/Lipid/DM/Bone density/Thyroid  Dyslipidemia  Hyperlipidemia  On a statin and taking atorvastatin lipitor   Has some leg pain          Recent Labs   Lab Test 06/11/24  0856 01/20/23  0834   CHOL 123 136   HDL 31* 33*   LDL 63 81   TRIG 146 110      No thyroid problems.            TSH   Date Value Ref Range Status   07/31/2019 1.91 0.40 - 4.00 mU/L Final      Bones are okay - not sure if he has had a bone  "density.      No diabetes         Lab Results   Component Value Date     A1C 4.7 07/31/2019         Cardio/heart/Hyper or Hypotensive   Blood pressure has increased over the years  Not taking medications for blood pressure.   No heart problems but there is a family history of heart disease.      Vision/Dry Eyes/Cataracts/Glaucoma/Macular   Using eye drops for pressure  Had unilateral eye surgery for glaucoma   Hyperopia  Glaucoma  Vision are okay  Pressures are a bit high.      Heme/Anticoagulation/Antiplatelet/Anemia/Other  No recent cbc  Not taking an aspirin      Iron deficiency  Ferritin was done recently and was normal.   Ferritin  6/30/2024  123     Vitamin B12 deficiency  B12 : 1100 as of 11/12/2020           Lab Results   Component Value Date     WBC 5.1 11/24/2023     WBC 5.7 11/12/2020            Lab Results   Component Value Date     RBC 4.35 11/24/2023     RBC 4.30 11/12/2020            Lab Results   Component Value Date     HGB 14.3 11/24/2023     HGB 14.0 11/12/2020            Lab Results   Component Value Date     HCT 42.5 11/24/2023     HCT 42.7 11/12/2020      No components found for: \"MCT\"        Lab Results   Component Value Date     MCV 98 11/24/2023     MCV 99 11/12/2020            Lab Results   Component Value Date     MCH 32.9 11/24/2023     MCH 32.6 11/12/2020            Lab Results   Component Value Date     MCHC 33.6 11/24/2023     MCHC 32.8 11/12/2020            Lab Results   Component Value Date     RDW 13.5 11/24/2023     RDW 13.0 11/12/2020            Lab Results   Component Value Date      11/24/2023      11/12/2020         ENT/Resp  May have lost his smell or taste  Non smoker   Benign positional vertigo  Hearing loss  Not wearing his hearing aides.   Tinnitus  tMJ  Reactive airway disease  Rare inhaler     Skin/Cancer/Seborrhea/other  No skin cancer  Rosacea vs seborrhea dermatitis      Musculoskeletal/Pain/Headache  Hip surgery in past on the left and had a fracture " that was not surgically managed  Planning right hip surgery      Left hip pain  Sciatica  TMJ  Epicondylitis  Hip arthritis  Arthritis  Lateral epicondylitis  Medial epicondylitis   Kyphosis  Scoliosis  osteoarthritis     Other:    Possible neuropathy  Slight reduced pp at toes  Vibration borderline normal  Temperature probably okay for age  Jps normal    He has been on gabapentin and ropinirole in the past.   He has ongoing primary insomnia that is related to his RLS and other factors.   He also other issues with his low back/torso pain     Discussed  Gabapentin  Pregabalin lyrica  Adjunctive therapy of long acting ropinirole XL 2mg from [facility] (he had problems with a low dose and would need to be aware of the risk of impulse control issues  Other option is short acting low dose ropinirole 0.25mg at night -   May want to go on a drug holiday and would have him work with pharmacy about this - ie slow wean if he is going to do this.   Reviewed neuropsych findings that were reassuring .  He has had a ferritin level that was fine.   May want to talk with pharmacy about timing of iron which he should remain on.   Return to see me in 6 months or Jacquelyn Gomes NP in 3 months.      Pharmacy (MTM) consultation and medication management    Treating insomnia can be difficult        Medications       2:30p 5p 7p       Acetaminophen tylenol 325         Acetaminophen tylenol 500         Albuterol inhaler  prn             Amoxicillin clav augmentin  no             Aspirin 81mg 1   1     Atorvastatin lipitor 20mg         1       Nakul carbonate tums 500mg prn        Cyancobolamin Vit B12 1000mcg        1       Dorzolamide timolol cosopt soln  right     right       Ferrous gluconate fergon 324       1        Gabapentin neurontin 600mg   1 1 1       Hydrocortisone 2.5% cream  prn             Ibuprofen advil/motrin 600mg prn        Ketoconazole cream   prn             Latanoprost Xalatan 0.005%        both       "  Metronidazole cream   prn             Oxycodone roxicodone 5mg prn        Sildenafil viagra 25mg prn        Triamcinolone cream   prn                                                                     Latest Reference Range & Units 04/30/25 17:09   Ferritin 31 - 409 ng/mL 99         Plan:    RLS symptoms are stable, well-controlled on current regimen    Taking gabapentin 2-3x per day, if it is 2x/day will take 900mg, otherwise will do 600mg per dose. About 1/2 the time will switch between the two regimens.    Symptoms will emerge if he is lying down but will also get symptoms if he is \"confined\" to a chair for a long period of time.     Balance is okay, has leg length discrepancy after R hip replacement and is getting used to this.    He is interested in adjusting his regimen slightly, which is reasonable. Discussed taking 900mg (1.5 tabs) at 2pm and 1200mg (2 tabs) at 7-8pm, which is reasonable to try. Counseled to watch for increased somnolence and confusion in the evenings and in the morning.     Note that any medical procedure that incurs some blood loss may make restless legs worse, counseled to consider getting iron infusions beforehand. Consider taking vitamin C with ferrous gluconate to help with absorption.     Return to clinic in 9mo      Coding statement:   Medical Decision Making:  #  Chronic progressive medical conditions addressed  - see above --   Review and/or interpretation of unique test or documentation from a provider outside of neurology yes - ferritin   Independent historian provided additional details  no I  Prescription drug management and review of potential side effects and/or monitoring for side effects  -- see above ---  Health impacted by social determinants of health  no    I have reviewed the note as documented above.  This accurately captures the substance of my conversation with the patient and total time spent preparing for visit, executing visit and completing visit on the day " of the visit:  30 minutes.  The portion of this total time included face to face time     The longitudinal plan of care for Jose Carlos Bolden was addressed during this visit. Due to the added complexity in care, I will continue to support Jose Carlos Bolden in the subsequent management of this condition(s) and with the ongoing continuity of care of this condition(s).      Taurus Woodard MD     ______________________________________    Last visit date and details:             ______________________________________      Patient was asked about 14 Review of systems including changes in vision (dry eyes, double vision), hearing, heart, lungs, musculoskeletal, depression, anxiety, snoring, RBD, insomnia, urinary frequency, urinary urgency, constipation, swallowing problems, hematological, ID, allergies, skin problems: seborrhea, endocrinological: thyroid, diabetes, cholesterol; balance, weight changes, and other neurological problems and these were not significant at this time except for   Patient Active Problem List   Diagnosis    Hearing loss    Reactive airway disease    Restless legs syndrome    Kyphosis    Scoliosis    Vitamin B12 deficiency    Osteoarthritis    Iron deficiency    Hyperlipidemia LDL goal <130    Benign paroxysmal positional vertigo    Dyslipidemia    Family history of ischemic heart disease    Hearing loss, bilateral    Left hip pain    Lateral epicondylitis    Medial epicondylitis of elbow    Pure hypercholesterolemia    Rosacea    Sciatica    Tinnitus    Hyperopia    Glaucoma    Arthritis    Osteoarthritis of hip    TMJ (temporomandibular joint syndrome)          Allergies   Allergen Reactions    Penicillium Notatum Allergy Skin Test      Tolerated amoxicillin 6/24    Other Food Allergy     Cats     Feathers     Food      Potato chips, tomatillos, cherry de la fuente    Seasonal Allergies     Smoke.     Sulfa Antibiotics Other (See Comments)     Confusion and dizziness  Questionable allergy     Past Surgical History:    Procedure Laterality Date    ARTHROPLASTY HIP ANTERIOR Right 2/21/2025    Procedure: Right total hip arthroplasty via anterior approach;  Surgeon: Taurus Donald MD;  Location: [facility] OR    COLONOSCOPY      normal    COLONOSCOPY N/A 12/17/2020    Procedure: COLONOSCOPY, WITH POLYPECTOMY AND BIOPSY;  Surgeon: Marvel Cook MD;  Location: [facility] OR    EYE SURGERY Right 2023    for glaucoma 2023 - laser treatment    JOINT REPLACEMENT Left 2014    hip, CATALINA    ORTHOPEDIC SURGERY Left 2022    no weight bearing for 3.5 months - no surgery  - fracture    OTHER SURGICAL HISTORY  1985    attempted to reverse his vasectomy    VASECTOMY  1975     Past Medical History:   Diagnosis Date    Allergy     sulfa, penicillium, food (tomatillos, ground cherries, some potato chips)    Anxiety     Back pain     BPPV (benign paroxysmal positional vertigo)     Depressive disorder 1974    Glaucoma suspect     Head injury 1966    not diagnosed    Headache     Hearing loss     Hip pain, left     Hyperlipidemia LDL goal <130     Hypertension 2019    sub threshold    Iron deficiency     Kyphosis     Osteoarthritis     Reactive airway disease     Restless leg syndrome     Scoliosis     Uncomplicated asthma 1970    recently dx, sub threshold?    Vitamin B12 deficiency      Social History     Socioeconomic History    Marital status:      Spouse name: Not on file    Number of children: Not on file    Years of education: Not on file    Highest education level: Not on file   Occupational History    Not on file   Tobacco Use    Smoking status: Never    Smokeless tobacco: Never    Tobacco comments:     heavy childhood exposure   Substance and Sexual Activity    Alcohol use: Yes     Comment: moderate use throughout all adulthood    Drug use: Yes     Types: Marijuana     Comment: Occsnl CBD, rare THC, night for RLS & glaucoma. Hx when ale    Sexual activity: Yes     Partners: Female     Birth control/protection: Male Surgical   Other Topics  Concern    Parent/sibling w/ CABG, MI or angioplasty before 65F 55M? 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     Social Drivers of Health     Financial Resource Strain: Low Risk  (9/22/2023)    Financial Resource Strain     Within the past 12 months, have you or your family members you live with been unable to get utilities (heat, electricity) when it was really needed?: No   Food Insecurity: Low Risk  (9/22/2023)    Food Insecurity     Within the past 12 months, did you worry that your food would run out before you got money to buy more?: No     Within the past 12 months, did the food you bought just not last and you didn t have money to get more?: No   Transportation Needs: Low Risk  (9/22/2023)    Transportation Needs     Within the past 12 months, has lack of transportation kept you from medical appointments, getting your medicines, non-medical meetings or appointments, work, or from getting things that you need?: No   Physical Activity: Not on file   Stress: Not on file   Social Connections: Not on file   Interpersonal Safety: Low Risk  (2/21/2025)    Interpersonal Safety     Do you feel physically and emotionally safe where you currently live?: Yes     Within the past 12 months, have you been hit, slapped, kicked or otherwise physically hurt by someone?: No     Within the past 12 months, have you been humiliated or emotionally abused in other ways by your partner or ex-partner?: No   Housing Stability: Low Risk  (9/22/2023)    Housing Stability     Do you have housing? : Yes     Are you worried about losing your housing?: No       Drug and lactation database from the [place] [employer]:  http://toxnet.nlm.nih.gov/cgi-bin/sis/htmlgen?LACT      B/P: Data Unavailable, T: Data Unavailable, P: Data Unavailable, R: Data Unavailable 0 lbs 0 oz  There were no vitals taken for this visit., There is no height or weight on file  to calculate BMI.  Medications and Vitals not listed above were documented in the cart and reviewed by me.     Current Outpatient Medications   Medication Sig Dispense Refill    acetaminophen (TYLENOL) 325 MG tablet Take 2 tablets (650 mg) by mouth every 4 hours as needed for other (mild pain). 100 tablet 0    acetaminophen (TYLENOL) 500 MG tablet Take 500-1,000 mg by mouth every 6 hours as needed for mild pain.      albuterol (PROAIR HFA) 108 (90 BASE) MCG/ACT Inhaler Inhale 2 puffs into the lungs every 4 hours as needed for shortness of breath / dyspnea 1 Inhaler 0    aspirin 81 MG EC tablet Take 1 tablet (81 mg) by mouth 2 times daily. 60 tablet 0    atorvastatin (LIPITOR) 20 MG tablet TAKE 1 TABLET (20 MG) BY MOUTH DAILY 90 tablet 2    calcium carbonate (TUMS) 500 MG chewable tablet Take 1 tablet (500 mg) by mouth daily as needed for heartburn.      Cyanocobalamin (VITAMIN B12) 1000 MCG TBCR Take 1 tablet by mouth at bedtime. Dose varies 90 tablet 3    dorzolamide-timolol (COSOPT) 2-0.5 % ophthalmic solution Place 1 drop into the right eye 2 times daily. 10 mL 5    ferrous gluconate (FERGON) 324 (38 Fe) MG tablet Take 324 mg by mouth at bedtime.      gabapentin (NEURONTIN) 600 MG tablet Take 1.5 tablets (900 mg) by mouth 2 times daily At 4pm and 8pm. (Patient taking differently: Take 600-900 mg by mouth. 2-3 times per day. Totals 1800mg per day) 270 tablet 3    hydrocortisone 2.5 % cream Apply topically 2 times daily Apply for 5-7 days for irritation. (Patient taking differently: Apply topically as needed. Apply for 5-7 days for irritation.) 20 g 3    ibuprofen (ADVIL/MOTRIN) 200 MG tablet Take 400 mg by mouth every 4 hours as needed for pain.      ibuprofen (ADVIL/MOTRIN) 600 MG tablet Take 1 tablet (600 mg) by mouth every 6 hours as needed for mild pain. 30 tablet 0    ketoconazole (NIZORAL) 2 % external cream Apply topically 2 times daily To face as needed for rash 60 g 11    latanoprost (XALATAN) 0.005 %  ophthalmic solution Place 1 drop into both eyes daily (Patient taking differently: Place 1 drop into both eyes at bedtime.) 2.5 mL 11    metroNIDAZOLE (NORITATE) 1 % cream Apply topically as needed. 60 g 0    oxyCODONE (ROXICODONE) 5 MG tablet Take 1-2 tablets (5-10 mg) by mouth every 4 hours as needed for moderate to severe pain. 26 tablet 0    sildenafil (VIAGRA) 25 MG tablet Take 1-4 tablets ( mg) by mouth daily as needed (30 min - 2 hours prior sexual activity). 20 tablet 1    triamcinolone (KENALOG) 0.1 % external cream Apply topically 2 times daily (Patient taking differently: Apply topically as needed.) 30 g 0         Taurus Woodard MD

## 2025-05-29 ENCOUNTER — OFFICE VISIT (OUTPATIENT)
Dept: NEUROLOGY | Facility: CLINIC | Age: 70
End: 2025-05-29
Payer: COMMERCIAL

## 2025-05-29 VITALS
OXYGEN SATURATION: 97 % | SYSTOLIC BLOOD PRESSURE: 126 MMHG | RESPIRATION RATE: 16 BRPM | DIASTOLIC BLOOD PRESSURE: 75 MMHG | HEART RATE: 64 BPM | WEIGHT: 133.4 LBS | BODY MASS INDEX: 20.89 KG/M2

## 2025-05-29 DIAGNOSIS — G25.81 RESTLESS LEGS SYNDROME: Primary | ICD-10-CM

## 2025-05-29 RX ORDER — GABAPENTIN 600 MG/1
TABLET ORAL
Qty: 270 TABLET | Refills: 5 | Status: SHIPPED | OUTPATIENT
Start: 2025-05-29

## 2025-05-29 ASSESSMENT — PAIN SCALES - GENERAL: PAINLEVEL_OUTOF10: MILD PAIN (2)

## 2025-05-29 NOTE — PATIENT INSTRUCTIONS
"Plan:    RLS symptoms are stable, well-controlled on current regimen    Taking gabapentin 2-3x per day, if it is 2x/day will take 900mg, otherwise will do 600mg per dose. About 1/2 the time will switch between the two regimens.    Symptoms will emerge if he is lying down but will also get symptoms if he is \"confined\" to a chair for a long period of time.     Balance is okay, has leg length discrepancy after R hip replacement and is getting used to this.    He is interested in adjusting his regimen slightly, which is reasonable. Discussed taking 900mg (1.5 tabs) at 2pm and 1200mg (2 tabs) at 7-8pm, which is reasonable to try. Counseled to watch for increased somnolence and confusion in the evenings and in the morning.       Note that any medical procedure that incurs some blood loss may make restless legs worse, counseled to consider getting iron infusions beforehand. Consider taking vitamin C with ferrous gluconate to help with absorption.     Return to clinic in 9mo  "

## 2025-05-29 NOTE — LETTER
2025       RE: Jose Carlos Bolden  [address] [facility] [address]     Dear Colleague,    Thank you for referring your patient, Jose Carlos Bolden, to the [facility] [place] at [facility]. Please see a copy of my visit note below.        Diagnosis/Summary/Recommendations:    PATIENT: Jose Carlos Bolden  69 year old male     : 1955    RACHAEL: May 29, 2025       MRN: [id number]  [address] [facility] [address]  Mobile Phone  [phone]  Email  [email or web address]     Cognitive testing   Vitamin C  Timing of iron     He granted proxy access to his wife for his mychart         Assessment:  (G25.81) Restless legs syndrome  (primary encounter diagnosis)  No family history of RLS        MRI [facility] 10/26/2023   Mild volume loss  No other issues.      Onset 20s or 30s with RLS  He has been on medication for about 10 years.   Started on gabapentin then ropinirole and then back to gabapentin   He has been on ropinirole in the past.   He may have had dizziness/sleepiness using 0.25mg 3/day      He has been iron as well.      Has symptoms primarily at night but can occur in seated events (sports/play) in the afternoon.   He has problems sitting in an airplane  He has some numbness in his left foot.      May have a minimal neuropathy      Review of diagnosis    RLS     Avoidance of dopamine blockers   Not taking     Motor complication review   Has not had had ICD issues  May have some symptoms in the afternoon  No clear augmentation      Review of Impulse control disorders   Denies      Review of surgical or medication options   Gabapentin  Ropinirole in the past.      Gait/Balance/Falls   Stumbles in the past years     Exercise/Therapy performed/offered   Walks daily and has not been doing strength training      Cognitive/Driving   Retired and was a  and addiction Counsellor.   [place]  problems driving      Cognitive evaluation 7/9/2024 Annabel     Overall, Mr. Bolden is functioning quite well from a neuropsychological standpoint and does not evidence any objective cognitive deficits. A select weakness in mental flexibility is a nonspecific finding but in the context of other very strong executive functioning scores is not suggestive of focal or lateralized cerebral dysfunction, and it does not raise concerns about acquired neurologic disease. This finding and his subjective cognitive complaints are likely attributable to longstanding weaknesses in complex attention and normative age-related cognitive lapses that may be exacerbated in the context of anxiety, stress, low mood, sleep disturbance, and pain.      Mood   Adopted kids - 2 kids - Servando and Radha   and lives with his wife - second marriage  40 years  1st marriage was 2 years.      Has had some depression and anxiety and managing them.      Hallucinations/delusions   Denies      Sleep   Taking iron and gabapentin  Restless legs RLS  Tries to go to bed at 10pm and may take up to 3 hours or typically 2 hours.   Sometimes within 1 hours      May get up and play solitaire and has problems and gets up and go to another bed and read.   When he falls asleep he stays asleep most of the night except having to get up once or twice to urinate.      Alex  Has not had a sleep study.   Rarely talking in his sleep  Rarely has he acted out his dreams.   Flailing  Wife sleeps in the same bed      Bladder/Renal/Prostate/Gyn/Other   Nocturia 1-2/noc  No daytime urinary problems other than some urgency   Has a chronic weak stream   Seen urology and not on medication  Has not seen a urologist recently  May be interested in discussing medication     GI/Constipation/GERD   Heartburn sometimes  May take tums as needed  Rare constipation or diarrhea.         ENDO/Lipid/DM/Bone density/Thyroid  Dyslipidemia  Hyperlipidemia  On a statin and taking atorvastatin  "lipitor   Has some leg pain          Recent Labs   Lab Test 06/11/24  0856 01/20/23  0834   CHOL 123 136   HDL 31* 33*   LDL 63 81   TRIG 146 110      No thyroid problems.            TSH   Date Value Ref Range Status   07/31/2019 1.91 0.40 - 4.00 mU/L Final      Bones are okay - not sure if he has had a bone density.      No diabetes         Lab Results   Component Value Date     A1C 4.7 07/31/2019         Cardio/heart/Hyper or Hypotensive   Blood pressure has increased over the years  Not taking medications for blood pressure.   No heart problems but there is a family history of heart disease.      Vision/Dry Eyes/Cataracts/Glaucoma/Macular   Using eye drops for pressure  Had unilateral eye surgery for glaucoma   Hyperopia  Glaucoma  Vision are okay  Pressures are a bit high.      Heme/Anticoagulation/Antiplatelet/Anemia/Other  No recent cbc  Not taking an aspirin      Iron deficiency  Ferritin was done recently and was normal.   Ferritin  6/30/2024  123     Vitamin B12 deficiency  B12 : 1100 as of 11/12/2020           Lab Results   Component Value Date     WBC 5.1 11/24/2023     WBC 5.7 11/12/2020            Lab Results   Component Value Date     RBC 4.35 11/24/2023     RBC 4.30 11/12/2020            Lab Results   Component Value Date     HGB 14.3 11/24/2023     HGB 14.0 11/12/2020            Lab Results   Component Value Date     HCT 42.5 11/24/2023     HCT 42.7 11/12/2020      No components found for: \"MCT\"        Lab Results   Component Value Date     MCV 98 11/24/2023     MCV 99 11/12/2020            Lab Results   Component Value Date     MCH 32.9 11/24/2023     MCH 32.6 11/12/2020            Lab Results   Component Value Date     MCHC 33.6 11/24/2023     MCHC 32.8 11/12/2020            Lab Results   Component Value Date     RDW 13.5 11/24/2023     RDW 13.0 11/12/2020            Lab Results   Component Value Date      11/24/2023      11/12/2020         ENT/Resp  May have lost his smell or " taste  Non smoker   Benign positional vertigo  Hearing loss  Not wearing his hearing aides.   Tinnitus  tMJ  Reactive airway disease  Rare inhaler     Skin/Cancer/Seborrhea/other  No skin cancer  Rosacea vs seborrhea dermatitis      Musculoskeletal/Pain/Headache  Hip surgery in past on the left and had a fracture that was not surgically managed  Planning right hip surgery      Left hip pain  Sciatica  TMJ  Epicondylitis  Hip arthritis  Arthritis  Lateral epicondylitis  Medial epicondylitis   Kyphosis  Scoliosis  osteoarthritis     Other:    Possible neuropathy  Slight reduced pp at toes  Vibration borderline normal  Temperature probably okay for age  Jps normal    He has been on gabapentin and ropinirole in the past.   He has ongoing primary insomnia that is related to his RLS and other factors.   He also other issues with his low back/torso pain     Discussed  Gabapentin  Pregabalin lyrica  Adjunctive therapy of long acting ropinirole XL 2mg from [facility] (he had problems with a low dose and would need to be aware of the risk of impulse control issues  Other option is short acting low dose ropinirole 0.25mg at night -   May want to go on a drug holiday and would have him work with pharmacy about this - ie slow wean if he is going to do this.   Reviewed neuropsych findings that were reassuring .  He has had a ferritin level that was fine.   May want to talk with pharmacy about timing of iron which he should remain on.   Return to see me in 6 months or Jacquelyn Gomes NP in 3 months.      Pharmacy (MT) consultation and medication management    Treating insomnia can be difficult        Medications       2:30p 5p 7p       Acetaminophen tylenol 325         Acetaminophen tylenol 500         Albuterol inhaler  prn             Amoxicillin clav augmentin  no             Aspirin 81mg 1   1     Atorvastatin lipitor 20mg         1       Nakul carbonate tums 500mg prn        Cyancobolamin Vit B12 1000mcg        1      "  Dorzolamide timolol cosopt soln  right     right       Ferrous gluconate fergon 324       1        Gabapentin neurontin 600mg   1 1 1       Hydrocortisone 2.5% cream  prn             Ibuprofen advil/motrin 600mg prn        Ketoconazole cream   prn             Latanoprost Xalatan 0.005%        both        Metronidazole cream   prn             Oxycodone roxicodone 5mg prn        Sildenafil viagra 25mg prn        Triamcinolone cream   prn                                                                     Latest Reference Range & Units 04/30/25 17:09   Ferritin 31 - 409 ng/mL 99         Plan:    RLS symptoms are stable, well-controlled on current regimen    Taking gabapentin 2-3x per day, if it is 2x/day will take 900mg, otherwise will do 600mg per dose. About 1/2 the time will switch between the two regimens.    Symptoms will emerge if he is lying down but will also get symptoms if he is \"confined\" to a chair for a long period of time.     Balance is okay, has leg length discrepancy after R hip replacement and is getting used to this.    He is interested in adjusting his regimen slightly, which is reasonable. Discussed taking 900mg (1.5 tabs) at 2pm and 1200mg (2 tabs) at 7-8pm, which is reasonable to try. Counseled to watch for increased somnolence and confusion in the evenings and in the morning.     Note that any medical procedure that incurs some blood loss may make restless legs worse, counseled to consider getting iron infusions beforehand. Consider taking vitamin C with ferrous gluconate to help with absorption.     Return to clinic in 9mo      Coding statement:   Medical Decision Making:  #  Chronic progressive medical conditions addressed  - see above --   Review and/or interpretation of unique test or documentation from a provider outside of neurology yes - ferritin   Independent historian provided additional details  no I  Prescription drug management and review of potential side effects and/or monitoring " for side effects  -- see above ---  Health impacted by social determinants of health  no    I have reviewed the note as documented above.  This accurately captures the substance of my conversation with the patient and total time spent preparing for visit, executing visit and completing visit on the day of the visit:  30 minutes.  The portion of this total time included face to face time     The longitudinal plan of care for Jose Carlos Bolden was addressed during this visit. Due to the added complexity in care, I will continue to support Jose Carlos Bolden in the subsequent management of this condition(s) and with the ongoing continuity of care of this condition(s).      Taurus Woodard MD     ______________________________________    Last visit date and details:             ______________________________________      Patient was asked about 14 Review of systems including changes in vision (dry eyes, double vision), hearing, heart, lungs, musculoskeletal, depression, anxiety, snoring, RBD, insomnia, urinary frequency, urinary urgency, constipation, swallowing problems, hematological, ID, allergies, skin problems: seborrhea, endocrinological: thyroid, diabetes, cholesterol; balance, weight changes, and other neurological problems and these were not significant at this time except for   Patient Active Problem List   Diagnosis     Hearing loss     Reactive airway disease     Restless legs syndrome     Kyphosis     Scoliosis     Vitamin B12 deficiency     Osteoarthritis     Iron deficiency     Hyperlipidemia LDL goal <130     Benign paroxysmal positional vertigo     Dyslipidemia     Family history of ischemic heart disease     Hearing loss, bilateral     Left hip pain     Lateral epicondylitis     Medial epicondylitis of elbow     Pure hypercholesterolemia     Rosacea     Sciatica     Tinnitus     Hyperopia     Glaucoma     Arthritis     Osteoarthritis of hip     TMJ (temporomandibular joint syndrome)          Allergies   Allergen  Reactions     Penicillium Notatum Allergy Skin Test      Tolerated amoxicillin 6/24     Other Food Allergy      Cats      Feathers      Food      Potato chips, tomatillos, cherry de la fuente     Seasonal Allergies      Smoke.      Sulfa Antibiotics Other (See Comments)     Confusion and dizziness  Questionable allergy     Past Surgical History:   Procedure Laterality Date     ARTHROPLASTY HIP ANTERIOR Right 2/21/2025    Procedure: Right total hip arthroplasty via anterior approach;  Surgeon: Taurus Donald MD;  Location: [facility] OR     COLONOSCOPY      normal     COLONOSCOPY N/A 12/17/2020    Procedure: COLONOSCOPY, WITH POLYPECTOMY AND BIOPSY;  Surgeon: Marvel Cook MD;  Location: [facility] OR     EYE SURGERY Right 2023    for glaucoma 2023 - laser treatment     JOINT REPLACEMENT Left 2014    hip, CATALINA     ORTHOPEDIC SURGERY Left 2022    no weight bearing for 3.5 months - no surgery  - fracture     OTHER SURGICAL HISTORY  1985    attempted to reverse his vasectomy     VASECTOMY  1975     Past Medical History:   Diagnosis Date     Allergy     sulfa, penicillium, food (tomatillos, ground cherries, some potato chips)     Anxiety      Back pain      BPPV (benign paroxysmal positional vertigo)      Depressive disorder 1974     Glaucoma suspect      Head injury 1966    not diagnosed     Headache      Hearing loss      Hip pain, left      Hyperlipidemia LDL goal <130      Hypertension 2019    sub threshold     Iron deficiency      Kyphosis      Osteoarthritis      Reactive airway disease      Restless leg syndrome      Scoliosis      Uncomplicated asthma 1970    recently dx, sub threshold?     Vitamin B12 deficiency      Social History     Socioeconomic History     Marital status:      Spouse name: Not on file     Number of children: Not on file     Years of education: Not on file     Highest education level: Not on file   Occupational History     Not on file   Tobacco Use     Smoking status: Never     Smokeless tobacco: Never      Tobacco comments:     heavy childhood exposure   Substance and Sexual Activity     Alcohol use: Yes     Comment: moderate use throughout all adulthood     Drug use: Yes     Types: Marijuana     Comment: Occsnl CBD, rare THC, night for RLS & glaucoma. Hx when ale     Sexual activity: Yes     Partners: Female     Birth control/protection: Male Surgical   Other Topics Concern     Parent/sibling w/ CABG, MI or angioplasty before 65F 55M? 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     Social Drivers of Health     Financial Resource Strain: Low Risk  (9/22/2023)    Financial Resource Strain      Within the past 12 months, have you or your family members you live with been unable to get utilities (heat, electricity) when it was really needed?: No   Food Insecurity: Low Risk  (9/22/2023)    Food Insecurity      Within the past 12 months, did you worry that your food would run out before you got money to buy more?: No      Within the past 12 months, did the food you bought just not last and you didn t have money to get more?: No   Transportation Needs: Low Risk  (9/22/2023)    Transportation Needs      Within the past 12 months, has lack of transportation kept you from medical appointments, getting your medicines, non-medical meetings or appointments, work, or from getting things that you need?: No   Physical Activity: Not on file   Stress: Not on file   Social Connections: Not on file   Interpersonal Safety: Low Risk  (2/21/2025)    Interpersonal Safety      Do you feel physically and emotionally safe where you currently live?: Yes      Within the past 12 months, have you been hit, slapped, kicked or otherwise physically hurt by someone?: No      Within the past 12 months, have you been humiliated or emotionally abused in other ways by your partner or ex-partner?: No   Housing Stability: Low Risk  (9/22/2023)    Housing Stability      Do you have  housing? : Yes      Are you worried about losing your housing?: No       Drug and lactation database from the [place] [employer]:  http://toxnet.nlm.nih.gov/cgi-bin/sis/htmlgen?LACT      B/P: Data Unavailable, T: Data Unavailable, P: Data Unavailable, R: Data Unavailable 0 lbs 0 oz  There were no vitals taken for this visit., There is no height or weight on file to calculate BMI.  Medications and Vitals not listed above were documented in the cart and reviewed by me.     Current Outpatient Medications   Medication Sig Dispense Refill     acetaminophen (TYLENOL) 325 MG tablet Take 2 tablets (650 mg) by mouth every 4 hours as needed for other (mild pain). 100 tablet 0     acetaminophen (TYLENOL) 500 MG tablet Take 500-1,000 mg by mouth every 6 hours as needed for mild pain.       albuterol (PROAIR HFA) 108 (90 BASE) MCG/ACT Inhaler Inhale 2 puffs into the lungs every 4 hours as needed for shortness of breath / dyspnea 1 Inhaler 0     aspirin 81 MG EC tablet Take 1 tablet (81 mg) by mouth 2 times daily. 60 tablet 0     atorvastatin (LIPITOR) 20 MG tablet TAKE 1 TABLET (20 MG) BY MOUTH DAILY 90 tablet 2     calcium carbonate (TUMS) 500 MG chewable tablet Take 1 tablet (500 mg) by mouth daily as needed for heartburn.       Cyanocobalamin (VITAMIN B12) 1000 MCG TBCR Take 1 tablet by mouth at bedtime. Dose varies 90 tablet 3     dorzolamide-timolol (COSOPT) 2-0.5 % ophthalmic solution Place 1 drop into the right eye 2 times daily. 10 mL 5     ferrous gluconate (FERGON) 324 (38 Fe) MG tablet Take 324 mg by mouth at bedtime.       gabapentin (NEURONTIN) 600 MG tablet Take 1.5 tablets (900 mg) by mouth 2 times daily At 4pm and 8pm. (Patient taking differently: Take 600-900 mg by mouth. 2-3 times per day. Totals 1800mg per day) 270 tablet 3     hydrocortisone 2.5 % cream Apply topically 2 times daily Apply for 5-7 days for irritation. (Patient taking differently: Apply topically as needed. Apply for 5-7  days for irritation.) 20 g 3     ibuprofen (ADVIL/MOTRIN) 200 MG tablet Take 400 mg by mouth every 4 hours as needed for pain.       ibuprofen (ADVIL/MOTRIN) 600 MG tablet Take 1 tablet (600 mg) by mouth every 6 hours as needed for mild pain. 30 tablet 0     ketoconazole (NIZORAL) 2 % external cream Apply topically 2 times daily To face as needed for rash 60 g 11     latanoprost (XALATAN) 0.005 % ophthalmic solution Place 1 drop into both eyes daily (Patient taking differently: Place 1 drop into both eyes at bedtime.) 2.5 mL 11     metroNIDAZOLE (NORITATE) 1 % cream Apply topically as needed. 60 g 0     oxyCODONE (ROXICODONE) 5 MG tablet Take 1-2 tablets (5-10 mg) by mouth every 4 hours as needed for moderate to severe pain. 26 tablet 0     sildenafil (VIAGRA) 25 MG tablet Take 1-4 tablets ( mg) by mouth daily as needed (30 min - 2 hours prior sexual activity). 20 tablet 1     triamcinolone (KENALOG) 0.1 % external cream Apply topically 2 times daily (Patient taking differently: Apply topically as needed.) 30 g 0         Taurus Woodard MD      Again, thank you for allowing me to participate in the care of your patient.      Sincerely,    Taurus Woodard MD

## 2025-05-29 NOTE — NURSING NOTE
Chief Complaint   Patient presents with    RECHECK     /75 (BP Location: Right arm, Patient Position: Sitting, Cuff Size: Adult Regular)   Pulse 64   Resp 16   Wt 60.5 kg (133 lb 6.4 oz)   SpO2 97%   BMI 20.89 kg/m      FRANCOIS MONTEZ

## 2025-06-17 ENCOUNTER — OFFICE VISIT (OUTPATIENT)
Dept: OPHTHALMOLOGY | Facility: CLINIC | Age: 70
End: 2025-06-17
Attending: OPHTHALMOLOGY
Payer: COMMERCIAL

## 2025-06-17 DIAGNOSIS — H25.13 AGE-RELATED NUCLEAR CATARACT OF BOTH EYES: ICD-10-CM

## 2025-06-17 DIAGNOSIS — H40.1131 PRIMARY OPEN ANGLE GLAUCOMA (POAG) OF BOTH EYES, MILD STAGE: Primary | ICD-10-CM

## 2025-06-17 PROCEDURE — G0463 HOSPITAL OUTPT CLINIC VISIT: HCPCS | Performed by: OPHTHALMOLOGY

## 2025-06-17 ASSESSMENT — SLIT LAMP EXAM - LIDS
COMMENTS: DERMATOCHALASIS
COMMENTS: DERMATOCHALASIS

## 2025-06-17 ASSESSMENT — CONF VISUAL FIELD
OS_SUPERIOR_TEMPORAL_RESTRICTION: 0
OS_INFERIOR_NASAL_RESTRICTION: 0
OD_SUPERIOR_NASAL_RESTRICTION: 0
OD_INFERIOR_TEMPORAL_RESTRICTION: 0
OS_SUPERIOR_NASAL_RESTRICTION: 0
OD_INFERIOR_NASAL_RESTRICTION: 0
METHOD: COUNTING FINGERS
OS_INFERIOR_TEMPORAL_RESTRICTION: 0
OD_SUPERIOR_TEMPORAL_RESTRICTION: 0
OS_NORMAL: 1
OD_NORMAL: 1

## 2025-06-17 ASSESSMENT — TONOMETRY
OS_IOP_MMHG: 18
OS_IOP_MMHG: 16
OD_IOP_MMHG: 11
IOP_METHOD: APPLANATION
OD_IOP_MMHG: 10

## 2025-06-17 ASSESSMENT — EXTERNAL EXAM - RIGHT EYE: OD_EXAM: NORMAL

## 2025-06-17 ASSESSMENT — VISUAL ACUITY
OS_CC: 20/20
OS_CC+: -2
METHOD: SNELLEN - LINEAR
OD_CC: 20/20
OD_CC+: -2

## 2025-06-17 ASSESSMENT — CUP TO DISC RATIO
OS_RATIO: 0.55
OD_RATIO: 0.7

## 2025-06-17 ASSESSMENT — EXTERNAL EXAM - LEFT EYE: OS_EXAM: NORMAL

## 2025-06-17 NOTE — PROGRESS NOTES
Chief Complaint/Presenting Concern: Glaucoma     History of Present Illness:   Jose Carlos Bolden is a 68 year old patient who presents for evaluation of glaucoma. Patient previously followed with Dr. Arias and Dr. Patel as glaucoma suspect, but noted changed on OCT and VF at visit 05/2023. S/p SLT right eye 10/13/23    06/17/2025: Presents for IOP check following SLT right eye (4/25/25)    Relevant Past Medical/Family/Social History: HTN    Relevant Review of Systems: Negative     Diagnosis: Primary open angle glaucoma , mild stage each eye   Year diagnosis: 2023. Previously glaucoma suspect  Previous glaucoma surgery/laser:   s/p SLT right eye 10/13/23, 4/25/25  Maximum intraocular pressure 22/22  Currently Meds: None  Family history: positive, 2 brothers, sister, grandmother  CCT: [id number]  Gonio: Open 360, TM heavily pigmented both eyes  Refractive status: Mild hyperopia  Trauma history: positive, remote history of hitting eye with a soccer ball  Steroid exposure: negative  Vasospastic disease: Migraine/Raynaud phenomenon: negative  A past hemodynamic crisis or Low BP:: negative  Meds AEs/intolerance:  PMHx: Reactive airway disease, HLD, osteoarthritis  Anticoagulants: None     Testing:  Visual field 4/3/25  Right eye - few scattered depressed points, stable    Left eye - few scattered depressed points, stable   OCT Optic Nerve RNFL Spectralis 4/3/25  right eye: Superior and Inferior Thinning, IT RNFL thinning compared to last OCT.   left eye: normal thickness 360, stable from previous     Additional Ocular History:   Early NS each eye     Plan/Recommendations:  Discussed findings with patient.  Angles open on gonio but heavily pigmented.   SLT right eye performed on 10/13/23, RNFL progression right eye since then  Repeated SLT right eye on 4/25/25  IOP today 11/18, did not use Cosopt this morning  Continue Latanoprost at bedtime both eyes  Continue Cosopt twice daily right eye if able to remember to take it     RTC in  4 months for repeat [place] and [place] [facility] [place]    Alejandro Schuster MD  Resident Physician, PGY-3  Department of Ophthalmology     Physician Attestation     Attending Physician Attestation:  Complete documentation of historical and exam elements from today's encounter can be found in the full encounter summary report (not reduplicated in this progress note). I personally obtained the chief complaint(s) and history of present illness. I confirmed and edited as necessary the review of systems, past medical/surgical history, family history, social history, and examination findings as documented by others; and I examined the patient myself. I personally reviewed the relevant tests, images, and reports as documented above. I formulated and edited as necessary the assessment and plan and discussed the findings and management plan with the patient and any family members present at the time of the visit.  Orlando Schilling M.D., Glaucoma, June 17, 2025

## 2025-06-19 ENCOUNTER — OFFICE VISIT (OUTPATIENT)
Dept: URGENT CARE | Facility: URGENT CARE | Age: 70
End: 2025-06-19
Payer: COMMERCIAL

## 2025-06-19 VITALS
HEART RATE: 56 BPM | DIASTOLIC BLOOD PRESSURE: 72 MMHG | OXYGEN SATURATION: 97 % | WEIGHT: 127 LBS | TEMPERATURE: 97.2 F | SYSTOLIC BLOOD PRESSURE: 132 MMHG | RESPIRATION RATE: 18 BRPM | BODY MASS INDEX: 19.89 KG/M2

## 2025-06-19 DIAGNOSIS — S70.362A TICK BITE OF LEFT THIGH, INITIAL ENCOUNTER: Primary | ICD-10-CM
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
## 2025-06-19 RX ORDER — DOXYCYCLINE 100 MG/1
200 CAPSULE ORAL ONCE
Qty: 2 CAPSULE | Refills: 0 | Status: SHIPPED | OUTPATIENT
Start: 2025-06-19 | End: 2025-06-19

## 2025-06-19 NOTE — PROGRESS NOTES
Urgent Care Clinic Visit    Chief Complaint   Patient presents with    Urgent Care     - Yesterday  - Left Leg  - Removed Tick  - Wants to rule out infection               6/19/2025    11:10 AM   Additional Questions   Roomed by Solitario DOOLEY   Accompanied by Supa

## 2025-06-19 NOTE — PROGRESS NOTES
SUBJECTIVE:  Jose Carlos Bolden is a 70 year old male who presents with a chief complaint of an tick bite on the hip  left.  Removed 12 hours ago. Duration on skin unknown. Tick appears to be deer tick      last tetanus booster within 10 years    Past Medical History:   Diagnosis Date    Allergy     sulfa, penicillium, food (tomatillos, ground cherries, some potato chips)    Anxiety     Back pain     BPPV (benign paroxysmal positional vertigo)     Depressive disorder 1974    Glaucoma suspect     Head injury 1966    not diagnosed    Headache     Hearing loss     Hip pain, left     Hyperlipidemia LDL goal <130     Hypertension 2019    sub threshold    Iron deficiency     Kyphosis     Osteoarthritis     Reactive airway disease     Restless leg syndrome     Scoliosis     Uncomplicated asthma 1970    recently dx, sub threshold?    Vitamin B12 deficiency        Current Outpatient Medications:     albuterol (PROAIR HFA) 108 (90 BASE) MCG/ACT Inhaler, Inhale 2 puffs into the lungs every 4 hours as needed for shortness of breath / dyspnea, Disp: 1 Inhaler, Rfl: 0    atorvastatin (LIPITOR) 20 MG tablet, TAKE 1 TABLET (20 MG) BY MOUTH DAILY, Disp: 90 tablet, Rfl: 2    calcium carbonate (TUMS) 500 MG chewable tablet, Take 1 tablet (500 mg) by mouth daily as needed for heartburn., Disp: , Rfl:     Cyanocobalamin (VITAMIN B12) 1000 MCG TBCR, Take 1 tablet by mouth at bedtime. Dose varies, Disp: 90 tablet, Rfl: 3    dorzolamide-timolol (COSOPT) 2-0.5 % ophthalmic solution, Place 1 drop into the right eye 2 times daily., Disp: 10 mL, Rfl: 5    doxycycline hyclate (VIBRAMYCIN) 100 MG capsule, Take 2 capsules (200 mg) by mouth once for 1 dose., Disp: 2 capsule, Rfl: 0    ferrous gluconate (FERGON) 324 (38 Fe) MG tablet, Take 324 mg by mouth at bedtime., Disp: , Rfl:     gabapentin (NEURONTIN) 600 MG tablet, Take 1.5 tabs (900mg) in the afternoon (~2pm) and 2 tabs (1200mg) in the evening (7-8pm), Disp: 270 tablet, Rfl: 5    hydrocortisone  2.5 % cream, Apply topically 2 times daily Apply for 5-7 days for irritation. (Patient taking differently: Apply topically as needed. Apply for 5-7 days for irritation.), Disp: 20 g, Rfl: 3    ketoconazole (NIZORAL) 2 % external cream, Apply topically 2 times daily To face as needed for rash, Disp: 60 g, Rfl: 11    latanoprost (XALATAN) 0.005 % ophthalmic solution, Place 1 drop into both eyes daily (Patient taking differently: Place 1 drop into both eyes at bedtime.), Disp: 2.5 mL, Rfl: 11    metroNIDAZOLE (NORITATE) 1 % cream, Apply topically as needed., Disp: 60 g, Rfl: 0    sildenafil (VIAGRA) 25 MG tablet, Take 1-4 tablets ( mg) by mouth daily as needed (30 min - 2 hours prior sexual activity)., Disp: 20 tablet, Rfl: 1    triamcinolone (KENALOG) 0.1 % external cream, Apply topically 2 times daily (Patient taking differently: Apply topically as needed.), Disp: 30 g, Rfl: 0  Social History     Tobacco Use    Smoking status: Never    Smokeless tobacco: Never    Tobacco comments:     heavy childhood exposure   Substance Use Topics    Alcohol use: Yes     Comment: moderate use throughout all adulthood       ROS:  Review of systems negative except as stated above.    OBJECTIVE:  /72   Pulse 56   Temp 97.2  F (36.2  C) (Temporal)   Resp 18   Wt 57.6 kg (127 lb)   SpO2 97%   BMI 19.89 kg/m    GENERAL: healthy, alert no acute distress  NEURO: Normal strength and tone, sensory exam grossly normal,  normal speech and mentation  SKIN: no suspicious lesions or rashes    ASSESSMENT:  (S70.362A,  W57.XXXA) Tick bite of left thigh, initial encounter  (primary encounter diagnosis)  Comment: prophylaxis indicated  Plan: doxycycline hyclate (VIBRAMYCIN) 100 MG capsule        Lyme test in 4-6 weeks

## 2025-07-03 ENCOUNTER — MYC MEDICAL ADVICE (OUTPATIENT)
Dept: INTERNAL MEDICINE | Facility: CLINIC | Age: 70
End: 2025-07-03
Payer: COMMERCIAL

## 2025-07-03 DIAGNOSIS — I83.811 VARICOSE VEINS OF RIGHT LOWER EXTREMITY WITH PAIN: Primary | ICD-10-CM

## 2025-07-09 DIAGNOSIS — I83.891 SYMPTOMATIC VARICOSE VEINS OF RIGHT LOWER EXTREMITY: Primary | ICD-10-CM

## 2025-07-16 ENCOUNTER — TRANSFERRED RECORDS (OUTPATIENT)
Dept: HEALTH INFORMATION MANAGEMENT | Facility: CLINIC | Age: 70
End: 2025-07-16

## 2025-08-08 ENCOUNTER — ANCILLARY PROCEDURE (OUTPATIENT)
Dept: ULTRASOUND IMAGING | Facility: CLINIC | Age: 70
End: 2025-08-08
Attending: RADIOLOGY
Payer: COMMERCIAL

## 2025-08-08 DIAGNOSIS — I83.891 SYMPTOMATIC VARICOSE VEINS OF RIGHT LOWER EXTREMITY: ICD-10-CM

## 2025-08-08 PROCEDURE — 93971 EXTREMITY STUDY: CPT | Mod: RT | Performed by: STUDENT IN AN ORGANIZED HEALTH CARE EDUCATION/TRAINING PROGRAM

## 2025-08-08 PROCEDURE — 82607 VITAMIN B-12: CPT | Performed by: INTERNAL MEDICINE

## 2025-08-08 PROCEDURE — 99000 SPECIMEN HANDLING OFFICE-LAB: CPT | Performed by: PATHOLOGY

## 2025-08-21 ENCOUNTER — OFFICE VISIT (OUTPATIENT)
Dept: VASCULAR SURGERY | Facility: CLINIC | Age: 70
End: 2025-08-21
Attending: NURSE PRACTITIONER
Payer: COMMERCIAL

## 2025-08-21 VITALS
SYSTOLIC BLOOD PRESSURE: 121 MMHG | HEART RATE: 56 BPM | WEIGHT: 131.5 LBS | OXYGEN SATURATION: 95 % | DIASTOLIC BLOOD PRESSURE: 71 MMHG | BODY MASS INDEX: 20.59 KG/M2

## 2025-08-21 DIAGNOSIS — I83.811 VARICOSE VEINS OF RIGHT LOWER EXTREMITY WITH PAIN: ICD-10-CM

## 2025-08-21 PROCEDURE — 1125F AMNT PAIN NOTED PAIN PRSNT: CPT | Performed by: PHYSICIAN ASSISTANT

## 2025-08-21 PROCEDURE — 3078F DIAST BP <80 MM HG: CPT | Performed by: PHYSICIAN ASSISTANT

## 2025-08-21 PROCEDURE — 3074F SYST BP LT 130 MM HG: CPT | Performed by: PHYSICIAN ASSISTANT

## 2025-08-21 PROCEDURE — 99203 OFFICE O/P NEW LOW 30 MIN: CPT | Performed by: PHYSICIAN ASSISTANT

## 2025-08-21 ASSESSMENT — PAIN SCALES - GENERAL: PAINLEVEL_OUTOF10: MILD PAIN (1)

## 2025-08-25 ENCOUNTER — MYC MEDICAL ADVICE (OUTPATIENT)
Dept: INTERNAL MEDICINE | Facility: CLINIC | Age: 70
End: 2025-08-25
Payer: COMMERCIAL

## 2025-08-25 ENCOUNTER — TELEPHONE (OUTPATIENT)
Dept: VASCULAR SURGERY | Facility: CLINIC | Age: 70
End: 2025-08-25
Payer: COMMERCIAL

## 2025-09-04 ENCOUNTER — TELEPHONE (OUTPATIENT)
Dept: VASCULAR SURGERY | Facility: CLINIC | Age: 70
End: 2025-09-04
Payer: COMMERCIAL

## (undated) DEVICE — DRSG TEGADERM 4X4 3/4" 1626W

## (undated) DEVICE — LINEN BACK PACK 5440

## (undated) DEVICE — DRAPE IOBAN ISOLATION VERTICAL 6619

## (undated) DEVICE — TUBING SUCTION 12"X1/4" N612

## (undated) DEVICE — SOL NACL 0.9% IRRIG 3000ML BAG 2B7477

## (undated) DEVICE — Device

## (undated) DEVICE — BONE CLEANING TIP INTERPULSE  0210-010-000

## (undated) DEVICE — STRAP KNEE/BODY 31143004

## (undated) DEVICE — PREP CHLORAPREP 26ML TINTED HI-LITE ORANGE 930815

## (undated) DEVICE — SU MONOCRYL 3-0 PS-1 27" Y936H

## (undated) DEVICE — NEEDLE SPINAL DISP 20GA X 3.5" QUINCKE 333335

## (undated) DEVICE — GLOVE BIOGEL PI ULTRATOUCH G SZ 7.5 42175

## (undated) DEVICE — BLADE SAW SAGITTAL STRK 18X90X1.37MM HD SYS 6 6118-137-090

## (undated) DEVICE — SUCTION TIP YANKAUER W/O VENT K86

## (undated) DEVICE — KIT ENDO TURNOVER/PROCEDURE CARRY-ON 101822

## (undated) DEVICE — ESU GROUND PAD ADULT W/CORD E7507

## (undated) DEVICE — HOOD SURG T7PLUS PEEL AWAY FACE SHIELD STRL LF 0416-801-100

## (undated) DEVICE — EYE PREP BETADINE 5% SOLUTION 30ML 0065-0411-30

## (undated) DEVICE — SUCTION MANIFOLD NEPTUNE 2 SYS 1 PORT 702-025-000

## (undated) DEVICE — DRAPE C-ARM W/STRAPS 42X72" 07-CA104

## (undated) DEVICE — SU DERMABOND ADVANCED .7ML DNX12

## (undated) DEVICE — SU VICRYL 2-0 CT-1 27" UND J259H

## (undated) DEVICE — SUCTION MANIFOLD NEPTUNE 2 SYS 4 PORT 0702-020-000

## (undated) DEVICE — SPECIMEN CONTAINER 3OZ W/FORMALIN 59901

## (undated) DEVICE — DRAPE IOBAN INCISE 36X23" 6651EZ

## (undated) DEVICE — SOL WATER IRRIG 1000ML BOTTLE 2F7114

## (undated) DEVICE — PACK TOTAL HIP W/U DRAPE RIVERSIDE LATEX FREE

## (undated) DEVICE — SUCTION IRR SYSTEM W/O TIP INTERPULSE HANDPIECE 0210-100-000

## (undated) DEVICE — GOWN XLG DISP 9545

## (undated) DEVICE — SU VICRYL 1 CT-1 27" J341H

## (undated) DEVICE — LINEN TOWEL PACK X5 5464

## (undated) DEVICE — SOL NACL 0.9% IRRIG 1000ML BOTTLE 2F7124

## (undated) DEVICE — SPONGE LAP 18X18" X8435

## (undated) DEVICE — GLOVE BIOGEL PI MICRO INDICATOR UNDERGLOVE SZ 8.0 48980

## (undated) DEVICE — GOWN IMPERVIOUS 2XL BLUE

## (undated) DEVICE — SU NDL MAYO 1824-2

## (undated) DEVICE — DRSG SILVERCEL 4.25X4.25" 900404

## (undated) DEVICE — SU VICRYL 0 CT-1 CR 8X18" J740D

## (undated) RX ORDER — FENTANYL CITRATE-0.9 % NACL/PF 10 MCG/ML
PLASTIC BAG, INJECTION (ML) INTRAVENOUS
Status: DISPENSED
Start: 2025-02-21

## (undated) RX ORDER — TRIAMCINOLONE ACETONIDE 40 MG/ML
INJECTION, SUSPENSION INTRA-ARTICULAR; INTRAMUSCULAR
Status: DISPENSED
Start: 2022-05-05

## (undated) RX ORDER — LIDOCAINE HYDROCHLORIDE 10 MG/ML
INJECTION, SOLUTION EPIDURAL; INFILTRATION; INTRACAUDAL; PERINEURAL
Status: DISPENSED
Start: 2020-02-28

## (undated) RX ORDER — PROPOFOL 10 MG/ML
INJECTION, EMULSION INTRAVENOUS
Status: DISPENSED
Start: 2025-02-21

## (undated) RX ORDER — LIDOCAINE HYDROCHLORIDE 10 MG/ML
INJECTION, SOLUTION EPIDURAL; INFILTRATION; INTRACAUDAL; PERINEURAL
Status: DISPENSED
Start: 2022-05-05

## (undated) RX ORDER — HYDROMORPHONE HYDROCHLORIDE 1 MG/ML
INJECTION, SOLUTION INTRAMUSCULAR; INTRAVENOUS; SUBCUTANEOUS
Status: DISPENSED
Start: 2025-02-21

## (undated) RX ORDER — BUPIVACAINE HYDROCHLORIDE 2.5 MG/ML
INJECTION, SOLUTION EPIDURAL; INFILTRATION; INTRACAUDAL
Status: DISPENSED
Start: 2020-02-28

## (undated) RX ORDER — CEFAZOLIN SODIUM/WATER 2 G/20 ML
SYRINGE (ML) INTRAVENOUS
Status: DISPENSED
Start: 2025-02-21

## (undated) RX ORDER — ESMOLOL HYDROCHLORIDE 10 MG/ML
INJECTION INTRAVENOUS
Status: DISPENSED
Start: 2025-02-21

## (undated) RX ORDER — FENTANYL CITRATE 50 UG/ML
INJECTION, SOLUTION INTRAMUSCULAR; INTRAVENOUS
Status: DISPENSED
Start: 2020-12-17

## (undated) RX ORDER — FENTANYL CITRATE 50 UG/ML
INJECTION, SOLUTION INTRAMUSCULAR; INTRAVENOUS
Status: DISPENSED
Start: 2025-02-21

## (undated) RX ORDER — ONDANSETRON 2 MG/ML
INJECTION INTRAMUSCULAR; INTRAVENOUS
Status: DISPENSED
Start: 2025-02-21

## (undated) RX ORDER — TRANEXAMIC ACID 650 MG/1
TABLET ORAL
Status: DISPENSED
Start: 2025-02-21

## (undated) RX ORDER — GLYCOPYRROLATE 0.2 MG/ML
INJECTION, SOLUTION INTRAMUSCULAR; INTRAVENOUS
Status: DISPENSED
Start: 2025-02-21

## (undated) RX ORDER — OXYCODONE HYDROCHLORIDE 5 MG/1
TABLET ORAL
Status: DISPENSED
Start: 2025-02-21

## (undated) RX ORDER — ONDANSETRON 4 MG/1
TABLET, ORALLY DISINTEGRATING ORAL
Status: DISPENSED
Start: 2025-02-21

## (undated) RX ORDER — DEXAMETHASONE SODIUM PHOSPHATE 4 MG/ML
INJECTION, SOLUTION INTRA-ARTICULAR; INTRALESIONAL; INTRAMUSCULAR; INTRAVENOUS; SOFT TISSUE
Status: DISPENSED
Start: 2025-02-21

## (undated) RX ORDER — TRIAMCINOLONE ACETONIDE 40 MG/ML
INJECTION, SUSPENSION INTRA-ARTICULAR; INTRAMUSCULAR
Status: DISPENSED
Start: 2020-02-28

## (undated) RX ORDER — ACETAMINOPHEN 325 MG/1
TABLET ORAL
Status: DISPENSED
Start: 2025-02-21

## (undated) RX ORDER — KETOROLAC TROMETHAMINE 30 MG/ML
INJECTION, SOLUTION INTRAMUSCULAR; INTRAVENOUS
Status: DISPENSED
Start: 2025-02-21

## (undated) RX ORDER — LIDOCAINE HYDROCHLORIDE AND EPINEPHRINE 10; 10 MG/ML; UG/ML
INJECTION, SOLUTION INFILTRATION; PERINEURAL
Status: DISPENSED
Start: 2024-03-26

## (undated) RX ORDER — LABETALOL HYDROCHLORIDE 5 MG/ML
INJECTION, SOLUTION INTRAVENOUS
Status: DISPENSED
Start: 2025-02-21

## (undated) RX ORDER — CEFAZOLIN SODIUM 1 G/3ML
INJECTION, POWDER, FOR SOLUTION INTRAMUSCULAR; INTRAVENOUS
Status: DISPENSED
Start: 2025-02-21